# Patient Record
Sex: FEMALE | Race: WHITE | NOT HISPANIC OR LATINO | Employment: OTHER | URBAN - METROPOLITAN AREA
[De-identification: names, ages, dates, MRNs, and addresses within clinical notes are randomized per-mention and may not be internally consistent; named-entity substitution may affect disease eponyms.]

---

## 2017-02-22 ENCOUNTER — TELEPHONE (OUTPATIENT)
Dept: ONCOLOGY | Facility: CLINIC | Age: 52
End: 2017-02-22

## 2017-02-22 DIAGNOSIS — M85.80 OSTEOPENIA: Primary | ICD-10-CM

## 2017-02-22 DIAGNOSIS — C50.919 BREAST CANCER (H): ICD-10-CM

## 2017-02-22 NOTE — TELEPHONE ENCOUNTER
Name of caller:   Mayra Ramirez  Relationship to pt:  Self    Reason for call:   Patient calling to report that she broke her collar bone 14 months ago and it isn't healing. She is concerned that it's due to poor bone density since having chemo. Please advise    Best phone number to reach pt at is:   [phone]  Ok to leave a message with medical info?   Yes

## 2017-02-23 NOTE — TELEPHONE ENCOUNTER
Spoke with Dr. Wu regarding patient's concerns about her bone density.  Since patient is on Boniva, Dr. Wu's recommendation is for patient to see endocrinology for further discussion about her osteopenia concerns and recommendations.  Called patient back and relayed this information to her.  She would like our recommendation for an endocrinologist.  Placed referral for endocrinology and our schedulers will call her later today to set up appointment.  Pt voiced understanding instructions.  No further questions at this time.

## 2017-02-24 ENCOUNTER — OFFICE VISIT - HEALTHEAST (OUTPATIENT)
Dept: INTERNAL MEDICINE | Facility: CLINIC | Age: 52
End: 2017-02-24

## 2017-02-24 DIAGNOSIS — M25.561 ACUTE PAIN OF RIGHT KNEE: ICD-10-CM

## 2017-02-24 DIAGNOSIS — M79.672 FOOT PAIN, LEFT: ICD-10-CM

## 2017-02-24 ASSESSMENT — MIFFLIN-ST. JEOR: SCORE: 1432.29

## 2017-03-03 ENCOUNTER — TELEPHONE (OUTPATIENT)
Dept: ONCOLOGY | Facility: CLINIC | Age: 52
End: 2017-03-03

## 2017-03-03 NOTE — TELEPHONE ENCOUNTER
Patient is calling and would like a referral to a endocrinologist. Patient broke her collar bone in October 2016 and it still has not healed.  Dr. Wu mentioned she would like patient to see a endocrinologist.  Patient is calling for a referral and name.

## 2017-03-06 NOTE — TELEPHONE ENCOUNTER
Spoke with our schedulers.  They will contact patient today to get endocrinology apt set up.  Pt has Osteopenia and is on Boniva.  She has had trouble healing from her broken collar bone from Oct 2016.  Dr. Wu would like her to see Endocrinology for further recommendations.

## 2017-04-07 ENCOUNTER — OFFICE VISIT (OUTPATIENT)
Dept: ENDOCRINOLOGY | Facility: CLINIC | Age: 52
End: 2017-04-07

## 2017-04-07 VITALS
BODY MASS INDEX: 25.46 KG/M2 | SYSTOLIC BLOOD PRESSURE: 102 MMHG | WEIGHT: 168 LBS | DIASTOLIC BLOOD PRESSURE: 67 MMHG | HEIGHT: 68 IN | HEART RATE: 61 BPM

## 2017-04-07 DIAGNOSIS — M81.0 OSTEOPOROSIS: Primary | ICD-10-CM

## 2017-04-07 DIAGNOSIS — M81.0 OSTEOPOROSIS: ICD-10-CM

## 2017-04-07 LAB
ALBUMIN SERPL-MCNC: 3.9 G/DL (ref 3.4–5)
CALCIUM SERPL-MCNC: 9.2 MG/DL (ref 8.5–10.1)
PTH-INTACT SERPL-MCNC: 30 PG/ML (ref 12–72)

## 2017-04-07 ASSESSMENT — PAIN SCALES - GENERAL: PAINLEVEL: NO PAIN (0)
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
## 2017-04-07 NOTE — NURSING NOTE
Chief Complaint   Patient presents with     Consult     NEW PATIENT- OSTEOPENIA     Tenisha Garcia, [facility]  Endocrinology & Diabetes 3G

## 2017-04-07 NOTE — MR AVS SNAPSHOT
After Visit Summary   4/7/2017    Mayra Ramirez    MRN: [id number]           Patient Information     Date Of Birth          1965        Visit Information        Provider Department      4/7/2017 2:50 PM Ramona Sandhu MD [facility] Endocrinology        Today's Diagnoses     Osteoporosis    -  1       Follow-ups after your visit        Your next 10 appointments already scheduled     Apr 13, 2017  3:30 PM CDT   Return Visit with David Wu MD   [facility] ([facility])    [facility]  [address]  [facility] [address]   [phone]              Future tests that were ordered for you today     Open Future Orders        Priority Expected Expires Ordered    Osteocalcin Routine  4/8/2018 4/7/2017    Vitamin D Deficiency (D3 Only) Routine  4/7/2018 4/7/2017    Albumin level Routine  4/7/2018 4/7/2017    Calcium Routine  4/7/2018 4/7/2017    Bone specific alk phosphatase Routine  4/7/2018 4/7/2017    Parathyroid Hormone Intact Routine  4/7/2018 4/7/2017    : Laboratory Miscellaneous Order Routine  4/7/2018 4/7/2017            Who to contact     Please call your clinic at [phone] to:    Ask questions about your health    Make or cancel appointments    Discuss your medicines    Learn about your test results    Speak to your doctor   If you have compliments or concerns about an experience at your clinic, or if you wish to file a complaint, please contact [facility] Patient Relations at [phone] or email us at [email or web address]         Additional Information About Your Visit        MyChart Information     [employer] gives you secure access to your electronic health record. If you see a primary care provider, you can also send messages to your care team and make appointments. If you have questions, please call your primary care clinic.  If you do not have a  "primary care provider, please call [phone] and they will assist you.      [employer] is an electronic gateway that provides easy, online access to your medical records. With [employer], you can request a clinic appointment, read your test results, renew a prescription or communicate with your care team.     To access your existing account, please contact your [facility] or call [phone] for assistance.        Care EveryWhere ID     This is your Care EveryWhere ID. This could be used by other organizations to access your [place] medical records  [id number]        Your Vitals Were     Pulse Height BMI (Body Mass Index)             61 1.727 m (5' 8\") 25.54 kg/m2          Blood Pressure from Last 3 Encounters:   04/07/17 102/67   10/12/16 106/71   09/12/16 98/64    Weight from Last 3 Encounters:   04/07/17 76.2 kg (168 lb)   10/12/16 74.9 kg (165 lb 3.2 oz)   09/12/16 75.7 kg (166 lb 14.4 oz)                 Today's Medication Changes          These changes are accurate as of: 4/7/17  3:41 PM.  If you have any questions, ask your nurse or doctor.               These medicines have changed or have updated prescriptions.        Dose/Directions    letrozole 2.5 MG tablet   Commonly known as:  FEMARA   This may have changed:  Another medication with the same name was removed. Continue taking this medication, and follow the directions you see here.   Used for:  Malignant neoplasm of female breast, unspecified laterality, unspecified site of breast (H)   Changed by:  David Wu MD        Dose:  2.5 mg   Take 1 tablet (2.5 mg) by mouth daily   Quantity:  90 tablet   Refills:  3         Stop taking these medicines if you haven't already. Please contact your care team if you have questions.     ACIDOPHILUS PO   Stopped by:  Ramona Sandhu MD           FISH OIL PO   Stopped by:  Ramona Sandhu MD           ZANTAC PO   Stopped by:  Ramona Sandhu MD     "                Primary Care Provider Office Phone # Fax #    Bryson Enriquez [phone]       [facility] [address]  [address]  [facility] [address]        Thank you!     Thank you for choosing [facility] ENDOCRINOLOGY  for your care. Our goal is always to provide you with excellent care. Hearing back from our patients is one way we can continue to improve our services. Please take a few minutes to complete the written survey that you may receive in the mail after your visit with us. Thank you!             Your Updated Medication List - Protect others around you: Learn how to safely use, store and throw away your medicines at www.disposemymeds.org.          This list is accurate as of: 4/7/17  3:41 PM.  Always use your most recent med list.                   Brand Name Dispense Instructions for use    BIOTIN PO      Take 1 tablet by mouth daily.       CALCIUM + D PO      None Entered       COQ10 PO      Take 1 tablet by mouth daily.       estradiol 0.1 MG/GM cream    ESTRACE    45 g    Use 0.5 gm intravaginally at bedtime on Monday and Thurs PM for 4 wks as directed       IBANdronate 150 MG tablet    BONIVA    3 tablet    Take 1 tablet (150 mg) by mouth every 30 days       letrozole 2.5 MG tablet    FEMARA    90 tablet    Take 1 tablet (2.5 mg) by mouth daily       MAGNESIUM GLUCONATE PO          * order for DME     1 Device    Equipment being ordered: Please provide patient with mastectomy bras and forms/prosthesis as indicated       * order for DME     1 Device    Equipment being ordered: Mastectomy Bras and prosthesis as indicated.       PROBIOTIC ADVANCED PO          vitamin D 1000 UNITS capsule      Take 2 capsules by mouth daily.       * Notice:  This list has 2 medication(s) that are the same as other medications prescribed for you. Read the directions carefully, and ask your doctor or other care provider to review them with you.

## 2017-04-08 PROBLEM — M81.0 OSTEOPOROSIS: Status: ACTIVE | Noted: 2017-04-08

## 2017-04-08 LAB — ALP BONE SERPL-MCNC: 12.9 UG/L

## 2017-04-09 LAB — DEPRECATED CALCIDIOL+CALCIFEROL SERPL-MC: 47 UG/L (ref 20–75)

## 2017-04-11 LAB
COLLAGEN NTX SER-SCNC: 19.8 UMOL/L
OSTEOCALCIN SERPL-MCNC: 18 NG/ML

## 2017-04-12 ENCOUNTER — TELEPHONE (OUTPATIENT)
Dept: ENDOCRINOLOGY | Facility: CLINIC | Age: 52
End: 2017-04-12

## 2017-04-12 NOTE — TELEPHONE ENCOUNTER
Reviewed lab    Results for MONIQUE GRIFFIN (MRN [id number]) as of 4/12/2017 11:38   Ref. Range 4/7/2017 16:00   Calcium Latest Ref Range: 8.5 - 10.1 mg/dL 9.2   Albumin Latest Ref Range: 3.4 - 5.0 g/dL 3.9   Bone Spec Alk Phosphatase Unknown 12.9   N-Telopeptide X-Link Unknown 19.8   Osteocalcin Latest Units: ng/mL 18   Vitamin D Deficiency screening Latest Ref Range: 20 - 75 ug/L 47   Parathyroid Hormone Intact Latest Ref Range: 12 - 72 pg/mL 30     NTX which is bone resorption marker is in upper limit of normal indicates of high bone resorption. Question is raised whether oral Boniva is working or is absorbed or not.    Plan: consider IV zoledronic acid vs. denosumab    Left message for patient to call back.    Ramona Sandhu MD    Division of Diabetes and Endocrinology  Department of Medicine  [phone]

## 2017-04-13 ENCOUNTER — ONCOLOGY VISIT (OUTPATIENT)
Dept: ONCOLOGY | Facility: CLINIC | Age: 52
End: 2017-04-13
Attending: INTERNAL MEDICINE
Payer: COMMERCIAL

## 2017-04-13 ENCOUNTER — HOSPITAL ENCOUNTER (OUTPATIENT)
Facility: CLINIC | Age: 52
Setting detail: SPECIMEN
Discharge: HOME | End: 2017-04-13
Attending: INTERNAL MEDICINE | Admitting: INTERNAL MEDICINE
Payer: COMMERCIAL

## 2017-04-13 VITALS
HEART RATE: 63 BPM | WEIGHT: 169 LBS | HEIGHT: 68 IN | OXYGEN SATURATION: 99 % | RESPIRATION RATE: 16 BRPM | SYSTOLIC BLOOD PRESSURE: 111 MMHG | BODY MASS INDEX: 25.61 KG/M2 | DIASTOLIC BLOOD PRESSURE: 72 MMHG | TEMPERATURE: 97.5 F

## 2017-04-13 DIAGNOSIS — C50.919 MALIGNANT NEOPLASM OF FEMALE BREAST, UNSPECIFIED LATERALITY, UNSPECIFIED SITE OF BREAST: Primary | ICD-10-CM

## 2017-04-13 LAB
ALBUMIN SERPL-MCNC: 3.9 G/DL (ref 3.4–5)
ALP SERPL-CCNC: 73 U/L (ref 40–150)
ALT SERPL W P-5'-P-CCNC: 25 U/L (ref 0–50)
ANION GAP SERPL CALCULATED.3IONS-SCNC: 6 MMOL/L (ref 3–14)
AST SERPL W P-5'-P-CCNC: 14 U/L (ref 0–45)
BILIRUB SERPL-MCNC: 0.3 MG/DL (ref 0.2–1.3)
BUN SERPL-MCNC: 14 MG/DL (ref 7–30)
CALCIUM SERPL-MCNC: 8.8 MG/DL (ref 8.5–10.1)
CANCER AG27-29 SERPL-ACNC: 31 U/ML (ref 0–39)
CHLORIDE SERPL-SCNC: 105 MMOL/L (ref 94–109)
CO2 SERPL-SCNC: 30 MMOL/L (ref 20–32)
CREAT SERPL-MCNC: 0.82 MG/DL (ref 0.52–1.04)
ERYTHROCYTE [DISTWIDTH] IN BLOOD BY AUTOMATED COUNT: 13 % (ref 10–15)
GFR SERPL CREATININE-BSD FRML MDRD: 73 ML/MIN/1.7M2
GLUCOSE SERPL-MCNC: 97 MG/DL (ref 70–99)
HCT VFR BLD AUTO: 39.7 % (ref 35–47)
HGB BLD-MCNC: 13.2 G/DL (ref 11.7–15.7)
MCH RBC QN AUTO: 30.1 PG (ref 26.5–33)
MCHC RBC AUTO-ENTMCNC: 33.2 G/DL (ref 31.5–36.5)
MCV RBC AUTO: 91 FL (ref 78–100)
PLATELET # BLD AUTO: 196 10E9/L (ref 150–450)
POTASSIUM SERPL-SCNC: 4 MMOL/L (ref 3.4–5.3)
PROT SERPL-MCNC: 7.2 G/DL (ref 6.8–8.8)
RBC # BLD AUTO: 4.38 10E12/L (ref 3.8–5.2)
SODIUM SERPL-SCNC: 141 MMOL/L (ref 133–144)
WBC # BLD AUTO: 6.3 10E9/L (ref 4–11)

## 2017-04-13 PROCEDURE — 99211 OFF/OP EST MAY X REQ PHY/QHP: CPT

## 2017-04-13 PROCEDURE — 99214 OFFICE O/P EST MOD 30 MIN: CPT | Performed by: INTERNAL MEDICINE

## 2017-04-13 PROCEDURE — 80053 COMPREHEN METABOLIC PANEL: CPT | Performed by: INTERNAL MEDICINE

## 2017-04-13 PROCEDURE — 85027 COMPLETE CBC AUTOMATED: CPT | Performed by: INTERNAL MEDICINE

## 2017-04-13 PROCEDURE — 86300 IMMUNOASSAY TUMOR CA 15-3: CPT | Performed by: INTERNAL MEDICINE

## 2017-04-13 PROCEDURE — 36415 COLL VENOUS BLD VENIPUNCTURE: CPT

## 2017-04-13 ASSESSMENT — PAIN SCALES - GENERAL: PAINLEVEL: NO PAIN (0)

## 2017-04-13 NOTE — NURSING NOTE
"Mayra Ramirez is a 51 year old female who presents for:  Chief Complaint   Patient presents with     Oncology Clinic Visit     Follow-up        Initial Vitals:  /72 (BP Location: Right arm, Patient Position: Chair, Cuff Size: Adult Regular)  Pulse 63  Temp 97.5  F (36.4  C) (Tympanic)  Resp 16  Ht 1.727 m (5' 8\")  Wt 76.7 kg (169 lb)  SpO2 99%  BMI 25.7 kg/m2 Estimated body mass index is 25.7 kg/(m^2) as calculated from the following:    Height as of this encounter: 1.727 m (5' 8\").    Weight as of this encounter: 76.7 kg (169 lb).. Body surface area is 1.92 meters squared. BP completed using cuff size: regular  No Pain (0) No LMP recorded. Patient is not currently having periods (Reason: UNKNOWN). Allergies and medications reviewed.     Medications: Medication refills not needed today.  Pharmacy name entered into [employer]: [id number] [place] [address]    Comments: Follow-up    8 minutes for nursing intake (face to face time)   Roshni Lopez  DISCHARGE PLAN:  Next appointments: See patient instruction section  Departure Mode: Ambulatory  Accompanied by: self  0 minutes for nursing discharge (face to face time)   Roshni Lopez          "

## 2017-04-13 NOTE — PATIENT INSTRUCTIONS
RTC MD 6 months -Scheduled for 10/18/2017 @ 2:30. Selina RIVER.    Labs today   And on [id number]. Selina CHAVEZ printed and given to Pt. Selina RIVER.

## 2017-04-13 NOTE — MR AVS SNAPSHOT
After Visit Summary   4/13/2017    Mayra Ramirez    MRN: [id number]           Patient Information     Date Of Birth          1965        Visit Information        Provider Department      4/13/2017 3:30 PM David Wu MD [facility] On[facility]      Today's Diagnoses     Malignant neoplasm of female breast, unspecified laterality, unspecified site of breast (H)    -  1      Care Instructions    RTC MD 6 months -Scheduled for 10/18/2017 @ 2:30. Selina LEBLANC    Labs today   And on hxoxek-Cwjm-Sooektsrq. Selina RIVER.  AVS printed and given to Pt. Selina RIVER.             Follow-ups after your visit        Your next 10 appointments already scheduled     Oct 18, 2017  2:30 PM CDT   Return Visit with David Wu MD   [facility] ([facility])    [facility]  [address]  [address]  [facility] [id number] [phone]              Who to contact     If you have questions or need follow up information about today's clinic visit or your schedule please contact [facility] directly at [phone].  Normal or non-critical lab and imaging results will be communicated to you by MyChart, letter or phone within 4 business days after the clinic has received the results. If you do not hear from us within 7 days, please contact the clinic through Ogorodhart or phone. If you have a critical or abnormal lab result, we will notify you by phone as soon as possible.  Submit refill requests through Tenebril or call your pharmacy and they will forward the refill request to us. Please allow 3 business days for your refill to be completed.          Additional Information About Your Visit        MyChart Information     Tenebril gives you secure access to your electronic health record. If you see a primary care provider, you can also send messages to your care team and make appointments. If you have  "questions, please call your primary care clinic.  If you do not have a primary care provider, please call [phone] and they will assist you.        Care EveryWhere ID     This is your Care EveryWhere ID. This could be used by other organizations to access your [facility] medical records  [id number]        Your Vitals Were     Pulse Temperature Respirations Height Pulse Oximetry BMI (Body Mass Index)    63 97.5  F (36.4  C) (Tympanic) 16 1.727 m (5' 8\") 99% 25.7 kg/m2       Blood Pressure from Last 3 Encounters:   04/13/17 111/72   04/07/17 102/67   10/12/16 106/71    Weight from Last 3 Encounters:   04/13/17 76.7 kg (169 lb)   04/07/17 76.2 kg (168 lb)   10/12/16 74.9 kg (165 lb 3.2 oz)              We Performed the Following     Ca27.29  breast tumor marker     CBC with platelets     Comprehensive metabolic panel (BMP + Alb, Alk Phos, ALT, AST, Total. Bili, TP)          Today's Medication Changes          These changes are accurate as of: 4/13/17  4:39 PM.  If you have any questions, ask your nurse or doctor.               Stop taking these medicines if you haven't already. Please contact your care team if you have questions.     IBANdronate 150 MG tablet   Commonly known as:  BONIVA   Stopped by:  David Wu MD                    Primary Care Provider Office Phone # Fax #    Bryson Enriquez [phone]       [facility] [address]  [address]  [place] [id number]        Thank you!     Thank you for choosing [facility]  for your care. Our goal is always to provide you with excellent care. Hearing back from our patients is one way we can continue to improve our services. Please take a few minutes to complete the written survey that you may receive in the mail after your visit with us. Thank you!             Your Updated Medication List - Protect others around you: Learn how to safely use, store and throw away your medicines at " www.disposemymeds.org.          This list is accurate as of: 4/13/17  4:39 PM.  Always use your most recent med list.                   Brand Name Dispense Instructions for use    BIOTIN PO      Take 1 tablet by mouth daily.       CALCIUM + D PO      None Entered       COQ10 PO      Take 1 tablet by mouth daily.       estradiol 0.1 MG/GM cream    ESTRACE    45 g    Use 0.5 gm intravaginally at bedtime on Monday and Thurs PM for 4 wks as directed       letrozole 2.5 MG tablet    FEMARA    90 tablet    Take 1 tablet (2.5 mg) by mouth daily       MAGNESIUM GLUCONATE PO          * order for DME     1 Device    Equipment being ordered: Please provide patient with mastectomy bras and forms/prosthesis as indicated       * order for DME     1 Device    Equipment being ordered: Mastectomy Bras and prosthesis as indicated.       PROBIOTIC ADVANCED PO          vitamin D 1000 UNITS capsule      Take 2 capsules by mouth daily.       * Notice:  This list has 2 medication(s) that are the same as other medications prescribed for you. Read the directions carefully, and ask your doctor or other care provider to review them with you.

## 2017-04-14 NOTE — PROGRESS NOTES
HISTORY OF PRESENT ILLNESS:  Mayra Ramirez is a 51-year-old patient who comes in today for interim followup.  She has a history of metastatic breast cancer, is here for followup of that.  Mayra was initially diagnosed with left-sided breast cancer and then developed brain metastasis.  She has been in remission for approximately 11 years on Femara, and she also does Boniva for her bones.  She has had some problems recently with memory and also she has had some issues with osteopenia and osteoporosis.  She had an MRI scan done which showed that she had some shrinkage on her brain, probably from her previous surgery and radiation therapy.  She also had neuropsych testing.  She decided to quit her job in order to decrease stress levels and increase her sleep to see if it would help with her brain functioning.  She is currently out of work.  With regard to the bones, she could potentially stop the Boniva, and also I have discussed with her whether or not she wants to stop the Femara because the Femara is actually hurting her bones.  She is going to start with stopping the Boniva and see if it makes any difference.  At this point she wants to continue on the Femara.      REVIEW OF SYSTEMS:  A 14-point comprehensive review of systems apart from what is outlined above is otherwise unremarkable.      MEDICATIONS:  As charted.      ALLERGIES:  As charted.      PHYSICAL EXAMINATION:   VITAL SIGNS:  Stable.   HEENT:  Oropharynx clear, mucous membranes moist.   NECK:  Has no masses or goiter.   LYMPH:  There is no cervical, supraclavicular or infraclavicular adenopathy.   CHEST:  Clear to auscultation and percussion bilaterally.   HEART:  S1, S2 normal.  No added sounds or murmurs.   BREASTS:  The patient is status post bilateral mastectomies.  The scars look good.  Right and left axillae are negative.   ABDOMEN:  Soft and nontender, no hepatosplenomegaly.   EXTREMITIES:  Legs are without tenderness or edema.   LYMPHATIC:  No  evidence of lymphedema.   NEUROLOGIC:  The patient is alert and oriented x3.  Peripheral neurologic exam grossly intact.   LEFT CLAVICLE:  The patient had an injury to her left clavicle area so that bone looks abnormal and has not healed properly.      DATA REVIEW:  Labs are pending at time of dictation.      IMPRESSION:  A 51-year-old patient with a history of metastatic breast cancer.  She has had metastatic breast cancer to brain.  She has been on Femara for a number of years.  She has also been on Boniva.  We are going to give her a holiday from the [place].  She will see me back again in 6 months.  At some point she could also stop her Femara and see if she still does well without it.         AMARILIS SAHNI MD             D: 2017 17:17   T: 2017 04:10   MT: [email or web address]      Name:     MONIQUE GRIFFIN   MRN:      [id number]        Account:      [id number]   :      1965           Visit Date:   2017      Document: [id number]

## 2017-04-18 DIAGNOSIS — C50.919 BREAST CANCER (H): Primary | ICD-10-CM

## 2017-04-20 ENCOUNTER — TELEPHONE (OUTPATIENT)
Dept: ENDOCRINOLOGY | Facility: CLINIC | Age: 52
End: 2017-04-20

## 2017-04-20 NOTE — TELEPHONE ENCOUNTER
Pt called back Dr. Greene to discuss labs r/t delayed clavicle healing. Please advise at [phone]. DVM fine. Sent to endo [facility].

## 2017-04-21 NOTE — TELEPHONE ENCOUNTER
Results for MONIQUE GRIFFIN (MRN [id number]) as of 4/21/2017 12:33   Ref. Range 4/7/2017 16:00   Calcium Latest Ref Range: 8.5 - 10.1 mg/dL 9.2   Albumin Latest Ref Range: 3.4 - 5.0 g/dL 3.9   Bone Spec Alk Phosphatase Unknown 12.9   N-Telopeptide X-Link Unknown 19.8   Osteocalcin Latest Units: ng/mL 18   Vitamin D Deficiency screening Latest Ref Range: 20 - 75 ug/L 47   Parathyroid Hormone Intact Latest Ref Range: 12 - 72 pg/mL 30     Called and discussed with the patient. Recommended Reclast. Side effects discussed.  She will discuss with her  and notify me.    Ramona Sandhu MD    Division of Diabetes and Endocrinology  Department of Medicine  [phone]

## 2017-04-27 ENCOUNTER — TELEPHONE (OUTPATIENT)
Dept: ONCOLOGY | Facility: CLINIC | Age: 52
End: 2017-04-27

## 2017-04-27 NOTE — TELEPHONE ENCOUNTER
Returned 's many calls ~ patient's short term memory loss and what should be done. Spoke with MD and she wants results of neuropsych testing and suggested stopping Femara.  Patient does not want to stop Femara, but has stopped working, and has a plan to exercise, get plenty of sleep and eat well.  Writer enc patient to stick to plan and give herself time to see if changing lifestyle and reducing stress is enough to improve memory issues.  Patient is in agreement with the plan.  Will cont to follow periodically and patient will call with additional questions or concerns.  Patient has also stop[ped Boniva for the forseeable future de to needing a break per MD.  Patient is concerned about DX of osteopenia and osteopetrosis, but is aware MD is monitoring.  No other concerns at this time.  Joe Baer, RN, BSN, OCN  [facility]  Patient Care Coordinator

## 2017-04-27 NOTE — TELEPHONE ENCOUNTER
Pt  Jesse Ramirez called this morning, he would like Joe to give him a call, he has some questions regarding his wife losing her short term memory, he can be reached at [phone]. Ok to leave a message. Nida Yip

## 2017-04-28 ENCOUNTER — TELEPHONE (OUTPATIENT)
Dept: ONCOLOGY | Facility: CLINIC | Age: 52
End: 2017-04-28

## 2017-04-28 NOTE — TELEPHONE ENCOUNTER
Patient calling wanting to know what bone strengthener she needs to go on.    Per Dr Wu's note states she wants the patient to have a Boniva holiday and she will re-evaluate at her next follow-up in 6 months.      Patient wants to know what new bone strengthener Dr Wu will put her on.    Explained to patient that she has not recommended anything because she wants her to have a holiday since she has been on boniva for a long time.    Patient states understanding.    Leda Patel RN BSN OCN

## 2017-04-28 NOTE — TELEPHONE ENCOUNTER
Remington, patient's spouse called ~ patient's short term memory loss.  Reports that patient has had 2 MRI's completed in May of 2015 and 2016.  Would like to have an additional MRI completed in May 2017 to have as a comparison.  2015 and 2016 MRI's showed brain shrinkage which may explain patient's STM loss.  Will discuss with MD and will also obtain info from [facility] ~ her neuro psych testing which was completed in Dec 2016 and reviewed in January 2017.  Patient has had her MRI's completed at [facility]. Patient's spouse would also like whatever paperwork is needed to be signed so he can receive information or sign on her behalf if she continues to have problems with her STM.  Writer did explain that his wife would need to sign off on all paperwork and he was aware of this fact.  Patient's email: [email or web address]

## 2017-05-03 ENCOUNTER — TELEPHONE (OUTPATIENT)
Dept: ONCOLOGY | Facility: CLINIC | Age: 52
End: 2017-05-03

## 2017-05-03 NOTE — TELEPHONE ENCOUNTER
Patient called and states she is schedule to do an MRI through her neurologist. She was told that they will do the MRI without contrast and was wondering if Dr. Wu, wanted her to do an MRI also since she thinks Dr. Wu, orders it with contrast.

## 2017-05-03 NOTE — TELEPHONE ENCOUNTER
Per review of chart, previous brain MRIs have been ordered without and with contrast.  Called pt.  Pt states that she is driving now and will call back later.

## 2017-05-04 NOTE — TELEPHONE ENCOUNTER
Talked with pt.  Pt states that her neurologist would like her to have another brain MRI done.  MRI will be ordered by neurologist.  Is wondering if her previous MRIs of the brain have used contrast?  Pt states that she thinks she has had contrast in the past.  Per review of chart, MRI of the brain from 5/4/2016 did use contrast.  Also MRI that was scanned into chart from [facility] in 2015 also used contrast.  Pt states that she will let her neurologist know that previous MRIs have used contrast.  Pt will also arrange to have MRI results sent to Dr Wu.  Pt verbalized understanding.  No further action needed at this time.    Jody Dutton RN BSN

## 2017-05-12 ENCOUNTER — TRANSFERRED RECORDS (OUTPATIENT)
Dept: HEALTH INFORMATION MANAGEMENT | Facility: CLINIC | Age: 52
End: 2017-05-12

## 2017-05-30 ENCOUNTER — TRANSFERRED RECORDS (OUTPATIENT)
Dept: HEALTH INFORMATION MANAGEMENT | Facility: CLINIC | Age: 52
End: 2017-05-30

## 2017-07-11 ENCOUNTER — COMMUNICATION - HEALTHEAST (OUTPATIENT)
Dept: FAMILY MEDICINE | Facility: CLINIC | Age: 52
End: 2017-07-11

## 2017-07-12 ENCOUNTER — AMBULATORY - HEALTHEAST (OUTPATIENT)
Dept: LAB | Facility: CLINIC | Age: 52
End: 2017-07-12

## 2017-07-12 DIAGNOSIS — R53.82 CHRONIC FATIGUE: ICD-10-CM

## 2017-07-13 ENCOUNTER — AMBULATORY - HEALTHEAST (OUTPATIENT)
Dept: NEUROLOGY | Facility: CLINIC | Age: 52
End: 2017-07-13

## 2017-07-13 DIAGNOSIS — G31.84 MCI (MILD COGNITIVE IMPAIRMENT): ICD-10-CM

## 2017-07-19 ENCOUNTER — HOSPITAL ENCOUNTER (OUTPATIENT)
Dept: NEUROLOGY | Facility: CLINIC | Age: 52
Setting detail: THERAPIES SERIES
Discharge: STILL ADMITTED OR ADMINISTRATIVE | End: 2017-07-19
Attending: PSYCHOLOGIST

## 2017-07-19 DIAGNOSIS — G31.84 MCI (MILD COGNITIVE IMPAIRMENT): ICD-10-CM

## 2017-07-19 DIAGNOSIS — F43.22 ADJUSTMENT DISORDER WITH ANXIETY: ICD-10-CM

## 2017-08-28 ENCOUNTER — OFFICE VISIT - HEALTHEAST (OUTPATIENT)
Dept: VASCULAR SURGERY | Facility: CLINIC | Age: 52
End: 2017-08-28

## 2017-08-28 ENCOUNTER — TRANSFERRED RECORDS (OUTPATIENT)
Dept: HEALTH INFORMATION MANAGEMENT | Facility: CLINIC | Age: 52
End: 2017-08-28

## 2017-08-28 DIAGNOSIS — L90.5 SCAR CONDITION AND FIBROSIS OF SKIN: ICD-10-CM

## 2017-08-28 DIAGNOSIS — R07.89 LEFT-SIDED CHEST WALL PAIN: ICD-10-CM

## 2017-08-28 DIAGNOSIS — I97.2 POST-MASTECTOMY LYMPHEDEMA SYNDROME: ICD-10-CM

## 2017-08-28 DIAGNOSIS — S42.022A: ICD-10-CM

## 2017-08-28 DIAGNOSIS — C50.919 BREAST CANCER METASTASIZED TO BRAIN, UNSPECIFIED LATERALITY (H): ICD-10-CM

## 2017-08-28 DIAGNOSIS — M24.512 CONTRACTURE, LEFT SHOULDER: ICD-10-CM

## 2017-08-28 DIAGNOSIS — C79.31 BREAST CANCER METASTASIZED TO BRAIN, UNSPECIFIED LATERALITY (H): ICD-10-CM

## 2017-08-28 ASSESSMENT — MIFFLIN-ST. JEOR: SCORE: 1386.94

## 2017-09-11 ENCOUNTER — TELEPHONE (OUTPATIENT)
Dept: ONCOLOGY | Facility: CLINIC | Age: 52
End: 2017-09-11

## 2017-09-11 ENCOUNTER — OFFICE VISIT - HEALTHEAST (OUTPATIENT)
Dept: PHYSICAL THERAPY | Facility: REHABILITATION | Age: 52
End: 2017-09-11

## 2017-09-11 DIAGNOSIS — C50.919 BREAST CANCER (H): Primary | ICD-10-CM

## 2017-09-11 DIAGNOSIS — R60.0 LOCALIZED EDEMA: ICD-10-CM

## 2017-09-11 DIAGNOSIS — R07.89 LEFT-SIDED CHEST WALL PAIN: ICD-10-CM

## 2017-09-11 DIAGNOSIS — M25.612 DECREASED ROM OF LEFT SHOULDER: ICD-10-CM

## 2017-09-12 ENCOUNTER — MEDICAL CORRESPONDENCE (OUTPATIENT)
Dept: HEALTH INFORMATION MANAGEMENT | Facility: CLINIC | Age: 52
End: 2017-09-12

## 2017-09-12 NOTE — TELEPHONE ENCOUNTER
Return call to pt who states she had a mastectomy in the past and will need new prescription for mastectomy bras and prosthesis, 2 each.  Requests order is sent to [facility] at [phone].  Prescriptions completed but will need signature from Dr Wu tomorrow.    Pt also states she is having problems with her balance that started within the last 6 months since pt has seen Dr Wu in April, 2017.  Pt states she has had Whole Brain RT in past for brain mets and has had memory issues and did have therapy at [facility] in [place] in the past.  Pt states she would like referral for [facility] again. Along with referral, pt states she is having more trouble with balance in the last 1-2 months and notes issues when she is not walking on a flat surface, walking at the fair, walking down stairs. Pt denies headaches, n/v, visual changes. Informed pt we will need to discuss with Dr Wu tomorrow when in clinic. Pt then states she cannot recall dates but has also seen neurology in past for memory issues but saw neuro at [facility] this summer and had Pet and other scans as well but informed her issues are related to past RT. Pt will call [facility] and have records faxed to our clinic.     Belkis Hooper, RN, BSN

## 2017-09-13 ENCOUNTER — MEDICAL CORRESPONDENCE (OUTPATIENT)
Dept: HEALTH INFORMATION MANAGEMENT | Facility: CLINIC | Age: 52
End: 2017-09-13

## 2017-09-13 NOTE — TELEPHONE ENCOUNTER
Called patient.  We have her referral to the [facility] that Dr. Wu signed.  We also have a prescription for 2 mastectomy bras and prosthesis that we will fax.  We just need to know where patient would like to have her [facility] referral sent to (which office location).  No answer.  Left voicemail for patient to call us back.

## 2017-09-13 NOTE — TELEPHONE ENCOUNTER
Was able to speak with patient this morning.  She'd like her [facility] Referral sent to the [place] office.  Told patient we will fax it to that location.  Also, told patient we faxed her prescription for the 2 mastectomy bras to [facility] as requested.  Forms faxed and sent to scanning.  Pt also updated us that she called [facility] and they plan to push her PET scan results to us.  She spoke with [facility] and they will send us her MRI results via mail.  No further questions or concerns at this time.

## 2017-09-14 ENCOUNTER — OFFICE VISIT - HEALTHEAST (OUTPATIENT)
Dept: PHYSICAL THERAPY | Facility: REHABILITATION | Age: 52
End: 2017-09-14

## 2017-09-14 DIAGNOSIS — M25.612 DECREASED ROM OF LEFT SHOULDER: ICD-10-CM

## 2017-09-14 DIAGNOSIS — R07.89 LEFT-SIDED CHEST WALL PAIN: ICD-10-CM

## 2017-09-14 DIAGNOSIS — R60.0 LOCALIZED EDEMA: ICD-10-CM

## 2017-09-18 ENCOUNTER — OFFICE VISIT - HEALTHEAST (OUTPATIENT)
Dept: PHYSICAL THERAPY | Facility: REHABILITATION | Age: 52
End: 2017-09-18

## 2017-09-18 DIAGNOSIS — R60.0 LOCALIZED EDEMA: ICD-10-CM

## 2017-09-18 DIAGNOSIS — R07.89 LEFT-SIDED CHEST WALL PAIN: ICD-10-CM

## 2017-09-18 DIAGNOSIS — M25.612 DECREASED ROM OF LEFT SHOULDER: ICD-10-CM

## 2017-09-21 ENCOUNTER — OFFICE VISIT - HEALTHEAST (OUTPATIENT)
Dept: PHYSICAL THERAPY | Facility: REHABILITATION | Age: 52
End: 2017-09-21

## 2017-09-21 DIAGNOSIS — M25.612 DECREASED ROM OF LEFT SHOULDER: ICD-10-CM

## 2017-09-21 DIAGNOSIS — L90.5 SCAR CONDITION AND FIBROSIS OF SKIN: ICD-10-CM

## 2017-09-21 DIAGNOSIS — R07.89 LEFT-SIDED CHEST WALL PAIN: ICD-10-CM

## 2017-09-21 DIAGNOSIS — M24.512 CONTRACTURE, LEFT SHOULDER: ICD-10-CM

## 2017-09-21 DIAGNOSIS — I97.2 POST-MASTECTOMY LYMPHEDEMA SYNDROME: ICD-10-CM

## 2017-09-21 DIAGNOSIS — R60.0 LOCALIZED EDEMA: ICD-10-CM

## 2017-09-23 DIAGNOSIS — N95.2 ATROPHIC VAGINITIS: ICD-10-CM

## 2017-09-24 NOTE — TELEPHONE ENCOUNTER
estradiol (ESTRACE) 0.1 MG/GM vaginal cream      Last Written Prescription Date: 08/27/15  Last Fill Quantity: 45g, # refills: 1  Last Office Visit with JEFF, HARLEY or [facility] prescribing provider: 09/12/16  Next 5 appointments (look out 90 days)     Oct 25, 2017 10:30 AM CDT   (Arrive by 6:30 AM)   Return Visit with David Wu MD   [facility] ([facility])    [facility]  [address]  [address]  [facility] [address]   [phone]                   BP Readings from Last 3 Encounters:   04/13/17 111/72   04/07/17 102/67   10/12/16 106/71     Date of last Breast Exam: not charted

## 2017-09-25 ENCOUNTER — OFFICE VISIT - HEALTHEAST (OUTPATIENT)
Dept: PHYSICAL THERAPY | Facility: REHABILITATION | Age: 52
End: 2017-09-25

## 2017-09-25 DIAGNOSIS — R60.0 LOCALIZED EDEMA: ICD-10-CM

## 2017-09-25 DIAGNOSIS — L90.5 SCAR CONDITION AND FIBROSIS OF SKIN: ICD-10-CM

## 2017-09-25 DIAGNOSIS — R07.89 LEFT-SIDED CHEST WALL PAIN: ICD-10-CM

## 2017-09-25 DIAGNOSIS — M24.512 CONTRACTURE, LEFT SHOULDER: ICD-10-CM

## 2017-09-25 DIAGNOSIS — I97.2 POST-MASTECTOMY LYMPHEDEMA SYNDROME: ICD-10-CM

## 2017-09-25 DIAGNOSIS — M25.612 DECREASED ROM OF LEFT SHOULDER: ICD-10-CM

## 2017-09-25 DIAGNOSIS — Z85.3 HISTORY OF BREAST CANCER: ICD-10-CM

## 2017-09-26 RX ORDER — ESTRADIOL 0.1 MG/G
CREAM VAGINAL
Qty: 42.5 G | Refills: 1 | Status: SHIPPED | OUTPATIENT
Start: 2017-09-26 | End: 2018-04-19

## 2017-09-26 NOTE — TELEPHONE ENCOUNTER
Dr. Enriquez please ok this medication with past hx of Cancer wanted to clarify ok to use .Rosa Elena Cody RN

## 2017-09-27 ENCOUNTER — TRANSFERRED RECORDS (OUTPATIENT)
Dept: HEALTH INFORMATION MANAGEMENT | Facility: CLINIC | Age: 52
End: 2017-09-27

## 2017-09-28 ENCOUNTER — OFFICE VISIT - HEALTHEAST (OUTPATIENT)
Dept: PHYSICAL THERAPY | Facility: REHABILITATION | Age: 52
End: 2017-09-28

## 2017-09-28 DIAGNOSIS — I97.2 POST-MASTECTOMY LYMPHEDEMA SYNDROME: ICD-10-CM

## 2017-09-28 DIAGNOSIS — L90.5 SCAR CONDITION AND FIBROSIS OF SKIN: ICD-10-CM

## 2017-09-28 DIAGNOSIS — R07.89 LEFT-SIDED CHEST WALL PAIN: ICD-10-CM

## 2017-09-28 DIAGNOSIS — M24.512 CONTRACTURE, LEFT SHOULDER: ICD-10-CM

## 2017-10-03 ENCOUNTER — OFFICE VISIT - HEALTHEAST (OUTPATIENT)
Dept: PHYSICAL THERAPY | Facility: REHABILITATION | Age: 52
End: 2017-10-03

## 2017-10-03 DIAGNOSIS — L90.5 SCAR CONDITION AND FIBROSIS OF SKIN: ICD-10-CM

## 2017-10-03 DIAGNOSIS — R07.89 LEFT-SIDED CHEST WALL PAIN: ICD-10-CM

## 2017-10-03 DIAGNOSIS — M24.512 CONTRACTURE, LEFT SHOULDER: ICD-10-CM

## 2017-10-03 DIAGNOSIS — M25.612 DECREASED ROM OF LEFT SHOULDER: ICD-10-CM

## 2017-10-03 DIAGNOSIS — I97.2 POST-MASTECTOMY LYMPHEDEMA SYNDROME: ICD-10-CM

## 2017-10-05 ENCOUNTER — OFFICE VISIT - HEALTHEAST (OUTPATIENT)
Dept: PHYSICAL THERAPY | Facility: REHABILITATION | Age: 52
End: 2017-10-05

## 2017-10-05 DIAGNOSIS — R60.0 LOCALIZED EDEMA: ICD-10-CM

## 2017-10-05 DIAGNOSIS — R07.89 LEFT-SIDED CHEST WALL PAIN: ICD-10-CM

## 2017-10-05 DIAGNOSIS — M24.512 CONTRACTURE, LEFT SHOULDER: ICD-10-CM

## 2017-10-05 DIAGNOSIS — L90.5 SCAR CONDITION AND FIBROSIS OF SKIN: ICD-10-CM

## 2017-10-05 DIAGNOSIS — M25.612 DECREASED ROM OF LEFT SHOULDER: ICD-10-CM

## 2017-10-05 DIAGNOSIS — I97.2 POST-MASTECTOMY LYMPHEDEMA SYNDROME: ICD-10-CM

## 2017-10-09 ENCOUNTER — OFFICE VISIT - HEALTHEAST (OUTPATIENT)
Dept: PHYSICAL THERAPY | Facility: REHABILITATION | Age: 52
End: 2017-10-09

## 2017-10-09 DIAGNOSIS — R07.89 LEFT-SIDED CHEST WALL PAIN: ICD-10-CM

## 2017-10-09 DIAGNOSIS — L90.5 SCAR CONDITION AND FIBROSIS OF SKIN: ICD-10-CM

## 2017-10-09 DIAGNOSIS — M24.512 CONTRACTURE, LEFT SHOULDER: ICD-10-CM

## 2017-10-09 DIAGNOSIS — I97.2 POST-MASTECTOMY LYMPHEDEMA SYNDROME: ICD-10-CM

## 2017-10-25 ENCOUNTER — ONCOLOGY VISIT (OUTPATIENT)
Dept: ONCOLOGY | Facility: CLINIC | Age: 52
End: 2017-10-25
Attending: INTERNAL MEDICINE
Payer: COMMERCIAL

## 2017-10-25 ENCOUNTER — HOSPITAL ENCOUNTER (OUTPATIENT)
Facility: CLINIC | Age: 52
Setting detail: SPECIMEN
Discharge: HOME | End: 2017-10-25
Attending: INTERNAL MEDICINE | Admitting: INTERNAL MEDICINE
Payer: COMMERCIAL

## 2017-10-25 VITALS
SYSTOLIC BLOOD PRESSURE: 104 MMHG | RESPIRATION RATE: 16 BRPM | HEIGHT: 68 IN | OXYGEN SATURATION: 100 % | TEMPERATURE: 96 F | DIASTOLIC BLOOD PRESSURE: 69 MMHG | WEIGHT: 170 LBS | BODY MASS INDEX: 25.76 KG/M2

## 2017-10-25 DIAGNOSIS — Z17.0 MALIGNANT NEOPLASM OF LEFT BREAST IN FEMALE, ESTROGEN RECEPTOR POSITIVE, UNSPECIFIED SITE OF BREAST (H): Primary | ICD-10-CM

## 2017-10-25 DIAGNOSIS — C50.912 MALIGNANT NEOPLASM OF LEFT BREAST IN FEMALE, ESTROGEN RECEPTOR POSITIVE, UNSPECIFIED SITE OF BREAST (H): Primary | ICD-10-CM

## 2017-10-25 LAB
ALBUMIN SERPL-MCNC: 4 G/DL (ref 3.4–5)
ALP SERPL-CCNC: 76 U/L (ref 40–150)
ALT SERPL W P-5'-P-CCNC: 26 U/L (ref 0–50)
ANION GAP SERPL CALCULATED.3IONS-SCNC: 5 MMOL/L (ref 3–14)
AST SERPL W P-5'-P-CCNC: 13 U/L (ref 0–45)
BILIRUB SERPL-MCNC: 0.7 MG/DL (ref 0.2–1.3)
BUN SERPL-MCNC: 15 MG/DL (ref 7–30)
CALCIUM SERPL-MCNC: 9 MG/DL (ref 8.5–10.1)
CANCER AG27-29 SERPL-ACNC: 31 U/ML (ref 0–39)
CHLORIDE SERPL-SCNC: 104 MMOL/L (ref 94–109)
CO2 SERPL-SCNC: 29 MMOL/L (ref 20–32)
CREAT SERPL-MCNC: 0.75 MG/DL (ref 0.52–1.04)
ERYTHROCYTE [DISTWIDTH] IN BLOOD BY AUTOMATED COUNT: 13.2 % (ref 10–15)
GFR SERPL CREATININE-BSD FRML MDRD: 81 ML/MIN/1.7M2
GLUCOSE SERPL-MCNC: 92 MG/DL (ref 70–99)
HCT VFR BLD AUTO: 42.7 % (ref 35–47)
HGB BLD-MCNC: 14.2 G/DL (ref 11.7–15.7)
MCH RBC QN AUTO: 29.9 PG (ref 26.5–33)
MCHC RBC AUTO-ENTMCNC: 33.3 G/DL (ref 31.5–36.5)
MCV RBC AUTO: 90 FL (ref 78–100)
PLATELET # BLD AUTO: 214 10E9/L (ref 150–450)
POTASSIUM SERPL-SCNC: 3.9 MMOL/L (ref 3.4–5.3)
PROT SERPL-MCNC: 7.7 G/DL (ref 6.8–8.8)
RBC # BLD AUTO: 4.75 10E12/L (ref 3.8–5.2)
SODIUM SERPL-SCNC: 138 MMOL/L (ref 133–144)
WBC # BLD AUTO: 5 10E9/L (ref 4–11)

## 2017-10-25 PROCEDURE — 99214 OFFICE O/P EST MOD 30 MIN: CPT | Performed by: INTERNAL MEDICINE

## 2017-10-25 PROCEDURE — 80053 COMPREHEN METABOLIC PANEL: CPT | Performed by: INTERNAL MEDICINE

## 2017-10-25 PROCEDURE — 36415 COLL VENOUS BLD VENIPUNCTURE: CPT

## 2017-10-25 PROCEDURE — 85027 COMPLETE CBC AUTOMATED: CPT | Performed by: INTERNAL MEDICINE

## 2017-10-25 PROCEDURE — 86300 IMMUNOASSAY TUMOR CA 15-3: CPT | Performed by: INTERNAL MEDICINE

## 2017-10-25 PROCEDURE — 99211 OFF/OP EST MAY X REQ PHY/QHP: CPT

## 2017-10-25 RX ORDER — LETROZOLE 2.5 MG/1
2.5 TABLET, FILM COATED ORAL DAILY
Qty: 90 TABLET | Refills: 3 | Status: CANCELLED | OUTPATIENT
Start: 2017-10-25

## 2017-10-25 ASSESSMENT — PAIN SCALES - GENERAL: PAINLEVEL: NO PAIN (0)

## 2017-10-25 NOTE — PATIENT INSTRUCTIONS
RTC MD 12 months  - scheduled/Karly  Bone density next available   - Faxed order to [facility].  Pt will now call to schedule at her convenience.  Karly    Labs today -Roshni CHAVEZ printed and given.  Karly

## 2017-10-25 NOTE — NURSING NOTE
"Oncology Rooming Note    October 25, 2017 10:37 AM   HeidiAngela Ramirez is a 52 year old female who presents for:    Chief Complaint   Patient presents with     Oncology Clinic Visit     Follow-up     Initial Vitals: Resp 16  Ht 1.727 m (5' 8\")  Wt 77.1 kg (170 lb)  BMI 25.85 kg/m2 Estimated body mass index is 25.85 kg/(m^2) as calculated from the following:    Height as of this encounter: 1.727 m (5' 8\").    Weight as of this encounter: 77.1 kg (170 lb). Body surface area is 1.92 meters squared.  No Pain (0) Comment: Data Unavailable   No LMP recorded. Patient is not currently having periods (Reason: UNKNOWN).  Allergies reviewed: Yes  Medications reviewed: Yes    Medications: MEDICATION REFILLS NEEDED TODAY. Provider was notified.  Pharmacy name entered into Ph[facility]: [id number] [place] [address]    Clinical concerns: Follow-up, patient would like a refill on her Famara     8 minutes for nursing intake (face to face time)   DISCHARGE PLAN:  Next appointments: See patient instruction section  Departure Mode: Ambulatory  Accompanied by: self  0 minutes for nursing discharge (face to face time)   Roshni Lopez                  "
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
## 2017-10-25 NOTE — MR AVS SNAPSHOT
After Visit Summary   10/25/2017    Mayra Ramirez    MRN: [id number]           Patient Information     Date Of Birth          1965        Visit Information        Provider Department      10/25/2017 10:30 AM David Wu MD [facility]        Today's Diagnoses     Malignant neoplasm of left breast in female, estrogen receptor positive, unspecified site of breast (H)    -  1      Care Instructions    RTC MD 12 months      Bone density next available         Labs today -[facility]          Follow-ups after your visit        Your next 10 appointments already scheduled     Oct 25, 2018 12:30 PM CDT   Return Visit with David Wu MD   [facility] ([facility])    [facility]  [address]  [facility] [address]  [facility] [id number] [phone]              Future tests that were ordered for you today     Open Future Orders        Priority Expected Expires Ordered    DX Hip/Pelvis/Spine Routine  10/25/2018 10/25/2017            Who to contact     If you have questions or need follow up information about today's clinic visit or your schedule please contact [facility] directly at [phone].  Normal or non-critical lab and imaging results will be communicated to you by MyChart, letter or phone within 4 business days after the clinic has received the results. If you do not hear from us within 7 days, please contact the clinic through [employer] or phone. If you have a critical or abnormal lab result, we will notify you by phone as soon as possible.  Submit refill requests through [employer] or call your pharmacy and they will forward the refill request to us. Please allow 3 business days for your refill to be completed.          Additional Information About Your Visit        MyChart Information     [employer] gives you secure access to your electronic health record. If you see a  "primary care provider, you can also send messages to your care team and make appointments. If you have questions, please call your primary care clinic.  If you do not have a primary care provider, please call [phone] and they will assist you.        Care EveryWhere ID     This is your Care EveryWhere ID. This could be used by other organizations to access your [facility] medical records  [id number]        Your Vitals Were     Temperature Respirations Height Pulse Oximetry BMI (Body Mass Index)       96  F (35.6  C) (Tympanic) 16 1.727 m (5' 8\") 100% 25.85 kg/m2        Blood Pressure from Last 3 Encounters:   10/25/17 104/69   04/13/17 111/72   04/07/17 102/67    Weight from Last 3 Encounters:   10/25/17 77.1 kg (170 lb)   04/13/17 76.7 kg (169 lb)   04/07/17 76.2 kg (168 lb)              We Performed the Following     Ca27.29  breast tumor marker     CBC with platelets     Comprehensive metabolic panel (BMP + Alb, Alk Phos, ALT, AST, Total. Bili, TP)        Primary Care Provider Office Phone # Fax #    Bryson Enriquez [phone]       [address]  [address]  [facility] [address]        Equal Access to Services     NIMISHA MEJIA : Hadii aad ku hadasho Soomaali, waaxda luqadaha, qaybta kaalmada adeegyada, waxay idiin haykenyan odessa turpin . So [facility] [phone].    ATENCIÓN: Si habla español, tiene a acuna disposición servicios gratuitos de asistencia lingüística. Llame al [phone].    We comply with applicable federal civil rights laws and [place] laws. We do not discriminate on the basis of race, color, national origin, age, disability, sex, sexual orientation, or gender identity.            Thank you!     Thank you for choosing [facility]  for your care. Our goal is always to provide you with excellent care. Hearing back from our patients is one way we can continue to improve our services. Please take a few minutes to complete the written survey that " you may receive in the mail after your visit with us. Thank you!             Your Updated Medication List - Protect others around you: Learn how to safely use, store and throw away your medicines at www.disposemymeds.org.          This list is accurate as of: 10/25/17 11:59 AM.  Always use your most recent med list.                   Brand Name Dispense Instructions for use Diagnosis    BONE DENSITY BUILDER PO           CALCIUM + D PO      None Entered    Routine gynecological examination       COQ10 PO      Take 1 tablet by mouth daily.        ESTRACE VAGINAL 0.1 MG/GM cream   Generic drug:  estradiol     42.5 g    USE 0.5GM INTRAVAGINALLY AT BEDTIME ON MONDAY AND THURSDAY FOR 4 WEEKS AS DIRECTED    Atrophic vaginitis       letrozole 2.5 MG tablet    FEMARA    90 tablet    Take 1 tablet (2.5 mg) by mouth daily    Malignant neoplasm of female breast, unspecified laterality, unspecified site of breast       MAGNESIUM GLUCONATE PO           * order for DME     1 Device    Equipment being ordered: Please provide patient with mastectomy bras and forms/prosthesis as indicated    Breast cancer (H)       * order for DME     1 Device    Equipment being ordered: Mastectomy Bras and prosthesis as indicated.    Breast cancer (H)       * order for DME     2 Units    2 Mastectomy Bras and 2 Prosthesis    Breast cancer (H)       PROBIOTIC ADVANCED PO       Osteoporosis       UNABLE TO FIND      MEDICATION NAME: Brain Restore        vitamin D 1000 UNITS capsule      Take 2 capsules by mouth daily.        * Notice:  This list has 3 medication(s) that are the same as other medications prescribed for you. Read the directions carefully, and ask your doctor or other care provider to review them with you.
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
## 2017-10-27 ENCOUNTER — OFFICE VISIT - HEALTHEAST (OUTPATIENT)
Dept: PHYSICAL THERAPY | Facility: REHABILITATION | Age: 52
End: 2017-10-27

## 2017-10-27 DIAGNOSIS — Z85.3 HISTORY OF BREAST CANCER: ICD-10-CM

## 2017-10-27 DIAGNOSIS — R60.0 LOCALIZED EDEMA: ICD-10-CM

## 2017-10-27 DIAGNOSIS — R07.89 LEFT-SIDED CHEST WALL PAIN: ICD-10-CM

## 2017-10-27 DIAGNOSIS — I97.2 POST-MASTECTOMY LYMPHEDEMA SYNDROME: ICD-10-CM

## 2017-10-27 DIAGNOSIS — M24.512 CONTRACTURE, LEFT SHOULDER: ICD-10-CM

## 2017-10-27 DIAGNOSIS — M25.612 DECREASED ROM OF LEFT SHOULDER: ICD-10-CM

## 2017-10-27 DIAGNOSIS — L90.5 SCAR CONDITION AND FIBROSIS OF SKIN: ICD-10-CM

## 2017-10-30 ENCOUNTER — OFFICE VISIT - HEALTHEAST (OUTPATIENT)
Dept: PHYSICAL THERAPY | Facility: REHABILITATION | Age: 52
End: 2017-10-30

## 2017-10-30 DIAGNOSIS — R60.0 LOCALIZED EDEMA: ICD-10-CM

## 2017-10-30 DIAGNOSIS — M25.612 DECREASED ROM OF LEFT SHOULDER: ICD-10-CM

## 2017-10-30 DIAGNOSIS — M24.512 CONTRACTURE, LEFT SHOULDER: ICD-10-CM

## 2017-10-30 DIAGNOSIS — L90.5 SCAR CONDITION AND FIBROSIS OF SKIN: ICD-10-CM

## 2017-10-30 DIAGNOSIS — I97.2 POST-MASTECTOMY LYMPHEDEMA SYNDROME: ICD-10-CM

## 2017-10-30 DIAGNOSIS — R07.89 LEFT-SIDED CHEST WALL PAIN: ICD-10-CM

## 2017-11-06 ENCOUNTER — OFFICE VISIT - HEALTHEAST (OUTPATIENT)
Dept: PHYSICAL THERAPY | Facility: REHABILITATION | Age: 52
End: 2017-11-06

## 2017-11-06 DIAGNOSIS — R07.89 LEFT-SIDED CHEST WALL PAIN: ICD-10-CM

## 2017-11-06 DIAGNOSIS — M24.512 CONTRACTURE, LEFT SHOULDER: ICD-10-CM

## 2017-11-06 DIAGNOSIS — L90.5 SCAR CONDITION AND FIBROSIS OF SKIN: ICD-10-CM

## 2017-11-06 DIAGNOSIS — I97.2 POST-MASTECTOMY LYMPHEDEMA SYNDROME: ICD-10-CM

## 2017-11-08 ENCOUNTER — OFFICE VISIT - HEALTHEAST (OUTPATIENT)
Dept: PHYSICAL THERAPY | Facility: REHABILITATION | Age: 52
End: 2017-11-08

## 2017-11-08 DIAGNOSIS — L90.5 SCAR CONDITION AND FIBROSIS OF SKIN: ICD-10-CM

## 2017-11-08 DIAGNOSIS — R07.89 LEFT-SIDED CHEST WALL PAIN: ICD-10-CM

## 2017-11-08 DIAGNOSIS — I97.2 POST-MASTECTOMY LYMPHEDEMA SYNDROME: ICD-10-CM

## 2017-11-08 DIAGNOSIS — M24.512 CONTRACTURE, LEFT SHOULDER: ICD-10-CM

## 2017-11-10 ENCOUNTER — TELEPHONE (OUTPATIENT)
Dept: ONCOLOGY | Facility: CLINIC | Age: 52
End: 2017-11-10

## 2017-11-10 DIAGNOSIS — R42 DIZZINESS: ICD-10-CM

## 2017-11-10 DIAGNOSIS — C79.31 BRAIN METASTASIS: ICD-10-CM

## 2017-11-10 DIAGNOSIS — C50.919 BREAST CANCER (H): Primary | ICD-10-CM

## 2017-11-10 NOTE — TELEPHONE ENCOUNTER
"Pt called today requesting a referral from Dr. Wu for an \"online\" neurologist? Please call pt for more details.  "

## 2017-11-12 ENCOUNTER — HEALTH MAINTENANCE LETTER (OUTPATIENT)
Age: 52
End: 2017-11-12

## 2017-11-13 ENCOUNTER — OFFICE VISIT - HEALTHEAST (OUTPATIENT)
Dept: PHYSICAL THERAPY | Facility: REHABILITATION | Age: 52
End: 2017-11-13

## 2017-11-13 DIAGNOSIS — R07.89 LEFT-SIDED CHEST WALL PAIN: ICD-10-CM

## 2017-11-13 DIAGNOSIS — L90.5 SCAR CONDITION AND FIBROSIS OF SKIN: ICD-10-CM

## 2017-11-13 DIAGNOSIS — M24.512 CONTRACTURE, LEFT SHOULDER: ICD-10-CM

## 2017-11-13 DIAGNOSIS — I97.2 POST-MASTECTOMY LYMPHEDEMA SYNDROME: ICD-10-CM

## 2017-11-13 NOTE — TELEPHONE ENCOUNTER
"Pt calling back.  States that she has had ongoing problems with balance, memory, and focusing issues for the past 4-5 months.  Has already been seen by neurology and at the [facility].  Pt states that her nephew and his wife go to a specialist in [place] for \"brain mapping\" to help with these types of symptoms.  Pt states that this specialist in [place] recommended that pt could see a specialist in [place] at [facility] who also does this type of work. Pt states that the office website is [email or web address], and the phone number is [phone]. Pt states that this office would do an EEG first to map the brain and show any deficiencies. Then would work on brain training to repair sites that are damaged. Pt is requesting a referral from Dr Wu to this office. Advised pt that will discuss with Dr Wu when she is back in the clinic on 11/15/2017, and then call pt back. OK to leave a message if unable to reach pt.  Jody Dutton RN BSN    "

## 2017-11-15 NOTE — TELEPHONE ENCOUNTER
Per Dr Wu, it would be OK to enter a neurology referral, and then to specify that pt will be seeing [facility] in [place].   Referral has been entered and printed. Copy of referral mailed to pt's home address per pt request.  Pt states that she has already talked to her insurance company about coverage for this type of appointment. Patient verbalized understanding and agreement with plan.  Pt was instructed to call the clinic with any questions, concerns, or worsening symptoms.   Jody Dutton RN BSN

## 2017-11-16 ENCOUNTER — TRANSFERRED RECORDS (OUTPATIENT)
Dept: HEALTH INFORMATION MANAGEMENT | Facility: CLINIC | Age: 52
End: 2017-11-16

## 2017-11-27 ENCOUNTER — COMMUNICATION - HEALTHEAST (OUTPATIENT)
Dept: VASCULAR SURGERY | Facility: CLINIC | Age: 52
End: 2017-11-27

## 2017-11-27 ENCOUNTER — TRANSFERRED RECORDS (OUTPATIENT)
Dept: HEALTH INFORMATION MANAGEMENT | Facility: CLINIC | Age: 52
End: 2017-11-27

## 2017-11-27 ENCOUNTER — OFFICE VISIT - HEALTHEAST (OUTPATIENT)
Dept: VASCULAR SURGERY | Facility: CLINIC | Age: 52
End: 2017-11-27

## 2017-11-27 DIAGNOSIS — R07.89 LEFT-SIDED CHEST WALL PAIN: ICD-10-CM

## 2017-11-27 DIAGNOSIS — C79.31 BREAST CANCER METASTASIZED TO BRAIN, UNSPECIFIED LATERALITY (H): ICD-10-CM

## 2017-11-27 DIAGNOSIS — C50.919 BREAST CANCER METASTASIZED TO BRAIN, UNSPECIFIED LATERALITY (H): ICD-10-CM

## 2017-11-27 DIAGNOSIS — I97.2 POST-MASTECTOMY LYMPHEDEMA SYNDROME: ICD-10-CM

## 2017-11-27 DIAGNOSIS — L90.5 SCAR CONDITION AND FIBROSIS OF SKIN: ICD-10-CM

## 2017-11-27 ASSESSMENT — MIFFLIN-ST. JEOR: SCORE: 1386.94

## 2017-11-29 ENCOUNTER — TELEPHONE (OUTPATIENT)
Dept: ONCOLOGY | Facility: CLINIC | Age: 52
End: 2017-11-29

## 2017-11-29 NOTE — TELEPHONE ENCOUNTER
Patient is requesting a call to be notified of her bone density results from 11/16.     ALINA Chong

## 2017-11-29 NOTE — TELEPHONE ENCOUNTER
Dr Wu has reviewed pt's bone density scan that was done at [facility] on 11/16/2017.  Dr Wu states that overall there has been no change in the results since her previous scan.  Reviewed results with pt.  Advised pt to continue with calcium and vitamin D supplements that she is taking.  Also weight-bearing exercise can be helpful. Patient verbalized understanding and agreement with plan.  Pt was instructed to call the clinic with any questions, concerns, or worsening symptoms.   Jody Dutton RN BSN

## 2017-11-29 NOTE — TELEPHONE ENCOUNTER
Mayra called this afternoon wanting the results of her Dexa scan, she can be reached at [phone]. Ok to leave a message. Nida Yip

## 2017-12-07 ENCOUNTER — TELEPHONE (OUTPATIENT)
Dept: ONCOLOGY | Facility: CLINIC | Age: 52
End: 2017-12-07

## 2017-12-07 NOTE — TELEPHONE ENCOUNTER
Called pt back.  Advised pt that Dr Wu states that the specific clinic is called [facility] in [place].  Also reviewed recent bone density results with pt again, including the osteoporosis in the lumbar spine and osteopenia in the hips. Patient verbalized understanding and agreement with plan.  Pt was instructed to call the clinic with any questions, concerns, or worsening symptoms.   Jody Dutton RN BSN

## 2017-12-07 NOTE — TELEPHONE ENCOUNTER
Mayra called this morning and would like a nurse to give her a call, she would like to know the specifics of her Bone density test, she said that she received the results but forgot what they were. She would also like to know if the name of the sports center where Dr. Wu's son goes that has a [employer]EM[employer] machine is called the [facility], she has also forgotten what Dr. Wu has told her, she has too many clinics to keep track of.  She can be reached at [phone]. Ok to leave a message. Nida Yip

## 2017-12-22 ENCOUNTER — OFFICE VISIT (OUTPATIENT)
Dept: OBGYN | Facility: CLINIC | Age: 52
End: 2017-12-22
Payer: COMMERCIAL

## 2017-12-22 VITALS — DIASTOLIC BLOOD PRESSURE: 62 MMHG | WEIGHT: 169 LBS | SYSTOLIC BLOOD PRESSURE: 110 MMHG | BODY MASS INDEX: 25.7 KG/M2

## 2017-12-22 DIAGNOSIS — Z01.411 ENCOUNTER FOR GYNECOLOGICAL EXAMINATION WITH ABNORMAL FINDING: ICD-10-CM

## 2017-12-22 DIAGNOSIS — C50.912 MALIGNANT NEOPLASM OF LEFT FEMALE BREAST, UNSPECIFIED ESTROGEN RECEPTOR STATUS, UNSPECIFIED SITE OF BREAST (H): ICD-10-CM

## 2017-12-22 DIAGNOSIS — M81.8 OTHER OSTEOPOROSIS WITHOUT CURRENT PATHOLOGICAL FRACTURE: ICD-10-CM

## 2017-12-22 DIAGNOSIS — K21.9 GASTROESOPHAGEAL REFLUX DISEASE WITHOUT ESOPHAGITIS: Primary | ICD-10-CM

## 2017-12-22 DIAGNOSIS — Z13.6 CARDIOVASCULAR SCREENING; LDL GOAL LESS THAN 160: ICD-10-CM

## 2017-12-22 DIAGNOSIS — N95.2 ATROPHIC VAGINITIS: ICD-10-CM

## 2017-12-22 PROCEDURE — 99396 PREV VISIT EST AGE 40-64: CPT | Performed by: OBSTETRICS & GYNECOLOGY

## 2017-12-22 RX ORDER — ANTIARTHRITIC COMBINATION NO.2 900 MG
25 TABLET ORAL DAILY
COMMUNITY
End: 2021-01-25

## 2017-12-22 NOTE — PATIENT INSTRUCTIONS
You can reach your [facility] Care Team any time of the day by calling [phone]. This number will put you in touch with the 24 hour nurse line if the clinic is closed.    To contact your OB/GYN Surgery Scheduler please call [phone]. This is a direct number for your care team between 8 a.m. and 4 p.m. Monday through Friday.    [facility] is open for your convenience: [phone]  Monday through Friday 8 a.m. to 8:30 p.m.  Saturday 9 a.m. to 6 p.m.  Sunday Noon to 6 p.m.    They are closed on all major holidays.

## 2017-12-22 NOTE — MR AVS SNAPSHOT
After Visit Summary   12/22/2017    Mayra Ramirez    MRN: [id number]           Patient Information     Date Of Birth          1965        Visit Information        Provider Department      12/22/2017 11:30 AM Bryson Enriquez MD [facility]        Today's Diagnoses     Gastroesophageal reflux disease without esophagitis    -  1    CARDIOVASCULAR SCREENING; LDL GOAL LESS THAN 160        Encounter for gynecological examination with abnormal finding        Atrophic vaginitis        Other osteoporosis without current pathological fracture        Malignant neoplasm of left female breast, unspecified estrogen receptor status, unspecified site of breast (H)          Care Instructions    You can reach your [facility] Care Team any time of the day by calling [phone]. This number will put you in touch with the 24 hour nurse line if the clinic is closed.    To contact your OB/GYN Surgery Scheduler please call [phone]. This is a direct number for your care team between 8 a.m. and 4 p.m. Monday through Friday.    [facility] is open for your convenience: [phone]  Monday through Friday 8 a.m. to 8:30 p.m.  Saturday 9 a.m. to 6 p.m.  Sunday Noon to 6 p.m.    They are closed on all major holidays.            Follow-ups after your visit        Additional Services     GASTROENTEROLOGY ADULT REF PROCEDURE ONLY       Last Lab Result: Creatinine (mg/dL)       Date                     Value                 10/25/2017               0.75             ----------  Body mass index is 25.7 kg/(m^2).     Needed:  No  Language:  English    Patient will be contacted to schedule procedure.     Please be aware that coverage of these services is subject to the terms and limitations of your health insurance plan.  Call member services at your health plan with any benefit or coverage questions.  Any procedures must be performed at a [facility] facility OR coordinated by your  clinic's referral office.    Please bring the following with you to your appointment:    (1) Any X-Rays, CTs or MRIs which have been performed.  Contact the facility where they were done to arrange for  prior to your scheduled appointment.    (2) List of current medications   (3) This referral request   (4) Any documents/labs given to you for this referral                  Your next 10 appointments already scheduled     Oct 25, 2018 12:30 PM CDT   Return Visit with David Wu MD   [facility] ([facility])    [facility]  [address]  [address]  [facility] [id number] [phone]              Future tests that were ordered for you today     Open Future Orders        Priority Expected Expires Ordered    Lipid panel reflex to direct LDL Fasting Routine 12/22/2017 12/22/2018 12/22/2017            Who to contact     If you have questions or need follow up information about today's clinic visit or your schedule please contact [facility] directly at [phone].  Normal or non-critical lab and imaging results will be communicated to you by MyChart, letter or phone within 4 business days after the clinic has received the results. If you do not hear from us within 7 days, please contact the clinic through [employer] or phone. If you have a critical or abnormal lab result, we will notify you by phone as soon as possible.  Submit refill requests through [employer] or call your pharmacy and they will forward the refill request to us. Please allow 3 business days for your refill to be completed.          Additional Information About Your Visit        [employer] Information     [employer] gives you secure access to your electronic health record. If you see a primary care provider, you can also send messages to your care team and make appointments. If you have questions, please call your primary care clinic.  If you do not have a  primary care provider, please call [phone] and they will assist you.        Care EveryWhere ID     This is your Care EveryWhere ID. This could be used by other organizations to access your [facility] medical records  [id number]        Your Vitals Were     BMI (Body Mass Index)                   25.7 kg/m2            Blood Pressure from Last 3 Encounters:   12/22/17 110/62   10/25/17 104/69   04/13/17 111/72    Weight from Last 3 Encounters:   12/22/17 169 lb (76.7 kg)   10/25/17 170 lb (77.1 kg)   04/13/17 169 lb (76.7 kg)              We Performed the Following     GASTROENTEROLOGY ADULT REF PROCEDURE ONLY        Primary Care Provider Office Phone # Fax #    Bryson Enriquez [phone]       [address]  [address]  [facility] [address]        Equal Access to Services     [facility]: Hadii aad ku hadasho Soomaali, waaxda luqadaha, qaybta kaalmada adeegyada, waxay idiin haymelody turpin . So [facility] [phone].    ATENCIÓN: Si habla español, tiene a acuna disposición servicios gratuitos de asistencia lingüística. Llame al [phone].    We comply with applicable federal civil rights laws and [place] laws. We do not discriminate on the basis of race, color, national origin, age, disability, sex, sexual orientation, or gender identity.            Thank you!     Thank you for choosing [facility]  for your care. Our goal is always to provide you with excellent care. Hearing back from our patients is one way we can continue to improve our services. Please take a few minutes to complete the written survey that you may receive in the mail after your visit with us. Thank you!             Your Updated Medication List - Protect others around you: Learn how to safely use, store and throw away your medicines at www.disposemymeds.org.          This list is accurate as of: 12/22/17 11:59 PM.  Always use your most recent med list.                   Brand Name  Dispense Instructions for use Diagnosis    ALOE VERA JUICE PO           BIOTIN PO           BONE DENSITY BUILDER PO           CALCIUM + D PO      None Entered    Routine gynecological examination       COQ10 PO      Take 1 tablet by mouth daily.        dhea 25 MG Tabs      Take 25 mg by mouth daily        ESTRACE VAGINAL 0.1 MG/GM cream   Generic drug:  estradiol     42.5 g    USE 0.5GM INTRAVAGINALLY AT BEDTIME ON MONDAY AND THURSDAY FOR 4 WEEKS AS DIRECTED    Atrophic vaginitis       MAGNESIUM GLUCONATE PO           MELATONIN PO           order for DME     2 Units    2 Mastectomy Bras and 2 Prosthesis    Breast cancer (H)       PROBIOTIC ADVANCED PO       Osteoporosis       SUPER GREENS PO           * UNABLE TO FIND      MEDICATION NAME: Brain Restore        * UNABLE TO FIND      MEDICATION NAME: [place] clear-Heavy metal detox        * UNABLE TO FIND      MEDICATION NAME: Adrenogen        * UNABLE TO FIND      MEDICATION NAME: Omega 10        * UNABLE TO FIND      perimine        * UNABLE TO FIND      MEDICATION NAME: thyrosol        * UNABLE TO FIND      45 mg MEDICATION NAME: Vitamin d with k 2        VITAMIN C PO           vitamin D 1000 UNITS capsule      Take 2 capsules by mouth daily.        * Notice:  This list has 7 medication(s) that are the same as other medications prescribed for you. Read the directions carefully, and ask your doctor or other care provider to review them with you.

## 2017-12-22 NOTE — NURSING NOTE
"Chief Complaint   Patient presents with     Physical       Initial /62  Wt 169 lb (76.7 kg)  BMI 25.7 kg/m2 Estimated body mass index is 25.7 kg/(m^2) as calculated from the following:    Height as of 10/25/17: 5' 8\" (1.727 m).    Weight as of this encounter: 169 lb (76.7 kg).  BP completed using cuff size: regular        The following HM Due: NONE      The following patient reported/Care Every where data was sent to:  P ABSTRACT QUALITY INITIATIVES [[id number]]        Vanessa Willingham, [facility]                 "
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
## 2018-02-12 ENCOUNTER — OFFICE VISIT - HEALTHEAST (OUTPATIENT)
Dept: FAMILY MEDICINE | Facility: CLINIC | Age: 53
End: 2018-02-12

## 2018-02-12 DIAGNOSIS — M94.9 DISORDER OF BONE AND CARTILAGE: ICD-10-CM

## 2018-02-12 DIAGNOSIS — Z85.3 HISTORY OF BREAST CANCER: ICD-10-CM

## 2018-02-12 DIAGNOSIS — E78.5 HYPERLIPIDEMIA, UNSPECIFIED HYPERLIPIDEMIA TYPE: ICD-10-CM

## 2018-02-12 DIAGNOSIS — Z00.00 ROUTINE GENERAL MEDICAL EXAMINATION AT A HEALTH CARE FACILITY: ICD-10-CM

## 2018-02-12 DIAGNOSIS — Z23 NEED FOR IMMUNIZATION AGAINST INFLUENZA: ICD-10-CM

## 2018-02-12 DIAGNOSIS — Z86.39 HISTORY OF HASHIMOTO THYROIDITIS: ICD-10-CM

## 2018-02-12 DIAGNOSIS — M89.9 DISORDER OF BONE AND CARTILAGE: ICD-10-CM

## 2018-02-12 ASSESSMENT — MIFFLIN-ST. JEOR: SCORE: 1411.89

## 2018-02-16 ENCOUNTER — AMBULATORY - HEALTHEAST (OUTPATIENT)
Dept: LAB | Facility: CLINIC | Age: 53
End: 2018-02-16

## 2018-02-16 ENCOUNTER — COMMUNICATION - HEALTHEAST (OUTPATIENT)
Dept: FAMILY MEDICINE | Facility: CLINIC | Age: 53
End: 2018-02-16

## 2018-02-16 ENCOUNTER — COMMUNICATION - HEALTHEAST (OUTPATIENT)
Dept: ADMINISTRATIVE | Facility: CLINIC | Age: 53
End: 2018-02-16

## 2018-02-16 DIAGNOSIS — E78.5 HYPERLIPIDEMIA, UNSPECIFIED HYPERLIPIDEMIA TYPE: ICD-10-CM

## 2018-02-16 DIAGNOSIS — Z86.39 HISTORY OF HASHIMOTO THYROIDITIS: ICD-10-CM

## 2018-02-16 DIAGNOSIS — Z00.00 ROUTINE GENERAL MEDICAL EXAMINATION AT A HEALTH CARE FACILITY: ICD-10-CM

## 2018-02-16 LAB
CHOLEST SERPL-MCNC: 221 MG/DL
FASTING STATUS PATIENT QL REPORTED: YES
FASTING STATUS PATIENT QL REPORTED: YES
GLUCOSE BLD-MCNC: 91 MG/DL (ref 70–99)
HDLC SERPL-MCNC: 51 MG/DL
LDLC SERPL CALC-MCNC: 152 MG/DL
T3 SERPL-MCNC: 80 NG/DL (ref 45–175)
T4 FREE SERPL-MCNC: 0.9 NG/DL (ref 0.7–1.8)
TRIGL SERPL-MCNC: 92 MG/DL
TSH SERPL DL<=0.005 MIU/L-ACNC: 3.09 UIU/ML (ref 0.3–5)

## 2018-03-20 ENCOUNTER — TELEPHONE (OUTPATIENT)
Dept: ONCOLOGY | Facility: CLINIC | Age: 53
End: 2018-03-20
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
## 2018-03-26 NOTE — TELEPHONE ENCOUNTER
Patient called to see if Dr. Wu was back in clinic. Patient was informed that Dr. Wu will be back next week.

## 2018-04-13 NOTE — TELEPHONE ENCOUNTER
Per Dr Wu, unfortunately, there is not any infusion/medication that she could order that would help with this concern.  This is an orthopedic concern and needs to be addressed by orthopedics. Pt has been notified. Patient verbalized understanding and agreement with plan.  Pt was instructed to call the clinic with any questions, concerns, or worsening symptoms.   Jody Dutton RN BSN

## 2018-04-19 DIAGNOSIS — N95.2 ATROPHIC VAGINITIS: ICD-10-CM

## 2018-04-19 RX ORDER — ESTRADIOL 0.1 MG/G
CREAM VAGINAL
Qty: 42.5 G | Refills: 1 | Status: SHIPPED | OUTPATIENT
Start: 2018-04-19 | End: 2019-07-12

## 2018-04-19 NOTE — PROGRESS NOTES
Pt calling for a refill on her Estrace cream. She was seen in December. Rx approved.    Ruby Moore RN

## 2018-04-20 ENCOUNTER — TRANSFERRED RECORDS (OUTPATIENT)
Dept: HEALTH INFORMATION MANAGEMENT | Facility: CLINIC | Age: 53
End: 2018-04-20

## 2018-05-29 ENCOUNTER — COMMUNICATION - HEALTHEAST (OUTPATIENT)
Dept: FAMILY MEDICINE | Facility: CLINIC | Age: 53
End: 2018-05-29

## 2018-05-31 ENCOUNTER — TELEPHONE (OUTPATIENT)
Dept: ONCOLOGY | Facility: CLINIC | Age: 53
End: 2018-05-31

## 2018-05-31 NOTE — TELEPHONE ENCOUNTER
Talked with pt.  Pt states that she was seen by orthopedics and endocrinology at [facility] for her non-healing fracture of her clavicle.  Pt reports that they are recommending that pt should restart the boniva before her surgery.  Dr Wu states that she would like to see the records from [facility] before starting any boniva.  The notes that are currently in Care Everywhere are not clear regarding the recommendations for Boniva.  Pt states that she will contact [facility] tomorrow for clarification.  She will have them fax their recommendations to our clinic.  Pt states that she will call back in the next few days with update. Patient verbalized understanding and agreement with plan.  Pt was instructed to call the clinic with any questions, concerns, or worsening symptoms.   Jody Dutton RN BSN

## 2018-06-05 NOTE — TELEPHONE ENCOUNTER
Pt called back.  Notified pt that she does not need to receive boniva before her surgery or for at least 3 months after surgery.  Pt verbalized understanding and agreement with plan.  Jody Dutton RN BSN

## 2018-06-05 NOTE — TELEPHONE ENCOUNTER
Tenisha from Dr Ojeda's office called back.  States that pt should NOT receive Boniva before her surgery. In fact, pt should not receive Boniva for at least 3 months after surgery also.  Dr Ojeda's office will let us know if boniva is needed in the future.  Left a message for pt to call back.  Jody Dutton RN BSN

## 2018-06-05 NOTE — TELEPHONE ENCOUNTER
Left a message for pt to call back to see if she was able to learn anything from [facility].  Jody Dutton RN BSN

## 2018-06-05 NOTE — TELEPHONE ENCOUNTER
Talked with pt.  Pt states that she talked with [facility] and they were supposed to be faxing some information to our clinic last week. No information from [facility] has been received.    Called [facility] directly.  Left a message with Dr Lance Ojeda's office (endocrinology) for clarification.  Does pt need to have boniva prior to her surgery scheduled on 6/15?  If boniva is needed, please fax orders to our clinic.  Will await return call.  Jody Dutton RN BSN

## 2018-08-15 ENCOUNTER — COMMUNICATION - HEALTHEAST (OUTPATIENT)
Dept: VASCULAR SURGERY | Facility: CLINIC | Age: 53
End: 2018-08-15

## 2018-08-15 ENCOUNTER — OFFICE VISIT - HEALTHEAST (OUTPATIENT)
Dept: VASCULAR SURGERY | Facility: CLINIC | Age: 53
End: 2018-08-15

## 2018-08-15 DIAGNOSIS — I97.2 POST-MASTECTOMY LYMPHEDEMA SYNDROME: ICD-10-CM

## 2018-08-15 DIAGNOSIS — C50.919 BREAST CANCER METASTASIZED TO BRAIN, UNSPECIFIED LATERALITY (H): ICD-10-CM

## 2018-08-15 DIAGNOSIS — L90.5 SCAR CONDITION AND FIBROSIS OF SKIN: ICD-10-CM

## 2018-08-15 DIAGNOSIS — S42.009K: ICD-10-CM

## 2018-08-15 DIAGNOSIS — C79.31 BREAST CANCER METASTASIZED TO BRAIN, UNSPECIFIED LATERALITY (H): ICD-10-CM

## 2018-08-15 DIAGNOSIS — M24.512 CONTRACTURE, LEFT SHOULDER: ICD-10-CM

## 2018-08-15 ASSESSMENT — MIFFLIN-ST. JEOR: SCORE: 1364.26

## 2018-09-24 ENCOUNTER — OFFICE VISIT - HEALTHEAST (OUTPATIENT)
Dept: FAMILY MEDICINE | Facility: CLINIC | Age: 53
End: 2018-09-24

## 2018-09-24 DIAGNOSIS — R41.3 POOR SHORT-TERM MEMORY: ICD-10-CM

## 2018-09-24 DIAGNOSIS — C50.919 BREAST CANCER METASTASIZED TO BRAIN, UNSPECIFIED LATERALITY (H): ICD-10-CM

## 2018-09-24 DIAGNOSIS — C79.31 BREAST CANCER METASTASIZED TO BRAIN, UNSPECIFIED LATERALITY (H): ICD-10-CM

## 2018-09-24 DIAGNOSIS — Z85.3 HISTORY OF BREAST CANCER: ICD-10-CM

## 2018-09-24 DIAGNOSIS — S42.009K: ICD-10-CM

## 2018-09-24 DIAGNOSIS — Z00.00 ROUTINE GENERAL MEDICAL EXAMINATION AT A HEALTH CARE FACILITY: ICD-10-CM

## 2018-10-16 ENCOUNTER — COMMUNICATION - HEALTHEAST (OUTPATIENT)
Dept: FAMILY MEDICINE | Facility: CLINIC | Age: 53
End: 2018-10-16

## 2018-11-05 ENCOUNTER — COMMUNICATION - HEALTHEAST (OUTPATIENT)
Dept: FAMILY MEDICINE | Facility: CLINIC | Age: 53
End: 2018-11-05

## 2018-11-05 ENCOUNTER — COMMUNICATION - HEALTHEAST (OUTPATIENT)
Dept: PEDIATRICS | Facility: CLINIC | Age: 53
End: 2018-11-05

## 2018-11-06 ENCOUNTER — AMBULATORY - HEALTHEAST (OUTPATIENT)
Dept: NURSING | Facility: CLINIC | Age: 53
End: 2018-11-06

## 2018-11-15 ENCOUNTER — COMMUNICATION - HEALTHEAST (OUTPATIENT)
Dept: VASCULAR SURGERY | Facility: CLINIC | Age: 53
End: 2018-11-15

## 2018-11-15 ENCOUNTER — COMMUNICATION - HEALTHEAST (OUTPATIENT)
Dept: ADMINISTRATIVE | Facility: CLINIC | Age: 53
End: 2018-11-15

## 2018-11-15 ENCOUNTER — OFFICE VISIT - HEALTHEAST (OUTPATIENT)
Dept: VASCULAR SURGERY | Facility: CLINIC | Age: 53
End: 2018-11-15

## 2018-11-15 ENCOUNTER — AMBULATORY - HEALTHEAST (OUTPATIENT)
Dept: VASCULAR SURGERY | Facility: CLINIC | Age: 53
End: 2018-11-15

## 2018-11-15 DIAGNOSIS — I97.2 POST-MASTECTOMY LYMPHEDEMA SYNDROME: ICD-10-CM

## 2018-11-15 DIAGNOSIS — C79.31 BREAST CANCER METASTASIZED TO BRAIN, UNSPECIFIED LATERALITY (H): ICD-10-CM

## 2018-11-15 DIAGNOSIS — M24.512 CONTRACTURE, LEFT SHOULDER: ICD-10-CM

## 2018-11-15 DIAGNOSIS — L90.5 SCAR CONDITION AND FIBROSIS OF SKIN: ICD-10-CM

## 2018-11-15 DIAGNOSIS — C50.919 BREAST CANCER METASTASIZED TO BRAIN, UNSPECIFIED LATERALITY (H): ICD-10-CM

## 2018-11-15 ASSESSMENT — MIFFLIN-ST. JEOR: SCORE: 1375.6

## 2018-11-19 ENCOUNTER — COMMUNICATION - HEALTHEAST (OUTPATIENT)
Dept: OTHER | Facility: CLINIC | Age: 53
End: 2018-11-19

## 2018-11-21 ENCOUNTER — COMMUNICATION - HEALTHEAST (OUTPATIENT)
Dept: OTHER | Facility: CLINIC | Age: 53
End: 2018-11-21

## 2018-11-26 ENCOUNTER — AMBULATORY - HEALTHEAST (OUTPATIENT)
Dept: OTHER | Facility: CLINIC | Age: 53
End: 2018-11-26

## 2018-11-26 ENCOUNTER — COMMUNICATION - HEALTHEAST (OUTPATIENT)
Dept: OTHER | Facility: CLINIC | Age: 53
End: 2018-11-26

## 2018-11-27 ENCOUNTER — AMBULATORY - HEALTHEAST (OUTPATIENT)
Dept: OTHER | Facility: CLINIC | Age: 53
End: 2018-11-27

## 2018-12-03 ENCOUNTER — COMMUNICATION - HEALTHEAST (OUTPATIENT)
Dept: VASCULAR SURGERY | Facility: CLINIC | Age: 53
End: 2018-12-03

## 2018-12-03 ENCOUNTER — COMMUNICATION - HEALTHEAST (OUTPATIENT)
Dept: OTHER | Facility: CLINIC | Age: 53
End: 2018-12-03

## 2018-12-03 ENCOUNTER — AMBULATORY - HEALTHEAST (OUTPATIENT)
Dept: OTHER | Facility: CLINIC | Age: 53
End: 2018-12-03

## 2018-12-06 ENCOUNTER — ONCOLOGY VISIT (OUTPATIENT)
Dept: ONCOLOGY | Facility: CLINIC | Age: 53
End: 2018-12-06
Attending: INTERNAL MEDICINE
Payer: COMMERCIAL

## 2018-12-06 ENCOUNTER — HOSPITAL ENCOUNTER (OUTPATIENT)
Facility: CLINIC | Age: 53
Setting detail: SPECIMEN
Discharge: HOME | End: 2018-12-06
Attending: INTERNAL MEDICINE | Admitting: INTERNAL MEDICINE
Payer: COMMERCIAL

## 2018-12-06 VITALS
WEIGHT: 167.4 LBS | SYSTOLIC BLOOD PRESSURE: 95 MMHG | DIASTOLIC BLOOD PRESSURE: 64 MMHG | BODY MASS INDEX: 25.37 KG/M2 | TEMPERATURE: 97.4 F | OXYGEN SATURATION: 99 % | RESPIRATION RATE: 18 BRPM | HEIGHT: 68 IN | HEART RATE: 54 BPM

## 2018-12-06 DIAGNOSIS — C50.912 MALIGNANT NEOPLASM OF LEFT BREAST IN FEMALE, ESTROGEN RECEPTOR POSITIVE, UNSPECIFIED SITE OF BREAST (H): Primary | ICD-10-CM

## 2018-12-06 DIAGNOSIS — Z17.0 MALIGNANT NEOPLASM OF LEFT BREAST IN FEMALE, ESTROGEN RECEPTOR POSITIVE, UNSPECIFIED SITE OF BREAST (H): Primary | ICD-10-CM

## 2018-12-06 LAB
ALBUMIN SERPL-MCNC: 3.7 G/DL (ref 3.4–5)
ALP SERPL-CCNC: 80 U/L (ref 40–150)
ALT SERPL W P-5'-P-CCNC: 24 U/L (ref 0–50)
ANION GAP SERPL CALCULATED.3IONS-SCNC: 3 MMOL/L (ref 3–14)
AST SERPL W P-5'-P-CCNC: 15 U/L (ref 0–45)
BILIRUB SERPL-MCNC: 0.4 MG/DL (ref 0.2–1.3)
BUN SERPL-MCNC: 11 MG/DL (ref 7–30)
CALCIUM SERPL-MCNC: 8.6 MG/DL (ref 8.5–10.1)
CANCER AG27-29 SERPL-ACNC: 29 U/ML (ref 0–39)
CHLORIDE SERPL-SCNC: 107 MMOL/L (ref 94–109)
CO2 SERPL-SCNC: 31 MMOL/L (ref 20–32)
CREAT SERPL-MCNC: 0.77 MG/DL (ref 0.52–1.04)
ERYTHROCYTE [DISTWIDTH] IN BLOOD BY AUTOMATED COUNT: 13.4 % (ref 10–15)
GFR SERPL CREATININE-BSD FRML MDRD: 78 ML/MIN/1.7M2
GLUCOSE SERPL-MCNC: 97 MG/DL (ref 70–99)
HCT VFR BLD AUTO: 41.7 % (ref 35–47)
HGB BLD-MCNC: 13.5 G/DL (ref 11.7–15.7)
MCH RBC QN AUTO: 29.4 PG (ref 26.5–33)
MCHC RBC AUTO-ENTMCNC: 32.4 G/DL (ref 31.5–36.5)
MCV RBC AUTO: 91 FL (ref 78–100)
PLATELET # BLD AUTO: 222 10E9/L (ref 150–450)
POTASSIUM SERPL-SCNC: 3.8 MMOL/L (ref 3.4–5.3)
PROT SERPL-MCNC: 7.3 G/DL (ref 6.8–8.8)
RBC # BLD AUTO: 4.59 10E12/L (ref 3.8–5.2)
SODIUM SERPL-SCNC: 141 MMOL/L (ref 133–144)
WBC # BLD AUTO: 6 10E9/L (ref 4–11)

## 2018-12-06 PROCEDURE — 85027 COMPLETE CBC AUTOMATED: CPT | Performed by: INTERNAL MEDICINE

## 2018-12-06 PROCEDURE — 80053 COMPREHEN METABOLIC PANEL: CPT | Performed by: INTERNAL MEDICINE

## 2018-12-06 PROCEDURE — 99214 OFFICE O/P EST MOD 30 MIN: CPT | Performed by: INTERNAL MEDICINE

## 2018-12-06 PROCEDURE — 36415 COLL VENOUS BLD VENIPUNCTURE: CPT

## 2018-12-06 PROCEDURE — 86300 IMMUNOASSAY TUMOR CA 15-3: CPT | Performed by: INTERNAL MEDICINE

## 2018-12-06 PROCEDURE — G0463 HOSPITAL OUTPT CLINIC VISIT: HCPCS

## 2018-12-06 ASSESSMENT — PAIN SCALES - GENERAL: PAINLEVEL: NO PAIN (0)

## 2018-12-06 NOTE — MR AVS SNAPSHOT
After Visit Summary   12/6/2018    Mayra Ramirez    MRN: [id number]           Patient Information     Date Of Birth          1965        Visit Information        Provider Department      12/6/2018 1:00 PM David Wu MD [facility]      Today's Diagnoses     Malignant neoplasm of left breast in female, estrogen receptor positive, unspecified site of breast (H)    -  1      Care Instructions    RTC MD 1 year        Labs today and on return-Drawn ES       Labs and 1 year F/U on 12/05/19  JV    AVS given to patient 12/6/18  JV          Follow-ups after your visit        Your next 10 appointments already scheduled     Jan 07, 2019  2:00 PM CST   [facility]    [address]  [address]  [facility] [address]   [phone]            Dec 05, 2019 10:30 AM CST   Return Visit with David Wu MD   [facility] ([facility])    [facility]  [address]  [address]  [facility] [id number] [phone]              Who to contact     If you have questions or need follow up information about today's clinic visit or your schedule please contact [facility] directly at [phone].  Normal or non-critical lab and imaging results will be communicated to you by MyChart, letter or phone within 4 business days after the clinic has received the results. If you do not hear from us within 7 days, please contact the clinic through MyChart or phone. If you have a critical or abnormal lab result, we will notify you by phone as soon as possible.  Submit refill requests through [employer] or call your pharmacy and they will forward the refill request to us. Please allow 3 business days for your refill to be completed.          Additional Information About Your Visit       "  MyChart Information     [employer] gives you secure access to your electronic health record. If you see a primary care provider, you can also send messages to your care team and make appointments. If you have questions, please call your primary care clinic.  If you do not have a primary care provider, please call [phone] and they will assist you.        Care EveryWhere ID     This is your Care EveryWhere ID. This could be used by other organizations to access your [place] medical records  [id number]        Your Vitals Were     Pulse Temperature Respirations Height Pulse Oximetry BMI (Body Mass Index)    54 97.4  F (36.3  C) (Oral) 18 1.727 m (5' 8\") 99% 25.45 kg/m2       Blood Pressure from Last 3 Encounters:   12/06/18 95/64   12/22/17 110/62   10/25/17 104/69    Weight from Last 3 Encounters:   12/06/18 75.9 kg (167 lb 6.4 oz)   12/22/17 76.7 kg (169 lb)   10/25/17 77.1 kg (170 lb)              We Performed the Following     Ca27.29  breast tumor marker     CBC with platelets     Comprehensive metabolic panel (BMP + Alb, Alk Phos, ALT, AST, Total. Bili, TP)        Primary Care Provider Office Phone # Fax #    Bryson Enriquez [phone]       [address]  [address]  [facility] [address]        Equal Access to Services     [facility] : Hadii aad ku hadasho Soomaali, waaxda luqadaha, qaybta kaalmada adeegyada, jeanne hillman haymelody turpin . So [place] [phone].    ATENCIÓN: Si habla español, tiene a acuna disposición servicios gratuitos de asistencia lingüística. Llame al [phone].    We comply with applicable federal civil rights laws and [place] laws. We do not discriminate on the basis of race, color, national origin, age, disability, sex, sexual orientation, or gender identity.            Thank you!     Thank you for choosing [facility]  for your care. Our goal is always to provide you with excellent care. Hearing back from our patients " is one way we can continue to improve our services. Please take a few minutes to complete the written survey that you may receive in the mail after your visit with us. Thank you!             Your Updated Medication List - Protect others around you: Learn how to safely use, store and throw away your medicines at www.disposemymeds.org.          This list is accurate as of 12/6/18  3:01 PM.  Always use your most recent med list.                   Brand Name Dispense Instructions for use Diagnosis    ALOE VERA JUICE PO           BIOTIN PO           BONE DENSITY BUILDER PO           CALCIUM + D PO      None Entered    Routine gynecological examination       COQ10 PO      Take 1 tablet by mouth daily.        dhea 25 MG Tabs      Take 25 mg by mouth daily        estradiol 0.1 MG/GM vaginal cream    ESTRACE VAGINAL    42.5 g    USE 0.5GM INTRAVAGINALLY AT BEDTIME ON MONDAY AND THURSDAY FOR 4 WEEKS AS DIRECTED    Atrophic vaginitis       MAGNESIUM GLUCONATE PO           MELATONIN PO           order for DME     2 Units    2 Mastectomy Bras and 2 Prosthesis    Breast cancer (H)       PROBIOTIC ADVANCED PO       Osteoporosis       SUPER GREENS PO           * UNABLE TO FIND      MEDICATION NAME: Brain Restore        * UNABLE TO FIND      MEDICATION NAME: [place] clear-Heavy metal detox        * UNABLE TO FIND      MEDICATION NAME: Adrenogen        * UNABLE TO FIND      MEDICATION NAME: Omega 10        * UNABLE TO FIND      perimine        * UNABLE TO FIND      MEDICATION NAME: thyrosol        * UNABLE TO FIND      45 mg MEDICATION NAME: Vitamin d with k 2        VITAMIN C PO           vitamin D 1000 units capsule      Take 2 capsules by mouth daily.        ZANTAC PO           * Notice:  This list has 7 medication(s) that are the same as other medications prescribed for you. Read the directions carefully, and ask your doctor or other care provider to review them with you.

## 2018-12-06 NOTE — PATIENT INSTRUCTIONS
RTC MD 1 year        Labs today and on return-Drawn ES       Labs and 1 year F/U on 12/05/19  JGABE    AVS given to patient 12/6/18  JV

## 2018-12-06 NOTE — LETTER
12/6/2018         RE: Mayra Ramirez  [address] [place] [address]        Dear Colleague,    Thank you for referring your patient, Mayra Ramirez, to the [facility]. Please see a copy of my visit note below.    Visit Date:   12/06/2018      HISTORY OF PRESENT ILLNESS:  Mayra Ramirez is 53 years old, comes in today for interim followup.  She has a history of metastatic breast cancer.  She initially was diagnosed with left-sided breast cancer and then developed brain metastasis.  She had a resection of the brain metastasis, and then had Femara for approximately about 10-11 years after that.  She was on Boniva for her bones.  She is off all treatment at this point.  She has had no evidence of recurrence of her cancer.  She did have a problem with a fracture of her left clavicle, and had some issues with bone healing, probably due to the fact that she was on Boniva.  She more recently has had orthopedic surgery done at the [facility] with a bone graft to her left clavicle, and that seems to be healing quite nicely.  She also has continued problems with some balance issues, and also memory loss which is felt to be chronic from her previous brain metastasis.  It has not gotten any worse recently.      REVIEW OF SYSTEMS:  A 14-point comprehensive review of systems apart from what is outlined above is otherwise unremarkable.  She does get lymphedema in the left arm.      MEDICATIONS:  As charted.      ALLERGIES:  AS CHARTED.      PHYSICAL EXAMINATION:   GENERAL:  She is well-appearing lady in no acute distress.   VITAL SIGNS:  Stable.   HEENT:  Oropharynx clear, mucous membranes moist.   NECK:  Has no masses or goiter.   LYMPH:  There is no cervical, supraclavicular or infraclavicular adenopathy.   CHEST:  Clear to auscultation and percussion bilaterally.   HEART:  S1, S2 normal.  No added sounds or murmurs.   BREASTS:  The patient is status post bilateral mastectomies,  scars look good.  Right and left axillae are negative.   GI EXAM:  Abdomen soft and nontender, no hepatosplenomegaly.   EXTREMITIES:  Legs are without tenderness or edema.  The patient's left clavicle looks distinctly abnormal since she has had her surgery done.      DATA REVIEW:  Labs are pending at the time of dictation.      IMPRESSION:  A 53-year-old patient with a history of metastatic breast cancer.  She is now off all treatment.  She had about 10-11 years of Femara after she had brain metastasis, and has not had any evidence of metastasis since her situation has been quite complex.  She does have some chronic memory and balance issues which she attributes to the brain metastasis.  She has no other worrisome symptomatology.      Total consultation time today has been 30 minutes.  The majority of time was spent on counseling and direction of patient care.         AMARILIS WU MD             D: 2018   T: 2018   MT: CORNELL      Name:     MONIQUE GRIFFIN   MRN:      [id number]        Account:      [id number]   :      1965           Visit Date:   2018      Document: [id number]       Again, thank you for allowing me to participate in the care of your patient.        Sincerely,        Amarilis Wu MD

## 2018-12-06 NOTE — NURSING NOTE
"Oncology Rooming Note    December 6, 2018 2:33 PM   Mayra Ramirez is a 53 year old female who presents for:    Chief Complaint   Patient presents with     Oncology Clinic Visit     Breast cancer      Initial Vitals: BP 95/64  Pulse 54  Temp 97.4  F (36.3  C) (Oral)  Resp 18  Ht 1.727 m (5' 8\")  Wt 75.9 kg (167 lb 6.4 oz)  SpO2 99%  BMI 25.45 kg/m2 Estimated body mass index is 25.45 kg/(m^2) as calculated from the following:    Height as of this encounter: 1.727 m (5' 8\").    Weight as of this encounter: 75.9 kg (167 lb 6.4 oz). Body surface area is 1.91 meters squared.  No Pain (0) Comment: Data Unavailable   No LMP recorded. Patient is not currently having periods (Reason: UNKNOWN).  Allergies reviewed: Yes  Medications reviewed: Yes    Medications: Medication refills not needed today.  Pharmacy name entered into [facility]: [id number] [place] [address]    Clinical concerns: f/u     8 minutes for nursing intake (face to face time)     Rena Rodriguez CMA          DISCHARGE PLAN:  Next appointments: See patient instruction section  Departure Mode: Ambulatory  Accompanied by: self  0 minutes for nursing discharge (face to face time)   Rena Rodriguez CMA      "

## 2018-12-07 ENCOUNTER — COMMUNICATION - HEALTHEAST (OUTPATIENT)
Dept: OTHER | Facility: CLINIC | Age: 53
End: 2018-12-07

## 2018-12-07 ENCOUNTER — AMBULATORY - HEALTHEAST (OUTPATIENT)
Dept: INTERNAL MEDICINE | Facility: CLINIC | Age: 53
End: 2018-12-07

## 2018-12-07 ENCOUNTER — OFFICE VISIT - HEALTHEAST (OUTPATIENT)
Dept: FAMILY MEDICINE | Facility: CLINIC | Age: 53
End: 2018-12-07

## 2018-12-07 DIAGNOSIS — R39.15 URINARY URGENCY: ICD-10-CM

## 2018-12-07 DIAGNOSIS — N39.46 MIXED INCONTINENCE: ICD-10-CM

## 2018-12-08 ENCOUNTER — COMMUNICATION - HEALTHEAST (OUTPATIENT)
Dept: SCHEDULING | Facility: CLINIC | Age: 53
End: 2018-12-08

## 2018-12-08 NOTE — PROGRESS NOTES
Visit Date:   12/06/2018      HISTORY OF PRESENT ILLNESS:  Mayra Ramirez is 53 years old, comes in today for interim followup.  She has a history of metastatic breast cancer.  She initially was diagnosed with left-sided breast cancer and then developed brain metastasis.  She had a resection of the brain metastasis, and then had Femara for approximately about 10-11 years after that.  She was on Boniva for her bones.  She is off all treatment at this point.  She has had no evidence of recurrence of her cancer.  She did have a problem with a fracture of her left clavicle, and had some issues with bone healing, probably due to the fact that she was on Boniva.  She more recently has had orthopedic surgery done at the [facility] with a bone graft to her left clavicle, and that seems to be healing quite nicely.  She also has continued problems with some balance issues, and also memory loss which is felt to be chronic from her previous brain metastasis.  It has not gotten any worse recently.      REVIEW OF SYSTEMS:  A 14-point comprehensive review of systems apart from what is outlined above is otherwise unremarkable.  She does get lymphedema in the left arm.      MEDICATIONS:  As charted.      ALLERGIES:  AS CHARTED.      PHYSICAL EXAMINATION:   GENERAL:  She is well-appearing lady in no acute distress.   VITAL SIGNS:  Stable.   HEENT:  Oropharynx clear, mucous membranes moist.   NECK:  Has no masses or goiter.   LYMPH:  There is no cervical, supraclavicular or infraclavicular adenopathy.   CHEST:  Clear to auscultation and percussion bilaterally.   HEART:  S1, S2 normal.  No added sounds or murmurs.   BREASTS:  The patient is status post bilateral mastectomies, scars look good.  Right and left axillae are negative.   GI EXAM:  Abdomen soft and nontender, no hepatosplenomegaly.   EXTREMITIES:  Legs are without tenderness or edema.  The patient's left clavicle looks distinctly abnormal since she has had her surgery done.       DATA REVIEW:  Labs are pending at the time of dictation.      IMPRESSION:  A 53-year-old patient with a history of metastatic breast cancer.  She is now off all treatment.  She had about 10-11 years of Femara after she had brain metastasis, and has not had any evidence of metastasis since her situation has been quite complex.  She does have some chronic memory and balance issues which she attributes to the brain metastasis.  She has no other worrisome symptomatology.      Total consultation time today has been 30 minutes.  The majority of time was spent on counseling and direction of patient care.         AMARILIS SAHNI MD             D: 2018   T: 2018   MT: CORNELL      Name:     MONIQUE GRIFFIN   MRN:      [id number]        Account:      [id number]   :      1965           Visit Date:   2018      Document: [id number]

## 2018-12-12 ENCOUNTER — AMBULATORY - HEALTHEAST (OUTPATIENT)
Dept: OTHER | Facility: CLINIC | Age: 53
End: 2018-12-12

## 2018-12-13 ENCOUNTER — COMMUNICATION - HEALTHEAST (OUTPATIENT)
Dept: FAMILY MEDICINE | Facility: CLINIC | Age: 53
End: 2018-12-13

## 2018-12-17 ENCOUNTER — COMMUNICATION - HEALTHEAST (OUTPATIENT)
Dept: OTHER | Facility: CLINIC | Age: 53
End: 2018-12-17

## 2019-01-07 ENCOUNTER — OFFICE VISIT (OUTPATIENT)
Dept: OBGYN | Facility: CLINIC | Age: 54
End: 2019-01-07
Payer: COMMERCIAL

## 2019-01-07 VITALS — BODY MASS INDEX: 25.24 KG/M2 | WEIGHT: 166 LBS | SYSTOLIC BLOOD PRESSURE: 102 MMHG | DIASTOLIC BLOOD PRESSURE: 78 MMHG

## 2019-01-07 DIAGNOSIS — E78.00 ELEVATED CHOLESTEROL: Primary | ICD-10-CM

## 2019-01-07 DIAGNOSIS — N76.0 VAGINITIS AND VULVOVAGINITIS: ICD-10-CM

## 2019-01-07 DIAGNOSIS — Z01.411 ENCOUNTER FOR GYNECOLOGICAL EXAMINATION WITH ABNORMAL FINDING: ICD-10-CM

## 2019-01-07 DIAGNOSIS — C50.012 MALIGNANT NEOPLASM INVOLVING BOTH NIPPLE AND AREOLA OF LEFT BREAST IN FEMALE, UNSPECIFIED ESTROGEN RECEPTOR STATUS (H): ICD-10-CM

## 2019-01-07 PROCEDURE — 99213 OFFICE O/P EST LOW 20 MIN: CPT | Mod: 25 | Performed by: OBSTETRICS & GYNECOLOGY

## 2019-01-07 PROCEDURE — 99396 PREV VISIT EST AGE 40-64: CPT | Performed by: OBSTETRICS & GYNECOLOGY

## 2019-01-07 RX ORDER — METRONIDAZOLE 7.5 MG/G
1 GEL VAGINAL DAILY
Qty: 35 G | Refills: 0 | Status: SHIPPED | OUTPATIENT
Start: 2019-01-07 | End: 2019-06-24
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
## 2019-01-08 NOTE — PATIENT INSTRUCTIONS
You can reach your [facility] Care Team any time of the day by calling [phone]. This number will put you in touch with the 24 hour nurse line if the clinic is closed.    To contact your OB/GYN Station Coordinator/Surgery Scheduler please call [phone]. This is a direct number for your care team between 8 a.m. and 4 p.m. Monday through Friday.    [facility] is open for your convenience:  Monday through Friday 8 a.m. to 6 p.m.  Closed weekends and all major holidays.

## 2019-01-09 ENCOUNTER — TELEPHONE (OUTPATIENT)
Dept: OBGYN | Facility: CLINIC | Age: 54
End: 2019-01-09

## 2019-01-09 DIAGNOSIS — E78.00 ELEVATED CHOLESTEROL: Primary | ICD-10-CM

## 2019-01-09 DIAGNOSIS — E78.00 ELEVATED CHOLESTEROL: ICD-10-CM

## 2019-01-09 LAB
CHOLEST SERPL-MCNC: 232 MG/DL
HDLC SERPL-MCNC: 47 MG/DL
LDLC SERPL CALC-MCNC: 157 MG/DL
NONHDLC SERPL-MCNC: 185 MG/DL
TRIGL SERPL-MCNC: 140 MG/DL

## 2019-01-09 PROCEDURE — 80061 LIPID PANEL: CPT | Performed by: OBSTETRICS & GYNECOLOGY

## 2019-01-09 PROCEDURE — 36415 COLL VENOUS BLD VENIPUNCTURE: CPT | Performed by: OBSTETRICS & GYNECOLOGY

## 2019-01-09 RX ORDER — SIMVASTATIN 20 MG
20 TABLET ORAL AT BEDTIME
Qty: 90 TABLET | Refills: 3 | Status: SHIPPED | OUTPATIENT
Start: 2019-01-09 | End: 2021-01-25

## 2019-01-10 NOTE — TELEPHONE ENCOUNTER
I spoke with the patient regarding the results of her fasting lipid panel and discussed the risk benefits and alternative forms of therapy.  The patient eats a healthy diet and after this lengthy discussion the patient is consented to begin simvastatin 20 mg at at bedtime.  In 1 month I will recheck a fasting lipid panel and liver enzyme panel with appropriate therapy to follow.  I have answered all the questions and obtained informed consent I think the patient is a good understanding

## 2019-01-14 ENCOUNTER — TELEPHONE (OUTPATIENT)
Dept: OBGYN | Facility: CLINIC | Age: 54
End: 2019-01-14

## 2019-01-14 NOTE — TELEPHONE ENCOUNTER
Patient calls to Rn VM asking for HDL and Lipid counts as she is going to a [facility] doctor today and they told her they can't pull up the records.   Called and gave her the results. Patient verbalized understanding.    Sylvia Schwartz RN

## 2019-02-04 ENCOUNTER — COMMUNICATION - HEALTHEAST (OUTPATIENT)
Dept: VASCULAR SURGERY | Facility: CLINIC | Age: 54
End: 2019-02-04

## 2019-02-06 ENCOUNTER — COMMUNICATION - HEALTHEAST (OUTPATIENT)
Dept: VASCULAR SURGERY | Facility: CLINIC | Age: 54
End: 2019-02-06

## 2019-02-07 ENCOUNTER — OFFICE VISIT - HEALTHEAST (OUTPATIENT)
Dept: VASCULAR SURGERY | Facility: CLINIC | Age: 54
End: 2019-02-07

## 2019-02-07 DIAGNOSIS — L90.5 SCAR CONDITION AND FIBROSIS OF SKIN: ICD-10-CM

## 2019-02-07 DIAGNOSIS — M24.512 CONTRACTURE, LEFT SHOULDER: ICD-10-CM

## 2019-02-07 DIAGNOSIS — C79.31 BREAST CANCER METASTASIZED TO BRAIN, UNSPECIFIED LATERALITY (H): ICD-10-CM

## 2019-02-07 DIAGNOSIS — C50.919 BREAST CANCER METASTASIZED TO BRAIN, UNSPECIFIED LATERALITY (H): ICD-10-CM

## 2019-02-07 ASSESSMENT — MIFFLIN-ST. JEOR: SCORE: 1396.01

## 2019-02-12 ENCOUNTER — OFFICE VISIT - HEALTHEAST (OUTPATIENT)
Dept: PHYSICAL THERAPY | Facility: REHABILITATION | Age: 54
End: 2019-02-12

## 2019-02-12 DIAGNOSIS — I97.2 POST-MASTECTOMY LYMPHEDEMA SYNDROME: ICD-10-CM

## 2019-02-12 DIAGNOSIS — L90.5 SCAR CONDITION AND FIBROSIS OF SKIN: ICD-10-CM

## 2019-02-12 DIAGNOSIS — M24.512 CONTRACTURE, LEFT SHOULDER: ICD-10-CM

## 2019-02-15 ENCOUNTER — OFFICE VISIT - HEALTHEAST (OUTPATIENT)
Dept: PHYSICAL THERAPY | Facility: REHABILITATION | Age: 54
End: 2019-02-15

## 2019-02-15 DIAGNOSIS — M25.612 DECREASED ROM OF LEFT SHOULDER: ICD-10-CM

## 2019-02-15 DIAGNOSIS — R07.89 LEFT-SIDED CHEST WALL PAIN: ICD-10-CM

## 2019-02-15 DIAGNOSIS — M24.512 CONTRACTURE, LEFT SHOULDER: ICD-10-CM

## 2019-02-15 DIAGNOSIS — Z85.3 HISTORY OF BREAST CANCER: ICD-10-CM

## 2019-02-15 DIAGNOSIS — I97.2 POST-MASTECTOMY LYMPHEDEMA SYNDROME: ICD-10-CM

## 2019-02-15 DIAGNOSIS — L90.5 SCAR CONDITION AND FIBROSIS OF SKIN: ICD-10-CM

## 2019-02-15 DIAGNOSIS — R60.0 LOCALIZED EDEMA: ICD-10-CM

## 2019-02-19 ENCOUNTER — OFFICE VISIT - HEALTHEAST (OUTPATIENT)
Dept: PHYSICAL THERAPY | Facility: REHABILITATION | Age: 54
End: 2019-02-19

## 2019-02-19 DIAGNOSIS — I97.2 POST-MASTECTOMY LYMPHEDEMA SYNDROME: ICD-10-CM

## 2019-02-19 DIAGNOSIS — M24.512 CONTRACTURE, LEFT SHOULDER: ICD-10-CM

## 2019-02-19 DIAGNOSIS — L90.5 SCAR CONDITION AND FIBROSIS OF SKIN: ICD-10-CM

## 2019-02-22 ENCOUNTER — COMMUNICATION - HEALTHEAST (OUTPATIENT)
Dept: FAMILY MEDICINE | Facility: CLINIC | Age: 54
End: 2019-02-22

## 2019-02-22 ENCOUNTER — OFFICE VISIT - HEALTHEAST (OUTPATIENT)
Dept: PHYSICAL THERAPY | Facility: REHABILITATION | Age: 54
End: 2019-02-22

## 2019-02-22 DIAGNOSIS — I97.2 POST-MASTECTOMY LYMPHEDEMA SYNDROME: ICD-10-CM

## 2019-02-22 DIAGNOSIS — R60.0 LOCALIZED EDEMA: ICD-10-CM

## 2019-02-22 DIAGNOSIS — M25.612 DECREASED ROM OF LEFT SHOULDER: ICD-10-CM

## 2019-02-22 DIAGNOSIS — M24.512 CONTRACTURE, LEFT SHOULDER: ICD-10-CM

## 2019-02-22 DIAGNOSIS — L90.5 SCAR CONDITION AND FIBROSIS OF SKIN: ICD-10-CM

## 2019-02-22 DIAGNOSIS — Z85.3 HISTORY OF BREAST CANCER: ICD-10-CM

## 2019-02-22 DIAGNOSIS — R07.89 LEFT-SIDED CHEST WALL PAIN: ICD-10-CM

## 2019-02-26 ENCOUNTER — OFFICE VISIT - HEALTHEAST (OUTPATIENT)
Dept: PHYSICAL THERAPY | Facility: REHABILITATION | Age: 54
End: 2019-02-26

## 2019-02-26 DIAGNOSIS — M24.512 CONTRACTURE, LEFT SHOULDER: ICD-10-CM

## 2019-02-26 DIAGNOSIS — L90.5 SCAR CONDITION AND FIBROSIS OF SKIN: ICD-10-CM

## 2019-02-26 DIAGNOSIS — I97.2 POST-MASTECTOMY LYMPHEDEMA SYNDROME: ICD-10-CM

## 2019-02-28 ENCOUNTER — OFFICE VISIT - HEALTHEAST (OUTPATIENT)
Dept: PHYSICAL THERAPY | Facility: REHABILITATION | Age: 54
End: 2019-02-28

## 2019-02-28 DIAGNOSIS — M24.512 CONTRACTURE, LEFT SHOULDER: ICD-10-CM

## 2019-02-28 DIAGNOSIS — L90.5 SCAR CONDITION AND FIBROSIS OF SKIN: ICD-10-CM

## 2019-02-28 DIAGNOSIS — I97.2 POST-MASTECTOMY LYMPHEDEMA SYNDROME: ICD-10-CM

## 2019-03-05 ENCOUNTER — OFFICE VISIT - HEALTHEAST (OUTPATIENT)
Dept: PHYSICAL THERAPY | Facility: REHABILITATION | Age: 54
End: 2019-03-05

## 2019-03-05 DIAGNOSIS — M24.512 CONTRACTURE, LEFT SHOULDER: ICD-10-CM

## 2019-03-05 DIAGNOSIS — R60.0 LOCALIZED EDEMA: ICD-10-CM

## 2019-03-05 DIAGNOSIS — R07.89 LEFT-SIDED CHEST WALL PAIN: ICD-10-CM

## 2019-03-05 DIAGNOSIS — M25.612 DECREASED ROM OF LEFT SHOULDER: ICD-10-CM

## 2019-03-05 DIAGNOSIS — I97.2 POST-MASTECTOMY LYMPHEDEMA SYNDROME: ICD-10-CM

## 2019-03-05 DIAGNOSIS — Z85.3 HISTORY OF BREAST CANCER: ICD-10-CM

## 2019-03-05 DIAGNOSIS — L90.5 SCAR CONDITION AND FIBROSIS OF SKIN: ICD-10-CM

## 2019-03-08 ENCOUNTER — COMMUNICATION - HEALTHEAST (OUTPATIENT)
Dept: PHYSICAL THERAPY | Facility: REHABILITATION | Age: 54
End: 2019-03-08

## 2019-03-08 ENCOUNTER — OFFICE VISIT - HEALTHEAST (OUTPATIENT)
Dept: PHYSICAL THERAPY | Facility: REHABILITATION | Age: 54
End: 2019-03-08

## 2019-03-08 DIAGNOSIS — M24.512 CONTRACTURE, LEFT SHOULDER: ICD-10-CM

## 2019-03-08 DIAGNOSIS — M25.612 DECREASED ROM OF LEFT SHOULDER: ICD-10-CM

## 2019-03-08 DIAGNOSIS — R60.0 LOCALIZED EDEMA: ICD-10-CM

## 2019-03-08 DIAGNOSIS — R07.89 LEFT-SIDED CHEST WALL PAIN: ICD-10-CM

## 2019-03-08 DIAGNOSIS — Z85.3 HISTORY OF BREAST CANCER: ICD-10-CM

## 2019-03-08 DIAGNOSIS — L90.5 SCAR CONDITION AND FIBROSIS OF SKIN: ICD-10-CM

## 2019-03-08 DIAGNOSIS — I97.2 POST-MASTECTOMY LYMPHEDEMA SYNDROME: ICD-10-CM

## 2019-03-12 ENCOUNTER — OFFICE VISIT - HEALTHEAST (OUTPATIENT)
Dept: PHYSICAL THERAPY | Facility: REHABILITATION | Age: 54
End: 2019-03-12

## 2019-03-12 DIAGNOSIS — L90.5 SCAR CONDITION AND FIBROSIS OF SKIN: ICD-10-CM

## 2019-03-12 DIAGNOSIS — I97.2 POST-MASTECTOMY LYMPHEDEMA SYNDROME: ICD-10-CM

## 2019-03-12 DIAGNOSIS — M24.512 CONTRACTURE, LEFT SHOULDER: ICD-10-CM

## 2019-03-28 ENCOUNTER — OFFICE VISIT - HEALTHEAST (OUTPATIENT)
Dept: PHYSICAL THERAPY | Facility: REHABILITATION | Age: 54
End: 2019-03-28

## 2019-03-28 DIAGNOSIS — M24.512 CONTRACTURE, LEFT SHOULDER: ICD-10-CM

## 2019-03-28 DIAGNOSIS — I97.2 POST-MASTECTOMY LYMPHEDEMA SYNDROME: ICD-10-CM

## 2019-03-28 DIAGNOSIS — L90.5 SCAR CONDITION AND FIBROSIS OF SKIN: ICD-10-CM

## 2019-04-02 ENCOUNTER — OFFICE VISIT - HEALTHEAST (OUTPATIENT)
Dept: PHYSICAL THERAPY | Facility: REHABILITATION | Age: 54
End: 2019-04-02

## 2019-04-02 DIAGNOSIS — I97.2 POST-MASTECTOMY LYMPHEDEMA SYNDROME: ICD-10-CM

## 2019-04-02 DIAGNOSIS — L90.5 SCAR CONDITION AND FIBROSIS OF SKIN: ICD-10-CM

## 2019-04-02 DIAGNOSIS — M24.512 CONTRACTURE, LEFT SHOULDER: ICD-10-CM

## 2019-04-04 ENCOUNTER — OFFICE VISIT - HEALTHEAST (OUTPATIENT)
Dept: PHYSICAL THERAPY | Facility: REHABILITATION | Age: 54
End: 2019-04-04

## 2019-04-04 DIAGNOSIS — L90.5 SCAR CONDITION AND FIBROSIS OF SKIN: ICD-10-CM

## 2019-04-04 DIAGNOSIS — M24.512 CONTRACTURE, LEFT SHOULDER: ICD-10-CM

## 2019-04-04 DIAGNOSIS — I97.2 POST-MASTECTOMY LYMPHEDEMA SYNDROME: ICD-10-CM

## 2019-04-23 ENCOUNTER — OFFICE VISIT - HEALTHEAST (OUTPATIENT)
Dept: PHYSICAL THERAPY | Facility: REHABILITATION | Age: 54
End: 2019-04-23

## 2019-04-23 DIAGNOSIS — I97.2 POST-MASTECTOMY LYMPHEDEMA SYNDROME: ICD-10-CM

## 2019-04-23 DIAGNOSIS — L90.5 SCAR CONDITION AND FIBROSIS OF SKIN: ICD-10-CM

## 2019-04-23 DIAGNOSIS — M24.512 CONTRACTURE, LEFT SHOULDER: ICD-10-CM

## 2019-04-25 ENCOUNTER — OFFICE VISIT - HEALTHEAST (OUTPATIENT)
Dept: PHYSICAL THERAPY | Facility: REHABILITATION | Age: 54
End: 2019-04-25

## 2019-04-25 DIAGNOSIS — M24.512 CONTRACTURE, LEFT SHOULDER: ICD-10-CM

## 2019-04-25 DIAGNOSIS — L90.5 SCAR CONDITION AND FIBROSIS OF SKIN: ICD-10-CM

## 2019-04-25 DIAGNOSIS — I97.2 POST-MASTECTOMY LYMPHEDEMA SYNDROME: ICD-10-CM

## 2019-04-30 ENCOUNTER — OFFICE VISIT - HEALTHEAST (OUTPATIENT)
Dept: PHYSICAL THERAPY | Facility: REHABILITATION | Age: 54
End: 2019-04-30

## 2019-04-30 DIAGNOSIS — I97.2 POST-MASTECTOMY LYMPHEDEMA SYNDROME: ICD-10-CM

## 2019-04-30 DIAGNOSIS — L90.5 SCAR CONDITION AND FIBROSIS OF SKIN: ICD-10-CM

## 2019-04-30 DIAGNOSIS — M24.512 CONTRACTURE, LEFT SHOULDER: ICD-10-CM

## 2019-06-13 ENCOUNTER — COMMUNICATION - HEALTHEAST (OUTPATIENT)
Dept: FAMILY MEDICINE | Facility: CLINIC | Age: 54
End: 2019-06-13

## 2019-06-13 DIAGNOSIS — N39.46 MIXED INCONTINENCE: ICD-10-CM

## 2019-06-13 DIAGNOSIS — R39.15 URINARY URGENCY: ICD-10-CM

## 2019-06-24 DIAGNOSIS — N95.2 ATROPHIC VAGINITIS: ICD-10-CM

## 2019-06-24 DIAGNOSIS — N76.0 VAGINITIS AND VULVOVAGINITIS: ICD-10-CM

## 2019-06-25 RX ORDER — METRONIDAZOLE 7.5 MG/G
GEL VAGINAL
Qty: 70 G | Refills: 0 | Status: SHIPPED | OUTPATIENT
Start: 2019-06-25 | End: 2019-12-05

## 2019-06-25 NOTE — TELEPHONE ENCOUNTER
Left message to call back on voicemail on cell phone.  Please verify with the pt that she is requesting this refill. If so, what symptoms is she having?   Then route to md. BENJI Patrick RN

## 2019-07-12 RX ORDER — ESTRADIOL 0.1 MG/G
CREAM VAGINAL
Qty: 42.5 G | Refills: 1 | Status: SHIPPED | OUTPATIENT
Start: 2019-07-12 | End: 2021-01-25

## 2019-07-12 NOTE — TELEPHONE ENCOUNTER
Pt actually needs estradiol refill.    She does hav evaginal odor though and would like to use both metrogel and estradiol if she can.  Is that possible?    Abbie VILLANUEVA R.N.  [facility]

## 2019-07-15 ENCOUNTER — OFFICE VISIT - HEALTHEAST (OUTPATIENT)
Dept: VASCULAR SURGERY | Facility: CLINIC | Age: 54
End: 2019-07-15

## 2019-07-15 DIAGNOSIS — I97.2 POST-MASTECTOMY LYMPHEDEMA SYNDROME: ICD-10-CM

## 2019-07-15 DIAGNOSIS — C79.31 BREAST CANCER METASTASIZED TO BRAIN, UNSPECIFIED LATERALITY (H): ICD-10-CM

## 2019-07-15 DIAGNOSIS — C50.919 BREAST CANCER METASTASIZED TO BRAIN, UNSPECIFIED LATERALITY (H): ICD-10-CM

## 2019-07-15 DIAGNOSIS — L90.5 SCAR CONDITION AND FIBROSIS OF SKIN: ICD-10-CM

## 2019-07-15 DIAGNOSIS — M24.512 CONTRACTURE, LEFT SHOULDER: ICD-10-CM

## 2019-07-15 DIAGNOSIS — R26.89 BALANCE PROBLEM: ICD-10-CM

## 2019-07-15 ASSESSMENT — MIFFLIN-ST. JEOR: SCORE: 1387.84

## 2019-08-02 ENCOUNTER — OFFICE VISIT - HEALTHEAST (OUTPATIENT)
Dept: FAMILY MEDICINE | Facility: CLINIC | Age: 54
End: 2019-08-02

## 2019-08-02 DIAGNOSIS — R35.0 URINARY FREQUENCY: ICD-10-CM

## 2019-08-02 DIAGNOSIS — N39.0 URINARY TRACT INFECTION WITHOUT HEMATURIA, SITE UNSPECIFIED: ICD-10-CM

## 2019-08-02 LAB
ALBUMIN UR-MCNC: ABNORMAL MG/DL
APPEARANCE UR: ABNORMAL
BACTERIA #/AREA URNS HPF: ABNORMAL HPF
BILIRUB UR QL STRIP: NEGATIVE
COLOR UR AUTO: YELLOW
GLUCOSE UR STRIP-MCNC: NEGATIVE MG/DL
HGB UR QL STRIP: ABNORMAL
KETONES UR STRIP-MCNC: NEGATIVE MG/DL
LEUKOCYTE ESTERASE UR QL STRIP: ABNORMAL
NITRATE UR QL: NEGATIVE
PH UR STRIP: 7.5 [PH] (ref 5–8)
RBC #/AREA URNS AUTO: ABNORMAL HPF
SP GR UR STRIP: 1.01 (ref 1–1.03)
SQUAMOUS #/AREA URNS AUTO: ABNORMAL LPF
UROBILINOGEN UR STRIP-ACNC: ABNORMAL
WBC #/AREA URNS AUTO: >100 HPF

## 2019-08-03 LAB — BACTERIA SPEC CULT: ABNORMAL

## 2019-08-15 ENCOUNTER — COMMUNICATION - HEALTHEAST (OUTPATIENT)
Dept: FAMILY MEDICINE | Facility: CLINIC | Age: 54
End: 2019-08-15

## 2019-09-20 ENCOUNTER — COMMUNICATION - HEALTHEAST (OUTPATIENT)
Dept: FAMILY MEDICINE | Facility: CLINIC | Age: 54
End: 2019-09-20

## 2019-09-20 ENCOUNTER — OFFICE VISIT - HEALTHEAST (OUTPATIENT)
Dept: FAMILY MEDICINE | Facility: CLINIC | Age: 54
End: 2019-09-20

## 2019-09-20 DIAGNOSIS — R30.0 DYSURIA: ICD-10-CM

## 2019-09-20 LAB
ALBUMIN UR-MCNC: NEGATIVE MG/DL
APPEARANCE UR: CLEAR
BACTERIA #/AREA URNS HPF: ABNORMAL HPF
BILIRUB UR QL STRIP: NEGATIVE
COLOR UR AUTO: YELLOW
GLUCOSE UR STRIP-MCNC: NEGATIVE MG/DL
HGB UR QL STRIP: ABNORMAL
KETONES UR STRIP-MCNC: NEGATIVE MG/DL
LEUKOCYTE ESTERASE UR QL STRIP: ABNORMAL
NITRATE UR QL: NEGATIVE
PH UR STRIP: 7 [PH] (ref 5–8)
RBC #/AREA URNS AUTO: ABNORMAL HPF
SP GR UR STRIP: 1.01 (ref 1–1.03)
SQUAMOUS #/AREA URNS AUTO: ABNORMAL LPF
UROBILINOGEN UR STRIP-ACNC: ABNORMAL
WBC #/AREA URNS AUTO: ABNORMAL HPF

## 2019-09-21 LAB — BACTERIA SPEC CULT: NO GROWTH

## 2019-09-23 ENCOUNTER — COMMUNICATION - HEALTHEAST (OUTPATIENT)
Dept: FAMILY MEDICINE | Facility: CLINIC | Age: 54
End: 2019-09-23

## 2019-10-21 ENCOUNTER — TELEPHONE (OUTPATIENT)
Dept: ONCOLOGY | Facility: CLINIC | Age: 54
End: 2019-10-21

## 2019-10-21 ENCOUNTER — PATIENT OUTREACH (OUTPATIENT)
Dept: ONCOLOGY | Facility: CLINIC | Age: 54
End: 2019-10-21

## 2019-10-21 NOTE — TELEPHONE ENCOUNTER
Patient calling states she is seeing a new Neurologist and he wants her to get a MRI of her brain tomorrow. She says Dr. Wu has done this same type of MRI in the past and she doesn't feel she needs to have another one. Patient would like to know if she should go ahead with the MRI or if she should wait until she she's Dr. Wu in December. Call back number given: [phone]. Thanks, KB

## 2019-10-21 NOTE — PROGRESS NOTES
Pt left a voicemail message at 12:10 pm today. States that her neurologist is recommending a brain MRI be done. Pt is wondering if she has a recent brain MRI done that was ordered by Dr Wu?    Pt is also requesting a copy of her recent Dexa scan.  Left a message for pt to call back to discuss.  Jody Dutton RN, BSN, OCN

## 2019-10-21 NOTE — TELEPHONE ENCOUNTER
Talked with pt. Pt states that she saw a neurologist for increasing problems with balance over the past 6 months. The neurologist recommended brain MRI which is scheduled for tomorrow 10/22 at 1:15. Pt is wondering when her last brain MRI was done? Advised pt that her last brain MRI was done in May 2017.   Pt states that she is hesitant to get the brain MRI done without checking with Dr Wu first. Pt is requesting that we check with Dr Wu right away tomorrow morning to see if she agrees with plan for brain MRI. Call pt with reply as soon as possible. OK to leave a message if unable to reach pt.   Pt is also wondering when her last bone density test was done? Advised pt that the last bone density test was done 11/16/2017 at [facility]. Pt states that she will contact [facility] now to see if she has a more recent bone density test, and to obtain the reports. Patient verbalized understanding and agreement with plan.  Pt was instructed to call the clinic with any questions, concerns, or worsening symptoms.   Jody Dutton, RN, BSN, OCN

## 2019-10-22 NOTE — TELEPHONE ENCOUNTER
Talked with Dr Wu today. Dr Wu agrees with plan for brain MRI today. Per pt request, a detailed message was left for pt with this information. Pt should proceed with brain MRI today.  Contact the clinic with any further questions.  Jody Dutton, RN, BSN, OCN

## 2019-10-28 ENCOUNTER — PATIENT OUTREACH (OUTPATIENT)
Dept: ONCOLOGY | Facility: CLINIC | Age: 54
End: 2019-10-28

## 2019-10-28 NOTE — PROGRESS NOTES
Dr Rajesh Jeff from [facility] neurology clinic is calling. States that he ordered brain MRI for pt that was done on 10/24/2019. MRI showed a small punctate lesion in the right temporoparietal lobe. Follow-up brain MRI in 2 months was recommended. Dr Jeff has notified pt with results and he will order brain MRI to be done in 2 months. He just wanted Dr Wu to be aware.  Will plan to update Dr Wu when she is back in the clinic tomorrow. No need to call Dr Jeff back unless Dr Wu has further questions.  Jody Dutton, RN, BSN, OCN

## 2019-11-08 ENCOUNTER — HEALTH MAINTENANCE LETTER (OUTPATIENT)
Age: 54
End: 2019-11-08

## 2019-11-22 ENCOUNTER — COMMUNICATION - HEALTHEAST (OUTPATIENT)
Dept: FAMILY MEDICINE | Facility: CLINIC | Age: 54
End: 2019-11-22

## 2019-11-25 ENCOUNTER — COMMUNICATION - HEALTHEAST (OUTPATIENT)
Dept: FAMILY MEDICINE | Facility: CLINIC | Age: 54
End: 2019-11-25

## 2019-11-25 ENCOUNTER — OFFICE VISIT - HEALTHEAST (OUTPATIENT)
Dept: FAMILY MEDICINE | Facility: CLINIC | Age: 54
End: 2019-11-25

## 2019-11-25 DIAGNOSIS — Z01.818 PRE-OP EXAM: ICD-10-CM

## 2019-11-25 DIAGNOSIS — H25.89 OTHER AGE-RELATED CATARACT OF BOTH EYES: ICD-10-CM

## 2019-11-25 LAB
ANION GAP SERPL CALCULATED.3IONS-SCNC: 8 MMOL/L (ref 5–18)
BASOPHILS # BLD AUTO: 0 THOU/UL (ref 0–0.2)
BASOPHILS NFR BLD AUTO: 1 % (ref 0–2)
BUN SERPL-MCNC: 11 MG/DL (ref 8–22)
CALCIUM SERPL-MCNC: 9.5 MG/DL (ref 8.5–10.5)
CHLORIDE BLD-SCNC: 107 MMOL/L (ref 98–107)
CO2 SERPL-SCNC: 27 MMOL/L (ref 22–31)
CREAT SERPL-MCNC: 0.78 MG/DL (ref 0.6–1.1)
EOSINOPHIL # BLD AUTO: 0.1 THOU/UL (ref 0–0.4)
EOSINOPHIL NFR BLD AUTO: 2 % (ref 0–6)
ERYTHROCYTE [DISTWIDTH] IN BLOOD BY AUTOMATED COUNT: 11.1 % (ref 11–14.5)
GFR SERPL CREATININE-BSD FRML MDRD: >60 ML/MIN/1.73M2
GLUCOSE BLD-MCNC: 92 MG/DL (ref 70–125)
HCT VFR BLD AUTO: 37.6 % (ref 35–47)
HGB BLD-MCNC: 13 G/DL (ref 12–16)
LYMPHOCYTES # BLD AUTO: 1.3 THOU/UL (ref 0.8–4.4)
LYMPHOCYTES NFR BLD AUTO: 25 % (ref 20–40)
MCH RBC QN AUTO: 30.5 PG (ref 27–34)
MCHC RBC AUTO-ENTMCNC: 34.5 G/DL (ref 32–36)
MCV RBC AUTO: 89 FL (ref 80–100)
MONOCYTES # BLD AUTO: 0.6 THOU/UL (ref 0–0.9)
MONOCYTES NFR BLD AUTO: 12 % (ref 2–10)
NEUTROPHILS # BLD AUTO: 3.1 THOU/UL (ref 2–7.7)
NEUTROPHILS NFR BLD AUTO: 60 % (ref 50–70)
PLATELET # BLD AUTO: 197 THOU/UL (ref 140–440)
PMV BLD AUTO: 8.6 FL (ref 7–10)
POTASSIUM BLD-SCNC: 4.3 MMOL/L (ref 3.5–5)
RBC # BLD AUTO: 4.25 MILL/UL (ref 3.8–5.4)
SODIUM SERPL-SCNC: 142 MMOL/L (ref 136–145)
WBC: 5.1 THOU/UL (ref 4–11)

## 2019-11-25 ASSESSMENT — MIFFLIN-ST. JEOR: SCORE: 1400.54

## 2019-11-26 ENCOUNTER — COMMUNICATION - HEALTHEAST (OUTPATIENT)
Dept: FAMILY MEDICINE | Facility: CLINIC | Age: 54
End: 2019-11-26

## 2019-12-05 ENCOUNTER — HOSPITAL ENCOUNTER (OUTPATIENT)
Facility: CLINIC | Age: 54
Setting detail: SPECIMEN
Discharge: HOME | End: 2019-12-05
Attending: INTERNAL MEDICINE | Admitting: INTERNAL MEDICINE
Payer: COMMERCIAL

## 2019-12-05 ENCOUNTER — ONCOLOGY VISIT (OUTPATIENT)
Dept: ONCOLOGY | Facility: CLINIC | Age: 54
End: 2019-12-05
Attending: INTERNAL MEDICINE
Payer: COMMERCIAL

## 2019-12-05 ENCOUNTER — RECORDS - HEALTHEAST (OUTPATIENT)
Dept: ADMINISTRATIVE | Facility: OTHER | Age: 54
End: 2019-12-05

## 2019-12-05 VITALS
DIASTOLIC BLOOD PRESSURE: 58 MMHG | SYSTOLIC BLOOD PRESSURE: 103 MMHG | OXYGEN SATURATION: 99 % | HEART RATE: 67 BPM | TEMPERATURE: 97.7 F | RESPIRATION RATE: 16 BRPM | WEIGHT: 166.2 LBS | BODY MASS INDEX: 25.27 KG/M2

## 2019-12-05 DIAGNOSIS — Z17.0 MALIGNANT NEOPLASM OF LEFT BREAST IN FEMALE, ESTROGEN RECEPTOR POSITIVE, UNSPECIFIED SITE OF BREAST (H): ICD-10-CM

## 2019-12-05 DIAGNOSIS — C50.912 MALIGNANT NEOPLASM OF LEFT BREAST IN FEMALE, ESTROGEN RECEPTOR POSITIVE, UNSPECIFIED SITE OF BREAST (H): ICD-10-CM

## 2019-12-05 DIAGNOSIS — C79.31 BRAIN METASTASIS: Primary | ICD-10-CM

## 2019-12-05 LAB
ALBUMIN SERPL-MCNC: 4 G/DL (ref 3.4–5)
ALP SERPL-CCNC: 76 U/L (ref 40–150)
ALT SERPL W P-5'-P-CCNC: 55 U/L (ref 0–50)
ANION GAP SERPL CALCULATED.3IONS-SCNC: 5 MMOL/L (ref 3–14)
AST SERPL W P-5'-P-CCNC: 40 U/L (ref 0–45)
BILIRUB SERPL-MCNC: 0.8 MG/DL (ref 0.2–1.3)
BUN SERPL-MCNC: 12 MG/DL (ref 7–30)
CALCIUM SERPL-MCNC: 9.1 MG/DL (ref 8.5–10.1)
CANCER AG27-29 SERPL-ACNC: 33 U/ML (ref 0–39)
CHLORIDE SERPL-SCNC: 106 MMOL/L (ref 94–109)
CO2 SERPL-SCNC: 29 MMOL/L (ref 20–32)
CREAT SERPL-MCNC: 0.79 MG/DL (ref 0.52–1.04)
ERYTHROCYTE [DISTWIDTH] IN BLOOD BY AUTOMATED COUNT: 13.1 % (ref 10–15)
GFR SERPL CREATININE-BSD FRML MDRD: 84 ML/MIN/{1.73_M2}
GLUCOSE SERPL-MCNC: 91 MG/DL (ref 70–99)
HCT VFR BLD AUTO: 43.1 % (ref 35–47)
HGB BLD-MCNC: 13.8 G/DL (ref 11.7–15.7)
MCH RBC QN AUTO: 29.6 PG (ref 26.5–33)
MCHC RBC AUTO-ENTMCNC: 32 G/DL (ref 31.5–36.5)
MCV RBC AUTO: 93 FL (ref 78–100)
PLATELET # BLD AUTO: 206 10E9/L (ref 150–450)
POTASSIUM SERPL-SCNC: 3.7 MMOL/L (ref 3.4–5.3)
PROT SERPL-MCNC: 7.6 G/DL (ref 6.8–8.8)
RBC # BLD AUTO: 4.66 10E12/L (ref 3.8–5.2)
SODIUM SERPL-SCNC: 140 MMOL/L (ref 133–144)
WBC # BLD AUTO: 5.4 10E9/L (ref 4–11)

## 2019-12-05 PROCEDURE — 86300 IMMUNOASSAY TUMOR CA 15-3: CPT | Performed by: INTERNAL MEDICINE

## 2019-12-05 PROCEDURE — G0463 HOSPITAL OUTPT CLINIC VISIT: HCPCS

## 2019-12-05 PROCEDURE — 80053 COMPREHEN METABOLIC PANEL: CPT | Performed by: INTERNAL MEDICINE

## 2019-12-05 PROCEDURE — 36415 COLL VENOUS BLD VENIPUNCTURE: CPT

## 2019-12-05 PROCEDURE — 99214 OFFICE O/P EST MOD 30 MIN: CPT | Performed by: INTERNAL MEDICINE

## 2019-12-05 PROCEDURE — 85027 COMPLETE CBC AUTOMATED: CPT | Performed by: INTERNAL MEDICINE

## 2019-12-05 ASSESSMENT — PAIN SCALES - GENERAL: PAINLEVEL: NO PAIN (0)

## 2019-12-05 NOTE — PROGRESS NOTES
Visit Date:   12/05/2019      HISTORY OF PRESENT ILLNESS:  Mayra Ramirez is a 54-year-old patient who comes in today for interim followup.  She has a history of metastatic breast cancer diagnosed in 2005.  She was initially diagnosed with primary left-sided breast cancer in 2004 and then developed brain metastasis in 2005.  She had resection of the brain metastasis as well as radiation therapy and then did adjuvant Femara for about 10 or 11 years after that.  She did develop some issues with her bones and was on Boniva for her bones but now she is off all treatment.  She had a problem with a fracture of her left clavicle and had a lot of complications with bone healing.  She had to have a steel aye put in her left clavicle as well as a bone graft.  She is hoping to have the steel aye removed at some point in the future.  She has also over the years had continued problems with balance issues and some memory loss, felt to be chronic from her previous brain metastasis and radiation therapy as well as a brain surgery.  She has noticed some balance issues have gotten worse in the last 6 months and she did see her neurologist and had an MRI scan done on 10/24.  She had stable postop changes.  There was an interval development of a small punctate focus of enhancement in the temporoparietal white matter which measured less than 3 mm.  This was indeterminate.  There were no other abnormally enhancing lesions seen.  She had changes related to a previous treatment, which were unchanged.  I have discussed the MRI scan with her from oncology standpoint, I would probably just repeat it again in 4 months to make sure that that new lesion looks stable.  It may just be also related to scar tissue.  She is in agreement with that plan.  I have written her for the MRI scan to be done in 2/2020 and she will see me afterwards with the results.      REVIEW OF SYSTEMS:  A 14-point comprehensive review of systems is remarkable for some  fatigue, some memory loss, some balance issues which are chronic.  She also gets intermittent lymphedema in the left arm.      MEDICATIONS AND ALLERGIES:  Outlined in the nursing records        PAIN ASSESSMENT:  She is in no pain today.      FAMILY HISTORY:  The patient has no family history of breast cancers.      SOCIAL HISTORY:  The patient is a nonsmoker.  She has got a supportive family network.      PHYSICAL EXAMINATION:   GENERAL:  She is well-appearing lady in no acute distress.   VITAL SIGNS:  Stable.   HEENT:  Oropharynx clear, mucous membranes moist.   NECK:  Has no masses or goiter.   NECK:  There is no cervical, supraclavicular or infraclavicular adenopathy.   CHEST:  Clear to auscultation and percussion bilaterally.   HEART:  Sounds 1, 2 normal.  No added sounds or murmurs.   BREASTS:  The patient is status post bilateral mastectomies, scars look good.  Right and left axilla are negative.   GASTROINTESTINAL:  Abdomen is soft and nontender, no hepatosplenomegaly.   EXTREMITIES:  Legs are without tenderness or edema.   SKIN:  Has an abnormal looking left clavicle.     PERIPHERAL NEUROLOGICAL EXAM:  She has some balance issues which are mild.  Otherwise, that is normal.   NEUROLOGIC:  The patient is alert and oriented x 3.      DATA REVIEW:  I have reviewed the recent MRI scan, which was done in 10/2019.      IMPRESSION:  A 54-year-old patient with metastatic breast cancer off all treatment at this point.  She had a recent MRI scan because of some ongoing balance issues and also some chronic memory issues.  There was a small less than 3 mm focus seen.  We are going to repeat imaging in about 4 months to make sure that that is stable.  I have reassured her today that this far out, it would be unusual to have a recurrence and she is not having any other worrisome symptomatology.  She will see me back with a repeat MRI scan in 02/2020.         AMARILIS SAHNI MD             D: 12/05/2019   T:  2019   MT: KENDAL      Name:     MONIQUE GRIFFIN   MRN:      [id number]        Account:      [id number]   :      1965           Visit Date:   2019      Document: [id number]

## 2019-12-05 NOTE — NURSING NOTE
"Oncology Rooming Note    December 5, 2019 10:49 AM   HeidiAngela Ramirez is a 54 year old female who presents for:    Chief Complaint   Patient presents with     Oncology Clinic Visit     Breast cancer      Initial Vitals: /58   Pulse 67   Temp 97.7  F (36.5  C) (Oral)   Resp 16   Wt 75.4 kg (166 lb 3.2 oz)   SpO2 99%   BMI 25.27 kg/m   Estimated body mass index is 25.27 kg/m  as calculated from the following:    Height as of 12/6/18: 1.727 m (5' 8\").    Weight as of this encounter: 75.4 kg (166 lb 3.2 oz). Body surface area is 1.9 meters squared.  No Pain (0) Comment: Data Unavailable   No LMP recorded. (Menstrual status: UNKNOWN).  Allergies reviewed: Yes  Medications reviewed: Yes    Medications: Medication refills not needed today.  Pharmacy name entered into [id number]: [facility] [id number] [place] [address] [place]    Clinical concerns: f/u       Rena Rodriguez CMA              "

## 2019-12-05 NOTE — LETTER
12/5/2019         RE: Mayra Ramirez  [address] [place] [address]        Dear Colleague,    Thank you for referring your patient, Mayra Ramirez, to the [facility]. Please see a copy of my visit note below.    Visit Date:   12/05/2019      HISTORY OF PRESENT ILLNESS:  Mayra Ramirez is a 54-year-old patient who comes in today for interim followup.  She has a history of metastatic breast cancer diagnosed in 2005.  She was initially diagnosed with primary left-sided breast cancer in 2004 and then developed brain metastasis in 2005.  She had resection of the brain metastasis as well as radiation therapy and then did adjuvant Femara for about 10 or 11 years after that.  She did develop some issues with her bones and was on Boniva for her bones but now she is off all treatment.  She had a problem with a fracture of her left clavicle and had a lot of complications with bone healing.  She had to have a steel aye put in her left clavicle as well as a bone graft.  She is hoping to have the steel aye removed at some point in the future.  She has also over the years had continued problems with balance issues and some memory loss, felt to be chronic from her previous brain metastasis and radiation therapy as well as a brain surgery.  She has noticed some balance issues have gotten worse in the last 6 months and she did see her neurologist and had an MRI scan done on 10/24.  She had stable postop changes.  There was an interval development of a small punctate focus of enhancement in the temporoparietal white matter which measured less than 3 mm.  This was indeterminate.  There were no other abnormally enhancing lesions seen.  She had changes related to a previous treatment, which were unchanged.  I have discussed the MRI scan with her from oncology standpoint, I would probably just repeat it again in 4 months to make sure that that new lesion looks stable.  It may just be also related to scar  tissue.  She is in agreement with that plan.  I have written her for the MRI scan to be done in 2/2020 and she will see me afterwards with the results.      REVIEW OF SYSTEMS:  A 14-point comprehensive review of systems is remarkable for some fatigue, some memory loss, some balance issues which are chronic.  She also gets intermittent lymphedema in the left arm.      MEDICATIONS AND ALLERGIES:  Outlined in the nursing records        PAIN ASSESSMENT:  She is in no pain today.      FAMILY HISTORY:  The patient has no family history of breast cancers.      SOCIAL HISTORY:  The patient is a nonsmoker.  She has got a supportive family network.      PHYSICAL EXAMINATION:   GENERAL:  She is well-appearing lady in no acute distress.   VITAL SIGNS:  Stable.   HEENT:  Oropharynx clear, mucous membranes moist.   NECK:  Has no masses or goiter.   NECK:  There is no cervical, supraclavicular or infraclavicular adenopathy.   CHEST:  Clear to auscultation and percussion bilaterally.   HEART:  Sounds 1, 2 normal.  No added sounds or murmurs.   BREASTS:  The patient is status post bilateral mastectomies, scars look good.  Right and left axilla are negative.   GASTROINTESTINAL:  Abdomen is soft and nontender, no hepatosplenomegaly.   EXTREMITIES:  Legs are without tenderness or edema.   SKIN:  Has an abnormal looking left clavicle.     PERIPHERAL NEUROLOGICAL EXAM:  She has some balance issues which are mild.  Otherwise, that is normal.   NEUROLOGIC:  The patient is alert and oriented x 3.      DATA REVIEW:  I have reviewed the recent MRI scan, which was done in 10/2019.      IMPRESSION:  A 54-year-old patient with metastatic breast cancer off all treatment at this point.  She had a recent MRI scan because of some ongoing balance issues and also some chronic memory issues.  There was a small less than 3 mm focus seen.  We are going to repeat imaging in about 4 months to make sure that that is stable.  I have reassured her today that  this far out, it would be unusual to have a recurrence and she is not having any other worrisome symptomatology.  She will see me back with a repeat MRI scan in 2020.         AMARILIS WU MD             D: 2019   T: 2019   MT: KENDAL      Name:     MONIQUE GRIFFIN   MRN:      [id number]        Account:      [id number]   :      1965           Visit Date:   2019      Document: [id number]       Again, thank you for allowing me to participate in the care of your patient.        Sincerely,        Amarilis Wu MD

## 2019-12-13 ENCOUNTER — TELEPHONE (OUTPATIENT)
Dept: ONCOLOGY | Facility: CLINIC | Age: 54
End: 2019-12-13

## 2019-12-13 DIAGNOSIS — Z17.0 MALIGNANT NEOPLASM OF LEFT BREAST IN FEMALE, ESTROGEN RECEPTOR POSITIVE, UNSPECIFIED SITE OF BREAST (H): Primary | ICD-10-CM

## 2019-12-13 DIAGNOSIS — C50.912 MALIGNANT NEOPLASM OF LEFT BREAST IN FEMALE, ESTROGEN RECEPTOR POSITIVE, UNSPECIFIED SITE OF BREAST (H): Primary | ICD-10-CM

## 2019-12-13 NOTE — TELEPHONE ENCOUNTER
Message left for patient to report that her bra script has been faxed to Romy Alfaro RN on 12/13/2019 at 11:04 AM

## 2019-12-13 NOTE — TELEPHONE ENCOUNTER
Mayra called in this morning and would like a script sent to [facility] for 2 bras and 1 set of breast forms, their fax number is [phone].  her insurance will cover this before the end of the year. If any questions Mayra can be reached at [phone].   Nida Yip, [facility]

## 2020-01-13 ENCOUNTER — OFFICE VISIT (OUTPATIENT)
Dept: OBGYN | Facility: CLINIC | Age: 55
End: 2020-01-13
Payer: COMMERCIAL

## 2020-01-13 ENCOUNTER — AMBULATORY - HEALTHEAST (OUTPATIENT)
Dept: MULTI SPECIALTY CLINIC | Facility: CLINIC | Age: 55
End: 2020-01-13

## 2020-01-13 VITALS — BODY MASS INDEX: 25.54 KG/M2 | DIASTOLIC BLOOD PRESSURE: 66 MMHG | WEIGHT: 168 LBS | SYSTOLIC BLOOD PRESSURE: 104 MMHG

## 2020-01-13 DIAGNOSIS — Z01.411 ENCOUNTER FOR GYNECOLOGICAL EXAMINATION WITH ABNORMAL FINDING: ICD-10-CM

## 2020-01-13 DIAGNOSIS — E78.00 ELEVATED CHOLESTEROL: Primary | ICD-10-CM

## 2020-01-13 DIAGNOSIS — N89.8 VAGINAL IRRITATION: ICD-10-CM

## 2020-01-13 DIAGNOSIS — N76.0 VAGINITIS AND VULVOVAGINITIS: ICD-10-CM

## 2020-01-13 DIAGNOSIS — Z12.4 SCREENING FOR MALIGNANT NEOPLASM OF CERVIX: ICD-10-CM

## 2020-01-13 LAB
HPV_EXT - HISTORICAL: NORMAL
PAP SMEAR - HIM PATIENT REPORTED: NORMAL
SPECIMEN SOURCE: NORMAL
WET PREP SPEC: NORMAL

## 2020-01-13 PROCEDURE — 99396 PREV VISIT EST AGE 40-64: CPT | Performed by: OBSTETRICS & GYNECOLOGY

## 2020-01-13 PROCEDURE — 87210 SMEAR WET MOUNT SALINE/INK: CPT | Performed by: OBSTETRICS & GYNECOLOGY

## 2020-01-13 PROCEDURE — 99213 OFFICE O/P EST LOW 20 MIN: CPT | Mod: 25 | Performed by: OBSTETRICS & GYNECOLOGY

## 2020-01-13 PROCEDURE — G0145 SCR C/V CYTO,THINLAYER,RESCR: HCPCS | Performed by: OBSTETRICS & GYNECOLOGY

## 2020-01-13 PROCEDURE — G0476 HPV COMBO ASSAY CA SCREEN: HCPCS | Performed by: OBSTETRICS & GYNECOLOGY

## 2020-01-13 PROCEDURE — 87624 HPV HI-RISK TYP POOLED RSLT: CPT | Performed by: OBSTETRICS & GYNECOLOGY

## 2020-01-13 RX ORDER — METRONIDAZOLE 7.5 MG/G
1 GEL VAGINAL DAILY
Qty: 35 G | Refills: 0 | Status: SHIPPED | OUTPATIENT
Start: 2020-01-13 | End: 2021-01-25

## 2020-01-13 NOTE — LETTER
January 21, 2020    Mayra Ramirez  [address] [place] [address]    Dear ,  This letter is regarding your recent Pap smear (cervical cancer screening) and Human Papillomavirus (HPV) test.  We are happy to inform you that your Pap smear result is normal. Cervical cancer is closely linked with certain types of HPV. Your results showed no evidence of high-risk HPV.  We recommend you have your next PAP smear and HPV test in 5 years.  You will still need to return to the clinic every year for an annual exam and other preventive tests.  If you have additional questions regarding this result, please call our registered nurse, Ruby at [phone].  Sincerely,    Bryson Enriquez MD/maria teresa

## 2020-01-13 NOTE — RESULT ENCOUNTER NOTE
Please notify pt of nl result because of the fact she is having malodor.  It did respond to MetroGel in the past I did send a prescription for MetroGel and applicator at bedtime for 7 nights to the listed pharmacy.  Please advise that she should avoid alcohol during the time she is taking the medication and to let me know if there is no resolution of her symptoms  Bryson Enriquez M.D.

## 2020-01-13 NOTE — NURSING NOTE
"Chief Complaint   Patient presents with     Physical       Initial /66   Wt 76.2 kg (168 lb)   BMI 25.54 kg/m   Estimated body mass index is 25.54 kg/m  as calculated from the following:    Height as of 18: 1.727 m (5' 8\").    Weight as of this encounter: 76.2 kg (168 lb).  BP completed using cuff size: regular    Questioned patient about current smoking habits.  Pt. has never smoked.          The following HM Due: NONE      The following patient reported/Care Every where data was sent to:  P ABSTRACT QUALITY INITIATIVES [[id number]]        Vanessa Willingham, [facility]                 "

## 2020-01-15 ENCOUNTER — COMMUNICATION - HEALTHEAST (OUTPATIENT)
Dept: FAMILY MEDICINE | Facility: CLINIC | Age: 55
End: 2020-01-15

## 2020-01-15 ENCOUNTER — COMMUNICATION - HEALTHEAST (OUTPATIENT)
Dept: LAB | Facility: CLINIC | Age: 55
End: 2020-01-15

## 2020-01-15 ENCOUNTER — AMBULATORY - HEALTHEAST (OUTPATIENT)
Dept: LAB | Facility: CLINIC | Age: 55
End: 2020-01-15

## 2020-01-15 ENCOUNTER — TRANSFERRED RECORDS (OUTPATIENT)
Dept: HEALTH INFORMATION MANAGEMENT | Facility: CLINIC | Age: 55
End: 2020-01-15

## 2020-01-15 DIAGNOSIS — E78.00 ELEVATED CHOLESTEROL: ICD-10-CM

## 2020-01-15 LAB
CHOLEST SERPL-MCNC: 188 MG/DL
HDLC SERPL-MCNC: 50 MG/DL
LDLC SERPL CALC-MCNC: 117 MG/DL
TRIGL SERPL-MCNC: 105 MG/DL

## 2020-01-16 LAB
CHOLEST SERPL-MCNC: 188 MG/DL
COPATH REPORT: NORMAL
FASTING STATUS PATIENT QL REPORTED: YES
HDLC SERPL-MCNC: 50 MG/DL
LDLC SERPL CALC-MCNC: 117 MG/DL
PAP: NORMAL
TRIGL SERPL-MCNC: 105 MG/DL

## 2020-01-16 NOTE — PATIENT INSTRUCTIONS
You can reach your [facility] Care Team any time of the day by calling [phone]. This number will put you in touch with the 24 hour nurse line if the clinic is closed.    To contact your OB/GYN Station Coordinator/Surgery Scheduler please call [phone]. This is a direct number for your care team between 8 a.m. and 4 p.m. Monday through Friday.    [facility] is open for your convenience:  Monday through Friday 8 a.m. to 6 p.m.  Closed weekends and all major holidays.
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
## 2020-01-20 ENCOUNTER — COMMUNICATION - HEALTHEAST (OUTPATIENT)
Dept: FAMILY MEDICINE | Facility: CLINIC | Age: 55
End: 2020-01-20

## 2020-01-21 LAB
FINAL DIAGNOSIS: NORMAL
HPV HR 12 DNA CVX QL NAA+PROBE: NEGATIVE
HPV16 DNA SPEC QL NAA+PROBE: NEGATIVE
HPV18 DNA SPEC QL NAA+PROBE: NEGATIVE
SPECIMEN DESCRIPTION: NORMAL
SPECIMEN SOURCE CVX/VAG CYTO: NORMAL

## 2020-02-03 ENCOUNTER — TELEPHONE (OUTPATIENT)
Dept: OBGYN | Facility: CLINIC | Age: 55
End: 2020-02-03

## 2020-02-05 ENCOUNTER — HOSPITAL ENCOUNTER (OUTPATIENT)
Dept: MRI IMAGING | Facility: CLINIC | Age: 55
Discharge: HOME | End: 2020-02-05
Attending: INTERNAL MEDICINE | Admitting: INTERNAL MEDICINE
Payer: COMMERCIAL

## 2020-02-05 DIAGNOSIS — C79.31 BRAIN METASTASIS: ICD-10-CM

## 2020-02-05 PROCEDURE — A9585 GADOBUTROL INJECTION: HCPCS | Performed by: INTERNAL MEDICINE

## 2020-02-05 PROCEDURE — 70553 MRI BRAIN STEM W/O & W/DYE: CPT

## 2020-02-05 PROCEDURE — 25500064 ZZH RX 255 OP 636: Performed by: INTERNAL MEDICINE

## 2020-02-05 RX ORDER — GADOBUTROL 604.72 MG/ML
7.5 INJECTION INTRAVENOUS ONCE
Status: COMPLETED | OUTPATIENT
Start: 2020-02-05 | End: 2020-02-05

## 2020-02-05 RX ADMIN — GADOBUTROL 7 ML: 604.72 INJECTION INTRAVENOUS at 12:17

## 2020-02-06 NOTE — TELEPHONE ENCOUNTER
I spoke with the patient today regarding her most recent lipid panel that she had done.  Her total cholesterol was 188 her triglycerides 105 her HDL 50 and her .  I reviewed the previous lipid panel from January 2019.  The patient elected not to go on medication but instead to do diet and exercise.  I congratulated her on a wonderful job.  She will call for any concerns in the interval.  We will proceed with routine follow-up per recommendations and protocol

## 2020-02-12 ENCOUNTER — ONCOLOGY VISIT (OUTPATIENT)
Dept: ONCOLOGY | Facility: CLINIC | Age: 55
End: 2020-02-12
Attending: INTERNAL MEDICINE
Payer: COMMERCIAL

## 2020-02-12 ENCOUNTER — RECORDS - HEALTHEAST (OUTPATIENT)
Dept: ADMINISTRATIVE | Facility: OTHER | Age: 55
End: 2020-02-12

## 2020-02-12 VITALS
RESPIRATION RATE: 18 BRPM | DIASTOLIC BLOOD PRESSURE: 72 MMHG | HEART RATE: 65 BPM | BODY MASS INDEX: 25.44 KG/M2 | OXYGEN SATURATION: 100 % | WEIGHT: 167.3 LBS | TEMPERATURE: 97.3 F | SYSTOLIC BLOOD PRESSURE: 110 MMHG

## 2020-02-12 DIAGNOSIS — Z17.0 MALIGNANT NEOPLASM OF LEFT BREAST IN FEMALE, ESTROGEN RECEPTOR POSITIVE, UNSPECIFIED SITE OF BREAST (H): Primary | ICD-10-CM

## 2020-02-12 DIAGNOSIS — C50.912 MALIGNANT NEOPLASM OF LEFT BREAST IN FEMALE, ESTROGEN RECEPTOR POSITIVE, UNSPECIFIED SITE OF BREAST (H): Primary | ICD-10-CM

## 2020-02-12 PROCEDURE — 99214 OFFICE O/P EST MOD 30 MIN: CPT | Performed by: INTERNAL MEDICINE

## 2020-02-12 PROCEDURE — G0463 HOSPITAL OUTPT CLINIC VISIT: HCPCS

## 2020-02-12 ASSESSMENT — PAIN SCALES - GENERAL: PAINLEVEL: NO PAIN (0)

## 2020-02-12 NOTE — LETTER
2/12/2020         RE: Mayra Ramirez  [address] [place] [address]        Dear Colleague,    Thank you for referring your patient, Mayra Ramirez, to the [facility]. Please see a copy of my visit note below.    Visit Date:   02/12/2020      HISTORY OF PRESENT ILLNESS:  Mayra Ramirez is a 54-year-old patient who comes in today for interim followup.  She has a history of metastatic breast cancer diagnosed in 2005.  Initially, her primary breast cancer was on the left side in 2004 and then she developed brain metastasis in 2005.  She had resection of that as well as radiation therapy and then did adjuvant Femara for approximately 10 years.  She has had some problems with her bones.  When I saw her on 12/05/2019, she had a recent MRI scan done because of some ongoing balance issues and memory issues and she was due to have a repeat one done.  There was a small focus less than 3 mm seen.  We were following up on that.  She comes back today with the repeat that was done on 02/05/2020.  There was no abnormal intracranial enhancement to suggest any intracranial metastatic disease seen.  She had stable sequelae from her right cerebellar resection and she has marked white matter changes which are thought to be due to either chronic ischemic disease versus treatment effects, and these are similar in appearance to 2017.  I have discussed the MRI scan of her head with her and she is happy with the result.  She can now continue to see me on a yearly basis.      MEDICATIONS AND ALLERGIES:  Outlined in the nursing records.      SOCIAL HISTORY:  The patient has a supportive family network.  She is a nonsmoker.      PHYSICAL EXAMINATION:   GENERAL:  Well-appearing lady in no acute distress.   VITAL SIGNS:  Stable.   NEUROLOGIC:  The patient is alert and oriented x3.   NECK:  Has no masses or goiter.  No cervical, supraclavicular or infraclavicular adenopathy.   CHEST:  Clear to auscultation and  percussion bilaterally.   HEART:  Sounds 1 and 2 normal.  No added sounds or murmurs.   BREASTS:  The patient is status post bilateral mastectomies.  The scars look good.  Right and left axilla are negative.   GASTROINTESTINAL:  Abdomen is soft and nontender.  No hepatosplenomegaly.   EXTREMITIES:  Legs without tenderness or edema.  The patient has an abnormal looking left clavicle where she has had a aye put in her left clavicle.      DATA REVIEW:  I have reviewed the MRI scan with her today, which was done in 2020.      IMPRESSION:  A 54-year-old patient with metastatic breast cancer, off all treatment at this point.  She is without any evidence of disease.  I have reviewed the MRI scan with her today, which does not show anything sinister.  I have discussed it with her and she is happy with it.  I will see her back in my clinic in a year or sooner if needed.      CONSULTATION TIME:  25 minutes, the majority of time was spent on counseling and direction of patient care.         AMARILIS WU MD             D: 2020   T: 2020   MT: ROBIN      Name:     MONIQUE GRIFFIN   MRN:      [id number]        Account:      [id number]   :      1965           Visit Date:   2020      Document: [id number]       Again, thank you for allowing me to participate in the care of your patient.        Sincerely,        Amarilis Wu MD

## 2020-02-12 NOTE — NURSING NOTE
"Oncology Rooming Note    February 12, 2020 1:17 PM   Mayra Ramirez is a 54 year old female who presents for:    Chief Complaint   Patient presents with     Oncology Clinic Visit     Brain metastasis      Initial Vitals: /72   Pulse 65   Temp 97.3  F (36.3  C) (Oral)   Resp 18   Wt 75.9 kg (167 lb 4.8 oz)   SpO2 100%   BMI 25.44 kg/m   Estimated body mass index is 25.44 kg/m  as calculated from the following:    Height as of 12/6/18: 1.727 m (5' 8\").    Weight as of this encounter: 75.9 kg (167 lb 4.8 oz). Body surface area is 1.91 meters squared.  No Pain (0) Comment: Data Unavailable   No LMP recorded. (Menstrual status: UNKNOWN).  Allergies reviewed: Yes  Medications reviewed: Yes    Medications: Medication refills not needed today.  Pharmacy name entered into [employer]: [id number] [place] [address]    Clinical concerns:  Doctor was notified.      Lila Hanna CMA              "

## 2020-02-13 NOTE — PROGRESS NOTES
Visit Date:   02/12/2020      HISTORY OF PRESENT ILLNESS:  Mayra Ramirez is a 54-year-old patient who comes in today for interim followup.  She has a history of metastatic breast cancer diagnosed in 2005.  Initially, her primary breast cancer was on the left side in 2004 and then she developed brain metastasis in 2005.  She had resection of that as well as radiation therapy and then did adjuvant Femara for approximately 10 years.  She has had some problems with her bones.  When I saw her on 12/05/2019, she had a recent MRI scan done because of some ongoing balance issues and memory issues and she was due to have a repeat one done.  There was a small focus less than 3 mm seen.  We were following up on that.  She comes back today with the repeat that was done on 02/05/2020.  There was no abnormal intracranial enhancement to suggest any intracranial metastatic disease seen.  She had stable sequelae from her right cerebellar resection and she has marked white matter changes which are thought to be due to either chronic ischemic disease versus treatment effects, and these are similar in appearance to 2017.  I have discussed the MRI scan of her head with her and she is happy with the result.  She can now continue to see me on a yearly basis.      MEDICATIONS AND ALLERGIES:  Outlined in the nursing records.      SOCIAL HISTORY:  The patient has a supportive family network.  She is a nonsmoker.      PHYSICAL EXAMINATION:   GENERAL:  Well-appearing lady in no acute distress.   VITAL SIGNS:  Stable.   NEUROLOGIC:  The patient is alert and oriented x3.   NECK:  Has no masses or goiter.  No cervical, supraclavicular or infraclavicular adenopathy.   CHEST:  Clear to auscultation and percussion bilaterally.   HEART:  Sounds 1 and 2 normal.  No added sounds or murmurs.   BREASTS:  The patient is status post bilateral mastectomies.  The scars look good.  Right and left axilla are negative.   GASTROINTESTINAL:  Abdomen is soft and  nontender.  No hepatosplenomegaly.   EXTREMITIES:  Legs without tenderness or edema.  The patient has an abnormal looking left clavicle where she has had a aye put in her left clavicle.      DATA REVIEW:  I have reviewed the MRI scan with her today, which was done in 2020.      IMPRESSION:  A 54-year-old patient with metastatic breast cancer, off all treatment at this point.  She is without any evidence of disease.  I have reviewed the MRI scan with her today, which does not show anything sinister.  I have discussed it with her and she is happy with it.  I will see her back in my clinic in a year or sooner if needed.      CONSULTATION TIME:  25 minutes, the majority of time was spent on counseling and direction of patient care.         AMARILIS SAHNI MD             D: 2020   T: 2020   MT: ROBIN      Name:     MONIQUE GRIFFIN   MRN:      [id number]        Account:      [id number]   :      1965           Visit Date:   2020      Document: [id number]

## 2020-02-19 ENCOUNTER — PATIENT OUTREACH (OUTPATIENT)
Dept: ONCOLOGY | Facility: CLINIC | Age: 55
End: 2020-02-19

## 2020-02-19 NOTE — PROGRESS NOTES
Late entry for 2/18/2020: Pt called the clinic and talked with the . States that she needs to have the images from her brain MRI done on 2/5/2020 sent to [facility] for ongoing care.   This nurse called and talked with the film room. They have pushed the images to [facility], but they state that [facility] will need to accept the images.  Called and talked with pt. Pt was notified with this information.  Pt states that she will contact [facility] to let them know that they will need to accept the images that have been sent to them. Patient verbalized understanding and agreement with plan.  Pt was instructed to call the clinic with any questions, concerns, or worsening symptoms.   Jody Dutton, RN, BSN, OCN, CBCN

## 2020-05-18 ENCOUNTER — TELEPHONE (OUTPATIENT)
Dept: OBGYN | Facility: CLINIC | Age: 55
End: 2020-05-18

## 2020-05-18 NOTE — TELEPHONE ENCOUNTER
Patient called asking if she could get a referral from Dr. Enriquez for a Gastroenchronologist? Pleaser return call.

## 2020-05-18 NOTE — TELEPHONE ENCOUNTER
Pt requests referral for GI.    She states that you referred her to a good GI doc a long time ago (Dr Ellis) but she has retired.    She asks for another good GI doc that you would recommend for acidic stomach and reflux and scopes.    Abbie VILLANUEVA R.N.

## 2020-05-18 NOTE — TELEPHONE ENCOUNTER
I spoke with the patient and recommended the physicians at [facility].  At this time she is doing well but has a history of reflux.  If her symptoms exacerbate and are unrelieved with conservative measures she will see them for appropriate ongoing therapy

## 2020-06-08 ENCOUNTER — RECORDS - HEALTHEAST (OUTPATIENT)
Dept: ADMINISTRATIVE | Facility: OTHER | Age: 55
End: 2020-06-08

## 2020-06-09 ENCOUNTER — COMMUNICATION - HEALTHEAST (OUTPATIENT)
Dept: SCHEDULING | Facility: CLINIC | Age: 55
End: 2020-06-09

## 2020-06-09 ENCOUNTER — OFFICE VISIT - HEALTHEAST (OUTPATIENT)
Dept: FAMILY MEDICINE | Facility: CLINIC | Age: 55
End: 2020-06-09

## 2020-06-09 DIAGNOSIS — S50.311A ABRASION OF RIGHT ELBOW, INITIAL ENCOUNTER: ICD-10-CM

## 2020-06-09 ASSESSMENT — PATIENT HEALTH QUESTIONNAIRE - PHQ9: SUM OF ALL RESPONSES TO PHQ QUESTIONS 1-9: 1

## 2020-06-15 ENCOUNTER — RECORDS - HEALTHEAST (OUTPATIENT)
Dept: VASCULAR SURGERY | Facility: CLINIC | Age: 55
End: 2020-06-15

## 2020-06-15 ENCOUNTER — OFFICE VISIT - HEALTHEAST (OUTPATIENT)
Dept: FAMILY MEDICINE | Facility: CLINIC | Age: 55
End: 2020-06-15

## 2020-06-15 DIAGNOSIS — S50.311D ABRASION OF RIGHT ELBOW, SUBSEQUENT ENCOUNTER: ICD-10-CM

## 2020-06-30 ENCOUNTER — RECORDS - HEALTHEAST (OUTPATIENT)
Dept: ADMINISTRATIVE | Facility: OTHER | Age: 55
End: 2020-06-30

## 2020-07-22 ENCOUNTER — OFFICE VISIT - HEALTHEAST (OUTPATIENT)
Dept: FAMILY MEDICINE | Facility: CLINIC | Age: 55
End: 2020-07-22

## 2020-07-22 ENCOUNTER — AMBULATORY - HEALTHEAST (OUTPATIENT)
Dept: LAB | Facility: CLINIC | Age: 55
End: 2020-07-22

## 2020-07-22 DIAGNOSIS — R30.0 DYSURIA: ICD-10-CM

## 2020-07-22 LAB
ALBUMIN UR-MCNC: NEGATIVE MG/DL
APPEARANCE UR: CLEAR
BACTERIA #/AREA URNS HPF: ABNORMAL HPF
BILIRUB UR QL STRIP: NEGATIVE
COLOR UR AUTO: YELLOW
GLUCOSE UR STRIP-MCNC: NEGATIVE MG/DL
HGB UR QL STRIP: NEGATIVE
KETONES UR STRIP-MCNC: NEGATIVE MG/DL
LEUKOCYTE ESTERASE UR QL STRIP: ABNORMAL
NITRATE UR QL: NEGATIVE
PH UR STRIP: 7 [PH] (ref 5–8)
RBC #/AREA URNS AUTO: ABNORMAL HPF
SP GR UR STRIP: 1.02 (ref 1–1.03)
SQUAMOUS #/AREA URNS AUTO: ABNORMAL LPF
UROBILINOGEN UR STRIP-ACNC: ABNORMAL
WBC #/AREA URNS AUTO: ABNORMAL HPF
WBC CLUMPS #/AREA URNS HPF: PRESENT /[HPF]

## 2020-07-23 LAB — BACTERIA SPEC CULT: NO GROWTH

## 2020-07-27 ENCOUNTER — COMMUNICATION - HEALTHEAST (OUTPATIENT)
Dept: FAMILY MEDICINE | Facility: CLINIC | Age: 55
End: 2020-07-27

## 2020-08-03 ENCOUNTER — RECORDS - HEALTHEAST (OUTPATIENT)
Dept: ADMINISTRATIVE | Facility: OTHER | Age: 55
End: 2020-08-03

## 2020-08-10 ENCOUNTER — OFFICE VISIT - HEALTHEAST (OUTPATIENT)
Dept: VASCULAR SURGERY | Facility: CLINIC | Age: 55
End: 2020-08-10

## 2020-08-10 DIAGNOSIS — C79.31 BREAST CANCER METASTASIZED TO BRAIN, UNSPECIFIED LATERALITY (H): ICD-10-CM

## 2020-08-10 DIAGNOSIS — C50.919 BREAST CANCER METASTASIZED TO BRAIN, UNSPECIFIED LATERALITY (H): ICD-10-CM

## 2020-08-10 DIAGNOSIS — I97.2 POST-MASTECTOMY LYMPHEDEMA SYNDROME: ICD-10-CM

## 2020-08-10 DIAGNOSIS — M24.512 CONTRACTURE, LEFT SHOULDER: ICD-10-CM

## 2020-08-10 DIAGNOSIS — L90.5 SCAR CONDITION AND FIBROSIS OF SKIN: ICD-10-CM

## 2020-08-24 ENCOUNTER — OFFICE VISIT - HEALTHEAST (OUTPATIENT)
Dept: PHYSICAL THERAPY | Facility: REHABILITATION | Age: 55
End: 2020-08-24

## 2020-08-24 DIAGNOSIS — M24.512 CONTRACTURE, LEFT SHOULDER: ICD-10-CM

## 2020-08-24 DIAGNOSIS — I97.2 POST-MASTECTOMY LYMPHEDEMA SYNDROME: ICD-10-CM

## 2020-08-24 DIAGNOSIS — L90.5 SCAR CONDITION AND FIBROSIS OF SKIN: ICD-10-CM

## 2020-08-25 ENCOUNTER — RECORDS - HEALTHEAST (OUTPATIENT)
Dept: ADMINISTRATIVE | Facility: OTHER | Age: 55
End: 2020-08-25

## 2020-08-26 ENCOUNTER — COMMUNICATION - HEALTHEAST (OUTPATIENT)
Dept: PHYSICAL THERAPY | Facility: REHABILITATION | Age: 55
End: 2020-08-26

## 2020-08-27 ENCOUNTER — OFFICE VISIT - HEALTHEAST (OUTPATIENT)
Dept: PHYSICAL THERAPY | Facility: REHABILITATION | Age: 55
End: 2020-08-27

## 2020-08-27 DIAGNOSIS — M25.612 DECREASED ROM OF LEFT SHOULDER: ICD-10-CM

## 2020-08-27 DIAGNOSIS — I97.2 POST-MASTECTOMY LYMPHEDEMA SYNDROME: ICD-10-CM

## 2020-08-27 DIAGNOSIS — M24.512 CONTRACTURE, LEFT SHOULDER: ICD-10-CM

## 2020-08-27 DIAGNOSIS — R60.0 LOCALIZED EDEMA: ICD-10-CM

## 2020-08-27 DIAGNOSIS — Z85.3 HISTORY OF BREAST CANCER: ICD-10-CM

## 2020-08-27 DIAGNOSIS — R07.89 LEFT-SIDED CHEST WALL PAIN: ICD-10-CM

## 2020-08-27 DIAGNOSIS — L90.5 SCAR CONDITION AND FIBROSIS OF SKIN: ICD-10-CM

## 2020-08-31 ENCOUNTER — OFFICE VISIT - HEALTHEAST (OUTPATIENT)
Dept: PHYSICAL THERAPY | Facility: REHABILITATION | Age: 55
End: 2020-08-31

## 2020-08-31 DIAGNOSIS — L90.5 SCAR CONDITION AND FIBROSIS OF SKIN: ICD-10-CM

## 2020-08-31 DIAGNOSIS — I97.2 POST-MASTECTOMY LYMPHEDEMA SYNDROME: ICD-10-CM

## 2020-08-31 DIAGNOSIS — M24.512 CONTRACTURE, LEFT SHOULDER: ICD-10-CM

## 2020-09-02 ENCOUNTER — OFFICE VISIT - HEALTHEAST (OUTPATIENT)
Dept: PHYSICAL THERAPY | Facility: REHABILITATION | Age: 55
End: 2020-09-02

## 2020-09-02 DIAGNOSIS — M24.512 CONTRACTURE, LEFT SHOULDER: ICD-10-CM

## 2020-09-02 DIAGNOSIS — I97.2 POST-MASTECTOMY LYMPHEDEMA SYNDROME: ICD-10-CM

## 2020-09-02 DIAGNOSIS — L90.5 SCAR CONDITION AND FIBROSIS OF SKIN: ICD-10-CM

## 2020-09-04 ENCOUNTER — OFFICE VISIT - HEALTHEAST (OUTPATIENT)
Dept: PHYSICAL THERAPY | Facility: REHABILITATION | Age: 55
End: 2020-09-04

## 2020-09-04 DIAGNOSIS — Z85.3 HISTORY OF BREAST CANCER: ICD-10-CM

## 2020-09-04 DIAGNOSIS — I97.2 POST-MASTECTOMY LYMPHEDEMA SYNDROME: ICD-10-CM

## 2020-09-04 DIAGNOSIS — M25.612 DECREASED ROM OF LEFT SHOULDER: ICD-10-CM

## 2020-09-04 DIAGNOSIS — R60.0 LOCALIZED EDEMA: ICD-10-CM

## 2020-09-04 DIAGNOSIS — R07.89 LEFT-SIDED CHEST WALL PAIN: ICD-10-CM

## 2020-09-04 DIAGNOSIS — L90.5 SCAR CONDITION AND FIBROSIS OF SKIN: ICD-10-CM

## 2020-09-04 DIAGNOSIS — M24.512 CONTRACTURE, LEFT SHOULDER: ICD-10-CM

## 2020-09-08 ENCOUNTER — OFFICE VISIT - HEALTHEAST (OUTPATIENT)
Dept: PHYSICAL THERAPY | Facility: REHABILITATION | Age: 55
End: 2020-09-08

## 2020-09-08 DIAGNOSIS — Z85.3 HISTORY OF BREAST CANCER: ICD-10-CM

## 2020-09-08 DIAGNOSIS — R60.0 LOCALIZED EDEMA: ICD-10-CM

## 2020-09-08 DIAGNOSIS — L90.5 SCAR CONDITION AND FIBROSIS OF SKIN: ICD-10-CM

## 2020-09-08 DIAGNOSIS — M25.612 DECREASED ROM OF LEFT SHOULDER: ICD-10-CM

## 2020-09-08 DIAGNOSIS — R07.89 LEFT-SIDED CHEST WALL PAIN: ICD-10-CM

## 2020-09-08 DIAGNOSIS — M24.512 CONTRACTURE, LEFT SHOULDER: ICD-10-CM

## 2020-09-08 DIAGNOSIS — I97.2 POST-MASTECTOMY LYMPHEDEMA SYNDROME: ICD-10-CM

## 2020-09-10 ENCOUNTER — OFFICE VISIT - HEALTHEAST (OUTPATIENT)
Dept: PHYSICAL THERAPY | Facility: REHABILITATION | Age: 55
End: 2020-09-10

## 2020-09-10 DIAGNOSIS — M25.612 DECREASED ROM OF LEFT SHOULDER: ICD-10-CM

## 2020-09-10 DIAGNOSIS — R07.89 LEFT-SIDED CHEST WALL PAIN: ICD-10-CM

## 2020-09-10 DIAGNOSIS — I97.2 POST-MASTECTOMY LYMPHEDEMA SYNDROME: ICD-10-CM

## 2020-09-10 DIAGNOSIS — M24.512 CONTRACTURE, LEFT SHOULDER: ICD-10-CM

## 2020-09-10 DIAGNOSIS — R60.0 LOCALIZED EDEMA: ICD-10-CM

## 2020-09-11 ENCOUNTER — OFFICE VISIT - HEALTHEAST (OUTPATIENT)
Dept: PHYSICAL THERAPY | Facility: REHABILITATION | Age: 55
End: 2020-09-11

## 2020-09-11 DIAGNOSIS — Z85.3 HISTORY OF BREAST CANCER: ICD-10-CM

## 2020-09-11 DIAGNOSIS — M24.512 CONTRACTURE, LEFT SHOULDER: ICD-10-CM

## 2020-09-11 DIAGNOSIS — R07.89 LEFT-SIDED CHEST WALL PAIN: ICD-10-CM

## 2020-09-11 DIAGNOSIS — I97.2 POST-MASTECTOMY LYMPHEDEMA SYNDROME: ICD-10-CM

## 2020-09-11 DIAGNOSIS — R60.0 LOCALIZED EDEMA: ICD-10-CM

## 2020-09-11 DIAGNOSIS — L90.5 SCAR CONDITION AND FIBROSIS OF SKIN: ICD-10-CM

## 2020-09-14 ENCOUNTER — OFFICE VISIT - HEALTHEAST (OUTPATIENT)
Dept: PHYSICAL THERAPY | Facility: REHABILITATION | Age: 55
End: 2020-09-14

## 2020-09-14 DIAGNOSIS — R60.0 LOCALIZED EDEMA: ICD-10-CM

## 2020-09-14 DIAGNOSIS — I97.2 POST-MASTECTOMY LYMPHEDEMA SYNDROME: ICD-10-CM

## 2020-09-14 DIAGNOSIS — M24.512 CONTRACTURE, LEFT SHOULDER: ICD-10-CM

## 2020-09-18 ENCOUNTER — OFFICE VISIT - HEALTHEAST (OUTPATIENT)
Dept: PHYSICAL THERAPY | Facility: REHABILITATION | Age: 55
End: 2020-09-18

## 2020-09-18 DIAGNOSIS — M25.612 DECREASED ROM OF LEFT SHOULDER: ICD-10-CM

## 2020-09-18 DIAGNOSIS — R07.89 LEFT-SIDED CHEST WALL PAIN: ICD-10-CM

## 2020-09-18 DIAGNOSIS — L90.5 SCAR CONDITION AND FIBROSIS OF SKIN: ICD-10-CM

## 2020-09-18 DIAGNOSIS — Z85.3 HISTORY OF BREAST CANCER: ICD-10-CM

## 2020-09-18 DIAGNOSIS — R60.0 LOCALIZED EDEMA: ICD-10-CM

## 2020-09-18 DIAGNOSIS — M24.512 CONTRACTURE, LEFT SHOULDER: ICD-10-CM

## 2020-09-18 DIAGNOSIS — I97.2 POST-MASTECTOMY LYMPHEDEMA SYNDROME: ICD-10-CM

## 2020-09-24 ENCOUNTER — OFFICE VISIT - HEALTHEAST (OUTPATIENT)
Dept: PHYSICAL THERAPY | Facility: REHABILITATION | Age: 55
End: 2020-09-24

## 2020-09-24 DIAGNOSIS — M24.512 CONTRACTURE, LEFT SHOULDER: ICD-10-CM

## 2020-09-24 DIAGNOSIS — I97.2 POST-MASTECTOMY LYMPHEDEMA SYNDROME: ICD-10-CM

## 2020-09-24 DIAGNOSIS — R07.89 LEFT-SIDED CHEST WALL PAIN: ICD-10-CM

## 2020-09-24 DIAGNOSIS — R60.0 LOCALIZED EDEMA: ICD-10-CM

## 2020-09-28 ENCOUNTER — OFFICE VISIT - HEALTHEAST (OUTPATIENT)
Dept: PHYSICAL THERAPY | Facility: REHABILITATION | Age: 55
End: 2020-09-28

## 2020-09-28 DIAGNOSIS — M24.512 CONTRACTURE, LEFT SHOULDER: ICD-10-CM

## 2020-09-28 DIAGNOSIS — R60.0 LOCALIZED EDEMA: ICD-10-CM

## 2020-09-28 DIAGNOSIS — I97.2 POST-MASTECTOMY LYMPHEDEMA SYNDROME: ICD-10-CM

## 2020-09-28 DIAGNOSIS — L90.5 SCAR CONDITION AND FIBROSIS OF SKIN: ICD-10-CM

## 2020-09-29 ENCOUNTER — TELEPHONE (OUTPATIENT)
Dept: ONCOLOGY | Facility: CLINIC | Age: 55
End: 2020-09-29

## 2020-09-29 DIAGNOSIS — C50.012 MALIGNANT NEOPLASM INVOLVING BOTH NIPPLE AND AREOLA OF LEFT BREAST IN FEMALE, UNSPECIFIED ESTROGEN RECEPTOR STATUS (H): Primary | ICD-10-CM

## 2020-09-29 NOTE — TELEPHONE ENCOUNTER
Pt called today, the place she got her mastectomy bras closed this section, she would like a new rx sent to [facility], their fax number is [phone]. She can get 2 bras and 1 set prothesis.  Mayra also has questions regarding  Past dexa and weak bones. She would like a call, she can be reached at [phone].   Nida Yip, [facility]

## 2020-09-29 NOTE — TELEPHONE ENCOUNTER
Orders for post mastectomy bras and breast prosthesis were faxed to [facility] at fax number below.  Attempted to reach pt, but she did not answer. Left a message for pt to call back to discuss.  Jody Dutton RN, BSN, OCN, CBCN

## 2020-09-30 ENCOUNTER — OFFICE VISIT - HEALTHEAST (OUTPATIENT)
Dept: PHYSICAL THERAPY | Facility: REHABILITATION | Age: 55
End: 2020-09-30

## 2020-09-30 DIAGNOSIS — L90.5 SCAR CONDITION AND FIBROSIS OF SKIN: ICD-10-CM

## 2020-09-30 DIAGNOSIS — M24.512 CONTRACTURE, LEFT SHOULDER: ICD-10-CM

## 2020-09-30 DIAGNOSIS — R60.0 LOCALIZED EDEMA: ICD-10-CM

## 2020-09-30 DIAGNOSIS — I97.2 POST-MASTECTOMY LYMPHEDEMA SYNDROME: ICD-10-CM

## 2020-09-30 NOTE — TELEPHONE ENCOUNTER
Talked with pt today. 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 Pt is concerned that she is not on any treatment for this. She is wondering if Dr. Wu would recommend treatment? Or perhaps pt should have another bone density test done since it has been almost 3 years since her last bone density test?  Will check with Dr. Wu for advice. Call pt on her mobile number with reply. OK to leave a message if unable to reach pt.   Jody Dutton, RN, BSN, OCN, CBCN

## 2020-10-01 DIAGNOSIS — M85.88 OSTEOPENIA OF LUMBAR SPINE: Primary | ICD-10-CM

## 2020-10-01 NOTE — TELEPHONE ENCOUNTER
David Wu MD Nielsen, Karla, RN             Should get another bone density first      Per pt request, a general message was left for pt to let her know that Dr. Wu would like pt to have a bone density test done.  Will send a message to  to contact pt to schedule bone density test.  Will follow-up to make sure that bone density test is scheduled.  Jody Dutton, RN, BSN, OCN, CBCN

## 2020-10-02 ENCOUNTER — OFFICE VISIT - HEALTHEAST (OUTPATIENT)
Dept: PHYSICAL THERAPY | Facility: REHABILITATION | Age: 55
End: 2020-10-02

## 2020-10-02 DIAGNOSIS — I97.2 POST-MASTECTOMY LYMPHEDEMA SYNDROME: ICD-10-CM

## 2020-10-02 DIAGNOSIS — L90.5 SCAR CONDITION AND FIBROSIS OF SKIN: ICD-10-CM

## 2020-10-02 DIAGNOSIS — R60.0 LOCALIZED EDEMA: ICD-10-CM

## 2020-10-02 DIAGNOSIS — Z85.3 HISTORY OF BREAST CANCER: ICD-10-CM

## 2020-10-02 DIAGNOSIS — R07.89 LEFT-SIDED CHEST WALL PAIN: ICD-10-CM

## 2020-10-02 DIAGNOSIS — M24.512 CONTRACTURE, LEFT SHOULDER: ICD-10-CM

## 2020-10-02 DIAGNOSIS — M25.612 DECREASED ROM OF LEFT SHOULDER: ICD-10-CM

## 2020-10-02 NOTE — TELEPHONE ENCOUNTER
Mayra called again today to ask what she should do about her osteopenia/osteoporosis from her bone density report from Nov 2017.  Per Dr. Wu, she wants patient to repeat her bone density test since it was last done in 2017.  Our 's reached out to patient to schedule bone density test and left voicemail for her.  Also gave patient the inexio phone number to schedule if she chooses to go through [facility] for her test as she did last time.  The phone number for [facility] scheduling was [phone].  Explained to patient that Dr. Wu would evaluate how to best treat patient after we get the new results from her bone density test.  Faxed referral to [facility] at [phone].  Pt voiced understanding.  No further questions or concerns at this time.

## 2020-10-05 ENCOUNTER — RECORDS - HEALTHEAST (OUTPATIENT)
Dept: ADMINISTRATIVE | Facility: OTHER | Age: 55
End: 2020-10-05

## 2020-10-06 ENCOUNTER — COMMUNICATION - HEALTHEAST (OUTPATIENT)
Dept: PHYSICAL THERAPY | Facility: REHABILITATION | Age: 55
End: 2020-10-06

## 2020-10-07 ENCOUNTER — COMMUNICATION - HEALTHEAST (OUTPATIENT)
Dept: OTHER | Facility: CLINIC | Age: 55
End: 2020-10-07

## 2020-10-08 ENCOUNTER — HOSPITAL ENCOUNTER (OUTPATIENT)
Dept: NUCLEAR MEDICINE | Facility: HOSPITAL | Age: 55
Discharge: HOME | End: 2020-10-08
Attending: INTERNAL MEDICINE

## 2020-10-08 DIAGNOSIS — K21.00 GASTRO-ESOPHAGEAL REFLUX DISEASE WITH ESOPHAGITIS: ICD-10-CM

## 2020-10-11 ENCOUNTER — COMMUNICATION - HEALTHEAST (OUTPATIENT)
Dept: FAMILY MEDICINE | Facility: CLINIC | Age: 55
End: 2020-10-11

## 2020-10-11 DIAGNOSIS — R39.15 URINARY URGENCY: ICD-10-CM

## 2020-10-11 DIAGNOSIS — N39.46 MIXED INCONTINENCE: ICD-10-CM

## 2020-10-13 ENCOUNTER — COMMUNICATION - HEALTHEAST (OUTPATIENT)
Dept: VASCULAR SURGERY | Facility: CLINIC | Age: 55
End: 2020-10-13

## 2020-10-29 ENCOUNTER — PATIENT OUTREACH (OUTPATIENT)
Dept: ONCOLOGY | Facility: CLINIC | Age: 55
End: 2020-10-29

## 2020-10-29 DIAGNOSIS — M81.8 OTHER OSTEOPOROSIS WITHOUT CURRENT PATHOLOGICAL FRACTURE: Primary | ICD-10-CM

## 2020-10-29 NOTE — PROGRESS NOTES
Pt called the clinic. Is requesting her bone density results that were done at [facility] on 10/26/2020.  Results are available in Care Everywhere.  Will send a message to Dr. Wu for advice.  Jody Dutton RN, BSN, OCN, CBCN

## 2020-10-29 NOTE — PROGRESS NOTES
Bryce, MD Marija Novoa Karla, RN             She has osteoporosis of lumbar spine and osteopenia of hips   She should continue calcium and vit D      Per Dr. Wu, she does not believe that pt is eligible for any bone strengthening agents due to her history of impaired healing after the left clavicle fracture.     Dr. Wu states that it would be best for pt to see endocrinology for follow-up and recommendations.    Will call pt back tomorrow with results and recommendations.  Jody Dutton, RN, BSN, OCN, CBCN

## 2020-10-30 ENCOUNTER — OFFICE VISIT - HEALTHEAST (OUTPATIENT)
Dept: FAMILY MEDICINE | Facility: CLINIC | Age: 55
End: 2020-10-30
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
## 2020-10-30 DIAGNOSIS — R42 VERTIGO: ICD-10-CM

## 2020-10-30 NOTE — PROGRESS NOTES
Talked with pt today. Pt was given results and recommendations per Dr. Wu. Pt states that she has been taking calcium/vitamin D supplement.   Referral has been entered for endocrinology. Advised pt that  staff will be reaching out to her to schedule appt with endocrinology.  Patient verbalized understanding and agreement with plan.  Pt was instructed to call the clinic with any questions, concerns, or worsening symptoms.  Will follow-up to make sure that endocrinology appt is scheduled.  Jody Dutton, RN, BSN, OCN, CBCN

## 2020-11-06 NOTE — PROGRESS NOTES
Talked with pt today. Pt states that she has an appt with endocrinology at [place] on 12/14/2020.  Pt states that she has already seen endocrinology at [place] in the past, and she decided to follow-up there.  No further action needed.  Patient verbalized understanding and agreement with plan.  Pt was instructed to call the clinic with any questions, concerns, or worsening symptoms.  Jody Dutton, RN, BSN, OCN, CBCN

## 2020-12-03 ENCOUNTER — OFFICE VISIT - HEALTHEAST (OUTPATIENT)
Dept: VASCULAR SURGERY | Facility: CLINIC | Age: 55
End: 2020-12-03

## 2020-12-03 DIAGNOSIS — M24.512 CONTRACTURE, LEFT SHOULDER: ICD-10-CM

## 2020-12-03 DIAGNOSIS — I97.2 POST-MASTECTOMY LYMPHEDEMA SYNDROME: ICD-10-CM

## 2020-12-03 DIAGNOSIS — M80.00XA PATHOLOGICAL FRACTURE DUE TO AGE-RELATED OSTEOPOROSIS, UNSPECIFIED FRACTURE SITE, INITIAL ENCOUNTER: ICD-10-CM

## 2020-12-03 DIAGNOSIS — C50.919 BREAST CANCER METASTASIZED TO BRAIN, UNSPECIFIED LATERALITY (H): ICD-10-CM

## 2020-12-03 DIAGNOSIS — L90.5 SCAR CONDITION AND FIBROSIS OF SKIN: ICD-10-CM

## 2020-12-03 DIAGNOSIS — C79.31 BREAST CANCER METASTASIZED TO BRAIN, UNSPECIFIED LATERALITY (H): ICD-10-CM

## 2020-12-03 DIAGNOSIS — M79.89 LEFT ARM SWELLING: ICD-10-CM

## 2020-12-03 ASSESSMENT — MIFFLIN-ST. JEOR: SCORE: 1414.15

## 2020-12-06 ENCOUNTER — HEALTH MAINTENANCE LETTER (OUTPATIENT)
Age: 55
End: 2020-12-06

## 2020-12-10 ENCOUNTER — RECORDS - HEALTHEAST (OUTPATIENT)
Dept: ADMINISTRATIVE | Facility: OTHER | Age: 55
End: 2020-12-10

## 2020-12-18 ENCOUNTER — RECORDS - HEALTHEAST (OUTPATIENT)
Dept: ADMINISTRATIVE | Facility: OTHER | Age: 55
End: 2020-12-18

## 2021-01-07 ENCOUNTER — COMMUNICATION - HEALTHEAST (OUTPATIENT)
Dept: SCHEDULING | Facility: CLINIC | Age: 56
End: 2021-01-07

## 2021-01-25 ENCOUNTER — OFFICE VISIT (OUTPATIENT)
Dept: OBGYN | Facility: CLINIC | Age: 56
End: 2021-01-25
Payer: COMMERCIAL

## 2021-01-25 VITALS — SYSTOLIC BLOOD PRESSURE: 120 MMHG | BODY MASS INDEX: 25.85 KG/M2 | DIASTOLIC BLOOD PRESSURE: 80 MMHG | WEIGHT: 170 LBS

## 2021-01-25 DIAGNOSIS — Z01.411 ENCOUNTER FOR GYNECOLOGICAL EXAMINATION WITH ABNORMAL FINDING: ICD-10-CM

## 2021-01-25 DIAGNOSIS — C79.31 BRAIN METASTASIS: ICD-10-CM

## 2021-01-25 DIAGNOSIS — E55.9 VITAMIN D DEFICIENCY: Primary | ICD-10-CM

## 2021-01-25 DIAGNOSIS — Z00.00 ENCOUNTER FOR MEDICARE ANNUAL WELLNESS EXAM: ICD-10-CM

## 2021-01-25 PROCEDURE — 87624 HPV HI-RISK TYP POOLED RSLT: CPT | Performed by: OBSTETRICS & GYNECOLOGY

## 2021-01-25 PROCEDURE — 99396 PREV VISIT EST AGE 40-64: CPT | Performed by: OBSTETRICS & GYNECOLOGY

## 2021-01-25 PROCEDURE — G0145 SCR C/V CYTO,THINLAYER,RESCR: HCPCS | Performed by: OBSTETRICS & GYNECOLOGY

## 2021-01-25 RX ORDER — ERGOCALCIFEROL 1.25 MG/1
50000 CAPSULE, LIQUID FILLED ORAL WEEKLY
Qty: 8 CAPSULE | Refills: 0 | Status: SHIPPED | OUTPATIENT
Start: 2021-01-25 | End: 2021-03-15

## 2021-01-25 SDOH — ECONOMIC STABILITY: FOOD INSECURITY: WITHIN THE PAST 12 MONTHS, THE FOOD YOU BOUGHT JUST DIDN'T LAST AND YOU DIDN'T HAVE MONEY TO GET MORE.: NOT ASKED

## 2021-01-25 SDOH — ECONOMIC STABILITY: TRANSPORTATION INSECURITY
IN THE PAST 12 MONTHS, HAS LACK OF TRANSPORTATION KEPT YOU FROM MEETINGS, WORK, OR FROM GETTING THINGS NEEDED FOR DAILY LIVING?: NOT ASKED

## 2021-01-25 SDOH — ECONOMIC STABILITY: FOOD INSECURITY: WITHIN THE PAST 12 MONTHS, YOU WORRIED THAT YOUR FOOD WOULD RUN OUT BEFORE YOU GOT MONEY TO BUY MORE.: NOT ASKED

## 2021-01-25 SDOH — ECONOMIC STABILITY: TRANSPORTATION INSECURITY
IN THE PAST 12 MONTHS, HAS THE LACK OF TRANSPORTATION KEPT YOU FROM MEDICAL APPOINTMENTS OR FROM GETTING MEDICATIONS?: NOT ASKED

## 2021-01-25 SDOH — ECONOMIC STABILITY: INCOME INSECURITY: HOW HARD IS IT FOR YOU TO PAY FOR THE VERY BASICS LIKE FOOD, HOUSING, MEDICAL CARE, AND HEATING?: NOT ASKED

## 2021-01-25 NOTE — PATIENT INSTRUCTIONS
You can reach your [facility] Care Team any time of the day by calling [phone]. This number will put you in touch with the 24 hour nurse line if the clinic is closed.    To contact your OB/GYN Station Coordinator/Surgery Scheduler please call [phone]. This is a direct number for your care team between 8 a.m. and 4 p.m. Monday through Friday.    [facility] is open for your convenience:  Monday through Friday 8 a.m. to 6 p.m.  Closed weekends and all major holidays.    Patient Education   Personalized Prevention Plan  You are due for the preventive services outlined below.  Your care team is available to assist you in scheduling these services.  If you have already completed any of these items, please share that information with your care team to update in your medical record.  Health Maintenance Due   Topic Date Due     Discuss Advance Care Planning  1965     Pneumococcal Vaccine (1 of 3 - PCV13) 10/10/1970     HIV Screening  10/10/1979     Hepatitis C Screening  10/10/1982     Flu Vaccine (1) 09/01/2020     PHQ-2  01/01/2021     Annual Wellness Visit  01/13/2021
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
## 2021-01-25 NOTE — NURSING NOTE
"Chief Complaint   Patient presents with     Physical       Initial /80   Wt 77.1 kg (170 lb)   BMI 25.85 kg/m   Estimated body mass index is 25.85 kg/m  as calculated from the following:    Height as of 18: 1.727 m (5' 8\").    Weight as of this encounter: 77.1 kg (170 lb).  BP completed using cuff size: regular    Questioned patient about current smoking habits.  Pt. has never smoked.          The following HM Due: NONE      The following patient reported/Care Every where data was sent to:  P ABSTRACT QUALITY INITIATIVES [[id number]]        Vanessa Willingham, [facility]                 "

## 2021-01-29 ENCOUNTER — TELEPHONE (OUTPATIENT)
Dept: OBGYN | Facility: CLINIC | Age: 56
End: 2021-01-29

## 2021-01-29 LAB
COPATH REPORT: NORMAL
FINAL DIAGNOSIS: NORMAL
HPV HR 12 DNA CVX QL NAA+PROBE: NEGATIVE
HPV16 DNA SPEC QL NAA+PROBE: NEGATIVE
HPV18 DNA SPEC QL NAA+PROBE: NEGATIVE
PAP: NORMAL
SPECIMEN DESCRIPTION: NORMAL
SPECIMEN SOURCE CVX/VAG CYTO: NORMAL

## 2021-01-29 NOTE — TELEPHONE ENCOUNTER
Pt calling. She had an appt with you on 1/25/21. She had recommended an OTC cream for her to use for vag itching and irritation. Pt does not remember what it was.    Please advise.    Jamia LUNA RN

## 2021-02-01 NOTE — TELEPHONE ENCOUNTER
I spoke with the patient regarding the vulvar irritation.  I recommended a 1% hydrocortisone cream that she could use on a 2-3 times a day basis for the next 7 to 14 days.  She will call me if there is no improvement.

## 2021-03-01 ENCOUNTER — COMMUNICATION - HEALTHEAST (OUTPATIENT)
Dept: FAMILY MEDICINE | Facility: CLINIC | Age: 56
End: 2021-03-01

## 2021-03-15 DIAGNOSIS — E55.9 VITAMIN D DEFICIENCY: ICD-10-CM

## 2021-03-15 RX ORDER — ERGOCALCIFEROL 1.25 MG/1
CAPSULE, LIQUID FILLED ORAL
Qty: 8 CAPSULE | Refills: 0 | Status: SHIPPED | OUTPATIENT
Start: 2021-03-15

## 2021-03-15 NOTE — TELEPHONE ENCOUNTER
Routing refill request to provider for review/approval because:  Drug not on the [facility] refill protocol     No mention of this in previous OV note, future order placed but not done.  Sending to provider to advise.    Maria Isabel Walker RN

## 2021-04-08 ENCOUNTER — RECORDS - HEALTHEAST (OUTPATIENT)
Dept: ADMINISTRATIVE | Facility: OTHER | Age: 56
End: 2021-04-08

## 2021-04-16 ENCOUNTER — OFFICE VISIT (OUTPATIENT)
Dept: OBGYN | Facility: CLINIC | Age: 56
End: 2021-04-16
Payer: COMMERCIAL

## 2021-04-16 ENCOUNTER — COMMUNICATION - HEALTHEAST (OUTPATIENT)
Dept: SCHEDULING | Facility: CLINIC | Age: 56
End: 2021-04-16

## 2021-04-16 ENCOUNTER — TELEPHONE (OUTPATIENT)
Dept: FAMILY MEDICINE | Facility: OTHER | Age: 56
End: 2021-04-16

## 2021-04-16 ENCOUNTER — NURSE TRIAGE (OUTPATIENT)
Dept: NURSING | Facility: CLINIC | Age: 56
End: 2021-04-16

## 2021-04-16 VITALS — BODY MASS INDEX: 26.3 KG/M2 | WEIGHT: 173 LBS | SYSTOLIC BLOOD PRESSURE: 110 MMHG | DIASTOLIC BLOOD PRESSURE: 62 MMHG

## 2021-04-16 DIAGNOSIS — N81.11 CYSTOCELE, MIDLINE: Primary | ICD-10-CM

## 2021-04-16 PROCEDURE — 99213 OFFICE O/P EST LOW 20 MIN: CPT | Performed by: OBSTETRICS & GYNECOLOGY

## 2021-04-16 RX ORDER — ROMOSOZUMAB-AQQG 105 MG/1.17ML
210 INJECTION, SOLUTION SUBCUTANEOUS
COMMUNITY
Start: 2021-02-01 | End: 2022-02-01

## 2021-04-16 NOTE — PATIENT INSTRUCTIONS
You can reach your [facility] Care Team any time of the day by calling [phone]. This number will put you in touch with the 24 hour nurse line if the clinic is closed.    To contact your OB/GYN Station Coordinator/Surgery Scheduler please call [phone]. This is a direct number for your care team between 8 a.m. and 4 p.m. Monday through Friday.    [facility] is open for your convenience:  Monday through Friday 8 a.m. to 6 p.m.  Closed weekends and all major holidays.

## 2021-04-16 NOTE — NURSING NOTE
"Chief Complaint   Patient presents with     Vaginal Problem       Initial /62   Wt 78.5 kg (173 lb)   BMI 26.30 kg/m   Estimated body mass index is 26.3 kg/m  as calculated from the following:    Height as of 18: 1.727 m (5' 8\").    Weight as of this encounter: 78.5 kg (173 lb).  BP completed using cuff size: regular    Questioned patient about current smoking habits.  Pt. has never smoked.          The following HM Due: NONE      The following patient reported/Care Every where data was sent to:  P ABSTRACT QUALITY INITIATIVES [[id number]]        Vanessa Willingham CMA                 "

## 2021-04-16 NOTE — TELEPHONE ENCOUNTER
Seen GYN (Dr. Enriquez) a couple weeks ago for yearly check     Had irritation and dryness in vaginal area  -told to use hydrocortizone cream  -just on outer area    Tonight it was very itchy and went to put some on, and there was something blocking vaginal opening    Still able to urinate  No pain   No fever    Triaged to a disposition of see HCP within 2 weeks. Patient sees Dr. Enriquez with [facility]. Transferred call to make an appointment with him.     COVID 19 Nurse Triage Plan/Patient Instructions    Please be aware that novel coronavirus (COVID-19) may be circulating in the community. If you develop symptoms such as fever, cough, or SOB or if you have concerns about the presence of another infection including coronavirus (COVID-19), please contact your health care provider or visit [email or web address].     Disposition/Instructions    In-Person Visit with provider recommended. Reference Visit Selection Guide.    Thank you for taking steps to prevent the spread of this virus.  o Limit your contact with others.  o Wear a simple mask to cover your cough.  o Wash your hands well and often.    Resources    [facility]: About COVID-19: [email or web address]    CDC: What to Do If You're Sick: www.cdc.gov/coronavirus/2019-ncov/about/steps-when-sick.html    CDC: Ending Home Isolation: www.cdc.gov/coronavirus/2019-ncov/hcp/disposition-in-home-patients.html     CDC: Caring for Someone: www.cdc.gov/coronavirus/2019-ncov/if-you-are-sick/care-for-someone.html     [facility]: Interim Guidance for Hospital Discharge to Home: www.health.[facility].[email or web address]/diseases/coronavirus/hcp/hospdischarge.pdf    [facility] clinical trials (COVID-19 research studies): clinicalaffairs.[email or web address]/um-clinical-trials     Below are the COVID-19 hotlines at the [facility] ([facility]). Interpreters are available.   o For health questions: Call 870-764-9729 or 1-366.357.9110 (7 a.m. to 7 p.m.)  o For  "questions about schools and childcare: Call 337-300-8222 or 1-532.524.1762 (7 a.m. to 7 p.m.)     Reason for Disposition    Feels like something inside is falling out of vagina (e.g., pressure, heaviness, fullness)    Additional Information    Negative: Sounds like a life-threatening emergency to the triager    Negative: Followed a genital area injury    Negative: Foreign body in vagina (e.g., tampon)    Negative: Vaginal bleeding is main symptom    Negative: Vaginal discharge is main symptom    Negative: Pain or burning with passing urine (urination) is main symptom    Negative: Menstrual cramps is main symptom    Negative: Abdomen pain is main symptom    Negative: Pubic lice suspected    Negative: Itching or rash of external female genital area (vulva)    Negative: Patient sounds very sick or weak to the triager    Negative: [1] SEVERE pain AND [2] not improved 2 hours after pain medicine    Negative: [1] Genital area looks infected (e.g., draining sore, spreading redness) AND [2] fever    Negative: [1] Something is hanging out of the vagina AND [2] can't easily be pushed back inside    Negative: MODERATE-SEVERE itching (i.e., interferes with school, work, or sleep)    Negative: Genital area looks infected (e.g., draining sore, spreading redness)    Negative: Rash with painful tiny water blisters    Negative: [1] Rash (e.g., redness, tiny bumps, sore) of genital area AND [2] present > 24 hours    Negative: Tender lump (swelling or \"ball\") at vaginal opening    Negative: [1] Symptoms of a yeast infection (i.e., itchy, white discharge, not bad smelling) AND    [2] not improved > 3 days following CARE ADVICE    Negative: [1] Vaginal itching AND [2] not improved > 3 days following CARE ADVICE    Negative: Patient is worried about sexually transmitted disease (STD)    Protocols used: VAGINAL SYMPTOMS-A-    Tenisha Benavides RN on 4/16/2021 at 1:53 AM      "

## 2021-04-19 ENCOUNTER — TELEPHONE (OUTPATIENT)
Dept: OBGYN | Facility: CLINIC | Age: 56
End: 2021-04-19

## 2021-04-19 PROBLEM — N81.11 CYSTOCELE, MIDLINE: Status: ACTIVE | Noted: 2021-04-19

## 2021-04-19 NOTE — TELEPHONE ENCOUNTER
Pt calling.   Had an appt with you on 4/16/21.    Pt states that she is noticing that she is having more issues with the cystocele.     States it occurs more often at night.     Pt is wondering what the next step is.     Jamia LUNA RN
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
## 2021-04-22 NOTE — TELEPHONE ENCOUNTER
I spoke with the patient regarding the cystocele that we diagnosed at an earlier office visit and we discussed the risk benefits and alternative forms of therapy.  In light of her past history I like to begin with a trial of a pessary.  The patient is interested in doing this.  Can you please contact her to arrange an appointment for a pessary fitting  Thank you  Bryson Enriquez MD FACOG

## 2021-04-26 ENCOUNTER — OFFICE VISIT (OUTPATIENT)
Dept: OBGYN | Facility: CLINIC | Age: 56
End: 2021-04-26
Payer: COMMERCIAL

## 2021-04-26 VITALS — DIASTOLIC BLOOD PRESSURE: 74 MMHG | SYSTOLIC BLOOD PRESSURE: 120 MMHG | WEIGHT: 173 LBS | BODY MASS INDEX: 26.3 KG/M2

## 2021-04-26 DIAGNOSIS — N81.11 CYSTOCELE, MIDLINE: Primary | ICD-10-CM

## 2021-04-26 PROCEDURE — 99213 OFFICE O/P EST LOW 20 MIN: CPT | Mod: 25 | Performed by: OBSTETRICS & GYNECOLOGY

## 2021-04-26 PROCEDURE — 57160 INSERT PESSARY/OTHER DEVICE: CPT | Performed by: OBSTETRICS & GYNECOLOGY

## 2021-04-26 PROCEDURE — A4562 PESSARY, NON RUBBER,ANY TYPE: HCPCS | Performed by: OBSTETRICS & GYNECOLOGY

## 2021-04-26 NOTE — NURSING NOTE
"Chief Complaint   Patient presents with     Pessary Check/Fit/Insert       Initial /74   Wt 78.5 kg (173 lb)   BMI 26.30 kg/m   Estimated body mass index is 26.3 kg/m  as calculated from the following:    Height as of 18: 1.727 m (5' 8\").    Weight as of this encounter: 78.5 kg (173 lb).  BP completed using cuff size: regular    Questioned patient about current smoking habits.  Pt. has never smoked.          The following HM Due: NONE      The following patient reported/Care Every where data was sent to:  P ABSTRACT QUALITY INITIATIVES [[id number]]        Vanessa Willingham [facility]                 "

## 2021-04-26 NOTE — PATIENT INSTRUCTIONS
You can reach your [facility] Care Team any time of the day by calling [phone]. This number will put you in touch with the 24 hour nurse line if the clinic is closed.    To contact your OB/GYN Station Coordinator/Surgery Scheduler please call [phone]. This is a direct number for your care team between 8 a.m. and 4 p.m. Monday through Friday.    [facility] is open for your convenience:  Monday through Friday 8 a.m. to 6 p.m.  Closed weekends and all major holidays.

## 2021-04-27 ENCOUNTER — TELEPHONE (OUTPATIENT)
Dept: OBGYN | Facility: CLINIC | Age: 56
End: 2021-04-27

## 2021-04-27 NOTE — PROGRESS NOTES
Mayra Ramirez is a 55 year old female  menopausal status post BSO for contraception, who presents for evaluation of pelvic floor pressure that she noticed recently.  The patient noticed the pressure after voiding.  She put her finger down by the vaginal introitus and could not feel the vaginal opening.  The patient had a sensation of something was falling out.  The patient says that she has some occasional urinary urgency with occasional leakage and some involuntary loss but is not a concern for her.  She is not wearing constant protection.  No vaginal bleeding.  See the previous office visit notes from 2021 for further details.  Time of that exam symptomatic grade 1-2 midline cystocele was noted.  We discussed conservative management.  The patient tried this and found unacceptable and she presents today for a pessary fitting.  Risks, benefits, and alternative modes of therapy discussed at length. Pathophysiology of the disease process reviewed, all of the patients questions answered and informed consent obtained.  The patient is not sexually active due to discomfort    Past Medical History:   Diagnosis Date     Abnormal Pap smear of cervix 05/15/1996    in Care Everywhere - ASCUS pap (no HPV result seen)     Brain metastases (H) 2006    Recurrent metastatic breast cancer     Lymphoedema 2012     Malignant neoplasm of breast (female), unspecified site 2004    Breast cancer     Current Outpatient Medications   Medication     ALOE VERA JUICE PO     BIOTIN PO     CALCIUM + D OR     Coenzyme Q10 (COQ10 PO)     MELATONIN PO     Mirabegron (MYRBETRIQ PO)     Multiple Minerals-Vitamins (BONE DENSITY BUILDER PO)     order for DME     Probiotic Product (PROBIOTIC ADVANCED PO)     romosozumab-aqqg (EVENITY) 105 MG/1.17ML SOSY injection     UNABLE TO FIND     UNABLE TO FIND     UNABLE TO FIND     UNABLE TO FIND     UNABLE TO FIND     UNABLE TO FIND     UNABLE TO FIND     UNABLE TO FIND     vitamin D2  (ERGOCALCIFEROL) 53820 units (1250 mcg) capsule     No current facility-administered medications for this visit.      /74   Wt 78.5 kg (173 lb)   BMI 26.30 kg/m    Constitutional: healthy, alert and no distress  Genitourinary: Normal external genitalia without lesions and speculum exam mildly atrophic vaginal epithelium noted no ulceration.  Multiparous cervix with grade 1 apical descent.  Grade 1-2 midline cystocele is noted.  The patient was fitted with a #3 diaphragm type pessary and demonstrated proficiency in insertion and removal.  The pessary was wrapped with Trimo-Zayas gel replaced and resulted in good pelvic floor support    (N81.11) Cystocele, midline  (primary encounter diagnosis)  Comment: Patient fitted with a #3 diaphragm pessary.  She will dress the pessary with Trimo-Zayas gel and inserted in the morning and remove it at night.  I will see her back in the office within the next several days to reassess.  If she has concerns in the interval she will call.  8 the patient has good understanding  Plan: FIT/INSERT INTRAVAG SUPPORT DEVICE/PESSARY,         PESSARY, NON RUBBER,ANY TYPE        Plan reviewed with the patient

## 2021-04-27 NOTE — TELEPHONE ENCOUNTER
Pt calling.   She had a pessary inserted yesterday, and today she is having a hard time inserting it. She has tried to insert it a few times and now noticed a little blood in her undies.  Pt does have a f/u appt on Thursday, 4/29.   I recommended that she leave the pessary our for now, and then be evaluated on Thursday.   I explained that sometimes you have to try different pessaries to find one that works best.     Pt wanted to make sure that the md was notified of all of this and see if he recommends her try something different.    Jamia LUNA, RN

## 2021-04-28 NOTE — TELEPHONE ENCOUNTER
Reviewed the phone triage nurse phone notes.  She has an appointment to see me tomorrow and I will review these findings with her.  Bryson Enriquez MD FACOG

## 2021-04-29 ENCOUNTER — OFFICE VISIT (OUTPATIENT)
Dept: OBGYN | Facility: CLINIC | Age: 56
End: 2021-04-29
Payer: COMMERCIAL

## 2021-04-29 VITALS — DIASTOLIC BLOOD PRESSURE: 64 MMHG | BODY MASS INDEX: 26.3 KG/M2 | SYSTOLIC BLOOD PRESSURE: 102 MMHG | WEIGHT: 173 LBS

## 2021-04-29 DIAGNOSIS — N81.11 CYSTOCELE, MIDLINE: Primary | ICD-10-CM

## 2021-04-29 PROCEDURE — 99213 OFFICE O/P EST LOW 20 MIN: CPT | Performed by: OBSTETRICS & GYNECOLOGY

## 2021-04-29 NOTE — PROGRESS NOTES
aMyra Ramirez is a 55 year old female  postmenopausal status post BSO not presently sexually active presents today for a follow-up of the visit of 2021.  At that time she had noticed a cystocele and after reviewing options with her decision was made to fit her with a pessary.  The patient was fitted with a #3 diaphragm type pessary and while provided good support the patient has difficulty with insertion.  She presents today for a follow-up    Past Medical History:   Diagnosis Date     Abnormal Pap smear of cervix 05/15/1996    in Care Everywhere - ASCUS pap (no HPV result seen)     Brain metastases (H)     Recurrent metastatic breast cancer     Lymphoedema 2012     Malignant neoplasm of breast (female), unspecified site     Breast cancer     Current Outpatient Medications   Medication     ALOE VERA JUICE PO     BIOTIN PO     CALCIUM + D OR     Coenzyme Q10 (COQ10 PO)     MELATONIN PO     Mirabegron (MYRBETRIQ PO)     Multiple Minerals-Vitamins (BONE DENSITY BUILDER PO)     order for DME     Probiotic Product (PROBIOTIC ADVANCED PO)     romosozumab-aqqg (EVENITY) 105 MG/1.17ML SOSY injection     UNABLE TO FIND     UNABLE TO FIND     UNABLE TO FIND     UNABLE TO FIND     UNABLE TO FIND     UNABLE TO FIND     UNABLE TO FIND     UNABLE TO FIND     vitamin D2 (ERGOCALCIFEROL) 29817 units (1250 mcg) capsule     No current facility-administered medications for this visit.      /64   Wt 78.5 kg (173 lb)   BMI 26.30 kg/m    Constitutional: healthy, alert and no distress  Genitourinary: Normal external genitalia without lesions and midline cystocele is noted unchanged from earlier exam.  The patient was fitted with a #2 diaphragm type pessary.  This provided good support.  The patient did not feel it once properly positioned.    (N81.11) Cystocele, midline  (primary encounter diagnosis)  Comment: Patient refitted with a #2 diaphragm pessary.  She will try this.  She will place it in the  morning and remove it at night.  She will use the Trimo-Zayas gel and call for any concerns in the interval.  If all is well I will see her back in a month for follow-up or sooner should concerns arise  Plan: Plan reviewed with the patient

## 2021-04-29 NOTE — PATIENT INSTRUCTIONS
You can reach your [facility] Care Team any time of the day by calling [phone]. This number will put you in touch with the 24 hour nurse line if the clinic is closed.    To contact your OB/GYN Station Coordinator/Surgery Scheduler please call [phone]. This is a direct number for your care team between 8 a.m. and 4 p.m. Monday through Friday.    [facility] is open for your convenience:  Monday through Friday 8 a.m. to 6 p.m.  Closed weekends and all major holidays.

## 2021-04-29 NOTE — NURSING NOTE
"Chief Complaint   Patient presents with     Pessary Check/Fit/Insert       Initial /64   Wt 78.5 kg (173 lb)   BMI 26.30 kg/m   Estimated body mass index is 26.3 kg/m  as calculated from the following:    Height as of 18: 1.727 m (5' 8\").    Weight as of this encounter: 78.5 kg (173 lb).  BP completed using cuff size: regular    Questioned patient about current smoking habits.  Pt. has never smoked.          The following HM Due: NONE      The following patient reported/Care Every where data was sent to:  P ABSTRACT QUALITY INITIATIVES [[id number]]        Vanessa Willingham, [facility]                 "

## 2021-05-05 ENCOUNTER — OFFICE VISIT - HEALTHEAST (OUTPATIENT)
Dept: VASCULAR SURGERY | Facility: CLINIC | Age: 56
End: 2021-05-05

## 2021-05-05 DIAGNOSIS — M24.512 CONTRACTURE, LEFT SHOULDER: ICD-10-CM

## 2021-05-05 DIAGNOSIS — M79.89 LEFT ARM SWELLING: ICD-10-CM

## 2021-05-05 DIAGNOSIS — L90.5 SCAR CONDITION AND FIBROSIS OF SKIN: ICD-10-CM

## 2021-05-05 DIAGNOSIS — C79.31 BREAST CANCER METASTASIZED TO BRAIN, UNSPECIFIED LATERALITY (H): ICD-10-CM

## 2021-05-05 DIAGNOSIS — C50.919 BREAST CANCER METASTASIZED TO BRAIN, UNSPECIFIED LATERALITY (H): ICD-10-CM

## 2021-05-05 DIAGNOSIS — I97.2 POST-MASTECTOMY LYMPHEDEMA SYNDROME: ICD-10-CM

## 2021-05-10 ENCOUNTER — TELEPHONE (OUTPATIENT)
Dept: OBGYN | Facility: CLINIC | Age: 56
End: 2021-05-10

## 2021-05-10 NOTE — TELEPHONE ENCOUNTER
Pt calls stating that when she removes her pessary at night the gel looks green.  She states its looks clear when she inserts it but then it looks green.    NO odor, no itching or irritation.    Any concern with this?  Next appt is May 17      Abbie VILLANUEVA R.N.

## 2021-05-10 NOTE — TELEPHONE ENCOUNTER
In the absence of any itching irritation or odor I would not change the therapy plan at this time.  The patient does have postmenopausal atrophic vaginitis and her symptoms are not terribly unusual

## 2021-05-17 ENCOUNTER — OFFICE VISIT (OUTPATIENT)
Dept: OBGYN | Facility: CLINIC | Age: 56
End: 2021-05-17
Payer: COMMERCIAL

## 2021-05-17 VITALS — BODY MASS INDEX: 26.76 KG/M2 | SYSTOLIC BLOOD PRESSURE: 110 MMHG | DIASTOLIC BLOOD PRESSURE: 76 MMHG | WEIGHT: 176 LBS

## 2021-05-17 DIAGNOSIS — N89.8 VAGINAL DISCHARGE: Primary | ICD-10-CM

## 2021-05-17 DIAGNOSIS — N81.11 CYSTOCELE, MIDLINE: ICD-10-CM

## 2021-05-17 LAB
SPECIMEN SOURCE: NORMAL
WET PREP SPEC: NORMAL

## 2021-05-17 PROCEDURE — 99213 OFFICE O/P EST LOW 20 MIN: CPT | Performed by: OBSTETRICS & GYNECOLOGY

## 2021-05-17 PROCEDURE — 87210 SMEAR WET MOUNT SALINE/INK: CPT | Performed by: OBSTETRICS & GYNECOLOGY

## 2021-05-17 NOTE — NURSING NOTE
"Chief Complaint   Patient presents with     Pessary Check/Fit/Insert       Initial /76   Wt 79.8 kg (176 lb)   BMI 26.76 kg/m   Estimated body mass index is 26.76 kg/m  as calculated from the following:    Height as of 18: 1.727 m (5' 8\").    Weight as of this encounter: 79.8 kg (176 lb).  BP completed using cuff size: regular    Questioned patient about current smoking habits.  Pt. has never smoked.          The following HM Due: NONE      The following patient reported/Care Every where data was sent to:  P ABSTRACT QUALITY INITIATIVES [[id number]]        Vanessa Willingham [facility]                 "

## 2021-05-17 NOTE — PROGRESS NOTES
Mayra Ramirez is a 55 year old female  postmenopausal status post BSO not presently sexually active presents today for a follow-up of the visit of 2021.  At that time she had noticed a cystocele and after reviewing options with her decision was made to fit her with a pessary.  Initially the patient was fitted with a #3 diaphragm pessary but found this uncomfortable.  The patient was then  Re- fitted with a #2 diaphragm type pessary.  The patient presents today for a follow-up visit.  While she states that initially she has some discomfort with placement with practice insertion and removal has gotten easier.  It does provide her with adequate support.  The patient does notice some intermittent green discharge with removal but denies burning itching or odor.  Bladder control is acceptable    Past Medical History:   Diagnosis Date     Abnormal Pap smear of cervix 05/15/1996    in Care Everywhere - ASCUS pap (no HPV result seen)     Brain metastases (H)     Recurrent metastatic breast cancer     Lymphoedema 2012     Malignant neoplasm of breast (female), unspecified site     Breast cancer     Current Outpatient Medications   Medication     ALOE VERA JUICE PO     BIOTIN PO     CALCIUM + D OR     Coenzyme Q10 (COQ10 PO)     MELATONIN PO     Mirabegron (MYRBETRIQ PO)     Multiple Minerals-Vitamins (BONE DENSITY BUILDER PO)     order for DME     Probiotic Product (PROBIOTIC ADVANCED PO)     romosozumab-aqqg (EVENITY) 105 MG/1.17ML SOSY injection     UNABLE TO FIND     UNABLE TO FIND     UNABLE TO FIND     UNABLE TO FIND     UNABLE TO FIND     UNABLE TO FIND     UNABLE TO FIND     UNABLE TO FIND     vitamin D2 (ERGOCALCIFEROL) 35021 units (1250 mcg) capsule     No current facility-administered medications for this visit.      /76   Wt 79.8 kg (176 lb)   BMI 26.76 kg/m    Constitutional: healthy, alert and no distress  Genitourinary: Normal external genitalia without lesions and the #2  diaphragm pessary was removed and cleansed with soap and water.  A wet prep was performed.  Bimanual exam the uterus is parous mobile adnexa without masses or enlargement, midline cystocele grade 2 is noted.  No evidence of any ulceration or irritation.  The pessary was redressed with Trimo-Zayas gel and replaced with good resulting pelvic floor support    (N89.8) Vaginal discharge  (primary encounter diagnosis)  Comment: Wet prep done  Plan: Wet prep        Results pending.  I will notify the patient when the results are available with appropriate therapy to follow.  I would anticipate that the wet prep would come back negative    (N81.11) Cystocele, midline  Comment: Pessary provides good support  Plan: I will see her back in a month or as needed concerns.  She will continue to insert it in the morning and remove it at night

## 2021-05-17 NOTE — RESULT ENCOUNTER NOTE
Please inform the patient of the normal wet prep results.  I did not see any signs of infection and believe the discharge that she experiences related to the Trimo-Zayas gel as we discussed  Thank you

## 2021-05-17 NOTE — PATIENT INSTRUCTIONS
You can reach your [facility] Care Team any time of the day by calling [phone]. This number will put you in touch with the 24 hour nurse line if the clinic is closed.    To contact your OB/GYN Station Coordinator/Surgery Scheduler please call [phone]. This is a direct number for your care team between 8 a.m. and 4 p.m. Monday through Friday.    [facility] is open for your convenience:  Monday through Friday 8 a.m. to 6 p.m.  Closed weekends and all major holidays.

## 2021-05-27 VITALS
SYSTOLIC BLOOD PRESSURE: 104 MMHG | BODY MASS INDEX: 26.27 KG/M2 | HEART RATE: 56 BPM | RESPIRATION RATE: 16 BRPM | DIASTOLIC BLOOD PRESSURE: 70 MMHG | TEMPERATURE: 97.5 F | WEIGHT: 172.8 LBS

## 2021-05-27 ASSESSMENT — PATIENT HEALTH QUESTIONNAIRE - PHQ9: SUM OF ALL RESPONSES TO PHQ QUESTIONS 1-9: 1

## 2021-05-27 NOTE — PROGRESS NOTES
[facility] Daily Progress     Patient Name: Mayra Vasquezb  Date: 4/2/2019  Visit #: 11/10-12   PTA visit #:  3  Referral Diagnosis: Swelling:  Lymphatic;  Left arm, Contracture(s):  Left  shoulder, Post mastectomy/lumpectomy/LND, Axillary LND and Fibrosis/Scarring.  Slight strain right upper trapezius.  Referring provider: Malia Escamilla, *  Visit Diagnosis:       ICD-10-CM     1. Contracture, left shoulder M24.512       right upper trap strain   2. 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 Scar condition and fibrosis of skin L90.5          Precaution: left clavicular metal implant.    History of Present Illness:    Mayra is a 53 y.o. female who presents to therapy today with complaints of left chest wall edema, decreased left shoulder mobility right neck pain. Date of onset/duration of symptoms is 2-7-19. Onset was gradual. Symptoms are intermittent and not improving. She denies history of similar symptoms. She describes their previous level of function as not limited     Assessment:     Patient arrived late due to road construction.  Left chest wall edema is reducing.  Skin is intact.    Patient is benefitting from skilled physical therapy and is making steady progress toward functional goals.  Patient is appropriate to continue with skilled physical therapy intervention, as indicated by initial plan of care.    Goal Status:  Patient will reach / maintain arm movement: overhead;for home chores;for dressing;with full ROM;in 4 weeks    Patient will have a decreased volume in : left;by 5%;to decrease risk of infection;for better fit of clothing;for improved body image;for ease of movement;in 4 weeks;Comment;Progressing toward  Comment: decreased left chest wall edema.  Patient will have decreased fibrosis, scar tissue for improved lymphatic mobilitiy : in 4 weeks;Progressing toward  Patient will perform, verbalize self-management of: skin  "care;self-monitoring;exercise;massage;self-compression;infection prevention;in 4 weeks      Plan / Patient Education:     Continue with initial plan of care.  Progress with home program as tolerated.  Plan for next visit: MFR, MLD, shoulder exercises, HEP. Review HEP upgrade as able due to metal implant.    Subjective:      Pain Ratin/10 today.    Patient reports she is feeling better.        Objective:     Caregiver present: no    Observation of swelling: left chest wall with mild edema, continues to improve    Volume measurements taken:  no    Skin condition is:  Intact, skin mobility increased.      Compression:Has compression sleeve, rarely uses      Medication for infection:  None.    Treatment Today     TREATMENT MINUTES COMMENTS   Evaluation     Self-care/ Home management     Manual therapy 45 MLD L torso in supine and R SL, R axilla to axilla anastomosis, MFR  With elongation to L anterior, lateral torso, L axilla and L upper arm. MFR and SCS R UT scapular region  In supine and seated.  Tissue lifting and space creation with \"S\", \"C\".   Neuromuscular Re-education     Therapeutic Activity     Therapeutic Exercises - Instructed on the side pull, pectoral stretch gentle, latissimus dorsi.   Gait training     Modality__________________                Total 45    Blank areas are intentional and mean the treatment did not include these items.       AXEL May  2019  " I have personally seen and examined this patient. I have fully participated in the care of this patient. I have reviewed all pertinent clinical information, including history physical exam, plan and the Resident's note and agree except as noted

## 2021-05-27 NOTE — PROGRESS NOTES
[facility] Daily Progress     Patient Name: Mayra Ramirez  Date: 4/4/2019  Visit #: 12/10-12 HP + 2 more  PTA visit #:  3  Referral Diagnosis: Swelling:  Lymphatic;  Left arm, Contracture(s):  Left  shoulder, Post mastectomy/lumpectomy/LND, Axillary LND and Fibrosis/Scarring.  Slight strain right upper trapezius.  Referring provider: Malia Escamilla, *  Visit Diagnosis:       ICD-10-CM     1. Contracture, left shoulder M24.512       right upper trap strain   2. 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 Scar condition and fibrosis of skin L90.5          Precaution: left clavicular metal implant.    History of Present Illness:    Mayra is a 53 y.o. female who presents to therapy today with complaints of left chest wall edema, decreased left shoulder mobility right neck pain. Date of onset/duration of symptoms is 2-7-19. Onset was gradual. Symptoms are intermittent and not improving. She denies history of similar symptoms. She describes their previous level of function as not limited     Assessment:     Left shoulder improved to WFL with minimal pectoral pulling at end range.  Left chest wall edema is decreasing.  Skin is intact.    Patient is benefitting from skilled physical therapy and is making steady progress toward functional goals.  Patient is appropriate to continue with skilled physical therapy intervention, as indicated by initial plan of care.    Goal Status:  Patient will reach / maintain arm movement: overhead;for home chores;for dressing;with full ROM;in 4 weeks    Patient will have a decreased volume in : left;by 5%;to decrease risk of infection;for better fit of clothing;for improved body image;for ease of movement;in 4 weeks;Comment;Progressing toward  Comment: decreased left chest wall edema.  Patient will have decreased fibrosis, scar tissue for improved lymphatic mobilitiy : in 4 weeks;Progressing toward  Patient will perform, verbalize self-management of: skin  "care;self-monitoring;exercise;massage;self-compression;infection prevention;in 4 weeks      Plan / Patient Education:     Continue with initial plan of care.  Progress with home program as tolerated.  Plan for next visit: MFR, MLD, shoulder exercises, HEP. Review HEP upgrade as able due to metal implant.will continue for 2 more visits to increase left chest wall skin mobility and edema redction.    Subjective:      Pain Ratin/10 today.    Patient reports she is doing her exercises..        Objective:     Caregiver present: no    Observation of swelling: left chest wall with mild edema, continues to improve    Volume measurements taken:  no    Skin condition is:  Intact, skin mobility increased.      Compression:Has compression sleeve, rarely uses      Medication for infection:  None.    Treatment Today     TREATMENT MINUTES COMMENTS   Evaluation     Self-care/ Home management     Manual therapy 45 MLD L torso in supine and R SL, R axilla to axilla anastomosis, MFR  With elongation to L anterior, lateral torso, L axilla and L upper arm with PNF motion D1D2. MFR and SCS R UT scapular region  In supine and seated.  Tissue lifting and space creation with \"S\", \"C\".   Neuromuscular Re-education     Therapeutic Activity     Therapeutic Exercises 10 Instructed and performed the side pull, pectoral stretch gentle, latissimus dorsi, shoulder shrugs.   Gait training     Modality__________________                Total 55    Blank areas are intentional and mean the treatment did not include these items.       AXEL May  2019  "

## 2021-05-27 NOTE — PROGRESS NOTES
[facility] Daily Progress     Patient Name: Mayra Vasquezb  Date: 3/28/2019  Visit #: 10/10-12   PTA visit #:  3  Referral Diagnosis: Swelling:  Lymphatic;  Left arm, Contracture(s):  Left  shoulder, Post mastectomy/lumpectomy/LND, Axillary LND and Fibrosis/Scarring.  Slight strain right upper trapezius.  Referring provider: Malia Escamilla, *  Visit Diagnosis:       ICD-10-CM     1. Contracture, left shoulder M24.512       right upper trap strain   2. 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 Scar condition and fibrosis of skin L90.5            History of Present Illness:    Mayra is a 53 y.o. female who presents to therapy today with complaints of left chest wall edema, decreased left shoulder mobility right neck pain. Date of onset/duration of symptoms is 2-7-19. Onset was gradual. Symptoms are intermittent and not improving. She denies history of similar symptoms. She describes their previous level of function as not limited     Assessment:     Left clavicular metal is showing more superficial.  Patient felt on the ice and called surgeon, they said it should be fine, no c/o clavicular pain.  Left upper quadrant edema reduces with MLD and skin increases mobility after therapy.    Patient is benefitting from skilled physical therapy and is making steady progress toward functional goals.  Patient is appropriate to continue with skilled physical therapy intervention, as indicated by initial plan of care.    Goal Status:  Patient will reach / maintain arm movement: overhead;for home chores;for dressing;with full ROM;in 4 weeks    Patient will have a decreased volume in : left;by 5%;to decrease risk of infection;for better fit of clothing;for improved body image;for ease of movement;in 4 weeks;Comment;Progressing toward  Comment: decreased left chest wall edema.  Patient will have decreased fibrosis, scar tissue for improved lymphatic mobilitiy : in 4 weeks;Progressing toward  Patient will  perform, verbalize self-management of: skin care;self-monitoring;exercise;massage;self-compression;infection prevention;in 4 weeks      Plan / Patient Education:     Continue with initial plan of care.  Progress with home program as tolerated.  Plan for next visit: MFR, MLD, shoulder exercises, HEP. Review HEP upgrade as able due to metal implant.    Subjective:      Pain Ratin/10 today.    Patient reports she is feeling better.        Objective:     Caregiver present: no    Observation of swelling: left chest wall with mild edema, continues to improve    Volume measurements taken:  no    Skin condition is:  Intact, skin mobility increased.      Compression:Has compression sleeve, rarely uses      Medication for infection:  None.    Treatment Today     TREATMENT MINUTES COMMENTS   Evaluation     Self-care/ Home management     Manual therapy 55 MLD L torso in supine and R SL, R axilla to axilla anastomosis, MFR  With elongation to L anterior, lateral torso, L axilla and L upper arm. MFR and SCS R UT scapular region  In supine and seated.   Neuromuscular Re-education     Therapeutic Activity     Therapeutic Exercises 5 Instructed on the side pull, pectoral stretch gentle, latissimus dorsi.   Gait training     Modality__________________                Total 60    Blank areas are intentional and mean the treatment did not include these items.       Sandra Osorio,NORY-SAI  3/28/2019

## 2021-05-28 NOTE — PROGRESS NOTES
[facility] Daily Progress     Patient Name: Mayra Ramirez  Date: 4/25/2019  Visit #: 14/10-12 HP + 2 more  PTA visit #:  3  Referral Diagnosis: Swelling:  Lymphatic;  Left arm, Contracture(s):  Left  shoulder, Post mastectomy/lumpectomy/LND, Axillary LND and Fibrosis/Scarring.  Slight strain right upper trapezius.  Referring provider: Malia Escamilla, *  Visit Diagnosis:       ICD-10-CM     1. Contracture, left shoulder M24.512       right upper trap strain   2. 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 Scar condition and fibrosis of skin L90.5          Precaution: left clavicular metal implant.    History of Present Illness:    Mayra is a 53 y.o. female who presents to therapy today with complaints of left chest wall edema, decreased left shoulder mobility right neck pain. Date of onset/duration of symptoms is 2-7-19. Onset was gradual. Symptoms are intermittent and not improving. She denies history of similar symptoms. She describes their previous level of function as not limited     Assessment:     Left shoulder ROM improved after manual therapy flexion 175o  Left chest wall with minimal edema.    Patient is benefitting from skilled physical therapy and is making steady progress toward functional goals.  Patient is appropriate to continue with skilled physical therapy intervention, as indicated by initial plan of care.    Goal Status:  Patient will reach / maintain arm movement: overhead;for home chores;for dressing;with full ROM;in 4 weeks;Progressing toward    Patient will have a decreased volume in : left;by 5%;to decrease risk of infection;for better fit of clothing;for improved body image;for ease of movement;in 4 weeks;Comment;Progressing toward  Comment: decreased left chest wall edema.  Patient will have decreased fibrosis, scar tissue for improved lymphatic mobilitiy : in 4 weeks;Progressing toward  Patient will perform, verbalize self-management of: skin  "care;self-monitoring;exercise;massage;self-compression;infection prevention;in 4 weeks;Progressing toward      Plan / Patient Education:     Continue with initial plan of care.  Progress with home program as tolerated.  Plan for next visit: MFR, MLD, shoulder exercises, HEP. Review HEP upgrade as able due to metal implant.will continue for 1 more visits to increase left chest wall skin mobility and edema reduction.    Subjective:      Pain Ratin/10 today.    Patient reports she fall down the stairs at home has a bruise on her right hip.        Objective:     Caregiver present: no    Observation of swelling: left chest wall with mild edema.    Volume measurements taken:  no    Skin condition is:  Intact, skin mobility increased.  Left clavicular metal implant in place.      Compression:Has compression sleeve, rarely uses      Medication for infection:  None.    Treatment Today     TREATMENT MINUTES COMMENTS   Evaluation     Self-care/ Home management     Manual therapy 45 MLD L torso in supine and R SL, R axilla to axilla anastomosis, MFR  With elongation to L anterior, lateral torso, L axilla and L upper arm with PNF motion D1D2. MFR and SCS R UT scapular region  In supine and seated.  Tissue lifting and space creation with \"S\", \"C\".   Neuromuscular Re-education     Therapeutic Activity     Therapeutic Exercises 10 Instructed and performed the side pull, pectoral stretch gentle, latissimus dorsi, shoulder shrugs.instructed to continue with daily exercises.   Gait training     Modality__________________                Total 55    Blank areas are intentional and mean the treatment did not include these items.       AXEL May  2019  "

## 2021-05-28 NOTE — PROGRESS NOTES
[facility] Daily Progress     Patient Name: Mayra Ramirez  Date: 4/23/2019  Visit #: 13/10-12 HP + 2 more  PTA visit #:  3  Referral Diagnosis: Swelling:  Lymphatic;  Left arm, Contracture(s):  Left  shoulder, Post mastectomy/lumpectomy/LND, Axillary LND and Fibrosis/Scarring.  Slight strain right upper trapezius.  Referring provider: Malia Escamilla, *  Visit Diagnosis:       ICD-10-CM     1. Contracture, left shoulder M24.512       right upper trap strain   2. 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 Scar condition and fibrosis of skin L90.5          Precaution: left clavicular metal implant.    History of Present Illness:    Mayra is a 53 y.o. female who presents to therapy today with complaints of left chest wall edema, decreased left shoulder mobility right neck pain. Date of onset/duration of symptoms is 2-7-19. Onset was gradual. Symptoms are intermittent and not improving. She denies history of similar symptoms. She describes their previous level of function as not limited     Assessment:     Left chest wall edema is decreased.    Patient is benefitting from skilled physical therapy and is making steady progress toward functional goals.  Patient is appropriate to continue with skilled physical therapy intervention, as indicated by initial plan of care.    Goal Status:  Patient will reach / maintain arm movement: overhead;for home chores;for dressing;with full ROM;in 4 weeks    Patient will have a decreased volume in : left;by 5%;to decrease risk of infection;for better fit of clothing;for improved body image;for ease of movement;in 4 weeks;Comment;Progressing toward  Comment: decreased left chest wall edema.  Patient will have decreased fibrosis, scar tissue for improved lymphatic mobilitiy : in 4 weeks;Progressing toward  Patient will perform, verbalize self-management of: skin care;self-monitoring;exercise;massage;self-compression;infection prevention;in 4 weeks      Plan / Patient  "Education:     Continue with initial plan of care.  Progress with home program as tolerated.  Plan for next visit: MFR, MLD, shoulder exercises, HEP. Review HEP upgrade as able due to metal implant.will continue for 1 more visits to increase left chest wall skin mobility and edema reduction.    Subjective:      Pain Ratin/10 today.    Patient reports she is doing her exercises..        Objective:     Caregiver present: no    Observation of swelling: left chest wall with mild edema.    Volume measurements taken:  no    Skin condition is:  Intact, skin mobility increased.      Compression:Has compression sleeve, rarely uses      Medication for infection:  None.    Treatment Today     TREATMENT MINUTES COMMENTS   Evaluation     Self-care/ Home management     Manual therapy 45 MLD L torso in supine and R SL, R axilla to axilla anastomosis, MFR  With elongation to L anterior, lateral torso, L axilla and L upper arm with PNF motion D1D2. MFR and SCS R UT scapular region  In supine and seated.  Tissue lifting and space creation with \"S\", \"C\".   Neuromuscular Re-education     Therapeutic Activity     Therapeutic Exercises 10 Instructed and performed the side pull, pectoral stretch gentle, latissimus dorsi, shoulder shrugs.   Gait training     Modality__________________                Total 55    Blank areas are intentional and mean the treatment did not include these items.       AXEL May  2019  "

## 2021-05-28 NOTE — PROGRESS NOTES
[facility] Daily Progress -discharge    Patient Name: Mayra Ramirez  Date: 2019  Visit #: 15/10-12 HP + 2 more  PTA visit #:  3  Referral Diagnosis: Swelling:  Lymphatic;  Left arm, Contracture(s):  Left  shoulder, Post mastectomy/lumpectomy/LND, Axillary LND and Fibrosis/Scarring.  Slight strain right upper trapezius.  Referring provider: Malia Escamilla, *  Visit Diagnosis:       ICD-10-CM     1. Contracture, left shoulder M24.512       right upper trap strain   2. 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 Scar condition and fibrosis of skin L90.5          Precaution: left clavicular metal implant.      Assessment:     Patient was seen for manual lymph drainage and myofascial release, exercises.  Left shoulder ROM is WFL. Decreased left chest wall edema, skin is intact including the left clavicular area with the metal implant.  Patient is compliant with home program.    Goal Status:  Patient will reach / maintain arm movement: overhead;for home chores;for dressing;with full ROM;in 4 weeks;Met    Patient will have a decreased volume in : left;by 5%;to decrease risk of infection;for better fit of clothing;for improved body image;for ease of movement;in 4 weeks;Comment;Met  Comment: decreased left chest wall edema.  Patient will have decreased fibrosis, scar tissue for improved lymphatic mobilitiy : in 4 weeks;Met  Patient will perform, verbalize self-management of: skin care;self-monitoring;exercise;massage;self-compression;infection prevention;in 4 weeks;Met      Plan / Patient Education:     Discharge from physical therapy all goals met.    Subjective:      Pain Ratin/10 today.    Doing her exercises.        Objective:     Caregiver present: no    Observation of swelling: left chest wall with decreased edema.    Volume measurements taken:  no    Skin condition is:  Intact, skin mobility increased.  Left clavicular metal implant in place.      Compression:Has compression sleeve,  "rarely uses      Medication for infection:  None.    Treatment Today     TREATMENT MINUTES COMMENTS   Evaluation     Self-care/ Home management     Manual therapy 45 MLD L torso in supine and R SL, R axilla to axilla anastomosis, MFR  With elongation to L anterior, lateral torso, L axilla and L upper arm with PNF motion D1D2. MFR and SCS R UT scapular region  In supine and seated.  Tissue lifting and space creation with \"S\", \"C\".   Neuromuscular Re-education     Therapeutic Activity     Therapeutic Exercises 10 Instructed and performed the side pull, pectoral stretch gentle, latissimus dorsi, shoulder shrugs.instructed to continue with daily exercises.   Gait training     Modality__________________                Total 55    Blank areas are intentional and mean the treatment did not include these items.       Sandra Osorio,NORY-SAI  4/30/2019  "

## 2021-05-29 NOTE — TELEPHONE ENCOUNTER
RN cannot approve Refill Request    RN can NOT refill this medication med is not covered by policy/route to provider.    Rajesh Hernandez, [facility] Triage/Med Refill 6/13/2019    Requested Prescriptions   Pending Prescriptions Disp Refills     MYRBETRIQ 25 mg Tb24 ER tablet [Pharmacy Med Name: MYRBETRIQ ER 25 MG TABLET] 90 tablet 1     Sig: TAKE 1 TABLET BY MOUTH EVERY DAY       There is no refill protocol information for this order

## 2021-05-30 VITALS — BODY MASS INDEX: 26.52 KG/M2 | HEIGHT: 68 IN | WEIGHT: 175 LBS

## 2021-05-30 NOTE — PROGRESS NOTES
Date Of Service: 07/15/19    Date last Seen:  02/07/19    PCP: Sariah Corbin MD    Impression:   1. Left chest wall swelling and left arm/shoulder tightness-under good control  2. Secondary post mastectomy lymphedema  3. Complex left clavicular fracture-s/p graft and plate removal with revision 6/18 [facility]-doing well  4. Decreased balance since treatment for brain met  5. Breast carcinoma (2004 left, left mod rad, right simple mastec, rad + chemo+solitary brain met)     Plan:        1.   Questions were answered.  1. She has a compression garment to be used when she needs it.    2. Continue exercises  3. Has antibiotics and been trained on how to use appropriately.    4. Continue to work on balance.  Recommendations given.  5. I will see her in follow up in 8 months or when needed.  Discussed [employer] recommendations and modifications.      Time spent with patient 25 minutes, with greater than 50% time in consultation, education and coordination of care.  _____________________________________________________________________    Chief Complaint:  left chest and left arm/shoulder tightness, complex left clavicular surgery     History of Present Illness:  Mayra Ramirez returns to the [facility] for her left chest fullness and left arm/shoulder tightness due to post mastectomy lymphedema with fibrosis and scarring with fractures after being diagnosed diagnosed with left breast cancer in 2004 and treated with left modified radical mastectomy, right simple mastectomy, and chemotherapy and and radiation.  She then developed a brain met requiring surgery then whole brain radiation followed by oophorectomy.  In Dec. 2015 she sustained a complex left clavicular fracture requiring plating with residual significant scarring requiring complex surgical revision at [facility]. She did very well.  There has been no recurrent disease and she sees Dr. Wu.    She is here for follow up.  She  reports the swelling is under excellent control.  She does her exercises regularly. She wears her compression as needed.  She continues to have balance issues since her brain met treatment.  She has had therapy for this.   There is no new numbness, tingling or weakness. There has been no new unexplained weight loss, loss of appetite, nausea and/or vomiting, shortness of breath, weight loss and chest pain.       Past Medical History:   Diagnosis Date     Breast cancer (H) left    2004 - HER2 pos, ER+, TX-     Metastasis to brain (H) 2005     Osteopenia     2/2 breast CA tx.       Past Surgical History:   Procedure Laterality Date     BRAIN SURGERY  2005    removal of cerebellar tumor     MASTECTOMY MODIFIED RADICAL Left      ORIF CLAVICLE FRACTURE  12/2016    plate and screws     TX REMOVAL OF OVARY(S)      Description: Oophorectomy;  Recorded: 10/29/2013;     SIMPLE MASTECTOMY Right      TONSILLECTOMY           Current Outpatient Medications:      ALOE VERA ORAL, Take 2 oz by mouth daily. juice, Disp: , Rfl:      biotin 5 mg cap, Take by mouth., Disp: , Rfl:      calcium carbonate-vitamin D3 (CALCIUM 600 + D,3,) 600 mg calcium- 200 unit cap, Take by mouth 2 times a day at 6:00 am and 4:00 pm., Disp: , Rfl:      clindamycin (CLEOCIN T) 1 % lotion, Apply topically 2 (two) times a day., Disp: , Rfl:      co-enzyme Q-10 30 mg capsule, Take 30 mg by mouth 3 (three) times a day., Disp: , Rfl:      coenzyme Q10 400 mg cap, Take 1 capsule by mouth daily., Disp: , Rfl:      estradiol (ESTRACE) 0.01 % (0.1 mg/gram) vaginal cream, , Disp: , Rfl:      fluticasone (FLONASE) 50 mcg/actuation nasal spray, , Disp: , Rfl:      LACTOBACILLUS ACIDOPHILUS (PROBIOTIC ORAL), Take by mouth daily., Disp: , Rfl:      magnesium 250 mg Tab, Take by mouth., Disp: , Rfl:      melatonin 3 mg Tab tablet, Take by mouth., Disp: , Rfl:      MULTIVITAMIN ORAL, Take by mouth., Disp: , Rfl:      MYRBETRIQ 25 mg Tb24 ER tablet, TAKE 1 TABLET BY MOUTH  EVERY DAY, Disp: 90 tablet, Rfl: 1     omega-3/dha/epa/fish oil (FISH OIL-OMEGA-3 FATTY ACIDS) 300-1,000 mg capsule, Take 2 g by mouth daily., Disp: , Rfl:      prasterone, DHEA, (DHEA ORAL), Take by mouth., Disp: , Rfl:      prasterone, DHEA, (DHEA ORAL), Take by mouth daily., Disp: , Rfl:      ranitidine (ZANTAC) 150 MG tablet, Take 150 mg by mouth 2 (two) times a day., Disp: , Rfl:      UNABLE TO FIND, Med Name: Trancor, Disp: , Rfl:      UNABLE TO FIND, Med Name: Endefin, Disp: , Rfl:      UNABLE TO FIND, Med Name: Perimine, Disp: , Rfl:      UNABLE TO FIND, Med Name: Thyrosol, Disp: , Rfl:      UNABLE TO FIND, Med Name: Bone Builder, Disp: , Rfl:      UNABLE TO FIND, Med Name: Adrenogen, Disp: , Rfl:      UNABLE TO FIND, Med Name: Metallo Clear, Disp: , Rfl:      vit C/E/zinc/lutein/zeaxanthin (OCUVITE EYE HEALTH ORAL), Take by mouth., Disp: , Rfl:      metroNIDAZOLE (METROGEL) 0.75 % vaginal gel, Insert 1 application into the vagina at bedtime., Disp: , Rfl:     Allergies   Allergen Reactions     Sulfa (Sulfonamide Antibiotics) Hives     Sulfamethoxazole-Trimethoprim      Sulfasalazine Hives       Social History     Socioeconomic History     Marital status:      Spouse name: Not on file     Number of children: Not on file     Years of education: Not on file     Highest education level: Not on file   Occupational History     Occupation:      Employer: [employer]     Comment: medical leave 4/2017-?   Social Needs     Financial resource strain: Not on file     Food insecurity:     Worry: Not on file     Inability: Not on file     Transportation needs:     Medical: Not on file     Non-medical: Not on file   Tobacco Use     Smoking status: Never Smoker     Smokeless tobacco: Never Used   Substance and Sexual Activity     Alcohol use: No     Drug use: No     Sexual activity: Yes     Partners: Male   Lifestyle     Physical activity:     Days per week: Not on file     Minutes per  session: Not on file     Stress: Not on file   Relationships     Social connections:     Talks on phone: Not on file     Gets together: Not on file     Attends Shinto service: Not on file     Active member of club or organization: Not on file     Attends meetings of clubs or organizations: Not on file     Relationship status: Not on file     Intimate partner violence:     Fear of current or ex partner: Not on file     Emotionally abused: Not on file     Physically abused: Not on file     Forced sexual activity: Not on file   Other Topics Concern     Not on file   Social History Narrative    .  4 kids.  Retired .         Family History   Problem Relation Age of Onset     Cancer Mother         thyroid      Hypertension Mother      Breast cancer Maternal Grandmother      Diabetes Maternal Grandmother      Diabetes Paternal Grandmother        Review of Systems:  Review of systems is otherwise negative, except as noted above.  Full 12 point review of systems was completed.    Imaging:    I personally reviewed the following imaging today and those on care everywhere, if indicated    DX Clavicle Left 2 Views6/17/2019  [facility]  Result Impression   No significant change since 12/10/2018. Plate and screw fixation  across a healed mid left clavicular fracture. Near anatomic alignment. Surgical  clips left axilla. No evidence of hardware failure.   Result Narrative   EXAM:  DX CLAVICLE LEFT 2 VIEWS   Other Result Information   Interface, [facility] Generic [id number] - 06/17/2019 11:18 AM CDT  EXAM:  DX CLAVICLE LEFT 2 VIEWS    IMPRESSION:  No significant change since 12/10/2018. Plate and screw fixation  across a healed mid left clavicular fracture. Near anatomic alignment. Surgical  clips left axilla. No evidence of hardware failure.       Labs:    I personally reviewed the following labs today and those on care everywhere, if indicated    Vitamin D, Total (25-Hydroxy)    Date Value Ref Range Status   07/12/2017 42.4 30.0 - 80.0 ng/mL Final       Lab Results   Component Value Date    TSH 3.09 02/16/2018       Physical Exam:  Vitals:    07/15/19 1012   BP: 94/64   Pulse: 60   Resp: 16   Temp: 98.6  F (37  C)      BMI 25.12 (stable) weight 165 pounds      Circumferential measures:    Vasc Edema 11/27/2017 8/15/2018 11/15/2018 2/7/2019 7/15/2019   Right-just above MCP 20.0 18.5 19.2 19.8 19.5   Right Wrist 16.0 15.4 16.5 16.3 16   Right Up 10cm 21.0 21.4 21.5 22.5 23.3   Right Up 10cm From Elbow 31.0 27.7 29.8 32 31.7   Left-just above MCP 19.0 18.5 19.1 19 19.5   Left Wrist 16.2 15.2 16.3 15.7 15.7   Left Up 10cm 20.6 21.4 22 23.5 22.2   Left Up 10cm From Elbow 30 29 30.8 32.5 31     Numbers are stable.    General:  53 y.o. female in no apparent distress.      Psych:  Alert and oriented x 3.  Cooperative. Affect normal.    HEENT:  Atraumaticand normocephalic.    Range of Motion:  Range of motion of shoulders, elbows, wrists is normal bilaterally without active joint synovitis, erythema, joint swelling, crepitus or joint laxity. The left clavicle is looking good.      Sensation: Intact to pinprick and light touch throughout the upper extremities bilaterally.       Strength:  Normal strength testing in shoulder abduction, elbow flexion, elbow extension, wrist extension, forearm supination, forearm pronation, hand intrinsics bilaterally.       Lymph nodes: No cervical, supraclavicular, infraclavicular, or axillary lymphadenopathy palpated.     Vascular: No unusual venous distention.  Radial arterial pulses strong and equal at the wrists bilaterally.      Skin: No unusual rubor, calor, masses or rashes along the skin in either arm.  No significant fibrosity or scarring.  No pain to palpation.  Left arm skin is soft.      Malia Escamilla MD, ABWMS, FACCWS, [facility]  Medical Director Wound Care and Lymphedema  [facility]  [phone]

## 2021-05-31 VITALS — HEIGHT: 68 IN | WEIGHT: 165 LBS | BODY MASS INDEX: 25.01 KG/M2

## 2021-05-31 VITALS — BODY MASS INDEX: 25.01 KG/M2 | WEIGHT: 165 LBS | HEIGHT: 68 IN

## 2021-05-31 NOTE — TELEPHONE ENCOUNTER
Medication Question or Clarification  Who is calling: Patient  What medication are you calling about? (include dose and sig) Myrbetriq 25 mg, one daily  Who prescribed the medication?: Sariah Corbin MD   What is your question/concern?: She [facility].  She stop Myrbetriq while taking the antibiotic.  She has been off the antibiotic for one day.  When should she restart this medication?  Pharmacy: n/a  Okay to leave a detailed message?: Yes  Site CMT - Please call the pharmacy to obtain any additional needed information.

## 2021-05-31 NOTE — TELEPHONE ENCOUNTER
Left message on patient's voicemail with provider's message, asked patient to call back with any other questions.

## 2021-06-01 VITALS — HEIGHT: 68 IN | WEIGHT: 171.2 LBS | BODY MASS INDEX: 25.94 KG/M2

## 2021-06-01 VITALS — WEIGHT: 160 LBS | HEIGHT: 68 IN | BODY MASS INDEX: 24.25 KG/M2

## 2021-06-01 NOTE — TELEPHONE ENCOUNTER
Test Results  Who is calling?:  The patient  Who ordered the test:  Blake Cruz MD  Type of test: Lab  Date of test:  9/20/2019  Where was the test performed:  [facility]  What are your questions/concerns?:  The patient would like the test results.   Okay to leave a detailed message?:  Yes

## 2021-06-01 NOTE — PROGRESS NOTES
Assessment/Plan:        1. Dysuria  Concern with urinary tract infection with symptoms similar to UTI experienced a month ago with positive urine culture    Plan:  - Urinalysis-UC if Indicated  - Culture, Urine   Negative UA and culture  Information sent to the patient  Keep hydrated        Subjective:    Patient ID:   Mayra Ramirez is a 53 y.o. female presenting with bladder symptoms as if having bladder infection, symptoms similar to what she had experienced a month ago for your tract infection and treated with cephalexin.  Looking back at the record she had a positive urine culture with group B strep.        Review of Systems  Allergy: reviewed  General : negative  A complete 5 point review of systems was obtained and is negative other than what is stated in the HPI.       The following patient's history were reviewed and updated as appropriate:   She  has a past medical history of Breast cancer (H) (left), Metastasis to brain (H) (2005), and Osteopenia..      Outpatient Encounter Medications as of 9/20/2019   Medication Sig Dispense Refill     ALOE VERA ORAL Take 2 oz by mouth daily. juice       biotin 5 mg cap Take by mouth.       calcium carbonate-vitamin D3 (CALCIUM 600 + D,3,) 600 mg calcium- 200 unit cap Take by mouth 2 times a day at 6:00 am and 4:00 pm.       clindamycin (CLEOCIN T) 1 % lotion Apply topically 2 (two) times a day.       coenzyme Q10 400 mg cap Take 1 capsule by mouth daily.       fluticasone (FLONASE) 50 mcg/actuation nasal spray        LACTOBACILLUS ACIDOPHILUS (PROBIOTIC ORAL) Take by mouth daily.       magnesium 250 mg Tab Take by mouth.       melatonin 3 mg Tab tablet Take by mouth.       MULTIVITAMIN ORAL Take by mouth.       MYRBETRIQ 25 mg Tb24 ER tablet TAKE 1 TABLET BY MOUTH EVERY DAY 90 tablet 1     omega-3/dha/epa/fish oil (FISH OIL-OMEGA-3 FATTY ACIDS) 300-1,000 mg capsule Take 2 g by mouth daily.       prasterone, DHEA, (DHEA ORAL) Take by mouth.       prasterone, DHEA, (DHEA  ORAL) Take by mouth daily.       ranitidine (ZANTAC) 150 MG tablet Take 150 mg by mouth 2 (two) times a day.       UNABLE TO FIND Med Name: Trancor       UNABLE TO FIND Med Name: Endefin       UNABLE TO FIND Med Name: Perimine       UNABLE TO FIND Med Name: Thyrosol       UNABLE TO FIND Med Name: Bone Builder       UNABLE TO FIND Med Name: Adrenogen       UNABLE TO FIND Med Name: Metallo Clear       vit C/E/zinc/lutein/zeaxanthin (OCUVITE EYE HEALTH ORAL) Take by mouth.       [DISCONTINUED] co-enzyme Q-10 30 mg capsule Take 30 mg by mouth 3 (three) times a day.       [DISCONTINUED] metroNIDAZOLE (METROGEL) 0.75 % vaginal gel Insert 1 application into the vagina at bedtime.       estradiol (ESTRACE) 0.01 % (0.1 mg/gram) vaginal cream        No facility-administered encounter medications on file as of 9/20/2019.          Objective:   /60 (Patient Site: Right Arm, Patient Position: Sitting, Cuff Size: Adult Regular)   Pulse (!) 50   Wt 166 lb (75.3 kg)   SpO2 100%   BMI 25.24 kg/m        Physical Exam  General exam: No apparent distress and well-hydrated  Abdomen: Soft and nontender  Skin: No rash

## 2021-06-02 VITALS — BODY MASS INDEX: 25.54 KG/M2 | WEIGHT: 168 LBS

## 2021-06-02 VITALS — WEIGHT: 164.2 LBS | BODY MASS INDEX: 24.97 KG/M2

## 2021-06-02 VITALS — BODY MASS INDEX: 24.63 KG/M2 | HEIGHT: 68 IN | WEIGHT: 162.5 LBS

## 2021-06-02 VITALS — WEIGHT: 167 LBS | BODY MASS INDEX: 25.31 KG/M2 | HEIGHT: 68 IN

## 2021-06-03 ENCOUNTER — RECORDS - HEALTHEAST (OUTPATIENT)
Dept: ADMINISTRATIVE | Facility: OTHER | Age: 56
End: 2021-06-03

## 2021-06-03 VITALS
SYSTOLIC BLOOD PRESSURE: 100 MMHG | HEART RATE: 50 BPM | BODY MASS INDEX: 25.24 KG/M2 | DIASTOLIC BLOOD PRESSURE: 60 MMHG | OXYGEN SATURATION: 100 % | WEIGHT: 166 LBS

## 2021-06-03 VITALS — BODY MASS INDEX: 24.48 KG/M2 | WEIGHT: 161 LBS

## 2021-06-03 VITALS — BODY MASS INDEX: 25.04 KG/M2 | WEIGHT: 165.2 LBS | HEIGHT: 68 IN

## 2021-06-03 NOTE — TELEPHONE ENCOUNTER
Question following Office Visit  When did you see your provider: today  What is your question: patient asking for a medication for heartburn she can take the morning of surgery on 12/2/19 Patient is very concerned about getting acid reflux. Please advise!  Okay to leave a detailed message: Yes

## 2021-06-03 NOTE — TELEPHONE ENCOUNTER
LM to patient about labs, asked to call clinic for a more in detail message, informed that I will be putting a letter through [employer] and will send through mail.

## 2021-06-03 NOTE — TELEPHONE ENCOUNTER
Please advise her that the anesthesiologist can give her a liquid medication on the morning of her surgery that will help with this concern. She can call her pre-op nurse and ask for this medication to be available but it is very standard and should be available on the day of surgery.

## 2021-06-03 NOTE — TELEPHONE ENCOUNTER
Called and relayed message to patient. She was understanding and will double check with pre op nurse

## 2021-06-03 NOTE — TELEPHONE ENCOUNTER
----- Message from Yessica Mike PA-C sent at 11/26/2019  7:35 AM CST -----  Labs are normal, please call results to the patient or send a letter if not reachable by phone.

## 2021-06-03 NOTE — PROGRESS NOTES
Preoperative Exam    Scheduled Procedure: Cataract Surgery Right Eye   Surgery Date:  12/2/19  Surgery Location: [facility]- Fax # [phone]    Surgeon:      Assessment/Plan:     1. Other age-related cataract of both eyes  Second cataract surgery planned for left eye 2 weeks after first.  - HM1(CBC and Differential); Future  - Basic Metabolic Panel; Future    2. Pre-op exam      Surgical Procedure Risk: Low (reported cardiac risk generally < 1%)  Have you had prior anesthesia?: Yes  Have you or any family members had a previous anesthesia reaction:  No  Do you or any family members have a history of a clotting or bleeding disorder?: No  Cardiac Risk Assessment: no increased risk for major cardiac complications    Pending review of diagnostic evaluation, APPROVAL GIVEN to proceed with proposed procedure, without further diagnostic evaluation.    Please Note:  none    Functional Status: Independent  Patient plans to recover at home with family.     Subjective:      Mayra Ramirez is a 54 y.o. female who presents for a preoperative consultation.  She has a history of breast cancer, was treated in 2006, had recurrence at the base of the cerebella in 2006 and had to have radiation therapy of the brain.  Since then she has some new dizziness and memory difficulties in the last 6 months.  She is following up with neurology and oncology for these concerns, has an MRI that is planned by neurology.  Surgical history is positive for clavicular fracture 4 years ago repaired with stabilizing hardware due to the severity of the fracture.  Prior to that surgery of the brain in 2006 as mentioned previously and double mastectomy in 2006.  ROS: Patient denies fever, chills, sweats, fainting, fatigue, weight change, dizziness, sleep problems, chest pain, palpitations, shortness of breath, wheezing, cough,  sore throat, changes in hearing, ear pain,tinnitus,  disphagia, sore throat, globus, changes in  vision, eye pain eye redness, acid reflux, nausea, vomiting, diarrhea, constipation, black or bloody stools,  Dysuria, frequency, urinary incontinence, nocturia, hematuria, back pain,joint pain, bone pain, muscle cramps,edema, weakness, numbness, tingling of extremities, rash, itching, skin changes, swollen lymph nodes, thirst, increased urination, breast lumps, breast pain, nipple discharge, memory difficulties, anxiety, mood swings, (female)vaginal discharge, dyspareunia, menorrhagia, pelvic pain, sexual dysfunction,   (male) testicular lumps, erectile dysfunction.      All other systems reviewed and are negative, other than those listed in the HPI.    Pertinent History  Do you have difficulty breathing or chest pain after walking up a flight of stairs: No  History of obstructive sleep apnea: No  Steroid use in the last 6 months: No  Frequent Aspirin/NSAID use: No  Prior Blood Transfusion: No  Prior Blood Transfusion Reaction: No  If for some reason prior to, during or after the procedure, if it is medically indicated, would you be willing to have a blood transfusion?:  There is no transfusion refusal.    Current Outpatient Medications   Medication Sig Dispense Refill     ALOE VERA ORAL Take 2 oz by mouth daily. juice       biotin 5 mg cap Take by mouth.       calcium carbonate-vitamin D3 (CALCIUM 600 + D,3,) 600 mg calcium- 200 unit cap Take by mouth 2 times a day at 6:00 am and 4:00 pm.       clindamycin (CLEOCIN T) 1 % lotion Apply topically 2 (two) times a day.       coenzyme Q10 400 mg cap Take 1 capsule by mouth daily.       estradiol (ESTRACE) 0.01 % (0.1 mg/gram) vaginal cream        fluticasone (FLONASE) 50 mcg/actuation nasal spray        LACTOBACILLUS ACIDOPHILUS (PROBIOTIC ORAL) Take by mouth daily.       magnesium 250 mg Tab Take by mouth.       melatonin 3 mg Tab tablet Take by mouth.       MULTIVITAMIN ORAL Take by mouth.       MYRBETRIQ 25 mg Tb24 ER tablet TAKE 1 TABLET BY MOUTH EVERY DAY 90  tablet 1     omega-3/dha/epa/fish oil (FISH OIL-OMEGA-3 FATTY ACIDS) 300-1,000 mg capsule Take 2 g by mouth daily.       prasterone, DHEA, (DHEA ORAL) Take by mouth.       prasterone, DHEA, (DHEA ORAL) Take by mouth daily.       ranitidine (ZANTAC) 150 MG tablet Take 150 mg by mouth 2 (two) times a day.       UNABLE TO FIND Med Name: Trancor       UNABLE TO FIND Med Name: Endefin       UNABLE TO FIND Med Name: Perimine       UNABLE TO FIND Med Name: Thyrosol       UNABLE TO FIND Med Name: Bone Builder       UNABLE TO FIND Med Name: Adrenogen       UNABLE TO FIND Med Name: Metallo Clear       vit C/E/zinc/lutein/zeaxanthin (OCUVITE EYE HEALTH ORAL) Take by mouth.       No current facility-administered medications for this visit.         Allergies   Allergen Reactions     Sulfa (Sulfonamide Antibiotics) Hives     Sulfamethoxazole-Trimethoprim      Sulfasalazine Hives       Patient Active Problem List   Diagnosis     Osteopenia     Dizziness     Breast cancer metastasized to brain, unspecified laterality (H)     Walk Is Wobbly Or Unsteady (Ataxia)     History of breast cancer     Hyperlipidemia     Left-sided chest wall pain     Scar condition and fibrosis of skin     Contracture, left shoulder     Post-mastectomy lymphedema syndrome     Atrophic vaginitis     Encounter for screening for cardiovascular disorders     Fracture of clavicle with nonunion     Gastroesophageal reflux disease     Hypothyroidism     Poor short-term memory     Balance problem       Past Medical History:   Diagnosis Date     Breast cancer (H) left    2004 - HER2 pos, ER+, ID-     Metastasis to brain (H) 2005     Osteopenia     2/2 breast CA tx.       Past Surgical History:   Procedure Laterality Date     BRAIN SURGERY  2005    removal of cerebellar tumor     MASTECTOMY MODIFIED RADICAL Left      ORIF CLAVICLE FRACTURE  12/2016    plate and screws     ID REMOVAL OF OVARY(S)      Description: Oophorectomy;  Recorded: 10/29/2013;     SIMPLE  "MASTECTOMY Right      TONSILLECTOMY         Social History     Socioeconomic History     Marital status:      Spouse name: Not on file     Number of children: Not on file     Years of education: Not on file     Highest education level: Not on file   Occupational History     Occupation:      Employer: [employer]     Comment: medical leave 4/2017-?   Social Needs     Financial resource strain: Not on file     Food insecurity:     Worry: Not on file     Inability: Not on file     Transportation needs:     Medical: Not on file     Non-medical: Not on file   Tobacco Use     Smoking status: Never Smoker     Smokeless tobacco: Never Used   Substance and Sexual Activity     Alcohol use: No     Drug use: No     Sexual activity: Yes     Partners: Male   Lifestyle     Physical activity:     Days per week: Not on file     Minutes per session: Not on file     Stress: Not on file   Relationships     Social connections:     Talks on phone: Not on file     Gets together: Not on file     Attends Yazdanism service: Not on file     Active member of club or organization: Not on file     Attends meetings of clubs or organizations: Not on file     Relationship status: Not on file     Intimate partner violence:     Fear of current or ex partner: Not on file     Emotionally abused: Not on file     Physically abused: Not on file     Forced sexual activity: Not on file   Other Topics Concern     Not on file   Social History Narrative    .  4 kids.  Retired .         Patient Care Team:  Sariah Corbin MD as PCP - General (Family Medicine)  Sariah Corbin MD as Assigned PCP          Objective:     Vitals:    11/25/19 1043   BP: (!) 88/58   Weight: 168 lb (76.2 kg)   Height: 5' 8\" (1.727 m)         Physical Exam:  Alert, cooperative, well-hydrated.  Appears well.  Eyes: Pupils equal, round, reactive to light.  HEENT: Sclera white, nares patent, MMM   Lungs: Clear to " auscultation. No retractions, no increased work of respiration, equal chest rise.   Heart: Regular rate and rhythm, no murmurs, clicks,    Gallops.  Abdomen: Soft, bowel sounds in 4 quadrants with no tenderness to palpation, no organomegaly or masses, no aortic or renal bruits.  Extremities: no tenderness to palpation of gastrocnemius, bilaterally.  Skin: no increased warmth, edema, or erythema of lower legs bilaterally.  Back:  No cervical, thoracic or lumbar tenderness to spinous processes or musculature.    Neuro: pupils equal and reactive to light bilaterally, CN II - XII  intact. No focal motor/sensory deficits.Rhomberg negative. M/S 5/5 all extremities. DTR 2/4 all extremities      There are no Patient Instructions on file for this visit.  Labs:  No results found for this or any previous visit (from the past 24 hour(s)).    Immunization History   Administered Date(s) Administered     Hep A, Adult IM (19yr & older) 09/13/2007     Influenza,seasonal quad, PF 10/29/2013     Influenza,seasonal quad, PF, =/> 6months 12/22/2015     Influenza,seasonal,quad inj =/> 6months 02/12/2018     Td, Adult, Absorbed 10/01/2010     Tdap 09/13/2007, 12/16/2013     Typhoid, Inj, Inactive 09/13/2007     ZOSTER, RECOMBINANT, IM 11/06/2018           Electronically signed by Yessica Mike PA-C 11/25/19 10:45 AM

## 2021-06-03 NOTE — TELEPHONE ENCOUNTER
New Appointment Needed  What is the reason for the visit:    Pre-Op Appt Request  When is the surgery? :  12/2  Where is the surgery?:   [facility]  Who is the surgeon? :  Dr. Joseph  What type of surgery is being done?: Catarct  Provider Preference: Any available  How soon do you need to be seen?: before 12/2  Waitlist offered?: No  Okay to leave a detailed message:  Yes

## 2021-06-04 ENCOUNTER — RECORDS - HEALTHEAST (OUTPATIENT)
Dept: ADMINISTRATIVE | Facility: OTHER | Age: 56
End: 2021-06-04

## 2021-06-04 VITALS
SYSTOLIC BLOOD PRESSURE: 102 MMHG | HEART RATE: 60 BPM | RESPIRATION RATE: 18 BRPM | DIASTOLIC BLOOD PRESSURE: 64 MMHG | BODY MASS INDEX: 24.94 KG/M2 | TEMPERATURE: 98.3 F | WEIGHT: 164 LBS

## 2021-06-04 VITALS — BODY MASS INDEX: 21.9 KG/M2 | WEIGHT: 144 LBS

## 2021-06-04 VITALS
BODY MASS INDEX: 25.46 KG/M2 | HEIGHT: 68 IN | SYSTOLIC BLOOD PRESSURE: 88 MMHG | WEIGHT: 168 LBS | DIASTOLIC BLOOD PRESSURE: 58 MMHG

## 2021-06-05 VITALS
SYSTOLIC BLOOD PRESSURE: 106 MMHG | TEMPERATURE: 98.1 F | OXYGEN SATURATION: 100 % | HEART RATE: 67 BPM | BODY MASS INDEX: 25.38 KG/M2 | RESPIRATION RATE: 12 BRPM | WEIGHT: 166.9 LBS | DIASTOLIC BLOOD PRESSURE: 69 MMHG

## 2021-06-05 VITALS
HEART RATE: 60 BPM | SYSTOLIC BLOOD PRESSURE: 100 MMHG | BODY MASS INDEX: 25.91 KG/M2 | TEMPERATURE: 97.1 F | WEIGHT: 171 LBS | DIASTOLIC BLOOD PRESSURE: 70 MMHG | RESPIRATION RATE: 20 BRPM | HEIGHT: 68 IN

## 2021-06-05 NOTE — TELEPHONE ENCOUNTER
New Appointment Needed  What is the reason for the visit:    Same Date/Next Day Appt Request  What is the reason for your visit?: Same day Lab, patient moved from 11:20am to 11:50am    Provider Preference: Lab  How soon do you need to be seen?: today  Waitlist offered?: No  Okay to leave a detailed message:  Yes, not needed.

## 2021-06-05 NOTE — TELEPHONE ENCOUNTER
Upcoming Appointment Question  When is the appointment: Today  What is your appointment for?: Lab-Lipid panel ordered by ob-gyn Dr. Bryson Enriquez   Who is your appointment scheduled with?: [facility]  What is your question/concern?: Patient will have her OB-GYN Dr. Bryson Enriquez office in [facility] fax the order to [place] Fax# [phone] Attn: Labs/Dr. Sariah Corbin  Okay to leave a detailed message?: No return call needed
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
## 2021-06-08 NOTE — PROGRESS NOTES
ASSESSMENT:  1. Abrasion of right elbow, subsequent encounter    As she continues to have pain and a bit of swelling there, I think it would be ho at this point to get an x-ray done for her, so we put in a future order for an x-ray that she can do at [place] at the [facility] there, and we can follow-up with her on results when they become available.  If it is negative I will just reassure her that she should continue with the current cares that she has been doing.  If there is something there we will address that according to the results.      - XR Elbow Right 2 VWS; Future  - XR Elbow Right 2 VWS          Preventive Health Care:      PLAN:  There are no Patient Instructions on file for this visit.    Orders Placed This Encounter   Procedures     XR Elbow Right 2 VWS     Standing Status:   Future     Number of Occurrences:   1     Standing Expiration Date:   6/16/2021     Order Specific Question:   Reason for Exam (Describe Symptoms):     Answer:   Right elbow pain after a fall     Order Specific Question:   Is the patient pregnant?     Answer:   No     Order Specific Question:   Can the procedure be changed per Radiologist protocol?     Answer:   Yes     No follow-ups on file.    CHIEF COMPLAINT:  Chief Complaint   Patient presents with     Follow-up     F/U with provider re: Injury from a fall Rt elbow, Part that is bleeding is healing, but part of bone that is extremely tender to the touch       HISTORY OF PRESENT ILLNESS:  Mayra is a 54 y.o. female calling in to the clinic today for a follow-up for her injury.  Please see last note from there where we had a video visit about a abrasion that she had on her elbow from a fall.  She fell forward and hit her right elbow and she opened up a small laceration which has already healed nicely.  However today, she is more concerned about some swelling and bruising that she has around the olecranon area.  She has a bit of pain when fully extending  "her right elbow and she is just concerned about it and wonders if she needs an x-ray.  The area that was open has healed up nicely without any concern.    REVIEW OF SYSTEMS:     All other systems are negative.    PFSH:      TOBACCO USE:  Social History     Tobacco Use   Smoking Status Never Smoker   Smokeless Tobacco Never Used           PHYSICAL EXAM:  (observations via Video)    The elbow is a bit bruised at that olecranon area.  The abrasion seems to be healed up nicely.  There is a bit of bruising there.  She does have just a bit of limitation to full extension but she does have full flexion it seems that supination and pronation are acceptable.          Video Start time:  1440  Video End time:  1455    The visit lasted a total of 15 minutes face to face    CA intake time  10 minutes      The patient has been notified of following:     \"This video visit will be conducted via a call between you and your physician/provider. We have found that certain health care needs can be provided without the need for an in-person physical exam.  This service lets us provide the care you need with a video conversation.  If a prescription is necessary we can send it directly to your pharmacy.  If lab work is needed we can place an order for that and you can then stop by our lab to have the test done at a later time.    Video visits are billed at different rates depending on your insurance coverage. Please reach out to your insurance provider with any questions.    If during the course of the call the physician/provider feels a video visit is not appropriate, you will not be charged for this service.\"    Patient has given verbal consent to a Video visit? Yes    Patient would like to receive their AVS by AVS Preference: Samantha.        Video-Visit Details    Type of service:  Video Visit    Originating Location (pt. Location): Home    Distant Location (provider location):  [facility]     Mode of Communication:  " Video Conference via [facility]    Quentin Medeiros MD

## 2021-06-08 NOTE — TELEPHONE ENCOUNTER
"Pt calling  Yesterday out walking &  Tripped over bike  Fell on elbow  Elbow is swollen, bruised, there is a \"cut\" that daughter states looks \"deep\"   Pt states she has full ROM  Has not taken any OTC for pain relief  Pt states it bled 'a lot\" yesterday,that has resolved  Has cleaned wound well, dressed with antibiotic ointment & Band-Aid  Per protocol pt to be evaluated today  Warm transfer to  for appointment.   pt agrees with plan  Reviewed care advise  Afua Rosenthal RN  [place] Nurse Advisor    Reason for Disposition    Patient wants to be seen    Additional Information    Negative: Major bleeding (actively dripping or spurting) that can't be stopped    Negative: Amputation or bone sticking through the skin    Negative: Serious injury with multiple fractures    Negative: Bullet, stabbed by knife or other serious penetrating wound    Negative: Sounds like a life-threatening emergency to the triager    Negative: Wound looks infected    Negative: Shoulder injury    Negative: Hand or wrist injury    Negative: Looks like a broken bone or dislocated joint (crooked or deformed)    Negative: Can't bend injured elbow at all    Negative: Skin is split open or gaping (length > 1/2 inch or 12 mm)    Negative: Bleeding won't stop after 10 minutes of direct pressure (using correct technique)    Negative: Dirt in the wound and not removed after 15 minutes of scrubbing    Negative: Numbness (loss of sensation) of finger(s), present now    Negative: Sounds like a serious injury to the triager    Negative: No prior tetanus shots (or is not fully vaccinated) and any wound (e.g., cut or scrape)    Negative: Large swelling or bruise and size > palm of person's hand    Negative: Can't move injured elbow normally (i.e., bend or straighten completely)    Negative: SEVERE pain (e.g., excruciating)    Protocols used: ELBOW INJURY-A-OH      "

## 2021-06-09 ENCOUNTER — COMMUNICATION - HEALTHEAST (OUTPATIENT)
Dept: OTHER | Facility: CLINIC | Age: 56
End: 2021-06-09

## 2021-06-09 NOTE — PROGRESS NOTES
"Mayra Ramirez is a 54 y.o. female who is being evaluated via a billable video visit.      The patient has been notified of following:     \"This video visit will be conducted via a call between you and your physician/provider. We have found that certain health care needs can be provided without the need for an in-person physical exam.  This service lets us provide the care you need with a video conversation.  If a prescription is necessary we can send it directly to your pharmacy.  If lab work is needed we can place an order for that and you can then stop by our lab to have the test done at a later time.    Video visits are billed at different rates depending on your insurance coverage. Please reach out to your insurance provider with any questions.    If during the course of the call the physician/provider feels a video visit is not appropriate, you will not be charged for this service.\"    Patient has given verbal consent to a Video visit? Yes  How would you like to obtain your AVS? AVS Preference: MyChart.  If dropped by the video visit, the video invitation should be sent to: Text to cell phone: [phone]  Will anyone else be joining your video visit? No        Video Start Time: 11:06 AM    Family Medicine Office Visit  [facility]  Patient Name: Mayra Ramirez  Patient Age: 54 y.o.  YOB: 1965  MRN: [id number]    Date of Visit: 2020  Reason for Office Visit:   Chief Complaint   Patient presents with     Urinary Tract Infection     strong ordor- adominal pain after voiding x2 weeks            Assessment / Plan / Medical Decision Makin. Dysuria  Called in antibiotic to take and call if any questions or concerns.  - Urinalysis-UC if Indicated; Future  - ciprofloxacin HCl (CIPRO) 500 MG tablet; Take 1 tablet (500 mg total) by mouth 2 (two) times a day for 10 days.  Dispense: 20 tablet; Refill: 0        Health Maintenance Review  Health Maintenance "   Topic Date Due     HEPATITIS C SCREENING  1965     HIV SCREENING  10/10/1979     ADVANCE CARE PLANNING  10/29/2018     ZOSTER VACCINES (2 of 2) 01/01/2019     MEDICARE ANNUAL WELLNESS VISIT  02/12/2019     PAP SMEAR  09/12/2019     INFLUENZA VACCINE RULE BASED (1) 08/01/2020     COLORECTAL CANCER SCREENING  04/07/2021     TD 18+ HE  12/16/2023     LIPID  01/15/2025     TDAP ADULT ONE TIME DOSE  Completed     HEPATITIS B VACCINES  Aged Out         I have discontinued Mayra Ramirez's biotin. I am also having her start on ciprofloxacin HCl. Additionally, I am having her maintain her calcium carbonate-vitamin D3, MULTIVITAMIN ORAL, magnesium, LACTOBACILLUS ACIDOPHILUS (PROBIOTIC ORAL), melatonin, fluticasone propionate, UNABLE TO FIND, UNABLE TO FIND, UNABLE TO FIND, UNABLE TO FIND, UNABLE TO FIND, UNABLE TO FIND, Myrbetriq, ALOE VERA ORAL, coenzyme Q10, NON FORMULARY, and omeprazole.      HPI:  Mayra Ramirez is a 54 y.o. year old who presents for a video visit today for symptoms of dysuria.  No significant pain with urination but pain around the time of urination with some abdominal pain in the suprapubic region.  Change in odor, foaming, and having to urinate more frequently - every hour.  No fevers, chills, no blood and no back pain       Review of Systems- pertinent positive in bold:  Constitutional: Fever, chills, night sweats, fainting, weight change, fatigue, seizures, dizziness, sleeping difficulties, loud snoring/pauses in breathing  Eyes: change in vision, blurred or double vision, redness/eye pain  Ears, nose, mouth, throat: change in hearing, ear pain, hoarseness, difficulty swallowing, sores in the mouth or throat  Respiratory: shortness of breath, cough, bloody sputum, wheezing  Cardiovascular: chest pain, palpitations   Gastrointestinal: abdominal pain, heartburn/indigestion, nausea/vomiting, change in appetite, change in bowel habits, constipation or diarrhea, rectal bleeding/dark stools,  difficulty swallowing  Urinary: painful urination, frequent urination, urinary urgency/incontinence, blood in urine/dark urine, nocturia  Musculoskeletal: backache/back pain (new or increasing), weakness, joint pain/stiffness (new or increasing), muscle cramps, swelling of hands, feet, ankles, leg pain/redness  Skin: change in moles/freckles, rash, nodules  Hematologic/lymphatic: swollen lymph glands, abnormal bruising/bleeding  Endocrine: excessive thirst/urination, cold or heat intolerance  Neurologic/emotional: worrisome memory change, numbness/tingling, anxiety, mood swings      Current Scheduled Meds:  Outpatient Encounter Medications as of 7/22/2020   Medication Sig Dispense Refill     ALOE VERA ORAL Take 2 oz by mouth daily. juice       calcium carbonate-vitamin D3 (CALCIUM 600 + D,3,) 600 mg calcium- 200 unit cap Take by mouth 2 times a day at 6:00 am and 4:00 pm.       coenzyme Q10 400 mg cap Take 1 capsule by mouth daily.       fluticasone (FLONASE) 50 mcg/actuation nasal spray        LACTOBACILLUS ACIDOPHILUS (PROBIOTIC ORAL) Take by mouth daily.       magnesium 250 mg Tab Take by mouth.       melatonin 3 mg Tab tablet Take by mouth.       MULTIVITAMIN ORAL Take by mouth.       MYRBETRIQ 25 mg Tb24 ER tablet TAKE 1 TABLET BY MOUTH EVERY DAY 90 tablet 1     NON FORMULARY Eye Pro - Daily eye supplement       omeprazole (PRILOSEC) 40 MG capsule TAKE 1 CAPSULE BY MOUTH EVERY DAY BEFORE A MEAL       UNABLE TO FIND Med Name: Trancor       UNABLE TO FIND Med Name: Endefin       UNABLE TO FIND Med Name: Perimine       UNABLE TO FIND Med Name: Thyrosol       UNABLE TO FIND Med Name: Bone Builder       UNABLE TO FIND Med Name: Adrenogen       ciprofloxacin HCl (CIPRO) 500 MG tablet Take 1 tablet (500 mg total) by mouth 2 (two) times a day for 10 days. 20 tablet 0     [DISCONTINUED] biotin 5 mg cap Take by mouth.       No facility-administered encounter medications on file as of 7/22/2020.      Past Medical History:    Diagnosis Date     Breast cancer (H) left    2004 - HER2 pos, ER+, IL-     Fracture of clavicle with nonunion 6/4/2018    Overview:  Added automatically from request for surgery [id number]     Metastasis to brain (H) 2005     Osteopenia     2/2 breast CA tx.     Past Surgical History:   Procedure Laterality Date     BRAIN SURGERY  2005    removal of cerebellar tumor     MASTECTOMY MODIFIED RADICAL Left      ORIF CLAVICLE FRACTURE  12/2016    plate and screws     [place] REMOVAL OF OVARY(S)      Description: Oophorectomy;  Recorded: 10/29/2013;     SIMPLE MASTECTOMY Right      TONSILLECTOMY       Social History     Tobacco Use     Smoking status: Never Smoker     Smokeless tobacco: Never Used   Substance Use Topics     Alcohol use: No     Drug use: No       Objective / Physical Examination:  There were no vitals filed for this visit.  Wt Readings from Last 3 Encounters:   06/09/20 144 lb (65.3 kg)   11/25/19 168 lb (76.2 kg)   09/20/19 166 lb (75.3 kg)     BP Readings from Last 3 Encounters:   11/25/19 (!) 88/58   09/20/19 100/60   08/02/19 104/69     There is no height or weight on file to calculate BMI.   Results for orders placed or performed in visit on 01/15/20   Lipid Cascade   Result Value Ref Range    Cholesterol 188 <=199 mg/dL    Triglycerides 105 <=149 mg/dL    HDL Cholesterol 50 >=50 mg/dL    LDL Calculated 117 <=129 mg/dL    Patient Fasting > 8hrs? Yes          GENERAL: Healthy, alert and no distress  EYES: Eyes grossly normal to inspection. No discharge or erythema, or obvious scleral/conjunctival abnormalities.  RESP: No audible wheeze, cough, or visible cyanosis.  No visible retractions or increased work of breathing.    NEURO: Cranial nerves grossly intact. Mentation and speech appropriate for age.  PSYCH: Mentation appears normal, affect normal/bright, judgement and insight intact, normal speech and appearance well-groomed        Orders Placed This Encounter   Procedures     Urinalysis-UC if Indicated    Followup: No follow-ups on file. earlier if needed.    Total time spent with patient was 15 min with >50% of time spent in face-to-face counseling regarding the above plan       Sariah Corbin MD        Video-Visit Details    Type of service:  Video Visit    Video End Time (time video stopped): 11:15  Originating Location (pt. Location): Home    Distant Location (provider location):  [facility]     Platform used for Video Visit: Basil Corbin MD

## 2021-06-09 NOTE — PROGRESS NOTES
Result is/are normal.  No growth but due to symptoms recommend continue antibiotic.  If no improvement, my chart or call the office.

## 2021-06-10 ENCOUNTER — AMBULATORY - HEALTHEAST (OUTPATIENT)
Dept: OTHER | Facility: CLINIC | Age: 56
End: 2021-06-10

## 2021-06-10 NOTE — TELEPHONE ENCOUNTER
Jerardo spoke with aMyra today regarding her NS. (NS #1) Pt forgot about the appt. Pt RS'd for tomorrow 8/26.  Lissette Swain PTA,CLT

## 2021-06-10 NOTE — PROGRESS NOTES
[facility] Certification Request    August 24, 2020      Patient: Mayra Ramirez  MR Number: [id number]  YOB: 1965  Date of Visit: 8/24/2020      Dear Malia Santos, *:    Thank you for this referral.   We are seeing Mayra Ramirez for Physical Therapy of left shoulder chest wall contracture.    Medicare and/or Medicaid requires physician review and approval of the treatment plan. Please review the plan of care and verify that you agree with the therapy plan of care by co-signing this note.      Plan of Care  Authorization / Certification Start Date: 08/24/20  Authorization / Certification End Date: 09/23/20  Authorization / Certification Number of Visits: 6-10  Communication with: Referral Source  Patient Related Instruction: Nature of Condition;Treatment plan and rationale;Self Care instruction;Basis of treatment;Body mechanics;Posture;Precautions;Next steps;Expected outcome  Times per Week: 2-3  Number of Weeks: 4  Number of Visits: 6-10  Discharge Planning: patient will be discharge to self care when goals met.  Precautions / Restrictions : left clavicle fracture and surgery   Therapeutic Exercise: ROM;Stretching;Strengthening;Lymphedema  Manual Therapy: soft tissue mobilization;myofascial release;lymphatic drainage massage      Goals:  Pt. will demonstrate/verbalize independence in self-management of condition in : 4 weeks  Pt. will be independent with home exercise program in : 4 weeks    Patient will have a decreased volume in : left;UE;by 5%;to decrease risk of infection;for better fit of clothing;for improved body image;for ease of movement  Patient will have decreased fibrosis, scar tissue for improved lymphatic mobilitiy : in 4 weeks  Patient will perform, verbalize self-management of: skin care;self-monitoring;exercise;massage;in 4 weeks        If you have any questions or concerns, please don't hesitate to call.    Sincerely,      Sandra Menchaca,  PT, CLT-SAI        Physician recommendation:     __X_ Follow therapist's recommendation        ___ Modify therapy      *Physician co-signature indicates they certify the need for these services furnished within this plan and while under their care.      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 Contracture, left shoulder  M24.512    2. Post-mastectomy lymphedema syndrome  I97.2    3. Scar condition and fibrosis of skin  L90.5        Assessment:     Mayra Ramirez is a 54 y.o. female who presents to therapy today with chief complaints of left arm swelling and left chest wall fullness. Onset date of sx was 2 months ago.  Pt reported h/o previous episodes of lymphedema. Previous treatments have included CDT.patient has history of breast cancer with bilateral mastectomies, Left LN removed, 2005 brain metastasis Swelling does not improves with elevation. Pain symptoms are 0-3/10.  Functional impairments include reaching.  Pt demo's signs and sx consistent with left upper extremity lymphedema, left chest wall scar fibrosis.   Lymphedema Assessment 2/12/2019 8/24/2020   Right Upper Extremity Total Estimated Volume (cm3) 2185.33 2110.95   Right UE change of volume from last visit (%) - -3.4   Left Upper Extremity Total Estimated Volume (cm3) 2199.05 2191.33   Left UE change of volume from last visit (%) - -0.35   Swelling Description Left>Right Left>Right   Upper Extremity Swelling Comparison (%) 0.62 3.67        Impairments in  pain,  "posture, joint mobility, integumentary integrity, swelling  Prognosis to achieve goals is  good      Pt. would be a good candidate for edema management and to develop a home management program.    Diagnoses and all orders for this visit:    Contracture, left shoulder    Post-mastectomy lymphedema syndrome    Scar condition and fibrosis of skin         Goals:   Pt. will demonstrate/verbalize independence in self-management of condition in : 4 weeks  Pt. will be independent with home exercise program in : 4 weeks    Patient will have a decreased volume in : left;UE;by 5%;to decrease risk of infection;for better fit of clothing;for improved body image;for ease of movement  Patient will have decreased fibrosis, scar tissue for improved lymphatic mobilitiy : in 4 weeks  Patient will perform, verbalize self-management of: skin care;self-monitoring;exercise;massage;in 4 weeks      Patient's goal:\"reduce lymphedema\".    Goals and plan of care were set in collaboration with the patient.    Patient's expectations/goals are realistic.    Barriers to Learning or Achieving Goals:  Chronicity of the problem.    Lymphedema Category:  V - Stage 2 with Low Functional Demand       Plan / Patient Instructions:      Plan of Care:   Authorization / Certification Start Date: 08/24/20  Authorization / Certification End Date: 09/23/20  Authorization / Certification Number of Visits: 6-10  Communication with: Referral Source  Patient Related Instruction: Nature of Condition;Treatment plan and rationale;Self Care instruction;Basis of treatment;Body mechanics;Posture;Precautions;Next steps;Expected outcome  Times per Week: 2-3  Number of Weeks: 4  Number of Visits: 6-10  Discharge Planning: patient will be discharge to self care when goals met.  Precautions / Restrictions : left clavicle fracture and surgery   Therapeutic Exercise: ROM;Stretching;Strengthening;Lymphedema  Manual Therapy: soft tissue mobilization;myofascial " release;lymphatic drainage massage    Treatment techniques, plan of care, and goals were discussed with the patient. The patient agrees to the plan as outlined. The plan of care is dynamic and will be modified on an ongoing basis.  Plan for next visit: MFR, MLCHARLENE, therapeutic exercises, upgrade HEP.     Subjective:         Social information:   Living Situation:single family home   Occupation:retired   Work Status:NA   Equipment Available: None and compression sleeve.    History of Present Illness:    Mayra is a 54 y.o. female who presents to therapy today with complaints of left arm swelling and chest wall edema. Date of onset/duration of symptoms is 2 months ago. Onset was gradual. Symptoms are intermittent and not improving. She reports  an episodic  history of similar symptoms. She describes their previous level of function as not limited    Pain Ratin  Pain rating at best: 0  Pain rating at worst: 3  Pain description: aching    Functional limitations are described as occurring with:   reaching overhead    Patient reports benefit from:  movement or exercise , physical therapy       Objective:      Patient Outcome Measures :    Lymphedema Life Impact Score (%): 7     Scores range from %, where a score of 20% represents the least impact on the individual's life (minimal physical, psychosocial and functional concerns).     Upper Extremity Lymphedema  2019   cm to wrist (cm) 8 8   cm to Tip of 3rd Finger 10 10   R Thumb (cm) 7.1 7.5   R Index (cm) 6.6 6.5   R Middle (cm) 6.2 6.3   R Ring (cm) 6.1 6.2   R Little (cm) 5.5 5.4   R --cm from 3rd fingertip 19.5 19   R --cm to smallest wrist 16.9 16.9   R 8cm Proximal to Wrist (cm) 18.6 18   R 16cm Proximal to Wrist (cm) 25.3 24   R 24cm Proximal to Wrist (cm) 26 26   R 32cm Proximal to Wrist (cm) 27.6 27.8   R 40cm Proximal to Wrist (cm) 33.1 32   Right Upper Extremity Total Estimated Volume (cm3) 2185.33 2110.95   Right UE change of volume from  last visit (%) - -3.4   L Thumb (cm) 6.1 6.1   L Index (cm) 6.2 6.2   L Middle (cm) 6.1 6.1   L Ring (cm) 5.8 6.2   L Little (cm) 5.2 5   L --cm from 3rd fingertip (cm) 19 19   L --cm to smallest wrist (cm) 16.5 16.4   L 8cm Proximal to Wrist (cm) 18.5 18.8   L 16cm Proximal to Wrist (cm) 24.2 24.5   L 24cm Proximal to Wrist (cm) 26 25.8   L 32cm Proximal to Wrist (cm) 29.3 29   L 40cm Proximal to Wrist (cm) 33.2 33   Left Upper Extremity Total Estimated Volume (cm3) 2199.05 2191.33   Left UE change of volume from last visit (%) - -0.35   Swelling Description Left>Right Left>Right   Upper Extremity Swelling Comparison (%) 0.62 3.67            Degree of swelling: Minimal < 10%    Areas of most significant swelling: left chest wall.        Examination  1. Contracture, left shoulder     2. Post-mastectomy lymphedema syndrome     3. Scar condition and fibrosis of skin       Precautions/Restrictions: None  Posture Observation:      General sitting posture is  fair.  General standing posture is fair.    Past Surgical History:   Procedure Laterality Date     BRAIN SURGERY  2005    removal of cerebellar tumor     MASTECTOMY MODIFIED RADICAL Left      ORIF CLAVICLE FRACTURE  12/2016    plate and screws     [place] REMOVAL OF OVARY(S)      Description: Oophorectomy;  Recorded: 10/29/2013;     SIMPLE MASTECTOMY Right      TONSILLECTOMY         Treatments: Chemotherapy  Precautions/Restrictions: left chest wall metal implant.  Involved area: Left Arm  Left Chest  Edema: 1+ and Minimal  Induration: No  Fibrosis: mild  Temperature: Normal  Sensation: Hyposensitive  Skin color: Normal  Skin texture: Dry  Scars: Location: bilateral chest wall.  Size: 8 cm each.  Wounds: Absent  Muscle tone: Normal  Gait: independent.        Treatment Today     TREATMENT MINUTES COMMENTS   Evaluation 30 low   Self-care/ Home management     Manual therapy 25 MFR /MLD left chest wall, elongation, tissue bending, scar release.   Neuromuscular Re-education      Therapeutic Activity     Therapeutic Exercises     Gait training 5 Exercises:  Exercise #1: supine pectoral stretch, x 5 , home program  Comment #1: bilateral shoulders rolls.x 5   home program       Modality__________________                Total 60    Blank areas are intentional and mean the treatment did not include these items.       PT Evaluation Code: (Please list factors)  Patient History/Co morbidities: breast cancer.  Examination: left chest wall and arm lymphedema.  Clinical Presentation: stable.  Clinical Decision Making: low    Patient History/  Co morbidities Examination  (body structures and functions, activity limitations, and/or participation restrictions) Clinical Presentation Clinical Decision Making (Complexity)   No documented Co morbidities or personal factors 1-2 Elements Stable and/or uncomplicated Low   1-2 documented co morbidities or personal factor 3 Elements Evolving clinical presentation with changing characteristics Moderate   3-4 documented co morbidities or personal factors 4 or more Unstable and unpredictable High            NORY May-SAI  8/24/2020  10:02 AM

## 2021-06-10 NOTE — PROGRESS NOTES
[facility] Daily Progress     Patient Name: Mayra Ramirez  Date: 2020  Visit #: 2  PTA visit #:  1  Referral Diagnosis: [unfilled]  Referring provider: Malia Escamilla, *  Visit Diagnosis:     ICD-10-CM    1. Contracture, left shoulder  M24.512    2. Post-mastectomy lymphedema syndrome  I97.2    3. Scar condition and fibrosis of skin  L90.5    4. Left-sided chest wall pain  R07.89    5. Decreased ROM of left shoulder  M25.612    6. Localized edema  R60.0    7. History of breast cancer  Z85.3          Assessment:   Pt was seen today for her first follow up appointment.  Pt demonstrates normal L shoulder ROM. She does c/o stiffness in the axilla area.   Mild cording noted L axilla area.  Patient is benefitting from skilled physical therapy and is making steady progress toward functional goals.  Patient is appropriate to continue with skilled physical therapy intervention, as indicated by initial plan of care.    Goal Status:  Pt. will demonstrate/verbalize independence in self-management of condition in : 4 weeks  Pt. will be independent with home exercise program in : 4 weeks    Patient will have a decreased volume in : left;UE;by 5%;to decrease risk of infection;for better fit of clothing;for improved body image;for ease of movement  Patient will have decreased fibrosis, scar tissue for improved lymphatic mobilitiy : in 4 weeks  Patient will perform, verbalize self-management of: skin care;self-monitoring;exercise;massage;in 4 weeks      Plan / Patient Education:     Continue with initial plan of care.  Progress with home program as tolerated.   Continue with MFR and MLD.     Subjective:   Pt reports her her L shoulder is sore and stiff. Pt reports stiffness under her arm. Sometimes it itches.  Pt has been compliant with her HEP. Pt states she walks 30' each evening.  Pt reports she does have a compression garment which she does not use.  Pain Ratin        Objective:   L shoulder  "ROM:  Flexion: 155 degrees  AB: WNL\"  Crunchy at the top.\"  ER: 75 degrees  IR: thumb to T5      Treatment Today     TREATMENT MINUTES COMMENTS   Evaluation     Self-care/ Home management     Manual therapy 53 MFR/MLD L chest wall, L UE, tissue bending and scar release   Neuromuscular Re-education     Therapeutic Activity     Therapeutic Exercises 5 Verbal review of HEP   Gait training     Modality__________________                Total 58    Blank areas are intentional and mean the treatment did not include these items.       Nithya Santos,CLT  8/27/2020    "

## 2021-06-11 NOTE — PROGRESS NOTES
[facility] re-Certification Request    September 18, 2020      Patient: Mayra Ramirez  MR Number: [id number]  YOB: 1965  Date of Visit: 9/18/2020      Dear Malia Santos, *:    Thank you for this referral.   We are seeing Mayra Ramirez for Physical Therapy of left upper extremity lymphedema.    Medicare and/or Medicaid requires physician review and approval of the treatment plan. Please review the plan of care and verify that you agree with the therapy plan of care by co-signing this note.      Plan of Care  Authorization / Certification Start Date: 09/14/20  Authorization / Certification End Date: 10/14/20  Authorization / Certification Number of Visits: continue with 6 more visits  Communication with: Referral Source  Patient Related Instruction: Nature of Condition;Treatment plan and rationale;Self Care instruction;Basis of treatment;Body mechanics;Posture;Precautions;Next steps;Expected outcome  Times per Week: 2  Number of Weeks: 6  Number of Visits: 6 more visits  Discharge Planning: patient will be discharge to self care when goals met.  Precautions / Restrictions : left clavicle fracture and surgery   Therapeutic Exercise: ROM;Stretching;Strengthening;Lymphedema  Manual Therapy: soft tissue mobilization;myofascial release;lymphatic drainage massage      Goals:  Pt. will demonstrate/verbalize independence in self-management of condition in : 4 weeks  Pt. will be independent with home exercise program in : 4 weeks    Patient will have a decreased volume in : in 4 weeks  Patient will have decreased fibrosis, scar tissue for improved lymphatic mobilitiy : in 4 weeks  Patient will perform, verbalize self-management of: in 4 weeks        If you have any questions or concerns, please don't hesitate to call.    Sincerely,      Nithya Swain PTA, [facility]        Physician recommendation:     _X__ Follow therapist's recommendation        ___ Modify  "therapy      *Physician co-signature indicates they certify the need for these services furnished within this plan and while under their care.    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      Assessment:     Patient was seen for complete decongestive physical therapy     Patient had a left axillary cord and it is resolving with soft manual therapy.    Patient is benefitting from skilled physical therapy and is making steady progress toward functional goals.  Patient is appropriate to continue with skilled physical therapy intervention, as indicated by initial plan of care.    Goal Status:  Pt. will demonstrate/verbalize independence in self-management of condition in : 4 weeks  Pt. will be independent with home exercise program in : 4 weeks    Patient will have a decreased volume in : in 4 weeks  Patient will have decreased fibrosis, scar tissue for improved lymphatic mobilitiy : in 4 weeks  Patient will perform, verbalize self-management of: in 4 weeks      Plan / Patient Education:     Continue with initial plan of care.  Progress with home program as tolerated.   Continue with MFR and MLD.     Subjective:     Pt reports tightness in L pec.      Pain Ratin        Objective:   L shoulder ROM:  Flexion: WNL  AB: WNL \"shoulder hurts\" pt reports some tingling in her fingers with AB  ER: 77 degrees  IR: thumb to T5    Left clavicular area with metal implant and reduced surgical scar " mobility.    Left deltoid with minimal edema.  Skin is intact, no open areas.      Treatment Today     TREATMENT MINUTES COMMENTS   Evaluation     Self-care/ Home management     Manual therapy 45 MFR/MLD L chest wall, L UE, tissue bending and scar release  Added PNF D1-2 with end range.  Left rib cage gentle mobilizations.   Neuromuscular Re-education     Therapeutic Activity     Therapeutic Exercises  reviewed pectoral stretch exercises, shoulder shrugs   Gait training     Modality__________________                Total 45 Pt 15 late for her appt   Blank areas are intentional and mean the treatment did not include these items.       Nithya Santos,CLT  9/18/2020     Physical Therapist observed treatment and reviewed patient's subjective and objective progress, progress towards goals and plan of care with the PTA.  It is appropriate to continue per Plan of Care.   Isela South, PT, DPT, CLT

## 2021-06-11 NOTE — PROGRESS NOTES
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 Post-mastectomy lymphedema syndrome  I97.2    2. Localized edema  R60.0    3. Contracture, left shoulder  M24.512    4. Scar condition and fibrosis of skin  L90.5        Diagnoses and all orders for this visit:    Post-mastectomy lymphedema syndrome    Localized edema    Contracture, left shoulder    Scar condition and fibrosis of skin       .OPTPTPRECAUTION : metal bar on right clavicule      Assessment:     Left shoulder with increased ROM by the end of the visit.  Left chest wall with increased fascia mobility.    Patient is compliant with home exercises.    Patient demonstrates understanding/independence with home program.  Response to Intervention manual therapy.  Patient is benefitting from skilled physical therapy and is making steady progress toward functional goals.  Patient is appropriate to continue with skilled physical therapy intervention, as indicated by initial plan of care.  Therapy interventions being performed are medically necessary, are being delivered according to accepted standards of medical practice, and require the skills of a therapist to perform the services.      Goal Status:  Pt. will demonstrate/verbalize independence in self-management of condition in : 4 weeks;Progressing toward  Pt. will be independent with home exercise program in : 4 weeks;Progressing toward    Patient will have a decreased volume in : in 4 weeks;Progressing  "toward  Patient will have decreased fibrosis, scar tissue for improved lymphatic mobilitiy : in 4 weeks;Progressing toward  Patient will perform, verbalize self-management of: in 4 weeks;Progressing toward      Plan / Patient Education:     Continue with initial plan of care.manual therapy, shoulder exrcises.    Subjective:     Pain Ratin  Patient reports :\" my left shoulder is getting better\".        Objective:     Caregiver present: No    Observation of swelling: left upper extremity is better.     Volume measurements taken:  No      Skin condition is:  better    Compression:  No compression. Last worn yesterday..    Medication for infection:  No    Treatment Today     TREATMENT MINUTES COMMENTS   Evaluation     Self-care/ Home management     Manual therapy 45 MFR to left upper quadrant and lateral trunk.  Manual Therapy: Manual lymph drainage for left upper extremity:  Neck effleurage, short neck, shoulder collectors, right axilla, left inguinal, anterior axilla to axilla anastomosis from left to right, anterior axilla to in left inguinal anastomosis, left upper extremity, anterior axilla to axilla, neck effleurage.  Follow up by PNF D1-2 arm motion     Neuromuscular Re-education     Therapeutic Activity     Therapeutic Exercises 2 Review home exercises.   Gait training     Modality__________________                Total 47    Blank areas are intentional and mean the treatment did not include these items.       Sandra Menchaca PT, CLT-[facility]  2020    "

## 2021-06-11 NOTE — PROGRESS NOTES
Initial Psychology Consultation (Standard)    Mayra Ramirez  [id number]  Consult Date: 7/19/2017    Reason for Referral:  The patient is a 51 y.o. year old, ,  individual who is being seen for an evaluation of cognitive, emotional, and behavioral functioning at the request of Rajesh Jeff MD. Collateral information was obtained from a review of the record.   Patient was informed of the role of psychology services, the foreseeable risks and benefits, the limits of confidentiality, and the responsibilities of a mandated .   The patient agreed to proceed.  Patient reports that her  is aware that she is meeting with this writer today.    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  The patient was treated for breast cancer with chemotherapy in 2004. 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  Secondary to short-term memory deficits observed by the patient in the last year she underwent a neuropsychological evaluation in 2016.  This was repeated in June, 2017 at [place].  The patient's results are reported to show mild to moderate impairments in attention, encoding, and verbal memory.  However, findings were stable from [id number] with the conclusion that the patient does not have a neurodegenerative illness.  The patient is described as being independent with driving, household management, and medications.  The patient's  has always managed the household finances.    Upon interview, the patient acknowledges that she is uncertain of the rationale for meeting with this writer today.  She reports that Dr. Jeff thought it might be beneficial given her complex health issues for the last 10+ years.  The patient indicates that her  and she  have learned that the best remedies are exercise, good nutrition, good sleep, and minimal stress.  Patient reports that she goes to a health club every other day.  On the off days she walks.  Patient indicates an awareness of the importance of mindful meditation.  She reports taking a delmy chi class and is pursuing learning more about mindfulness meditation.  This writer suggests that the patient consider calm.com as another resource for relaxation techniques.  Patient also indicates that she is aware of the importance of brain stimulation and utilizes apps such as luminosity to engage in these strategies.  Patient also indicates that she is on medical leave from work which substantially reduces her stress.  Patient reports no difficulty transitioning to possible assisted.  She reports having a full day and meaningful activity.    Patient denies depressed or anxious mood.  She denies feelings of hopelessness, helplessness, worthlessness.  She reports no suicidal ideation.  She reports sleeping well.  She reports no difficulty with irritability, concentration, or indecisiveness.    No standardized assessment tools were administered.    No non-personal contextual factors are reported.    Social History: The patient was raised by both parents.  She is the oldest of 4 children.  Her father is .  She describes her family as being close.  Patient achieved a bachelor's degree and a master's degree in school counseling.  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  The patient reports that she is on medical leave for 1 year and will re-evaluate although she expects to retire.  Patient reports having excellent support from family including her mother.  She reports that her spiritual beliefs are of comfort and participates in a [employer]ism community.   She reports no history of abuse.  She reports achieving developmental milestones in an expected fashion.  Patient does not report financial concerns.    Medical History: Patient feels that she has a good understanding of her complex medical issues.  The patient does not report cultural factors impacting pursuit of care.  The patient pursues standard medical care.  Patient's history is as indicated above with metastatic breast cancer.  Patient's family history is significant for her father with heart disease.  He  in .  Her mother's history is significant for hypertension and thyroid cancer.    Medications include:    Current Outpatient Prescriptions   Medication Sig     biotin 5 mg cap Take by mouth.     calcium carbonate-vitamin D3 (CALCIUM 600 + D,3,) 600 mg calcium- 200 unit cap Take by mouth 2 times a day at 6:00 am and 4:00 pm.     cholecalciferol, vitamin D3, (VITAMIN D3) 2,000 unit Tab Take 1 tablet by mouth daily.     co-enzyme Q-10 30 mg capsule Take 30 mg by mouth daily. Taking liquid form 2 tsp daily     DOCOSAHEXANOIC ACID/EPA (FISH OIL ORAL) Take by mouth.     ibandronate (BONIVA) 150 mg tablet Take 150 mg by mouth every 30 (thirty) days. Take in AM with glass of water prior to food, don't lie down for 30 minutes.     L. RHAMNOSUS GG/INULIN (CULTURELLE PROBIOTICS ORAL) Take by mouth.     letrozole (FEMARA) 2.5 mg tablet Take 2.5 mg by mouth daily.     MELATONIN ORAL Take 300 mg by mouth daily.     MULTIVITAMIN ORAL Take by mouth.     ranitidine (ZANTAC) 75 MG tablet Take 150 mg by mouth as needed for heartburn.       Psychiatric History: Patient reports no history significant for mental health or chemical health issues.  Patient's family history is significant for her father and paternal grandfather with depression in the context of complex medical issues.  She reports no family history significant for chemical abuse or dependency.  Patient does not currently have psychiatric medications.  A  CAGE Questionnaire was not administered secondary to absence of reported chemical use history    Mental Status Exam:   Grooming:  Well-groomed    Attire:  Appropriate    Age:  appears stated age    Attitude during interview: friendly    Participation: cooperative     Motor activity:  Mild gait disturbance    Eye contact:  appropriate    Mood:  Stable    Affect:  Pleasant; mildly anxious    Speech/language:  Within normal limits    Attention:  Within normal limits    Concentration:  within normal limits    Thought Process:  Within normal limits    Thought Content:  Within normal limits    Orientation:  Orientated to person time and place    Memory:  No evidence of impairment.    Judgement: No evidence of impairment    Estimated intelligence:  high    Demonstrated insight:  normal    Fund of Knowledge: Good      Clinical Summary: The patient is a 51-year-old, ,  individual who is referred for evaluation and treatment of adjustment issues associated with complex medical history.  The patient was diagnosed with breast cancer in 2004 with brain metastasis in 2005.  Patient has recently undergone neuropsychological evaluation in 2016 and again in 2017.  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  The patient does not have evidence of neurodegeneration.  She has recently taken medical leave from her employment as a  at the middle school level.    Patient reports coping well with her medical issues and welcomes being on medical leave.  She reports having excellent support from family.  Patient describes her awareness of the importance of good nutrition, exercise, good sleep, and minimizing stress for brain health.  She reports no difficulty engaging in the strategies.  She expresses an interest in learning more about relaxation techniques and is currently taking a class in delmy chi.  The patient describes a weekly routine that is  meaningful and full.    Patient denies depressed or anxious mood and reports hope for the future.    Patient's strengths include absence of premorbid history significant for mental health or chemical health issues and excellent support from family.    Diagnosis: Adjustment disorder with anxious mood, in remission      Plan: We mutually agreed that there would be no need for further services at this time.  The patient was advised on how to initiate services should the need arise in the future.    Thank you Dr. Jeff for requesting the participation of psychology service in the care of this patient.

## 2021-06-11 NOTE — PROGRESS NOTES
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 Post-mastectomy lymphedema syndrome  I97.2    2. Localized edema  R60.0    3. Contracture, left shoulder  M24.512    4. Left-sided chest wall pain  R07.89    5. History of breast cancer  Z85.3    6. Scar condition and fibrosis of skin  L90.5          Assessment:     Pt doing well with home program.  Pt responding well to treatment.    Patient is benefitting from skilled physical therapy and is making steady progress toward functional goals.  Patient is appropriate to continue with skilled physical therapy intervention, as indicated by initial plan of care.    Goal Status:  Pt. will demonstrate/verbalize independence in self-management of condition in : 4 weeks;Progressing toward  Pt. will be independent with home exercise program in : 4 weeks;Progressing toward    Patient will have a decreased volume in : left;UE;by 5%;to decrease risk of infection;for better fit of clothing;for improved body image;for ease of movement;Progressing toward  Patient will have decreased fibrosis, scar tissue for improved lymphatic mobilitiy : in 4 weeks;Progressing toward  Patient will perform, verbalize self-management of: skin care;self-monitoring;exercise;massage;in 4 weeks;infection prevention;compression care;Progressing toward      Plan / Patient Education:     Continue with initial plan of care.  Progress with home program as tolerated.   Continue with MFR and MLD.  "    Subjective:   Pt reports she is \"good.\"    Pt reports she felt good after yesterday's treatment.         Pain Ratin        Objective:   L shoulder ROM:  Flexion: WNL  AB: WNL \"shoulder hurts\" pt reports some tingling in her fingers with AB  ER: 75 degrees  IR: thumb to T5    Left clavicular area with metal implant and reduced surgical scar mobility.      Treatment Today     TREATMENT MINUTES COMMENTS   Evaluation     Self-care/ Home management     Manual therapy 45 MFR/MLD L chest wall, L UE, tissue bending and scar release  Added PNF D1-2 with end range.  Left rib cage gentle mobilizations.   Neuromuscular Re-education     Therapeutic Activity     Therapeutic Exercises  Verbal review of HEP   Gait training     Modality__________________                Total 45    Blank areas are intentional and mean the treatment did not include these items.       Nithya Santos,CLT  2020  "

## 2021-06-11 NOTE — PROGRESS NOTES
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 Post-mastectomy lymphedema syndrome  I97.2    2. Localized edema  R60.0    3. Contracture, left shoulder  M24.512    4. History of breast cancer  Z85.3    5. Scar condition and fibrosis of skin  L90.5    6. Left-sided chest wall pain  R07.89    7. Decreased ROM of left shoulder  M25.612          Assessment:     Noted left axillar cording, responds well to manual therapy and stretching.    Patient is benefitting from skilled physical therapy and is making steady progress toward functional goals.  Patient is appropriate to continue with skilled physical therapy intervention, as indicated by initial plan of care.    Goal Status:  Pt. will demonstrate/verbalize independence in self-management of condition in : 4 weeks;Progressing toward  Pt. will be independent with home exercise program in : 4 weeks;Progressing toward    Patient will have a decreased volume in : left;UE;by 5%;to decrease risk of infection;for better fit of clothing;for improved body image;for ease of movement;Progressing toward  Patient will have decreased fibrosis, scar tissue for improved lymphatic mobilitiy : in 4 weeks;Progressing toward  Patient will perform, verbalize self-management of: skin care;self-monitoring;exercise;massage;in 4 weeks;infection prevention;compression care;Progressing toward      Plan / Patient Education:     Continue with initial plan of care.  Progress with home program as  "tolerated.   Continue with MFR and MLD.     Subjective:   Pt reports her L anterior chest wall has been sore.   Pt feels that she has more stiffness.       Pain Ratin        Objective:   L shoulder ROM:  Flexion: 160 degrees with c/o arm pull  AB: WNL\"  Crunchy at the top.\"  ER: 75 degrees  IR: thumb to T5    Left clavicular area with metal implant and reduced surgical scar mobility.      Treatment Today     TREATMENT MINUTES COMMENTS   Evaluation     Self-care/ Home management     Manual therapy 53 MFR/MLD L chest wall, L UE, tissue bending and scar release  Added PNF D1-2 with end range.   Neuromuscular Re-education     Therapeutic Activity     Therapeutic Exercises 5 Verbal review of HEP   Gait training     Modality__________________                Total 58    Blank areas are intentional and mean the treatment did not include these items.       Nithya Santos,CLT  2020  "

## 2021-06-11 NOTE — PROGRESS NOTES
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 Post-mastectomy lymphedema syndrome  I97.2    2. Localized edema  R60.0    3. Contracture, left shoulder  M24.512    4. Scar condition and fibrosis of skin  L90.5        Diagnoses and all orders for this visit:    Post-mastectomy lymphedema syndrome    Localized edema    Contracture, left shoulder    Scar condition and fibrosis of skin       .OPTPTPRECAUTION : metal bar on right clavicule      Assessment:     Chest wall with increased fascia mobility.  Skin intact, left clavicular surgical scar with increased mobility.    Patient is compliant with home exercises.    Patient demonstrates understanding/independence with home program.  Response to Intervention manual therapy.  Patient is benefitting from skilled physical therapy and is making steady progress toward functional goals.  Patient is appropriate to continue with skilled physical therapy intervention, as indicated by initial plan of care.  Therapy interventions being performed are medically necessary, are being delivered according to accepted standards of medical practice, and require the skills of a therapist to perform the services.      Goal Status:  Pt. will demonstrate/verbalize independence in self-management of condition in : 4 weeks;Progressing toward  Pt. will be independent with home exercise program in : 4 weeks;Progressing toward    Patient will have a decreased volume in : in 4 weeks;Progressing  "toward  Patient will have decreased fibrosis, scar tissue for improved lymphatic mobilitiy : in 4 weeks;Progressing toward  Patient will perform, verbalize self-management of: in 4 weeks;Progressing toward      Plan / Patient Education:     Continue with initial plan of care.manual therapy, shoulder exrcises.    Subjective:     Pain Ratin  Patient reports :\" I did my exercises\".        Objective:     Caregiver present: No    Observation of swelling: left upper extremity is decreased.     Volume measurements taken:  No    Skin condition is: intact.    Compression:  No compression. Last worn yesterday..    Medication for infection:  No    Treatment Today     TREATMENT MINUTES COMMENTS   Evaluation     Self-care/ Home management     Manual therapy 54 MFR to left upper quadrant and lateral trunk, left rib cage elongation.  Manual Therapy: Manual lymph drainage for left upper extremity:  Neck effleurage, short neck, shoulder collectors, right axilla, left inguinal, anterior axilla to axilla anastomosis from left to right, anterior axilla to in left inguinal anastomosis, left upper extremity, anterior axilla to axilla, neck effleurage.  Follow up by PNF D1-2 arm motion     Neuromuscular Re-education     Therapeutic Activity     Therapeutic Exercises 2 Review home exercises.   Gait training     Modality__________________                Total 57    Blank areas are intentional and mean the treatment did not include these items.       Sandra Menchaca PT, NORY-SAI  2020  "

## 2021-06-11 NOTE — PROGRESS NOTES
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 Contracture, left shoulder  M24.512    2. Post-mastectomy lymphedema syndrome  I97.2    3. Scar condition and fibrosis of skin  L90.5          Assessment:     Patient with limited fascial mobility on the anterior chest wall and latissimus dorsi.  ROM of shoulder is WFL.    Patient is benefitting from skilled physical therapy and is making steady progress toward functional goals.  Patient is appropriate to continue with skilled physical therapy intervention, as indicated by initial plan of care.    Goal Status:  Pt. will demonstrate/verbalize independence in self-management of condition in : 4 weeks  Pt. will be independent with home exercise program in : 4 weeks    Patient will have a decreased volume in : left;UE;by 5%;to decrease risk of infection;for better fit of clothing;for improved body image;for ease of movement  Patient will have decreased fibrosis, scar tissue for improved lymphatic mobilitiy : in 4 weeks  Patient will perform, verbalize self-management of: skin care;self-monitoring;exercise;massage;in 4 weeks      Plan / Patient Education:     Continue with initial plan of care.  Progress with home program as tolerated.   Continue with MFR and MLD.     Subjective:     Patient reports\" she is doing her exercises, my left shoulder feel stiff\".  Pain Ratin        Objective:   L shoulder ROM:  Flexion: 155 degrees  AB: WNL\"  Crunchy at the top.\"  ER: 75 " degrees  IR: thumb to T5    Left clavicular area with metal implant and reduced surgical scar mobility.      Treatment Today     TREATMENT MINUTES COMMENTS   Evaluation     Self-care/ Home management     Manual therapy 53 MFR/MLD L chest wall, L UE, tissue bending and scar release  Added PNF D1-2 with end range.   Neuromuscular Re-education     Therapeutic Activity     Therapeutic Exercises 5 Verbal review of HEP   Gait training     Modality__________________                Total 58    Blank areas are intentional and mean the treatment did not include these items.       Sandra Osorio,NORY-SAI  8/31/2020

## 2021-06-11 NOTE — PROGRESS NOTES
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 Post-mastectomy lymphedema syndrome  I97.2    2. Localized edema  R60.0    3. Contracture, left shoulder  M24.512    4. Left-sided chest wall pain  R07.89        Diagnoses and all orders for this visit:    Post-mastectomy lymphedema syndrome    Localized edema    Contracture, left shoulder    Left-sided chest wall pain       .OPTPTPRECAUTION      Assessment:     Left chest wall increases myofascial mobility after MFR.    Patient is compliant with home exercises.    Patient demonstrates understanding/independence with home program.  Response to Intervention manual therapy.  Patient is benefitting from skilled physical therapy and is making steady progress toward functional goals.  Patient is appropriate to continue with skilled physical therapy intervention, as indicated by initial plan of care.  Therapy interventions being performed are medically necessary, are being delivered according to accepted standards of medical practice, and require the skills of a therapist to perform the services.      Goal Status:  Pt. will demonstrate/verbalize independence in self-management of condition in : 4 weeks;Progressing toward  Pt. will be independent with home exercise program in : 4 weeks;Progressing toward    Patient will have a decreased volume in : in 4 weeks;Progressing toward  Patient will have decreased fibrosis, scar tissue for improved lymphatic mobilitiy : in 4  "weeks;Progressing toward  Patient will perform, verbalize self-management of: in 4 weeks;Progressing toward      Plan / Patient Education:     Continue with initial plan of care.    Subjective:     Pain Ratin  Patient reports :\" I'm going for weekly chiropractor treatments for the metal implant\".        Objective:     Caregiver present: No    Observation of swelling: left upper extremity is better.     Volume measurements taken:  No      Skin condition is:  better    Compression:  No compression. Last worn yesterday..    Medication for infection:  No    Treatment Today     TREATMENT MINUTES COMMENTS   Evaluation     Self-care/ Home management     Manual therapy 54 MFR to left upper quadrant and lateral trunk.  Manual Therapy: Manual lymph drainage for left upper extremity:  Neck effleurage, short neck, shoulder collectors, right axilla, left inguinal, anterior axilla to axilla anastomosis from left to right, anterior axilla to in left inguinal anastomosis, left upper extremity, anterior axilla to axilla, neck effleurage.  Follow up by PNF D1-2 arm motion     Neuromuscular Re-education     Therapeutic Activity     Therapeutic Exercises 2 Review home exercises.   Gait training     Modality__________________                Total 57    Blank areas are intentional and mean the treatment did not include these items.       Sandra Menchaca PT, CLT-SAI  2020      "

## 2021-06-11 NOTE — PROGRESS NOTES
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 Post-mastectomy lymphedema syndrome  I97.2    2. Contracture, left shoulder  M24.512    3. Scar condition and fibrosis of skin  L90.5          Assessment:     Noted left axillar cording, responds well to manual therapy and stretching.    Patient is benefitting from skilled physical therapy and is making steady progress toward functional goals.  Patient is appropriate to continue with skilled physical therapy intervention, as indicated by initial plan of care.    Goal Status:  Pt. will demonstrate/verbalize independence in self-management of condition in : 4 weeks;Progressing toward  Pt. will be independent with home exercise program in : 4 weeks;Progressing toward    Patient will have a decreased volume in : left;UE;by 5%;to decrease risk of infection;for better fit of clothing;for improved body image;for ease of movement;Progressing toward  Patient will have decreased fibrosis, scar tissue for improved lymphatic mobilitiy : in 4 weeks;Progressing toward  Patient will perform, verbalize self-management of: skin care;self-monitoring;exercise;massage;in 4 weeks;infection prevention;compression care;Progressing toward      Plan / Patient Education:     Continue with initial plan of care.  Progress with home program as tolerated.   Continue with MFR and MLD.     Subjective:     Patient reports\" she is doing her exercises, my left shoulder feel stiff\".  Pain Rating: " "0        Objective:   L shoulder ROM:  Flexion: 160 degrees with c/o arm pull  AB: WNL\"  Crunchy at the top.\"  ER: 75 degrees  IR: thumb to T5    Left clavicular area with metal implant and reduced surgical scar mobility.      Treatment Today     TREATMENT MINUTES COMMENTS   Evaluation     Self-care/ Home management     Manual therapy 53 MFR/MLD L chest wall, L UE, tissue bending and scar release  Added PNF D1-2 with end range.   Neuromuscular Re-education     Therapeutic Activity     Therapeutic Exercises 5 Verbal review of HEP   Gait training     Modality__________________                Total 58    Blank areas are intentional and mean the treatment did not include these items.       Sandra Osorio,NORY-SAI  9/2/2020  "

## 2021-06-11 NOTE — PROGRESS NOTES
[facility] re-Certification Request    September 14, 2020      Patient: Mayra Ramirez  MR Number: [id number]  YOB: 1965  Date of Visit: 9/14/2020      Dear Malia Santos, *:    Thank you for this referral.   We are seeing Mayra Ramirez for Physical Therapy of left upper extremity lymphedema.    Medicare and/or Medicaid requires physician review and approval of the treatment plan. Please review the plan of care and verify that you agree with the therapy plan of care by co-signing this note.      Plan of Care  Authorization / Certification Start Date: 09/14/20  Authorization / Certification End Date: 10/14/20  Authorization / Certification Number of Visits: continue with 6 more visits  Communication with: Referral Source  Patient Related Instruction: Nature of Condition;Treatment plan and rationale;Self Care instruction;Basis of treatment;Body mechanics;Posture;Precautions;Next steps;Expected outcome  Times per Week: 2  Number of Weeks: 6  Number of Visits: 6 more visits  Discharge Planning: patient will be discharge to self care when goals met.  Precautions / Restrictions : left clavicle fracture and surgery   Therapeutic Exercise: ROM;Stretching;Strengthening;Lymphedema  Manual Therapy: soft tissue mobilization;myofascial release;lymphatic drainage massage      Goals:  Pt. will demonstrate/verbalize independence in self-management of condition in : 4 weeks  Pt. will be independent with home exercise program in : 4 weeks    Patient will have a decreased volume in : in 4 weeks  Patient will have decreased fibrosis, scar tissue for improved lymphatic mobilitiy : in 4 weeks  Patient will perform, verbalize self-management of: in 4 weeks        If you have any questions or concerns, please don't hesitate to call.    Sincerely,      Sandra Menchaca, PT, [facility]-SAI        Physician recommendation:     _X__ Follow therapist's recommendation        ___ Modify  therapy      *Physician co-signature indicates they certify the need for these services furnished within this plan and while under their care.    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 Post-mastectomy lymphedema syndrome  I97.2    2. Localized edema  R60.0    3. Contracture, left shoulder  M24.512          Assessment:     Patient was seen for complete decongestive physical therapy  As per  Left upper extremity protocol with manual lymph drainage, MFR patient education and exercises, patient was able to tolerate bandaging well, patient had good softening of thick fibrotic areas, skin is intact but very dry.   Patient had a left axillary cord and it is resolving with soft manual therapy.    Patient is benefitting from skilled physical therapy and is making steady progress toward functional goals.  Patient is appropriate to continue with skilled physical therapy intervention, as indicated by initial plan of care.    Goal Status:  Pt. will demonstrate/verbalize independence in self-management of condition in : 4 weeks  Pt. will be independent with home exercise program in : 4 weeks    Patient will have a decreased volume in : in 4 weeks  Patient will have decreased fibrosis, scar tissue for improved lymphatic mobilitiy : in 4 weeks  Patient will perform, verbalize self-management of: in 4 weeks      Plan / Patient Education:     Continue with initial plan of care.  Progress with home  "program as tolerated.   Continue with MFR and MLD.     Subjective:   Pt reports she is \" getting better, left arm is more mobile.\"    Pt reports she goes to [place] for chiropractor treatments for clavicle.         Pain Ratin        Objective:   L shoulder ROM:  Flexion: WNL  AB: WNL \"shoulder hurts\" pt reports some tingling in her fingers with AB  ER: 77 degrees  IR: thumb to T5    Left clavicular area with metal implant and reduced surgical scar mobility.    Left deltoid with minimal edema.  Skin is intact, no open areas.      Treatment Today     TREATMENT MINUTES COMMENTS   Evaluation     Self-care/ Home management     Manual therapy 45 MFR/MLD L chest wall, L UE, tissue bending and scar release  Added PNF D1-2 with end range.  Left rib cage gentle mobilizations.   Neuromuscular Re-education     Therapeutic Activity     Therapeutic Exercises 10 Instructed and performed pectoral stretch exercises, shoulder shrugs   Gait training     Modality__________________                Total 55    Blank areas are intentional and mean the treatment did not include these items.       NORY May-SAI  2020  "

## 2021-06-11 NOTE — PROGRESS NOTES
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 Post-mastectomy lymphedema syndrome  I97.2    2. Localized edema  R60.0    3. Contracture, left shoulder  M24.512    4. History of breast cancer  Z85.3    5. Scar condition and fibrosis of skin  L90.5    6. Left-sided chest wall pain  R07.89    7. Decreased ROM of left shoulder  M25.612          Assessment:     Left pectoral to clavicular area with myofascial restrictions.  Patient responds well to manual therapy and exercises.    Patient is benefitting from skilled physical therapy and is making steady progress toward functional goals.  Patient is appropriate to continue with skilled physical therapy intervention, as indicated by initial plan of care.    Goal Status:  Pt. will demonstrate/verbalize independence in self-management of condition in : 4 weeks;Progressing toward  Pt. will be independent with home exercise program in : 4 weeks;Progressing toward    Patient will have a decreased volume in : left;UE;by 5%;to decrease risk of infection;for better fit of clothing;for improved body image;for ease of movement;Progressing toward  Patient will have decreased fibrosis, scar tissue for improved lymphatic mobilitiy : in 4 weeks;Progressing toward  Patient will perform, verbalize self-management of: skin care;self-monitoring;exercise;massage;in 4 weeks;infection prevention;compression care;Progressing toward      Plan / Patient Education:     Continue with initial  "plan of care.  Progress with home program as tolerated.   Continue with MFR and MLD.     Subjective:   Pt reports her L anterior chest wall has been sore.   Pt feels that she has more stiffness.       Pain Ratin        Objective:   L shoulder ROM:  Flexion: 162 degrees with c/o arm pull  AB: WNL\"  Crunchy at the top.\"  ER: 75 degrees  IR: thumb to T5    Left clavicular area with metal implant and reduced surgical scar mobility.      Treatment Today     TREATMENT MINUTES COMMENTS   Evaluation     Self-care/ Home management     Manual therapy 53 MFR/MLD L chest wall, L UE, tissue bending and scar release  Added PNF D1-2 with end range.  Left rib cage gentle mobilizations.   Neuromuscular Re-education     Therapeutic Activity     Therapeutic Exercises 5 Verbal review of HEP   Gait training     Modality__________________                Total 58    Blank areas are intentional and mean the treatment did not include these items.       RANJIT Mancilla  2020  "

## 2021-06-11 NOTE — PROGRESS NOTES
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 Post-mastectomy lymphedema syndrome  I97.2    2. Localized edema  R60.0    3. Contracture, left shoulder  M24.512    4. Left-sided chest wall pain  R07.89    5. Decreased ROM of left shoulder  M25.612          Assessment:     Pt doing well with home program.  Pt responding well to treatment.    Patient is benefitting from skilled physical therapy and is making steady progress toward functional goals.  Patient is appropriate to continue with skilled physical therapy intervention, as indicated by initial plan of care.    Goal Status:  Pt. will demonstrate/verbalize independence in self-management of condition in : 4 weeks;Progressing toward  Pt. will be independent with home exercise program in : 4 weeks;Progressing toward    Patient will have a decreased volume in : left;UE;by 5%;to decrease risk of infection;for better fit of clothing;for improved body image;for ease of movement;Progressing toward  Patient will have decreased fibrosis, scar tissue for improved lymphatic mobilitiy : in 4 weeks;Progressing toward  Patient will perform, verbalize self-management of: skin care;self-monitoring;exercise;massage;in 4 weeks;infection prevention;compression care;Progressing toward      Plan / Patient Education:     Continue with initial plan of care.  Progress with home program as tolerated.   Continue with MFR and MLD.     Subjective:   Pt reports she is \"doing " "alright.\"  Pt states she has been doing her stretches.  Pt reports some tingling in her L UE with wall slides.        Pain Ratin        Objective:   L shoulder ROM:  Flexion: 162 degrees with c/o arm pull  AB: WNL\"  Crunchy at the top.\"  ER: 75 degrees  IR: thumb to T5    Left clavicular area with metal implant and reduced surgical scar mobility.      Treatment Today     TREATMENT MINUTES COMMENTS   Evaluation     Self-care/ Home management     Manual therapy 53 MFR/MLD L chest wall, L UE, tissue bending and scar release  Added PNF D1-2 with end range.  Left rib cage gentle mobilizations.   Neuromuscular Re-education     Therapeutic Activity     Therapeutic Exercises  Verbal review of HEP   Gait training     Modality__________________                Total 53    Blank areas are intentional and mean the treatment did not include these items.       RANJIT Gusman  9/10/2020  "

## 2021-06-12 NOTE — TELEPHONE ENCOUNTER
I called Mayra on 10/07/2020 and spoke with her about the electronic referral we received. She says she post-mastectomy bras and two breast forms. I gave her the scheduling line number to call back when she is at home near her calendar to get an appnt scheduled.

## 2021-06-12 NOTE — PROGRESS NOTES
S:  Very pleasant 55-year-old female with past history of breast cancer with metastasis, presents after falling last night.  She became vertiginous when getting out of bed and fell and struck her right thorax on a piece of furniture.  There was no loss of consciousness and she is able to ambulate afterwards.  She is noticed increased vertiginous episodes over the past 2 to 3 months and they are intermittent.  ROS: No headache.  No shortness of breath.  No difficulty breathing.    Medications: Calcium, Flonase, melatonin, Prilosec, metronidazole gel and magnesium.    O:   Blood pressure 106/69 pulse 87 respiration 12 temperature 98.1  In no acute distress  Lungs clear to auscultation  Heart regular rate and rhythm  Neurologically the patient moves all 4 extremities pupils are equal and she ambulates and gets a bit of a chair easily  Examination of the thorax shows pain to both AP and lateral compression of the right thorax around the T12 region.    X-ray-no evidence of rib fracture    A:   Rib contusion  Fall  Vertigo    P:   The patient has a rib contusion and most likely represents blood underneath the skin or a bruised rib.  Recommended over-the-counter analgesics, expectant therapy and told her that in the next 4 to 6 weeks it should heal.  In addition because of her new vertiginous symptoms and the fact that she had metastasis to the cerebellum in the past MRI of the brain was ordered and she agreed to follow-up with Dr. Corbin.

## 2021-06-12 NOTE — TELEPHONE ENCOUNTER
I left a voice message for Mayra on both her home and cell phone. I did tell her we do not have her scheduled for more visits. Pt was informed that was for 6 visits which were completed last Friday. Pt was instructed to call this clinic if she has further questions.   Lissette Swain PTA,CLT

## 2021-06-12 NOTE — TELEPHONE ENCOUNTER
RN cannot approve Refill Request    RN can NOT refill this medication med is not covered by policy/route to provider. Last office visit: 12/7/2018 Sariah Corbin MD Last Physical: Visit date not found Last MTM visit: Visit date not found Last visit same specialty: 11/25/2019 Yessica Mike PA-C.  Next visit within 3 mo: Visit date not found  Next physical within 3 mo: Visit date not found      Gayathri Osorio, [facility] Triage/Med Refill 10/11/2020    Requested Prescriptions   Pending Prescriptions Disp Refills     MYRBETRIQ 25 mg Tb24 ER tablet [Pharmacy Med Name: MYRBETRIQ ER 25 MG TABLET] 90 tablet 1     Sig: TAKE 1 TABLET BY MOUTH EVERY DAY       There is no refill protocol information for this order

## 2021-06-12 NOTE — PROGRESS NOTES
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 Post-mastectomy lymphedema syndrome  I97.2    2. Localized edema  R60.0    3. Contracture, left shoulder  M24.512    4. Scar condition and fibrosis of skin  L90.5    5. Left-sided chest wall pain  R07.89    6. History of breast cancer  Z85.3    7. Decreased ROM of left shoulder  M25.612        Diagnoses and all orders for this visit:    Post-mastectomy lymphedema syndrome    Localized edema    Contracture, left shoulder    Scar condition and fibrosis of skin    Left-sided chest wall pain    History of breast cancer    Decreased ROM of left shoulder       .OPTPTPRECAUTION : metal bar on right clavicule      Assessment:     Chest wall with increased fascia mobility.  Skin intact, left clavicular surgical scar with increased mobility.    Patient is compliant with home exercises.    Patient demonstrates understanding/independence with home program.  Response to Intervention manual therapy.  Patient is benefitting from skilled physical therapy and is making steady progress toward functional goals.  Patient is appropriate to continue with skilled physical therapy intervention, as indicated by initial plan of care.  Therapy interventions being performed are medically necessary, are being delivered according to accepted standards of medical practice, and require the skills of a therapist to perform the services.      Goal Status:  Pt. will demonstrate/verbalize independence  "in self-management of condition in : 4 weeks;Progressing toward  Pt. will be independent with home exercise program in : 4 weeks;Progressing toward    Patient will have a decreased volume in : in 4 weeks;Progressing toward  Patient will have decreased fibrosis, scar tissue for improved lymphatic mobilitiy : in 4 weeks;Progressing toward  Patient will perform, verbalize self-management of: in 4 weeks;Progressing toward      Plan / Patient Education:     Continue with initial plan of care.manual therapy, shoulder exrcises.  Pt will call later today. Pt thinking she had more appointments scheduled. We do not have anything scheduled. I will speak with her primary PT Sandra Menchaca on Monday to update the POC.    Subjective:   Pt reports her L chest wall and axilla feels \"strange.\"  Pt reports her HEP is going well..   Pt reports her shoulders get sore. Pt reports soreness after carrying grocery bags and also with lying on her sides.  Pain Ratin          Objective:       Caregiver present: No    Observation of swelling: left upper extremity is decreased.     Volume measurements taken:  yes    Skin condition is: intact.    Compression:  No compression. Last worn yesterday..    Medication for infection:  No    Treatment Today     TREATMENT MINUTES COMMENTS   Evaluation     Self-care/ Home management     Manual therapy 54 MFR to left upper quadrant and lateral trunk, left rib cage elongation.  Manual Therapy: Manual lymph drainage for left upper extremity:  Neck effleurage, short neck, shoulder collectors, right axilla, left inguinal, anterior axilla to axilla anastomosis from left to right, anterior axilla to in left inguinal anastomosis, left upper extremity, anterior axilla to axilla, neck effleurage.  Follow up by PNF D1-2 arm motion     Neuromuscular Re-education     Therapeutic Activity     Therapeutic Exercises  Review home exercises.   Gait training     Modality__________________                Total 55  "   Blank areas are intentional and mean the treatment did not include these items.       Nithya Swain PTA, CLT  10/2/2020

## 2021-06-12 NOTE — TELEPHONE ENCOUNTER
Spoke with patient she is questioning if needs more therapy. Fluid is improved but cording continues. I will forward this to Dr Escamilla and return to call to patient.  Patient denies fever or s/s of infection at this time.

## 2021-06-12 NOTE — TELEPHONE ENCOUNTER
Spoke with patient and let her know to continue stretching and exercise at home.    Patient is concerned because she can still feel the cord in her armpit and it is bothering her. She has completed therapy, she is wondering if Dr. Escamilla thinks she needs more therapy to work on the cord.
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
## 2021-06-12 NOTE — TELEPHONE ENCOUNTER
Patient is calling with concerns of the lymphedema in her chest and wondering if she should be seen sooner than the 4 months.  She did complete the therapy treatments

## 2021-06-13 NOTE — PROGRESS NOTES
[facility] Daily Progress     Patient Name: Mayra Ramirez  Date: 2017  Visit #: 3/12  PTA visit #:  0     Date of evaluation: 2017  Referral Diagnosis: Left-sided chest wall pain  Referring provider: Malia Escamilla, *   Precautions/Restrictions: Balance; left clavicle fracture and surgery 2015      Visit Diagnosis:     ICD-10-CM    1. Decreased ROM of left shoulder M25.612    2. Left-sided chest wall pain R07.89    3. Localized edema R60.0          Assessment:   Pt tolerated MT with stretching.  She has good PROM.  Intermittent swelling may be related to work out days.  Suggested stretching before and after weight lifting and see if it helps.  Patient demonstrates understanding/independence with home program.  Patient is appropriate to continue with skilled physical therapy intervention, as indicated by initial plan of care.    Goal Status:    Patient will have a decreased volume in : left;for ease of movement;in 4 weeks;Comment;Progressing toward  Comment: upper chest wall  Patient will have decreased fibrosis, scar tissue for improved lymphatic mobilitiy : in 4 weeks;Progressing toward  Patient will perform, verbalize self-management of: skin care;self-monitoring;exercise;massage;self-compression;compression wear;compression care;infection prevention;in 4 weeks;Progressing toward  Pt will: have full left shoulder ROM for ADL's in 4 weeks progressing.    Plan for next visits:     Continue with initial plan of care.  Progress with home program as tolerated.    Subjective:   No concerns  Pain Ratin       Objective:     Pt 20 minutes late. Full PROM in left shoulder in supine.    Treatment Today      TREATMENT MINUTES COMMENTS   Evaluation     Self-care/ Home management     Manual therapy 30 MLT trunk, neck, chest and back; PROM; Grade I-II distraction.   Neuromuscular Re-education     Therapeutic Activity     Therapeutic Exercises 10 Instructed in arm slide ; discussed  importance of stretching.   Gait training     Modality__________________                Total 40    Blank areas are intentional and mean the treatment did not include these items.     Caity Kunz PT, CLT  9/18/2017  1:20 PM

## 2021-06-13 NOTE — PROGRESS NOTES
"[facility] Daily Progress     Patient Name: Mayra Ramirez  Date: 10/9/2017  Visit #:   PTA visit #:  0     Date of evaluation: 2017  Referral Diagnosis: Left-sided chest wall pain  Referring provider: Malia Escamilla  Precautions/Restrictions: Balance; left clavicle fracture and surgery 2015      Visit Diagnosis:     ICD-10-CM    1. Left-sided chest wall pain R07.89    2. Scar condition and fibrosis of skin L90.5    3. Contracture, left shoulder M24.512    4. Post-mastectomy lymphedema syndrome I97.2          Assessment:     Left chest wall edema is decreased, patient has increased shoulder ROM.  Minimal c/o puffiness at left pectoral area.    Patient demonstrates understanding/independence with home program.  Patient is appropriate to continue with skilled physical therapy intervention, as indicated by initial plan of care.    Goal Status:  Patient will have a decreased volume in : left;for ease of movement;in 4 weeks;Comment;Progressing toward  Comment: upper chest wall  Patient will have decreased fibrosis, scar tissue for improved lymphatic mobilitiy : in 4 weeks;Progressing toward  Patient will perform, verbalize self-management of: skin care;self-monitoring;exercise;massage;self-compression;compression wear;compression care;infection prevention;in 4 weeks;Progressing toward  Pt will: have full left shoulder ROM for ADL's in 4 weeks progressing.    Plan for next visits:     Continue with initial plan of care.  Progress with home program as tolerated. MFR, MLD, exercises, HEP.    Subjective:     \" I feel my left chest is smaller \".   .  Pain Ratin       Objective:     Left pectoral insertion with minimal tenderness.  Left shoulder ROM is improved.      Treatment Today      TREATMENT MINUTES COMMENTS   Evaluation     Self-care/ Home management     Manual therapy 45 MLD as per left upper extremity protocol and MFR to lateral left trunk, left shoulder and pectoral release,left " shoulder mob, left arm pull.   Neuromuscular Re-education     Therapeutic Activity     Therapeutic Exercises 10 Instructed and performed  arm slide ;pectoral stretch, discussed importance of stretching.   Gait training     Modality__________________                Total 55    Blank areas are intentional and mean the treatment did not include these items.       Sandra Menchaca, PT,CLKIKI-SAI

## 2021-06-13 NOTE — PROGRESS NOTES
"[facility] Daily Progress     Patient Name: Mayra Ramirez  Date: 10/5/2017  Visit #:   PTA visit #:  0     Date of evaluation: 2017  Referral Diagnosis: Left-sided chest wall pain  Referring provider: Malia Escamilla, *   Precautions/Restrictions: Balance; left clavicle fracture and surgery 2015      Visit Diagnosis:     ICD-10-CM    1. Left-sided chest wall pain R07.89    2. Scar condition and fibrosis of skin L90.5    3. Contracture, left shoulder M24.512    4. 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 Decreased ROM of left shoulder M25.612    6. Localized edema R60.0          Assessment:     Left shoulder with increased ROM active.  Left chest wall is smaller.    Patient demonstrates understanding/independence with home program.  Patient is appropriate to continue with skilled physical therapy intervention, as indicated by initial plan of care.    Goal Status:    Patient will have a decreased volume in : left;for ease of movement;in 4 weeks;Comment;Progressing toward  Comment: upper chest wall  Patient will have decreased fibrosis, scar tissue for improved lymphatic mobilitiy : in 4 weeks;Progressing toward  Patient will perform, verbalize self-management of: skin care;self-monitoring;exercise;massage;self-compression;compression wear;compression care;infection prevention;in 4 weeks;Progressing toward  Pt will: have full left shoulder ROM for ADL's in 4 weeks progressing.    Plan for next visits:     Continue with initial plan of care.  Progress with home program as tolerated. MFR, MLD, exercises, HEP.    Subjective:     \" I'm going for treatments for my collar bone \".   .  Pain Ratin       Objective:     Left chest wall with decreased edema.  Shoulder posture improved.      Treatment Today      TREATMENT MINUTES COMMENTS   Evaluation     Self-care/ Home management     Manual therapy 44 MLD as per left upper extremity protocol and MFR to lateral left trunk, left " shoulder and pectoral release, left arm pull.   Neuromuscular Re-education     Therapeutic Activity     Therapeutic Exercises 9 Instructed in arm slide ;pectoral stretch, discussed importance of stretching.   Gait training     Modality__________________                Total 54    Blank areas are intentional and mean the treatment did not include these items.       Sandra Menchaca, PT,CLKIKI-SAI

## 2021-06-13 NOTE — PROGRESS NOTES
[facility] Daily Progress     Patient Name: Mayra Ramirez  Date: 2017  Visit #:   PTA visit #:  1     Date of evaluation: 2017  Referral Diagnosis: Left-sided chest wall pain  Referring provider: Malia Escamilla  Precautions/Restrictions: Balance; left clavicle fracture and surgery 2015      Visit Diagnosis:     ICD-10-CM    1. Left-sided chest wall pain R07.89    2. Scar condition and fibrosis of skin L90.5    3. Contracture, left shoulder M24.512    4. 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    Patient demonstrates understanding/independence with home program.  Patient is appropriate to continue with skilled physical therapy intervention, as indicated by initial plan of care.    Goal Status:  Patient will have a decreased volume in : left;for ease of movement;in 4 weeks;Comment;Progressing toward  Comment: upper chest wall  Patient will have decreased fibrosis, scar tissue for improved lymphatic mobilitiy : in 4 weeks;Progressing toward  Patient will perform, verbalize self-management of: skin care;self-monitoring;exercise;massage;self-compression;compression wear;compression care;infection prevention;in 4 weeks;Progressing toward  Pt will: have full left shoulder ROM for ADL's in 4 weeks progressing.    Plan for next visits:     Continue with initial plan of care.  Progress with home program as tolerated. MFR, MLD, exercises, HEP.    Subjective:      .  Pain Ratin  Patient reports she always feels better after therapy , left shoulder feels better.    Objective:     Skin is intact.    Left shoulder ROM  flexion 145o      Treatment Today      TREATMENT MINUTES COMMENTS   Evaluation     Self-care/ Home management     Manual therapy 45 MLD as per left upper extremity protocol and MFR to lateral left trunk,left scapular release, left shoulder and pectoral release,left shoulder mob, left arm pull.    Neuromuscular  Re-education     Therapeutic Activity     Therapeutic Exercises 10 Review home program, will continue with shoulder ROM exercises; shoulder rolls, pectoral stretch, median nerve glides.   Gait training     Modality__________________                Total 50    Blank areas are intentional and mean the treatment did not include these items.       Sandra Menchaca, PT,CLT-SAI

## 2021-06-13 NOTE — PROGRESS NOTES
"[facility] Daily Progress     Patient Name: Mayra Ramirez  Date: 2017  Visit #:   PTA visit #:  0     Date of evaluation: 2017  Referral Diagnosis: Left-sided chest wall pain  Referring provider: Malia Escamilla, *   Precautions/Restrictions: Balance; left clavicle fracture and surgery 2015      Visit Diagnosis:     ICD-10-CM    1. Left-sided chest wall pain R07.89    2. Scar condition and fibrosis of skin L90.5    3. Contracture, left shoulder M24.512    4. 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  Skin is intact, left chest wall with decreased edema.    Patient demonstrates understanding/independence with home program.  Patient is appropriate to continue with skilled physical therapy intervention, as indicated by initial plan of care.    Goal Status:    Patient will have a decreased volume in : left;for ease of movement;in 4 weeks;Comment;Progressing toward  Comment: upper chest wall  Patient will have decreased fibrosis, scar tissue for improved lymphatic mobilitiy : in 4 weeks;Progressing toward  Patient will perform, verbalize self-management of: skin care;self-monitoring;exercise;massage;self-compression;compression wear;compression care;infection prevention;in 4 weeks;Progressing toward  Pt will: have full left shoulder ROM for ADL's in 4 weeks progressing.    Plan for next visits:     Continue with initial plan of care.  Progress with home program as tolerated. MFR, MLD, exercises, HEP.    Subjective:     \" my left side is better\".   .  Pain Ratin       Objective:     Left chest wall with decreased edema.  Posture improves with verbal cues.      Treatment Today      TREATMENT MINUTES COMMENTS   Evaluation     Self-care/ Home management     Manual therapy 45 MLD as per left upper extremity protocol and MFR to lateral left trunk, left shoulder and pectoral release.   Neuromuscular Re-education     Therapeutic Activity   "   Therapeutic Exercises 8 Instructed in arm slide ; discussed importance of stretching.   Gait training     Modality__________________                Total 53    Blank areas are intentional and mean the treatment did not include these items.       Sandra Menchaca, PT,CLKIKI-SAI

## 2021-06-13 NOTE — PROGRESS NOTES
[facility] Daily Progress     Patient Name: Mayra Ramirez  Date: 2017  Visit #:   PTA visit #:  0     Date of evaluation: 2017  Referral Diagnosis: Left-sided chest wall pain  Referring provider: Malia Escamilla, *   Precautions/Restrictions: Balance; left clavicle fracture and surgery 2015      Visit Diagnosis:     ICD-10-CM    1. Decreased ROM of left shoulder M25.612    2. Left-sided chest wall pain R07.89    3. Localized edema R60.0    4. 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 Contracture, left shoulder M24.512    6. Scar condition and fibrosis of skin L90.5          Assessment:     Decreased left chest wall edema.  Posture is improving.    Patient demonstrates understanding/independence with home program.  Patient is appropriate to continue with skilled physical therapy intervention, as indicated by initial plan of care.    Goal Status:    Patient will have a decreased volume in : left;for ease of movement;in 4 weeks;Comment;Progressing toward  Comment: upper chest wall  Patient will have decreased fibrosis, scar tissue for improved lymphatic mobilitiy : in 4 weeks;Progressing toward  Patient will perform, verbalize self-management of: skin care;self-monitoring;exercise;massage;self-compression;compression wear;compression care;infection prevention;in 4 weeks;Progressing toward  Pt will: have full left shoulder ROM for ADL's in 4 weeks progressing.    Plan for next visits:     Continue with initial plan of care.  Progress with home program as tolerated. MFR, MLD, exercises, HEP.    Subjective:     I'm feeling my shoulder is better.   .  Pain Ratin       Objective:     Left chest wall with decreased edema.      Treatment Today      TREATMENT MINUTES COMMENTS   Evaluation     Self-care/ Home management     Manual therapy 52 MLD as per left upper extremity protocol and MFR to lateral left trunk, left shoulder and pectoral release.   Neuromuscular Re-education      Therapeutic Activity     Therapeutic Exercises 8 Instructed in arm slide ; discussed importance of stretching.   Gait training     Modality__________________                Total 60    Blank areas are intentional and mean the treatment did not include these items.       Sandra Menchaca, PT,CLT-SAI

## 2021-06-13 NOTE — PROGRESS NOTES
[facility] Daily Progress     Patient Name: Mayra Ramirez  Date: 10/30/2017  Visit #:   PTA visit #:  1     Date of evaluation: 2017  Referral Diagnosis: Left-sided chest wall pain  Referring provider: Malia Escamilla  Precautions/Restrictions: Balance; left clavicle fracture and surgery 2015      Visit Diagnosis:     ICD-10-CM    1. Left-sided chest wall pain R07.89    2. Scar condition and fibrosis of skin L90.5    3. Contracture, left shoulder M24.512    4. 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 Decreased ROM of left shoulder M25.612    6. Localized edema R60.0          Assessment:     Left shoulder ROM is improving.  Left chest wall with decreased edema.    Patient demonstrates understanding/independence with home program.  Patient is appropriate to continue with skilled physical therapy intervention, as indicated by initial plan of care.    Goal Status:  Patient will have a decreased volume in : left;for ease of movement;in 4 weeks;Comment;Progressing toward  Comment: upper chest wall  Patient will have decreased fibrosis, scar tissue for improved lymphatic mobilitiy : in 4 weeks;Progressing toward  Patient will perform, verbalize self-management of: skin care;self-monitoring;exercise;massage;self-compression;compression wear;compression care;infection prevention;in 4 weeks;Progressing toward  Pt will: have full left shoulder ROM for ADL's in 4 weeks progressing.    Plan for next visits:     Continue with initial plan of care.  Progress with home program as tolerated. MFR, MLD, exercises, HEP.  Consider adding scapular and core stabilization to HEP  Subjective:      .  Pain Ratin  Patient just came back from [place] where she goes for treatments for bone healing of her clavicula.    Objective:     Decreased left chest wall edema.  Left shoulder ROM is improving, shoulder flexion 145o      Treatment Today      TREATMENT MINUTES COMMENTS   Evaluation      Self-care/ Home management     Manual therapy 45 MLD as per left upper extremity protocol and MFR to lateral left trunk, left shoulder and pectoral release,left shoulder mob, left arm pull. Arm slide performed with therapist, x3 each side   Neuromuscular Re-education     Therapeutic Activity     Therapeutic Exercises 15 Corner-pectoral stretch, wand shoulder extension, shoulder shrugs.   Gait training     Modality__________________                Total 60    Blank areas are intentional and mean the treatment did not include these items.       Sandra Menchaca, PT,CLT-SAI

## 2021-06-13 NOTE — PROGRESS NOTES
"[facility] Daily Progress     Patient Name: Mayra Ramirez  Date: 10/3/2017  Visit #:   PTA visit #:  0     Date of evaluation: 2017  Referral Diagnosis: Left-sided chest wall pain  Referring provider: Malia Escamilla, *   Precautions/Restrictions: Balance; left clavicle fracture and surgery 2015      Visit Diagnosis:     ICD-10-CM    1. Left-sided chest wall pain R07.89    2. Scar condition and fibrosis of skin L90.5    3. Contracture, left shoulder M24.512    4. 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 Decreased ROM of left shoulder M25.612          Assessment:     Decreased left chest wall edema.    Patient demonstrates understanding/independence with home program.  Patient is appropriate to continue with skilled physical therapy intervention, as indicated by initial plan of care.    Goal Status:    Patient will have a decreased volume in : left;for ease of movement;in 4 weeks;Comment;Progressing toward  Comment: upper chest wall  Patient will have decreased fibrosis, scar tissue for improved lymphatic mobilitiy : in 4 weeks;Progressing toward  Patient will perform, verbalize self-management of: skin care;self-monitoring;exercise;massage;self-compression;compression wear;compression care;infection prevention;in 4 weeks;Progressing toward  Pt will: have full left shoulder ROM for ADL's in 4 weeks progressing.    Plan for next visits:     Continue with initial plan of care.  Progress with home program as tolerated. MFR, MLD, exercises, HEP.    Subjective:     \" I always feel better after therapy\".   .  Pain Ratin       Objective:     Increased left shoulder mobility.  Increased scar mobility.      Treatment Today      TREATMENT MINUTES COMMENTS   Evaluation     Self-care/ Home management     Manual therapy 45 MLD as per left upper extremity protocol and MFR to lateral left trunk, left shoulder and pectoral release, left arm pull.   Neuromuscular Re-education   "   Therapeutic Activity     Therapeutic Exercises 8 Instructed in arm slide ;pectoral stretch, discussed importance of stretching.   Gait training     Modality__________________                Total 53    Blank areas are intentional and mean the treatment did not include these items.       Sandra Menchaca, PT,CLKIKI-SAI

## 2021-06-13 NOTE — PROGRESS NOTES
"[facility] Daily Progress     Patient Name: Mayra Ramirez  Date: 2017  Visit #: 2  PTA visit #:  -    Date of evaluation: 2017  Referral Diagnosis: Left-sided chest wall pain  Referring provider: Malia Escamilla, *   Visit Diagnosis:     ICD-10-CM    1. Decreased ROM of left shoulder M25.612    2. Left-sided chest wall pain R07.89    3. Localized edema R60.0          Assessment:     Left chest wall with moderate edema, 3 surgical scar spots with decreased scar mobility.  Scar tissue softens well with manual therapy.  Patient is benefitting from skilled physical therapy and is making steady progress toward functional goals.  Patient is appropriate to continue with skilled physical therapy intervention, as indicated by initial plan of care.    Goal Status:  Patient will have a decreased volume in : left;for ease of movement;in 4 weeks;Comment;Progressing toward  Comment: upper chest wall  Patient will have decreased fibrosis, scar tissue for improved lymphatic mobilitiy : in 4 weeks;Progressing toward  Patient will perform, verbalize self-management of: skin care;self-monitoring;exercise;massage;self-compression;compression wear;compression care;infection prevention;in 4 weeks;Progressing toward  Pt will: have full left shoulder ROM for ADL's in 4 weeks progressing.    Plan / Patient Education:     Continue with initial plan of care. MFR, MLD, therapeutic exercises, kinesiotape as needed.    Subjective:     Pain Ratin  \"It's hard to move move left arm\".      Objective:     Caregiver present: No    Observation of swelling: left chest wall is unchanged.     Volume measurements taken:  No      Skin condition is:  unchanged    Compression: no compression, patient reports she has not wear her compression sleeve in several years not even when she travels.      Medication for infection:  No    Treatment Today     TREATMENT MINUTES COMMENTS   Evaluation     Self-care/ Home management  Review " traveling precautions.   Manual therapy 20 Manual Therapy: Manual lymph drainage for left upper extremity:  Neck effleurage, short neck, shoulder collectors, right axilla, left inguinal, anterior axilla to axilla anastomosis from left to right, anterior axilla to in left inguinal anastomosis, left upper extremity, anterior axilla to axilla, neck effleurage.MFR to pectoral and lateral trunk   Neuromuscular Re-education     Therapeutic Activity     Therapeutic Exercises 10 Shoulder rolls, pectoral stretch   Gait training     Modality__________________                Total 30    Blank areas are intentional and mean the treatment did not include these items.       Sandra Menchaca PT, NORY-SAI  9/14/2017

## 2021-06-13 NOTE — PATIENT INSTRUCTIONS - HE
Use compression garment as needed.    Continue exercises.    Follow up in 6 months or when needed.

## 2021-06-13 NOTE — PROGRESS NOTES
[facility] Daily Progress     Patient Name: Mayra Ramirez  Date: 2017  Visit #:   PTA visit #:  0     Date of evaluation: 2017  Referral Diagnosis: Left-sided chest wall pain  Referring provider: Malia Escamilla, *   Precautions/Restrictions: Balance; left clavicle fracture and surgery 2015      Visit Diagnosis:     ICD-10-CM    1. Decreased ROM of left shoulder M25.612    2. Left-sided chest wall pain R07.89    3. Localized edema R60.0          Assessment:     Left chest wall decreases edema after MLD.    Patient demonstrates understanding/independence with home program.  Patient is appropriate to continue with skilled physical therapy intervention, as indicated by initial plan of care.    Goal Status:    Patient will have a decreased volume in : left;for ease of movement;in 4 weeks;Comment;Progressing toward  Comment: upper chest wall  Patient will have decreased fibrosis, scar tissue for improved lymphatic mobilitiy : in 4 weeks;Progressing toward  Patient will perform, verbalize self-management of: skin care;self-monitoring;exercise;massage;self-compression;compression wear;compression care;infection prevention;in 4 weeks;Progressing toward  Pt will: have full left shoulder ROM for ADL's in 4 weeks progressing.    Plan for next visits:     Continue with initial plan of care.  Progress with home program as tolerated. MFR, MLD, exercises, HEP.    Subjective:     I had to pull out my pantone.  Pain Ratin       Objective:     Left chest wall edema softens after MLD.    Treatment Today      TREATMENT MINUTES COMMENTS   Evaluation     Self-care/ Home management     Manual therapy 52 MLD as per left upper extremity protocol and MFR to lateral left trunk, left shoulder and pectoral release.   Neuromuscular Re-education     Therapeutic Activity     Therapeutic Exercises 8 Instructed in arm slide ; discussed importance of stretching.   Gait training      Modality__________________                Total 60    Blank areas are intentional and mean the treatment did not include these items.       Sandra Menchaca PT,NORY-SAI

## 2021-06-14 NOTE — TELEPHONE ENCOUNTER
Medication Request  Medication name: myrbetriq 25mg in the morning    Requested Pharmacy: [facility] in [facility]    Reason for request: the patient is wondering if she can take this medication twice per day. Or if she can increase the medication.  At first the medication was working great for 24 hours. In the last month it is losing its effectiveness. And there are some days that are worse than others.     She is leaking.  She sneezes and she leaks. She doesn't last the whole day. It is random times of the day when she will leak.       Okay to leave a detailed message: yes on the cell phone at [phone].      Sheree Villasenor, RN   [facility] Medication Refill and Triage Nurse  12:51 PM  1/7/2021    Reason for Disposition    Caller has NON-URGENT medication question about med that PCP prescribed and triager unable to answer question    Protocols used: MEDICATION QUESTION CALL-A-[place]

## 2021-06-14 NOTE — PROGRESS NOTES
[facility] Daily Progress     Patient Name: Mayra Ramirez  Date: 2017  Visit #:  +2  PTA visit #:  1     Date of evaluation: 2017  Referral Diagnosis: Left-sided chest wall pain  Referring provider: Malia Escamilla  Precautions/Restrictions: Balance; left clavicle fracture and surgery 2015      Visit Diagnosis:     ICD-10-CM    1. 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 Contracture, left shoulder M24.512    3. Scar condition and fibrosis of skin L90.5    4. Left-sided chest wall pain R07.89          Assessment:     Decreased left chest wall edema.    Patient demonstrates understanding/independence with home program.  Patient is appropriate to continue with skilled physical therapy intervention, as indicated by initial plan of care.    Goal Status:  Patient will have a decreased volume in : left;for ease of movement;in 4 weeks;Comment;Progressing toward  Comment: upper chest wall  Patient will have decreased fibrosis, scar tissue for improved lymphatic mobilitiy : in 4 weeks;Progressing toward  Patient will perform, verbalize self-management of: skin care;self-monitoring;exercise;massage;self-compression;compression wear;compression care;infection prevention;in 4 weeks;Progressing toward  Pt will: have full left shoulder ROM for ADL's in 4 weeks progressing.    Plan for next visits:     Continue with initial plan of care.  Progress with home program as tolerated. MFR, MLD, exercises,lower extremity strengthening,  HEP.    Subjective:      .  Pain Ratin  Patient reports  She is doing her exercises.    Objective:     Skin is intact.    Left shoulder ROM  flexion 147o    Left chest wall edema is decreased.    Skin : intact.      Treatment Today      TREATMENT MINUTES COMMENTS   Evaluation     Self-care/ Home management     Manual therapy 45 MLD as per left upper extremity protocol and MFR to lateral left trunk,left scapular release, left shoulder and pectoral  release,left shoulder mob, left arm pull. Left axilla with tissue bending techniques.    Neuromuscular Re-education     Therapeutic Activity     Therapeutic Exercises 10 Review home program, will continue with shoulder ROM exercises; shoulder rolls, pectoral stretch, median nerve glides.   Gait training     Modality__________________                Total 55    Blank areas are intentional and mean the treatment did not include these items.       Sandra Menchaca, PT,CLT-SAI

## 2021-06-14 NOTE — PROGRESS NOTES
[facility] Discharge Summary  Patient Name: Mayra Ramirez  Date: 1/26/2021  Referral Diagnosis:   Contracture, left shoulder [M24.512]  - Primary        Scar condition and fibrosis of skin [L90.5]        Post-mastectomy lymphedema syndrome [I97.2]        Breast cancer metastasized to brain, unspecified laterality (H) [C50.919, C79.31]           Referring provider: Sariah Corbin MD  Visit Diagnosis:   1. Post-mastectomy lymphedema syndrome     2. Localized edema     3. Contracture, left shoulder     4. Scar condition and fibrosis of skin     5. Left-sided chest wall pain     6. History of breast cancer     7. Decreased ROM of left shoulder         Goals: met  No data recorded  No data recorded    Patient was seen for 6 visits from 8/24/2020 to 10/2/2020 with 01 missed appointments.  The patient met goals and has demonstrated understanding of and independence in the home program for self-care, and progression to next steps.  She will initiate contact if questions or concerns arise.  Patient received a home program shoulder and lymphedema exercises.    Therapy will be discontinued at this time.  The patient will need a new referral to resume.    Thank you for your referral.  Sandra Menchaca PT  1/26/2021  6:04 PM

## 2021-06-14 NOTE — PROGRESS NOTES
[facility] Daily Progress     Patient Name: Mayra Ramirez  Date: 2017  Visit #:  +1  PTA visit #:  1     Date of evaluation: 2017  Referral Diagnosis: Left-sided chest wall pain  Referring provider: Malia Escamilla  Precautions/Restrictions: Balance; left clavicle fracture and surgery 2015      Visit Diagnosis:     ICD-10-CM    1. Left-sided chest wall pain R07.89    2. Scar condition and fibrosis of skin L90.5    3. Contracture, left shoulder M24.512          Assessment:     Increased left shoulder mobility.    Patient demonstrates understanding/independence with home program.  Patient is appropriate to continue with skilled physical therapy intervention, as indicated by initial plan of care.    Goal Status:  Patient will have a decreased volume in : left;for ease of movement;in 4 weeks;Comment;Progressing toward  Comment: upper chest wall  Patient will have decreased fibrosis, scar tissue for improved lymphatic mobilitiy : in 4 weeks;Progressing toward  Patient will perform, verbalize self-management of: skin care;self-monitoring;exercise;massage;self-compression;compression wear;compression care;infection prevention;in 4 weeks;Progressing toward  Pt will: have full left shoulder ROM for ADL's in 4 weeks progressing.    Plan for next visits:     Continue with initial plan of care.  Progress with home program as tolerated. MFR, MLD, exercises,lower extremity strengthening,  HEP.    Subjective:      .  Pain Ratin  Patient reports  left shoulder feels better.    Objective:     Skin is intact.    Left shoulder ROM  flexion 145o    Left chest wall edema is decreased.      Treatment Today      TREATMENT MINUTES COMMENTS   Evaluation     Self-care/ Home management     Manual therapy 45 MLD as per left upper extremity protocol and MFR to lateral left trunk,left scapular release, left shoulder and pectoral release,left shoulder mob, left arm pull.    Neuromuscular Re-education      Therapeutic Activity     Therapeutic Exercises 10 Review home program, will continue with shoulder ROM exercises; shoulder rolls, pectoral stretch, median nerve glides.   Gait training     Modality__________________                Total 50    Blank areas are intentional and mean the treatment did not include these items.       Sandra Menchaca, PT,CLT-SAI

## 2021-06-14 NOTE — PROGRESS NOTES
[facility] Discharge Summary  Patient Name: Mayra Ramirez  Date: 12/14/2017  Referral Diagnosis: Left-sided chest wall pain  Referring provider: Malia Escamilla, *  Visit Diagnosis:   1. Post-mastectomy lymphedema syndrome     2. Contracture, left shoulder     3. Scar condition and fibrosis of skin     4. Left-sided chest wall pain         Goals:  Patient will have a decreased volume in : left;for ease of movement;in 4 weeks;Comment;Progressing toward  Comment: upper chest wall  Patient will have decreased fibrosis, scar tissue for improved lymphatic mobilitiy : in 4 weeks;Progressing toward  Patient will perform, verbalize self-management of: skin care;self-monitoring;exercise;massage;self-compression;compression wear;compression care;infection prevention;in 4 weeks;Progressing toward  Pt will: have full left shoulder ROM for ADL's in 4 weeks progressing.    Patient was seen for 14 visits from 9-11-17 to 11-13-17 with 0 missed appointments.  The patient met goals and has demonstrated understanding of and independence in the home program for self-care, and progression to next steps.  She will initiate contact if questions or concerns arise.  The patient was instructed to follow up with physician's clinic.    Therapy will be discontinued at this time.  The patient will need a new referral to resume.    Thank you for your referral.  Sandra Menchaca PT  12/14/2017  10:28 AM
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
## 2021-06-16 NOTE — TELEPHONE ENCOUNTER
Seen GYN (Dr. Enriquez) a couple weeks ago for yearly check     Had irritation and dryness in vaginal area  -told to use hydrocortizone cream  -just on outer area    Tonight it was very itchy and went to put some on, and there was something blocking vaginal opening    Still able to urinate  No pain   No fever    Triaged to a disposition of see HCP within 2 weeks. Patient sees Dr. Enriquez with [facility] for GYN. Transferred call to make an appointment with him.     *note also placed in [facility] instance of Epic    COVID 19 Nurse Triage Plan/Patient Instructions    Please be aware that novel coronavirus (COVID-19) may be circulating in the community. If you develop symptoms such as fever, cough, or SOB or if you have concerns about the presence of another infection including coronavirus (COVID-19), please contact your health care provider or visit  [email or web address].    Disposition/Instructions    In-Person Visit with provider recommended. Reference Visit Selection Guide.    Thank you for taking steps to prevent the spread of this virus.  o Limit your contact with others.  o Wear a simple mask to cover your cough.  o Wash your hands well and often.    Resources    [facility]: About COVID-19: [email or web address]    CDC: What to Do If You're Sick: www.cdc.gov/coronavirus/2019-ncov/about/steps-when-sick.html    CDC: Ending Home Isolation: www.cdc.gov/coronavirus/2019-ncov/hcp/disposition-in-home-patients.html     CDC: Caring for Someone: www.cdc.gov/coronavirus/2019-ncov/if-you-are-sick/care-for-someone.html     [facility]: Interim Guidance for Hospital Discharge to Home: www.[email or web address]/diseases/coronavirus/hcp/hospdischarge.pdf    [facility] clinical trials (COVID-19 research studies): [email or web address]     Below are the COVID-19 hotlines at the [place] Department of Health ([facility]). Interpreters are available.   o For health questions: Call 907-171-5553  "or 1-277.208.7874 (7 a.m. to 7 p.m.)  o For questions about schools and childcare: Call 027-875-3061 or 1-880.715.1687 (7 a.m. to 7 p.m.)     Reason for Disposition    Feels like something inside is falling out of vagina (e.g., pressure, heaviness, fullness)    Additional Information    Negative: Sounds like a life-threatening emergency to the triager    Negative: Followed a genital area injury    Negative: Foreign body in vagina (e.g., tampon)    Negative: Vaginal bleeding is main symptom    Negative: Vaginal discharge is main symptom    Negative: Pain or burning with passing urine (urination) is main symptom    Negative: Menstrual cramps is main symptom    Negative: Abdomen pain is main symptom    Negative: Pubic lice suspected    Negative: Itching or rash of external female genital area (vulva)    Negative: Patient sounds very sick or weak to the triager    Negative: Tender lump (swelling or \"ball\") at vaginal opening    Negative: [1] Symptoms of a yeast infection (i.e., itchy, white discharge, not bad smelling) AND    [2] not improved > 3 days following CARE ADVICE    Negative: [1] Vaginal itching AND [2] not improved > 3 days following CARE ADVICE    Negative: Patient is worried about sexually transmitted disease (STD)    Protocols used: VAGINAL SYMPTOMS-LEIGHTON-LOLITA Benavides RN Triage Nurse Advisor    "

## 2021-06-16 NOTE — PROGRESS NOTES
Assessment/Plan:     1. Routine general medical examination at a health care facility  Routine history and physical, updated in EMR.  Patient will return fasting for labs as below.  Will attempt to obtain record from [facility].  Mammogram no longer indicated, pap smear up to date, as is colonoscopy.  Immunizations up to date as well.  Plan repeat physical in 1 year.  - Thyroid Stimulating Hormone (TSH); Future  - T4, Free; Future  - T3, Total; Future  - Glucose; Future  - Lipid [place] FASTING; Future    2. Osteopenia  Patient stopped Boniva 10/2017.  Recommended repeat [facility] in October to evaluate for progression to decide about length of drug holiday.    3. Hyperlipidemia, unspecified hyperlipidemia type  Patient will return fasting for check of lipids.  - Lipid [place] FASTING; Future    4. History of Hashimoto thyroiditis  Patient was told in the past that she has Hashimoto thyroiditis.  She has never taken levothyroxine and states she would refuse this.  - Thyroid Stimulating Hormone (TSH); Future  - T4, Free; Future  - T3, Total; Future    5. Need for immunization against influenza  - Influenza, Seasonal,Quad Inj, 36+ MOS    6. History of breast cancer  Patient follows with her Oncologist at [facility].      Subjective:      Mayra Ramirez is a 52 y.o. female who presents for an annual exam. The patient is not sexually active. The patient participates in regular exercise: yes. The patient reports that there is not domestic violence in her life.  Patient talks about how she continues to deal with some fatigue and dizziness, improved by natural treatments like supplements and chiropractic care.    Healthy Habits:   Regular Exercise: Yes  Sunscreen Use: No  Healthy Diet: Yes  Dental Visits Regularly: Yes  Seat Belt: Yes  Sexually active: No  Self Breast Exam Monthly:Yes   Colonoscopy: 4/7/11 - repeating this spring  Lipid Profile: Yes  Glucose Screen: Yes  Prevention of Osteoporosis: Yes and taking Ca with D  Last  Dexa: Yes and 11/2017 - osteoporosis      Immunization History   Administered Date(s) Administered     Influenza,seasonal quad, PF 10/29/2013     Influenza,seasonal quad, PF, 36+MOS 12/22/2015     Tdap 12/16/2013     Immunization status: up to date and documented.    No exam data present    Gynecologic History  No LMP recorded. Patient is not currently having periods (Reason: Oopherectomy).  Contraception: surgical - oophorectomy  Last Pap: 9/12/16. Results were: normal and HPV neg  Last mammogram: 2004. Results were: abnormal      OB History   No data available       Current Outpatient Prescriptions   Medication Sig Dispense Refill     ascorbic acid, vitamin C, (VITAMIN C) 1000 MG tablet Take by mouth.       biotin 5 mg cap Take by mouth.       calcium carbonate-vitamin D3 (CALCIUM 600 + D,3,) 600 mg calcium- 200 unit cap Take by mouth 2 times a day at 6:00 am and 4:00 pm.       calcium-magnesium-zinc 333-133-5 mg Tab Take by mouth.       cholecalciferol, vitamin D3, (VITAMIN D3) 2,000 unit Tab Take 1 tablet by mouth daily.       LACTOBACILLUS ACIDOPHILUS (PROBIOTIC ORAL) Take by mouth daily.       magnesium 250 mg Tab Take by mouth.       melatonin 3 mg Tab tablet Take by mouth.       MULTIVITAMIN ORAL Take by mouth.       estradiol (ESTRACE) 0.01 % (0.1 mg/gram) vaginal cream USE 0.5GM INTRAVAGINALLY AT BEDTIME ON MONDAY AND THURSDAY FOR 4 WEEKS AS DIRECTED       letrozole (FEMARA) 2.5 mg tablet Take 2.5 mg by mouth daily.       omeprazole (PRILOSEC) 20 MG capsule TAKE 1 CAPSULE BY MOUTH TWICE A DAY AS DIRECTED  3     No current facility-administered medications for this visit.      Past Medical History:   Diagnosis Date     Breast cancer left    2004     Metastasis to brain 2005     Osteopenia     2/2 breast CA tx.     Past Surgical History:   Procedure Laterality Date     MASTECTOMY MODIFIED RADICAL Left      [place] REMOVAL OF OVARY(S)      Description: Oophorectomy;  Recorded: 10/29/2013;     SHOULDER SURGERY Left  "12/2015     SIMPLE MASTECTOMY Right      TONSILLECTOMY       Sulfamethoxazole-trimethoprim and Sulfasalazine  Family History   Problem Relation Age of Onset     Cancer Mother      thyroid      Hypertension Mother      Breast cancer Maternal Grandmother      Diabetes Maternal Grandmother      Diabetes Paternal Grandmother      Social History     Social History     Marital status:      Spouse name: N/A     Number of children: N/A     Years of education: N/A     Occupational History      [employer]     medical leave 4/2017-?     Social History Main Topics     Smoking status: Never Smoker     Smokeless tobacco: Never Used     Alcohol use No     Drug use: No     Sexual activity: Yes     Partners: Male     Other Topics Concern     Not on file     Social History Narrative    .  4 kids.  On medical leave .         Review of Systems  General:  Fatigue  Eyes: Denies problem  Ears/Nose/Throat: Denies problem  Cardiovascular: Denies problem  Respiratory:  Denies problem  Gastrointestinal:  Denies problem  Genitourinary: Denies problem  Musculoskeletal:  Left shoulder and clavicle pain after fracture  Skin: Denies problem  Neurologic: Ongoing dizziness  Psychiatric: Denies problem  Endocrine: Denies problem  Heme/Lymphatic: Denies problem   Allergic/Immunologic: Denies problem        Objective:         Vitals:    02/12/18 1404   BP: 108/70   Pulse: (!) 56   Resp: 16   Weight: 171 lb 3.2 oz (77.7 kg)   Height: 5' 7.8\" (1.722 m)     Body mass index is 26.18 kg/(m^2).    Physical Exam:  General Appearance: Alert, cooperative, no distress, appears stated age  Head: Normocephalic, without obvious abnormality, atraumatic  Eyes: PERRL, conjunctiva/corneas clear, EOM's intact  Ears: Normal TM's and external ear canals, both ears  Nose: Nares normal, septum midline, mucosa normal, no drainage  Throat: Lips, mucosa, and tongue normal; teeth and gums normal  Neck: " Supple, symmetrical, trachea midline, no adenopathy; thyroid: not enlarged, symmetric, no tenderness/mass/nodules  Back: Symmetric, no curvature, ROM normal, no CVA tenderness  Lungs: Clear to auscultation bilaterally, respirations unlabored  Breasts: Not examined (patient defers)  Heart: Regular rate and rhythm, S1 and S2 normal, no murmur, rub, or gallop  Abdomen: Soft, non-tender, bowel sounds active all four quadrants, no masses, no organomegaly  Pelvic:Not examined  Extremities: Extremities normal, atraumatic, no cyanosis or edema  Skin: Skin color, texture, turgor normal, no rashes or lesions  Lymph nodes: Cervical, supraclavicular, and axillary nodes normal  Neurologic: Normal

## 2021-06-17 NOTE — PATIENT INSTRUCTIONS - HE
Patient Instructions by Avel Vu PA-C at 8/2/2019 12:20 PM     Author: Avel Vu PA-C Service: -- Author Type: Physician Assistant    Filed: 8/2/2019  1:27 PM Encounter Date: 8/2/2019 Status: Signed    : Avel Vu PA-C (Physician Assistant)       Increased fluids and rest.  Discussed signs and symptoms of ascending urinary tract infection symptoms to include pyelonephritis. Instructed to turn to clinic if there are increased fever chills night sweats fatigue abdominal pain or flank pain  Antibiotic as written. Risks and benefits of medication discussed.  Indication for return to clinic.        Urinary Tract Infections in Women    Urinary tract infections (UTIs) are most often caused by bacteria (germs). These bacteria enter the urinary tract. The bacteria may come from outside the body. Or they may travel from the skin outside the rectum or vagina into the urethra. Female anatomy makes it easier for bacteria from the bowel to enter a womans urinary tract, which is the most common source of UTI. This means women develop UTIs more often than men. Pain in or around the urinary tract is a common UTI symptom. But the only way to know for sure if you have a UTI for the health care provider to test your urine. The two tests that may be done are the urinalysis and urine culture.  Types of UTIs    Cystitis: A bladder infection (cystitis) is the most common UTI in women. You may have urgent or frequent urination. You may also have pain, burning when you urinate, and bloody urine.    Urethritis: This is an inflamed urethra, which is the tube that carries urine from the bladder to outside the body. You may have lower stomach or back pain. You may also have urgent or frequent urination.    Pyelonephritis: This is a kidney infection. If not treated, it can be serious and damage your kidneys. In severe cases, you may be hospitalized. You may have a fever and lower back pain.  Medications to treat a UTI  Most  UTIs are treated with antibiotics. These kill the bacteria. The length of time you need to take them depends on the type of infection. It may be as short as 3 days. If you have repeated UTIs, a low-dose antibiotic may be needed for several months. Take antibiotics exactly as directed. Dont stop taking them until all of the medication is gone. If you stop taking the antibiotic too soon, the infection may not go away, and you may develop a resistance to the antibiotic. This can make it much harder to treat.  Lifestyle changes to treat and prevent UTIs  The lifestyle changes below will help get rid of your UTI. They may also help prevent future UTIs.    Drink plenty of fluids. This includes water, juice, or other caffeine-free drinks. Fluids help flush bacteria out of your body.    Empty your bladder. Always empty your bladder when you feel the urge to urinate. And always urinate before going to sleep. Urine that stays in your bladder can lead to infection. Try to urinate before and after sex as well.    Practice good personal hygiene. Wipe yourself from front to back after using the toilet. This helps keep bacteria from getting into the urethra.    Use condoms during sex. These help prevent UTIs caused by sexually transmitted bacteria. Also, avoid using spermicides during sex. These can increase the risk of UTIs. Choose other forms of birth control instead. For women who tend to get UTIs after sex, a low-dose of a preventive antibiotic may be used. Be sure to discuss this option with your health care provider.    Follow up with your health care provider as directed. He or she may test to make sure the infection has cleared. If necessary, additional treatment may be started.  Date Last Reviewed: 9/8/2014 2000-2016 [employer]. [address] [place] [address], [place] [address]. All rights reserved. This information is not intended as a substitute for professional medical care. Always follow your healthcare  professional's instructions.

## 2021-06-17 NOTE — PROGRESS NOTES
No new changes or concerns. Still has swelling on left upper chest near shoulder. No infection concerns. Has been having falls at home.

## 2021-06-17 NOTE — PATIENT INSTRUCTIONS - HE
For new compression garments:  Please call one of the [facility] locations below to schedule an appointment. If you received a prescription please bring it with you to your appointment. Some locations are limited to what they carry.    Office Locations    [facility]  [address] [place] [address]  Home Medical Equipment, [address]   Phone: [phone]   Orthotics and Prosthetics, [address]   Phone: [phone]    [facility]  Home Medical Equipment  [address], [address], [place] [address]   Phone: [phone]    Orthotics and Prosthetics ([facility])    [address], [place] [address]  Phone: [phone]    [facility]  [address]  [address]   [place] [address]   Phone: [phone]    [facility]  [address]  [place] [address]  Phone: [phone]    [facility] Bld[facility]   [address]  [place] [address]  Phone: [phone]    [facility]  [address]  [place] [address]  Phone: [phone]    [facility]  [address]  [address]  [place] [address]  Phone: [phone]    [place]   [address] [place] [address]   Phone: [phone]

## 2021-06-18 NOTE — PATIENT INSTRUCTIONS - HE
Patient Instructions by Nithya Swain PTA at 9/4/2020 11:00 AM     Author: Nithya Swain PTA Service: -- Author Type: Physical Therapist Assistant    Filed: 9/4/2020 11:52 AM Encounter Date: 9/4/2020 Status: Signed    : Nithya Swain PTA (Physical Therapist Assistant)        WALL WALK    Place your affected hand on the wall with the palm facing the wall. Next, walk your fingers up the wall towards overhead. Lastly, slide your hand back down the wall to the starting position.   .xshoul

## 2021-06-19 NOTE — LETTER
Letter by Blake Cruz MD at      Author: Blake Cruz MD Service: -- Author Type: --    Filed:  Encounter Date: 9/23/2019 Status: Signed         Mayra Ramirez  [address] [place] [address]             September 23, 2019         Dear Ms. Ramirez,    Below are the results from your recent visit:    Resulted Orders   Urinalysis-UC if Indicated   Result Value Ref Range    Color, UA Yellow Colorless, Yellow, Straw, Light Yellow    Clarity, UA Clear Clear    Glucose, UA Negative Negative    Bilirubin, UA Negative Negative    Ketones, UA Negative Negative    Specific Gravity, UA 1.015 1.005 - 1.030    Blood, UA Trace (!) Negative    pH, UA 7.0 5.0 - 8.0    Protein, UA Negative Negative mg/dL    Urobilinogen, UA 0.2 E.U./dL 0.2 E.U./dL, 1.0 E.U./dL    Nitrite, UA Negative Negative    Leukocytes, UA Trace (!) Negative    Bacteria, UA None Seen None Seen hpf    RBC, UA 0-2 None Seen, 0-2 hpf    WBC, UA 0-5 None Seen, 0-5 hpf    Squam Epithel, UA None Seen None Seen, 0-5 lpf    Narrative    Urine Culture ordered based on [facility] Medical Laboratory criteria   Culture, Urine   Result Value Ref Range    Culture No Growth                  Negative         Please call with questions or contact us using [employer].    Sincerely,        Electronically signed by Blake Cruz MD
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
## 2021-06-19 NOTE — LETTER
Letter by Yessica Mike PA-C at      Author: Yessica Mike PA-C Service: -- Author Type: --    Filed:  Encounter Date: 11/26/2019 Status: Signed         Mayra Ramirez  [address] [place] [address]             November 26, 2019         Dear Ms. Ramirez,    Below are the results from your recent visit:    Resulted Orders   Basic Metabolic Panel   Result Value Ref Range    Sodium 142 136 - 145 mmol/L    Potassium 4.3 3.5 - 5.0 mmol/L    Chloride 107 98 - 107 mmol/L    CO2 27 22 - 31 mmol/L    Anion Gap, Calculation 8 5 - 18 mmol/L    Glucose 92 70 - 125 mg/dL    Calcium 9.5 8.5 - 10.5 mg/dL    BUN 11 8 - 22 mg/dL    Creatinine 0.78 0.60 - 1.10 mg/dL    GFR MDRD Af Amer >60 >60 mL/min/1.73m2    GFR MDRD Non Af Amer >60 >60 mL/min/1.73m2    Narrative    Fasting Glucose reference range is 70-99 mg/dL per  American Diabetes Association (ADA) guidelines.   HM1 (CBC with Diff)   Result Value Ref Range    WBC 5.1 4.0 - 11.0 thou/uL    RBC 4.25 3.80 - 5.40 mill/uL    Hemoglobin 13.0 12.0 - 16.0 g/dL    Hematocrit 37.6 35.0 - 47.0 %    MCV 89 80 - 100 fL    MCH 30.5 27.0 - 34.0 pg    MCHC 34.5 32.0 - 36.0 g/dL    RDW 11.1 11.0 - 14.5 %    Platelets 197 140 - 440 thou/uL    MPV 8.6 7.0 - 10.0 fL    Neutrophils % 60 50 - 70 %    Lymphocytes % 25 20 - 40 %    Monocytes % 12 (H) 2 - 10 %    Eosinophils % 2 0 - 6 %    Basophils % 1 0 - 2 %    Neutrophils Absolute 3.1 2.0 - 7.7 thou/uL    Lymphocytes Absolute 1.3 0.8 - 4.4 thou/uL    Monocytes Absolute 0.6 0.0 - 0.9 thou/uL    Eosinophils Absolute 0.1 0.0 - 0.4 thou/uL    Basophils Absolute 0.0 0.0 - 0.2 thou/uL        Labs are normal.      Please call with questions or contact us using "[employer].    Sincerely,        Electronically signed by Yessica Mike PA-C

## 2021-06-20 NOTE — LETTER
Letter by Sariah Corbin MD at      Author: Sariah Corbin MD Service: -- Author Type: --    Filed:  Encounter Date: 7/27/2020 Status: (Other)         Mayra Ramirez  [address] [place] [address] [address] [place] [address]             July 27, 2020         Dear Ms. Ramirez,    Below are the results from your recent visit:    Resulted Orders   Urinalysis-UC if Indicated   Result Value Ref Range    Color, UA Yellow Colorless, Yellow, Straw, Light Yellow    Clarity, UA Clear Clear    Glucose, UA Negative Negative    Bilirubin, UA Negative Negative    Ketones, UA Negative Negative    Specific Gravity, UA 1.020 1.005 - 1.030    Blood, UA Negative Negative    pH, UA 7.0 5.0 - 8.0    Protein, UA Negative Negative mg/dL    Urobilinogen, UA 0.2 E.U./dL 0.2 E.U./dL, 1.0 E.U./dL    Nitrite, UA Negative Negative    Leukocytes, UA Trace (!) Negative    Bacteria, UA Few (!) None Seen hpf    RBC, UA None Seen None Seen, 0-2 hpf    WBC, UA 0-5 None Seen, 0-5 hpf    Squam Epithel, UA None Seen None Seen, 0-5 lpf    WBC Clumps Present (!) None Seen    Narrative    Urine Culture ordered based on [facility] criteria   Culture, Urine   Result Value Ref Range    Culture No Growth         Result is/are normal.  No growth but due to symptoms recommend continue antibiotic.  If no  improvement, my chart or call the office.     Please call with questions or contact us using [employer].    Sincerely,        Electronically signed by Sariah Corbin MD

## 2021-06-20 NOTE — LETTER
Letter by Sariah Corbin MD at      Author: Sariah Corbin MD Service: -- Author Type: --    Filed:  Encounter Date: 1/20/2020 Status: Signed         Mayra Ramirez  [address] [place] [address]             January 20, 2020         Dear Ms. Ramirez,    Below are the results from your recent visit:    Resulted Orders   Lipid Cascade   Result Value Ref Range    Cholesterol 188 <=199 mg/dL    Triglycerides 105 <=149 mg/dL    HDL Cholesterol 50 >=50 mg/dL    LDL Calculated 117 <=129 mg/dL    Patient Fasting > 8hrs? Yes            Normal          Please call with questions or contact us using [employer].    Sincerely,        Electronically signed by Sariah Corbin MD
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
## 2021-06-21 NOTE — PROGRESS NOTES
When I  went through my list of patients to call and schedule, I saw that Gisselle Mccoy had scheduled this patient on 11/21/18 to come in for an eval in [place] for 12/3/18.
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
## 2021-06-21 NOTE — PROGRESS NOTES
S: Patient seen in [place] to be measured for a Medi [place] arm sleeve with attached gauntlet 20-30mmHg.  She has an Rx from Dr. Escamilla to treat her lymphedema.  She reports her swelling okay.    O: Goal is to evaluate and measure patient for a compression garment that will help control her lymphedema and maintain reduction levels. The expected outcome with compliant use is to reduce swelling and control the patient's condition.      A: I took measurements for a Medi [place] arm sleeve with attached gauntlet 20-30mmHg (size 3) in sand with a silicone top band. This garment will help to maintain the reduced volume and shape of the limb. It will also help to inhibit further fluid accumulation. I helped Mayra don her compression sleeve pulled from the stock in [place]. It was a good fit and she stated that it was comfortable.    P: She is to call with any further needs.  Goal is to maintain a home program.

## 2021-06-22 ENCOUNTER — COMMUNICATION - HEALTHEAST (OUTPATIENT)
Dept: OTHER | Facility: CLINIC | Age: 56
End: 2021-06-22

## 2021-06-22 NOTE — PROGRESS NOTES
I called the patient on 12/12/18 and discussed that she would like to come in and exchange her arm sleeve w/attached gauntlet for the next size up (MEDI HARMONY ARMSLEEVE W/GAUNTLET 20-30MMHG (SIZE 4), IN SAND W/DELTA). She will come to the [facility] to do an exchange.

## 2021-06-22 NOTE — PROGRESS NOTES
Family Medicine Office Visit  [facility] [place]  Patient Name: Mayra Ramirez  Patient Age: 53 y.o.  YOB: 1965  MRN: [id number]    Date of Visit: 2018  Reason for Office Visit:   Chief Complaint   Patient presents with     Urine Leakage     bladder leakage - would like medications before vacation            Assessment / Plan / Medical Decision Makin. Urinary urgency  Trial of myrbetriq for urgency and see if any help with the incontinence    2. Mixed incontinence  Discussed kegel exercises and potential for pelvic floor exercises when back from vacation.  Call if you would like a referral to PT        Health Maintenance Review  Health Maintenance   Topic Date Due     INFLUENZA VACCINE RULE BASED (1) 2018     ADVANCE DIRECTIVES DISCUSSED WITH PATIENT  10/29/2018     COLONOSCOPY  2021     PAP SMEAR  2021     TD 18+ HE  2023     TDAP ADULT ONE TIME DOSE  Completed         I am having Mayra Ramirez start on mirabegron and azithromycin. I am also having her maintain her biotin, calcium carbonate-vitamin D3, MULTIVITAMIN ORAL, cholecalciferol (vitamin D3), magnesium, LACTOBACILLUS ACIDOPHILUS (PROBIOTIC ORAL), ascorbic acid (vitamin C), melatonin, fluticasone, ranitidine, co-enzyme Q-10, UNABLE TO FIND, UNABLE TO FIND, (prasterone, DHEA, (DHEA ORAL)), UNABLE TO FIND, UNABLE TO FIND, vit C/E/zinc/lutein/zeaxanthin (OCUVITE EYE HEALTH ORAL), fish oil-omega-3 fatty acids, UNABLE TO FIND, UNABLE TO FIND, UNABLE TO FIND, estradiol, and azithromycin.      HPI:  Mayra Ramirez is a 53 y.o. year old who presents to the office today for urinary incontinence.  Ongoing for many months - some incontinence when bending down and going to  things.  At times urgency and unable to make it to the bathroom.  Other times feels like has to go and nothing comes out.  No leakage with coughing, sneezing or laughing.  Nocturia 3-4x/week.  Stops drinking liquids  about 8pm.  No dysuria, no hematuria, no fever or chills, no abdominal pain.  Increased frequency of urination throughout the day.        Review of Systems- pertinent positive in bold:  Constitutional: Fever, chills, night sweats, fainting, weight change, fatigue, seizures, dizziness, sleeping difficulties, loud snoring/pauses in breathing  Eyes: change in vision, blurred or double vision, redness/eye pain  Ears, nose, mouth, throat: change in hearing, ear pain, hoarseness, difficulty swallowing, sores in the mouth or throat  Respiratory: shortness of breath, cough, bloody sputum, wheezing  Cardiovascular: chest pain, palpitations   Gastrointestinal: abdominal pain, heartburn/indigestion, nausea/vomiting, change in appetite, change in bowel habits, constipation or diarrhea, rectal bleeding/dark stools, difficulty swallowing  Urinary: painful urination, frequent urination, urinary urgency/incontinence, blood in urine/dark urine, nocturia  Musculoskeletal: backache/back pain (new or increasing), weakness, joint pain/stiffness (new or increasing), muscle cramps, swelling of hands, feet, ankles, leg pain/redness  Skin: change in moles/freckles, rash, nodules  Hematologic/lymphatic: swollen lymph glands, abnormal bruising/bleeding  Endocrine: excessive thirst/urination, cold or heat intolerance  Neurologic/emotional: worrisome memory change, numbness/tingling, anxiety, mood swings      Current Scheduled Meds:  Outpatient Encounter Medications as of 12/7/2018   Medication Sig Dispense Refill     ascorbic acid, vitamin C, (VITAMIN C) 1000 MG tablet Take by mouth.       biotin 5 mg cap Take by mouth.       calcium carbonate-vitamin D3 (CALCIUM 600 + D,3,) 600 mg calcium- 200 unit cap Take by mouth 2 times a day at 6:00 am and 4:00 pm.       cholecalciferol, vitamin D3, (VITAMIN D3) 2,000 unit Tab Take 1 tablet by mouth daily.       co-enzyme Q-10 30 mg capsule Take 30 mg by mouth 3 (three) times a day.       estradiol  (ESTRACE) 0.01 % (0.1 mg/gram) vaginal cream        fluticasone (FLONASE) 50 mcg/actuation nasal spray        LACTOBACILLUS ACIDOPHILUS (PROBIOTIC ORAL) Take by mouth daily.       magnesium 250 mg Tab Take by mouth.       melatonin 3 mg Tab tablet Take by mouth.       MULTIVITAMIN ORAL Take by mouth.       omega-3/dha/epa/fish oil (FISH OIL-OMEGA-3 FATTY ACIDS) 300-1,000 mg capsule Take 2 g by mouth daily.       prasterone, DHEA, (DHEA ORAL) Take by mouth.       ranitidine (ZANTAC) 150 MG tablet Take 150 mg by mouth 2 (two) times a day.       UNABLE TO FIND Med Name: Trancor       UNABLE TO FIND Med Name: Endefin       UNABLE TO FIND Med Name: Perimine       UNABLE TO FIND Med Name: Thyrosol       UNABLE TO FIND Med Name: Bone Builder       UNABLE TO FIND Med Name: Adrenogen       UNABLE TO FIND Med Name: Metallo Clear       vit C/E/zinc/lutein/zeaxanthin (OCUVITE EYE HEALTH ORAL) Take by mouth.       azithromycin (ZITHROMAX Z-RC) 250 MG tablet Take 2 tablets (500 mg) on  Day 1,  followed by 1 tablet (250 mg) once daily on Days 2 through 5.. 6 tablet 0     azithromycin (ZITHROMAX) 500 MG tablet        mirabegron (MYRBETRIQ) 25 mg Tb24 ER tablet Take 1 tablet (25 mg total) by mouth daily. 30 tablet 0     No facility-administered encounter medications on file as of 12/7/2018.      Past Medical History:   Diagnosis Date     Breast cancer (H) left    2004 - HER2 pos, ER+, [place]-     Metastasis to brain (H) 2005     Osteopenia     2/2 breast CA tx.     Past Surgical History:   Procedure Laterality Date     BRAIN SURGERY  2005    removal of cerebellar tumor     MASTECTOMY MODIFIED RADICAL Left      ORIF CLAVICLE FRACTURE  12/2016    plate and screws     ME REMOVAL OF OVARY(S)      Description: Oophorectomy;  Recorded: 10/29/2013;     SIMPLE MASTECTOMY Right      TONSILLECTOMY       Social History     Tobacco Use     Smoking status: Never Smoker     Smokeless tobacco: Never Used   Substance Use Topics     Alcohol use: No      Drug use: No       Objective / Physical Examination:  Vitals:    12/07/18 0715   BP: 100/76   Patient Site: Right Arm   Patient Position: Sitting   Cuff Size: Adult Regular   Pulse: 90   Weight: 168 lb (76.2 kg)     Wt Readings from Last 3 Encounters:   12/07/18 168 lb (76.2 kg)   11/15/18 162 lb 8 oz (73.7 kg)   09/24/18 164 lb 3.2 oz (74.5 kg)     BP Readings from Last 3 Encounters:   12/07/18 100/76   11/15/18 94/62   09/24/18 100/60     Body mass index is 25.54 kg/m .     General Appearance: Alert and oriented, cooperative, affect appropriate, speech clear, in no apparent distress  Head: Normocephalic, atraumatic  Ears: Tympanic membrane clear with landmarks well visualized bilaterally  Eyes: PERRL, fundi appear clear bilaterally. EOMI. Conjunctivae clear and sclerae non-icteric  Nose: Septum midline, nares patent, no visible polyps, mucosa moist and without drainage  Throat: Lips and mucosa moist. Teeth in good repair, pharynx without erythema or exudate  Neck: Supple, trachea midline. No cervical adenopathy  Back: Symmetrical and nontender  Lungs: Clear to auscultation bilaterally. Normal inspiratory and expiratory effort  Cardiovascular: Regular rate, normal S1, S2. No murmurs, rubs, or gallops  Abdomen: Bowel sounds active all four quadrants. Soft, non-tender. No hepatomegaly or splenomegaly. No bruits detected.   Extremities: Pulses 2+ and equal throughout. No edema. Strength equal throughout.  Integumentary: Warm and dry. Without suspicious looking lesions  Neuro: Alert and oriented, follows commands appropriately.     No orders of the defined types were placed in this encounter.  Followup: No Follow-up on file. earlier if needed.    Total time spent with patient was 15 min with >50% of time spent in face-to-face counseling regarding the above plan       Sariah Corbin MD

## 2021-06-22 NOTE — PROGRESS NOTES
Mayra and I spoke and she has concernes with the tightness of her arm sleeve. She says it kind of hurts when bending her thumb but she can't tell if it's supposed to be that tight. I told her to wash it a few times until I get the next size up in that garment later this week. I told her she can come in to trade it out if needed for the size up.    I will call her once that garment is in later this week to discuss a plan.

## 2021-06-23 NOTE — PATIENT INSTRUCTIONS - HE
A referral was sent to [facility]. You will receive a phone call in 1-2 business days to schedule you appointment. If for some reason you do not hear from them or wish to schedule sooner please call [phone] to schedule.  [facility] - [address], [address]  [place] [address]    SWELLING/LYMPHEDEMA THERAPY TREATMENT     - What to expect your first visit     Based on your initial evaluation, you will have an individualized program designed by a certified lymphedema therapist, with specialized training in swelling.     It will consist of discussing your prior activity level, goals and limitations.     The therapist will assess your edema, evaluate for swelling, ,measure your motion and strength.      - Treatment usually includes     Swelling education and prevention strategies.     Swelling/lymphedema massage- A special decompressive massage to help improve the lymphatic drainage.     Swelling/lymphatic stimulation exercises     Bandaging or compression garments- To provide increased tissue pressure in the swollen extremity.     Home exercise program instruction     Stretching exercises     Education in how to independently manage edema.        - Follow up care     ONCE FORMAL THERAPY HAS BEEN COMPLETED, THE PATIENT WILL BE EXPECTED TO WEAR THE APPROPRIATE COMPRESSION BANDAGES, BE CONSISTENT WITH THE SKIN CARE PROGRAM  AND PERFORM THE PRESCRIBED SELF -MASSAGE AND /OR EXERCISES AS PRESCRIBED.

## 2021-06-23 NOTE — TELEPHONE ENCOUNTER
Patient left voice message. She is having lymphedema in her chest. She has an acupuncture appointment coming up and she knows to avoid her left arm but she is not sure about what she can do and cannot do with her chest.     Message forwarded to Dr. Francine Beltran's .

## 2021-06-23 NOTE — TELEPHONE ENCOUNTER
Spoke with Mayra, informed her that Dr. Escamilla said the chest and arms should not be a site for acupuncture. She will discuss this more in depth tomorrow at her appointment with Dr. Escamilla.

## 2021-06-23 NOTE — TELEPHONE ENCOUNTER
Left message for patient to call back regarding her question about acupuncture. Note from Dr. Escamilla in August stated the patient could have acupuncture if they did not go near the arms or anterior chest.
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
## 2021-06-24 NOTE — PROGRESS NOTES
[facility] Daily Progress     Patient Name: Mayra Vasquezb  Date: 3/12/2019  Visit #: 9/10-12   PTA visit #:  3  Referral Diagnosis: Swelling:  Lymphatic;  Left arm, Contracture(s):  Left  shoulder, Post mastectomy/lumpectomy/LND, Axillary LND and Fibrosis/Scarring.  Slight strain right upper trapezius.  Referring provider: Malia Escamilla, *  Visit Diagnosis:       ICD-10-CM     1. Contracture, left shoulder M24.512       right upper trap strain   2. 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 Scar condition and fibrosis of skin L90.5            History of Present Illness:    Mayra is a 53 y.o. female who presents to therapy today with complaints of left chest wall edema, decreased left shoulder mobility right neck pain. Date of onset/duration of symptoms is 2-7-19. Onset was gradual. Symptoms are intermittent and not improving. She denies history of similar symptoms. She describes their previous level of function as not limited     Assessment:     Left shoulder ROM improved.  Left clavicular metal implant in place and surgical scar softens well with MFR, left chest wall upper quadrant with minimal fluid collection, softens well with MLD.    Patient is benefitting from skilled physical therapy and is making steady progress toward functional goals.  Patient is appropriate to continue with skilled physical therapy intervention, as indicated by initial plan of care.    Goal Status:  Patient will reach / maintain arm movement: overhead;for home chores;for dressing;with full ROM;in 4 weeks    Patient will have a decreased volume in : left;by 5%;to decrease risk of infection;for better fit of clothing;for improved body image;for ease of movement;in 4 weeks;Comment;Progressing toward  Comment: decreased left chest wall edema.  Patient will have decreased fibrosis, scar tissue for improved lymphatic mobilitiy : in 4 weeks;Progressing toward  Patient will perform, verbalize self-management of: skin  care;self-monitoring;exercise;massage;self-compression;infection prevention;in 4 weeks      Plan / Patient Education:     Continue with initial plan of care.  Progress with home program as tolerated.  Plan for next visit: MFR, MLD, shoulder exercises, HEP. Review HEP upgrade as able due to metal implant.    Subjective:      Pain Ratin/10 today.    Patient reports she is doing her exercises.        Objective:     Caregiver present: no    Observation of swelling: left chest wall with mild edema, continues to improve    Volume measurements taken:  no    Skin condition is:  Intact, skin mobility increased.      Compression:Has compression sleeve, rarely uses      Medication for infection:  None.    Treatment Today     TREATMENT MINUTES COMMENTS   Evaluation     Self-care/ Home management     Manual therapy 55 MLD L torso in supine and R SL, R axilla to axilla anastomosis, MFR  With elongation to L anterior, lateral torso, L axilla and L upper arm. MFR and SCS R UT scapular region  In supine and seated.   Neuromuscular Re-education     Therapeutic Activity     Therapeutic Exercises 5 Instructed on the side pull, pectoral stretch gentle, latissimus dorsi.   Gait training     Modality__________________                Total 60    Blank areas are intentional and mean the treatment did not include these items.       Sandra Osorio,NORY-SAI  3/12/2019

## 2021-06-24 NOTE — TELEPHONE ENCOUNTER
LVM for pt that it was not primary care that called as there are no notes or labs and pt has not been seen recently and no appointments scheduled.

## 2021-06-24 NOTE — PROGRESS NOTES
[facility] Daily Progress     Patient Name: Mayra Vasquezb  Date: 3/8/2019  Visit #: 8/10-12  PTA visit #:  3  Referral Diagnosis: Swelling:  Lymphatic;  Left arm, Contracture(s):  Left  shoulder, Post mastectomy/lumpectomy/LND, Axillary LND and Fibrosis/Scarring.  Slight strain right upper trapezius.  Referring provider: Malia Escamilla, *  Visit Diagnosis:       ICD-10-CM     1. Contracture, left shoulder M24.512       right upper trap strain   2. 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 Scar condition and fibrosis of skin L90.5            History of Present Illness:    Mayra is a 53 y.o. female who presents to therapy today with complaints of left chest wall edema, decreased left shoulder mobility right neck pain. Date of onset/duration of symptoms is 2-7-19. Onset was gradual. Symptoms are intermittent and not improving. She denies history of similar symptoms. She describes their previous level of function as not limited     Assessment:   Pt feels better at end of visit, L clavicular area  was also feeling OK  Left clavicular surgical scar with increased mobility.  Left chest wall with decreased edema.    Patient is benefitting from skilled physical therapy and is making steady progress toward functional goals.  Patient is appropriate to continue with skilled physical therapy intervention, as indicated by initial plan of care.    Goal Status:  Patient will reach / maintain arm movement: overhead;for home chores;for dressing;with full ROM;in 4 weeks    Patient will have a decreased volume in : left;by 5%;to decrease risk of infection;for better fit of clothing;for improved body image;for ease of movement;in 4 weeks;Comment;Progressing toward  Comment: decreased left chest wall edema.  Patient will have decreased fibrosis, scar tissue for improved lymphatic mobilitiy : in 4 weeks;Progressing toward  Patient will perform, verbalize self-management of: skin  care;self-monitoring;exercise;massage;self-compression;infection prevention;in 4 weeks      Plan / Patient Education:     Continue with initial plan of care.  Progress with home program as tolerated.  Plan for next visit: MFR, MLD, shoulder exercises, HEP. Consider KT    Subjective:      Pain Ratin/10 today.  Itching around L clavicle continues to irritate at times        Objective:     Caregiver present: no    Observation of swelling: left chest wall with mild edema, continues to improve    Volume measurements taken:  no    Skin condition is:  Intact, skin mobility increased.      Compression:Has compression sleeve, rarely uses      Medication for infection:  None.    Treatment Today     TREATMENT MINUTES COMMENTS   Evaluation     Self-care/ Home management     Manual therapy 48 MLD L torso in supine and R SL, R axilla to axilla anastomosis, MFR  With elongation to L anterior, lateral torso, L axilla and L upper arm. MFR and SCS R UT scapular region  In supine and seated.   Neuromuscular Re-education     Therapeutic Activity     Therapeutic Exercises     Gait training     Modality__________________                Total 48    Blank areas are intentional and mean the treatment did not include these items.       Afua Mann,NORY  3/8/2019

## 2021-06-24 NOTE — PROGRESS NOTES
[facility] Daily Progress     Patient Name: Mayra Vasquezb  Date: 2/15/2019  Visit #: 2/10-12  PTA visit #:  1  Referral Diagnosis: Swelling:  Lymphatic;  Left arm, Contracture(s):  Left  shoulder, Post mastectomy/lumpectomy/LND, Axillary LND and Fibrosis/Scarring.  Slight strain right upper trapezius.  Referring provider: Malia Escamilla, *  Visit Diagnosis:       ICD-10-CM     1. Contracture, left shoulder M24.512       right upper trap strain   2. 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 Scar condition and fibrosis of skin L90.5         with bone graft from shin      History of Present Illness:    Mayra is a 53 y.o. female who presents to therapy today with complaints of left chest wall edema, decreased left shoulder mobility right neck pain. Date of onset/duration of symptoms is 2-7-19. Onset was gradual. Symptoms are intermittent and not improving. She denies history of similar symptoms. She describes their previous level of function as not limited     Assessment:   Pt tolerating  MT well, denies pain and shortness of breath. She reports she is moving with less restriction at end of visit  CROM and LB shoulder AROM improved  with MT  Patient is benefitting from skilled physical therapy and is making steady progress toward functional goals.  Patient is appropriate to continue with skilled physical therapy intervention, as indicated by initial plan of care.    Goal Status:  Patient will reach / maintain arm movement: overhead;for home chores;for dressing;with full ROM;in 4 weeks    Patient will have a decreased volume in : left;by 5%;to decrease risk of infection;for better fit of clothing;for improved body image;for ease of movement;in 4 weeks;Comment  Comment: decreased left chest wall edema.  Patient will have decreased fibrosis, scar tissue for improved lymphatic mobilitiy : in 4 weeks  Patient will perform, verbalize self-management of: skin  care;self-monitoring;exercise;massage;self-compression;infection prevention;in 4 weeks      Plan / Patient Education:     Continue with initial plan of care.  Progress with home program as tolerated.  Plan for next visit: MFR, MLD, shoulder exercises, HEP. Consider KT  Subjective:     Pain Rating: Not rated today, but felt better after last visit and edema in L ant chest region remains slightly improved        Objective:     Caregiver present: no    Observation of swelling:     Volume measurements taken:  no    Skin condition is:  Intact, mildly dry, thickened scar along L clavicle with mild tenderness to touch  Mild fibrosis, adherence along incision    Compression:Has compression sleeve, rarely uses      Medication for infection:      Treatment Today     TREATMENT MINUTES COMMENTS   Evaluation     Self-care/ Home management     Manual therapy 58 MLD L torso in supine and R SL, MFR  L anterior, lateral torso, MFR and SCS R UT scapular region   Neuromuscular Re-education     Therapeutic Activity     Therapeutic Exercises     Gait training     Modality__________________                Total 58    Blank areas are intentional and mean the treatment did not include these items.       Afua Mann,NORY  2/15/2019

## 2021-06-24 NOTE — PROGRESS NOTES
[facility] Daily Progress     Patient Name: Mayra Vasquezb  Date: 2/22/2019  Visit #: 4/10-12  PTA visit #:  2  Referral Diagnosis: Swelling:  Lymphatic;  Left arm, Contracture(s):  Left  shoulder, Post mastectomy/lumpectomy/LND, Axillary LND and Fibrosis/Scarring.  Slight strain right upper trapezius.  Referring provider: Malia Escamilla, *  Visit Diagnosis:       ICD-10-CM     1. Contracture, left shoulder M24.512       right upper trap strain   2. 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 Scar condition and fibrosis of skin L90.5            History of Present Illness:    Mayra is a 53 y.o. female who presents to therapy today with complaints of left chest wall edema, decreased left shoulder mobility right neck pain. Date of onset/duration of symptoms is 2-7-19. Onset was gradual. Symptoms are intermittent and not improving. She denies history of similar symptoms. She describes their previous level of function as not limited     Assessment:   Pt is painfree at end of visit  Left shoulder is WFL.  Improved skin mobility of chest wall, mild left lateral trunk edema persists    Patient is benefitting from skilled physical therapy and is making steady progress toward functional goals.  Patient is appropriate to continue with skilled physical therapy intervention, as indicated by initial plan of care.    Goal Status:  Patient will reach / maintain arm movement: overhead;for home chores;for dressing;with full ROM;in 4 weeks    Patient will have a decreased volume in : left;by 5%;to decrease risk of infection;for better fit of clothing;for improved body image;for ease of movement;in 4 weeks;Comment;Progressing toward  Comment: decreased left chest wall edema.  Patient will have decreased fibrosis, scar tissue for improved lymphatic mobilitiy : in 4 weeks;Progressing toward  Patient will perform, verbalize self-management of: skin care;self-monitoring;exercise;massage;self-compression;infection  prevention;in 4 weeks      Plan / Patient Education:     Continue with initial plan of care.  Progress with home program as tolerated.  Plan for next visit: MFR, MLD, shoulder exercises, HEP. Consider KT  Subjective:     Pain Rating: A little pain noted in R UT  and continued tightness in L axilla/incisional area  Overall improved discomfort in axillary region, but still very sore at times. Pain remains variable          Objective:     Caregiver present: no    Observation of swelling: left lateral trunk with mild edema.    Volume measurements taken:  no    Skin condition is:  Intact, mildly dry, thickened scar along L clavicle with mild tenderness to touch  Mild fibrosis, adherence along incision    Compression:Has compression sleeve, rarely uses      Medication for infection:  None.    Treatment Today     TREATMENT MINUTES COMMENTS   Evaluation     Self-care/ Home management     Manual therapy 58 MLD L torso in supine and R axilla to axilla anastomosis, MFR  With elongation to L anterior, lateral torso, MFR and SCS R UT scapular region along  with PNF D1D2 patterns    Neuromuscular Re-education     Therapeutic Activity     Therapeutic Exercises     Gait training     Modality__________________                Total 58    Blank areas are intentional and mean the treatment did not include these items.       Afua Mann,RIZWANT  2/22/2019

## 2021-06-24 NOTE — PROGRESS NOTES
[facility] Daily Progress     Patient Name: Mayra Vasquezb  Date: 2/19/2019  Visit #: 3/10-12  PTA visit #:  1  Referral Diagnosis: Swelling:  Lymphatic;  Left arm, Contracture(s):  Left  shoulder, Post mastectomy/lumpectomy/LND, Axillary LND and Fibrosis/Scarring.  Slight strain right upper trapezius.  Referring provider: Malia Escamilla, *  Visit Diagnosis:       ICD-10-CM     1. Contracture, left shoulder M24.512       right upper trap strain   2. 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 Scar condition and fibrosis of skin L90.5            History of Present Illness:    Mayra is a 53 y.o. female who presents to therapy today with complaints of left chest wall edema, decreased left shoulder mobility right neck pain. Date of onset/duration of symptoms is 2-7-19. Onset was gradual. Symptoms are intermittent and not improving. She denies history of similar symptoms. She describes their previous level of function as not limited     Assessment:     Left shoulder is WFL.  Improved skin mobility of chest wall, mild left lateral trunk edema.    Patient is benefitting from skilled physical therapy and is making steady progress toward functional goals.  Patient is appropriate to continue with skilled physical therapy intervention, as indicated by initial plan of care.    Goal Status:  Patient will reach / maintain arm movement: overhead;for home chores;for dressing;with full ROM;in 4 weeks    Patient will have a decreased volume in : left;by 5%;to decrease risk of infection;for better fit of clothing;for improved body image;for ease of movement;in 4 weeks;Comment  Comment: decreased left chest wall edema.  Patient will have decreased fibrosis, scar tissue for improved lymphatic mobilitiy : in 4 weeks  Patient will perform, verbalize self-management of: skin care;self-monitoring;exercise;massage;self-compression;infection prevention;in 4 weeks      Plan / Patient Education:     Continue with initial  "plan of care.  Progress with home program as tolerated.  Plan for next visit: MFR, MLD, shoulder exercises, HEP. Consider KT  Subjective:     Pain Ratin/10  \"I went to acupuncture\".        Objective:     Caregiver present: no    Observation of swelling: left lateral trunk with mild edema.    Volume measurements taken:  no    Skin condition is:  Intact, mildly dry, thickened scar along L clavicle with mild tenderness to touch  Mild fibrosis, adherence along incision    Compression:Has compression sleeve, rarely uses      Medication for infection:  None.    Treatment Today     TREATMENT MINUTES COMMENTS   Evaluation     Self-care/ Home management     Manual therapy 57 MLD L torso in supine and R axilla to axilla anastomosis, MFR  With elongation to L anterior, lateral torso, MFR and SCS R UT scapular region and with PNF D1D2 patterns exercises.   Neuromuscular Re-education     Therapeutic Activity     Therapeutic Exercises     Gait training     Modality__________________                Total 57    Blank areas are intentional and mean the treatment did not include these items.       Sandra Osorio,NORY-SAI  2019    "

## 2021-06-24 NOTE — PROGRESS NOTES
[facility] Daily Progress     Patient Name: Mayra Ramirez  Date: 2/28/2019  Visit #: 6/10-12  PTA visit #:  2  Referral Diagnosis: Swelling:  Lymphatic;  Left arm, Contracture(s):  Left  shoulder, Post mastectomy/lumpectomy/LND, Axillary LND and Fibrosis/Scarring.  Slight strain right upper trapezius.  Referring provider: Malia Escmailla, *  Visit Diagnosis:       ICD-10-CM     1. Contracture, left shoulder M24.512       right upper trap strain   2. 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 Scar condition and fibrosis of skin L90.5            History of Present Illness:    Mayra is a 53 y.o. female who presents to therapy today with complaints of left chest wall edema, decreased left shoulder mobility right neck pain. Date of onset/duration of symptoms is 2-7-19. Onset was gradual. Symptoms are intermittent and not improving. She denies history of similar symptoms. She describes their previous level of function as not limited     Assessment:     Left chest wall with fluid collection.left shoulder ROM is WFL.    Patient is benefitting from skilled physical therapy and is making steady progress toward functional goals.  Patient is appropriate to continue with skilled physical therapy intervention, as indicated by initial plan of care.    Goal Status:  Patient will reach / maintain arm movement: overhead;for home chores;for dressing;with full ROM;in 4 weeks    Patient will have a decreased volume in : left;by 5%;to decrease risk of infection;for better fit of clothing;for improved body image;for ease of movement;in 4 weeks;Comment;Progressing toward  Comment: decreased left chest wall edema.  Patient will have decreased fibrosis, scar tissue for improved lymphatic mobilitiy : in 4 weeks;Progressing toward  Patient will perform, verbalize self-management of: skin care;self-monitoring;exercise;massage;self-compression;infection prevention;in 4 weeks      Plan / Patient Education:     Continue with  "initial plan of care.  Progress with home program as tolerated.  Plan for next visit: MFR, MLD, shoulder exercises, HEP. Consider KT    Subjective:     Pain Ratin/10 today.  \"my left chest feel full\".        Objective:     Caregiver present: no    Observation of swelling: left chest wall with mild edema.    Volume measurements taken:  no    Skin condition is:  Intact,  decreased skin mobility.      Compression:Has compression sleeve, rarely uses      Medication for infection:  None.    Treatment Today     TREATMENT MINUTES COMMENTS   Evaluation     Self-care/ Home management     Manual therapy 58 MLD L torso in supine and R axilla to axilla anastomosis, MFR  With elongation to L anterior, lateral torso, MFR and SCS R UT scapular region along  with PNF D1D2 patterns , surgical scar gentle release.   Neuromuscular Re-education     Therapeutic Activity     Therapeutic Exercises     Gait training     Modality__________________                Total 58    Blank areas are intentional and mean the treatment did not include these items.       NORY May-SAI  2019  "

## 2021-06-24 NOTE — TELEPHONE ENCOUNTER
Patient Returning Call  Reason for call:  patient is returning call  Information relayed to patient:   Found nothing to relay  Patient has additional questions:  Yes  If YES, what are your questions/concerns:  Why she was called    Okay to leave a detailed message?: Yes

## 2021-06-24 NOTE — PROGRESS NOTES
[facility]   Lymphedema Initial Evaluation      Patient Name: Mayra Ramirez  Date of evaluation: 2/12/2019  Referral Diagnosis: Swelling:  Lymphatic;  Left arm, Contracture(s):  Left  shoulder, Post mastectomy/lumpectomy/LND, Axillary LND and Fibrosis/Scarring.  Slight strain right upper trapezius.  Referring provider: Malia Escamilla, *  Visit Diagnosis:     ICD-10-CM    1. Contracture, left shoulder M24.512     right upper trap strain   2. 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 Scar condition and fibrosis of skin L90.5       with bone graft from shin  Assessment:   Mayra Ramirez is a 53 y.o. female who presents to therapy today with chief complaints of left chest wall swelling, limited left shoulder mobility. Onset date of sx was 2-7-19.  Pt reported h/o left collar bone fracture.  Pain symptoms are 3/10 left shoulder and chest wall. Previous treatments have included therapeutic exercises, PT for balance Functional impairments include reaching above shoulder level.  Pt demo's signs and sx consistent with left shoulder contracture, left chest wall lymphedema, right upper trap strain.   Impairments in  pain, posture, joint mobility, strength, integumentary integrity, swelling  Prognosis to achieve goals is  good    left shoulder flexion 140o  Left shoulder abduction 98o    Patient Outcome Measures :    Shoulder Pain and Disability Index (SPADI) in %: 0.1     Scores range from 0-100%, where a score of 0% represents minimal pain and maximal function. The minimal clincically important difference is a score reduction of 10%.    Pt. is appropriate for skilled PT intervention as outlined in the Plan of Care (POC).  Pt. is a good candidate for skilled PT services to improve pain levels and function.  Pt. would be a good candidate for edema management and to develop a home management program.    Diagnoses and all orders for this visit:    Contracture, left shoulder  Comments:  right upper trap  "strain    Post-mastectomy lymphedema syndrome    Scar condition and fibrosis of skin         Goals:   Patient will reach / maintain arm movement: overhead;for home chores;for dressing;with full ROM;in 4 weeks    Patient will have a decreased volume in : left;by 5%;to decrease risk of infection;for better fit of clothing;for improved body image;for ease of movement;in 4 weeks;Comment  Comment: decreased left chest wall edema.  Patient will have decreased fibrosis, scar tissue for improved lymphatic mobilitiy : in 4 weeks  Patient will perform, verbalize self-management of: skin care;self-monitoring;exercise;massage;self-compression;infection prevention;in 4 weeks      Patient\"s Goal: \"decrease lymphedema in my chest\".    Goals and plan of care were set in collaboration with the patient.    Patient's expectations/goals are realistic.    Barriers to Learning or Achieving Goals:  No Barriers.    Lymphedema Category:  IV - Stage 1 with Mod-High Functional Demand       Plan / Patient Instructions:      Plan of Care:   Authorization / Certification Start Date: 02/12/19  Authorization / Certification End Date: 03/14/19  Authorization / Certification Number of Visits: 6-10  Communication with: Referral Source  Patient Related Instruction: Nature of Condition;Treatment plan and rationale;Self Care instruction;Basis of treatment;Body mechanics;Posture;Precautions;Next steps;Expected outcome  Times per Week: 2-3  Number of Weeks: 3-4  Number of Visits: 10-12  Discharge Planning: patient will be discharge to [facility] care when goals met.  Precautions / Restrictions : left clavicle fracture and surgery   Therapeutic Exercise: ROM;Stretching;Strengthening;Lymphedema  Manual Therapy: myofascial release;lymphatic drainage massage  Functional Training (ADL's): skin care;self care;lymphedema precautions    Treatment techniques, plan of care, and goals were discussed with the patient. The patient agrees to the plan as outlined. The " plan of care is dynamic and will be modified on an ongoing basis.  Plan for next visit: MFR, MLD, shoulder exercises, HEP.     Subjective:         Social information:   Living Situation:single family home and lives with others    Occupation:retired .   Work Status:NA   Equipment Available: None    History of Present Illness:    Mayra is a 53 y.o. female who presents to therapy today with complaints of left chest wall edema, decreased left shoulder mobility right neck pain. Date of onset/duration of symptoms is 2-7-19. Onset was gradual. Symptoms are intermittent and not improving. She denies history of similar symptoms. She describes their previous level of function as not limited    Pain Rating:3  Pain rating at best: 0  Pain rating at worst: 3  Pain description: aching and weakness    Functional limitations are described as occurring with:   reaching overhead    Patient reports benefit from:  movement or exercise        Objective:       Measure in supine:  Upper Extremity Lymphedema  2/12/2019   cm to wrist (cm) 8   cm to Tip of 3rd Finger 10   R Thumb (cm) 7.1   R Index (cm) 6.6   R Middle (cm) 6.2   R Ring (cm) 6.1   R Little (cm) 5.5   R --cm from 3rd fingertip 19.5   R --cm to smallest wrist 16.9   R 8cm Proximal to Wrist (cm) 18.6   R 16cm Proximal to Wrist (cm) 25.3   R 24cm Proximal to Wrist (cm) 26   R 32cm Proximal to Wrist (cm) 27.6   R 40cm Proximal to Wrist (cm) 33.1   Right Upper Extremity Total Estimated Volume (cm3) 2185.33   L Thumb (cm) 6.1   L Index (cm) 6.2   L Middle (cm) 6.1   L Ring (cm) 5.8   L Little (cm) 5.2   L --cm from 3rd fingertip (cm) 19   L --cm to smallest wrist (cm) 16.5   L 8cm Proximal to Wrist (cm) 18.5   L 16cm Proximal to Wrist (cm) 24.2   L 24cm Proximal to Wrist (cm) 26   L 32cm Proximal to Wrist (cm) 29.3   L 40cm Proximal to Wrist (cm) 33.2   Left Upper Extremity Total Estimated Volume (cm3) 2199.05   Swelling Description Left>Right   Upper Extremity  Swelling Comparison (%) 0.62     Patient Outcome Measures :    Shoulder Pain and Disability Index (SPADI) in %: 0.1     Scores range from 0-100%, where a score of 0% represents minimal pain and maximal function. The minimal clincically important difference is a score reduction of 10%.    Lymphedema Assessment 2/12/2019   Right Upper Extremity Total Estimated Volume (cm3) 2185.33   Left Upper Extremity Total Estimated Volume (cm3) 2199.05   Swelling Description Left>Right   Upper Extremity Swelling Comparison (%) 0.62         Upper Extremity Lymphedema  2/12/2019   cm to wrist (cm) 8   cm to Tip of 3rd Finger 10   R Thumb (cm) 7.1   R Index (cm) 6.6   R Middle (cm) 6.2   R Ring (cm) 6.1   R Little (cm) 5.5   R --cm from 3rd fingertip 19.5   R --cm to smallest wrist 16.9   R 8cm Proximal to Wrist (cm) 18.6   R 16cm Proximal to Wrist (cm) 25.3   R 24cm Proximal to Wrist (cm) 26   R 32cm Proximal to Wrist (cm) 27.6   R 40cm Proximal to Wrist (cm) 33.1   Right Upper Extremity Total Estimated Volume (cm3) 2185.33   L Thumb (cm) 6.1   L Index (cm) 6.2   L Middle (cm) 6.1   L Ring (cm) 5.8   L Little (cm) 5.2   L --cm from 3rd fingertip (cm) 19   L --cm to smallest wrist (cm) 16.5   L 8cm Proximal to Wrist (cm) 18.5   L 16cm Proximal to Wrist (cm) 24.2   L 24cm Proximal to Wrist (cm) 26   L 32cm Proximal to Wrist (cm) 29.3   L 40cm Proximal to Wrist (cm) 33.2   Left Upper Extremity Total Estimated Volume (cm3) 2199.05   Swelling Description Left>Right   Upper Extremity Swelling Comparison (%) 0.62           Degree of swelling: Minimal < 10%    Areas of most significant swelling: left chest wall.        Examination  1. Contracture, left shoulder      right upper trap strain   2. Post-mastectomy lymphedema syndrome     3. Scar condition and fibrosis of skin       Precautions/Restrictions: None  Posture Observation:      General sitting posture is  fair.  General standing posture is fair.  Shoulder/Thoracic complex: Moderate  left scapular winging    Surgery: Mastectomy:  Bilateral  1. Breast carcinoma (2004 left, left mod rad, right simple mastec, rad + chemo+solitary brain met)  Involved area: Left Chest  Edema: 1+ and Minimal  Induration: No  Fibrosis: absent  Temperature: Normal  Sensation: Hyposensitive  Skin color: Normal  Skin texture: Dry, decreased skin mobility on surgical scars.  Scars: Location: bilateral chest wall and left clavicle.  Size: each chest scar 8 cm, left clavicle 10cm, thick scar tissue.  Wounds: Absent  Muscle tone: Atrophy  Capillary refill: symmetrical.    Gait: independent.      Treatment Today     TREATMENT MINUTES COMMENTS   Evaluation 30 low   Self-care/ Home management     Manual therapy 30 MFR anterior chest wall, scar release.   Neuromuscular Re-education     Therapeutic Activity     Therapeutic Exercises     Gait training     Modality__________________                Total 60    Blank areas are intentional and mean the treatment did not include these items.     PT Evaluation Code: (Please list factors)  Patient History/Comorbidities: history of breast cancer.  Examination: left chest wall lymphedema, decreased left shoulder mobility.  Clinical Presentation: stable.  Clinical Decision Making: low.    Patient History/  Comorbidities Examination  (body structures and functions, activity limitations, and/or participation restrictions) Clinical Presentation Clinical Decision Making (Complexity)   No documented Comorbidities or personal factors 1-2 Elements Stable and/or uncomplicated Low   1-2 documented comorbidities or personal factor 3 Elements Evolving clinical presentation with changing characteristics Moderate   3-4 documented comorbidities or personal factors 4 or more Unstable and unpredictable High            Sandra Menchaca PT, CLT-SAI  2/12/2019  10:22 AM

## 2021-06-24 NOTE — PROGRESS NOTES
[facility] Daily Progress     Patient Name: Mayra Ramirez  Date: 2/26/2019  Visit #: 5/10-12  PTA visit #:  2  Referral Diagnosis: Swelling:  Lymphatic;  Left arm, Contracture(s):  Left  shoulder, Post mastectomy/lumpectomy/LND, Axillary LND and Fibrosis/Scarring.  Slight strain right upper trapezius.  Referring provider: Malia Escamilla, *  Visit Diagnosis:       ICD-10-CM     1. Contracture, left shoulder M24.512       right upper trap strain   2. 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 Scar condition and fibrosis of skin L90.5            History of Present Illness:    Mayra is a 53 y.o. female who presents to therapy today with complaints of left chest wall edema, decreased left shoulder mobility right neck pain. Date of onset/duration of symptoms is 2-7-19. Onset was gradual. Symptoms are intermittent and not improving. She denies history of similar symptoms. She describes their previous level of function as not limited     Assessment:     Left skin mobility improved.  Left shoulder is WFL, limited surgical scar mobility.  Left lateral trunk edema is decreased.    Patient is benefitting from skilled physical therapy and is making steady progress toward functional goals.  Patient is appropriate to continue with skilled physical therapy intervention, as indicated by initial plan of care.    Goal Status:  Patient will reach / maintain arm movement: overhead;for home chores;for dressing;with full ROM;in 4 weeks    Patient will have a decreased volume in : left;by 5%;to decrease risk of infection;for better fit of clothing;for improved body image;for ease of movement;in 4 weeks;Comment;Progressing toward  Comment: decreased left chest wall edema.  Patient will have decreased fibrosis, scar tissue for improved lymphatic mobilitiy : in 4 weeks;Progressing toward  Patient will perform, verbalize self-management of: skin care;self-monitoring;exercise;massage;self-compression;infection  "prevention;in 4 weeks      Plan / Patient Education:     Continue with initial plan of care.  Progress with home program as tolerated.  Plan for next visit: MFR, MLD, shoulder exercises, HEP. Consider KT  Subjective:     Pain Ratin/10 today.  \"I'm doing my exercises\".        Objective:     Caregiver present: no    Observation of swelling: left lateral trunk with mild edema.    Volume measurements taken:  no    Skin condition is:  Intact,  thickened scar along L clavicle with mild tenderness to touch, decreased skin mobility.      Compression:Has compression sleeve, rarely uses      Medication for infection:  None.    Treatment Today     TREATMENT MINUTES COMMENTS   Evaluation     Self-care/ Home management     Manual therapy 58 MLD L torso in supine and R axilla to axilla anastomosis, MFR  With elongation to L anterior, lateral torso, MFR and SCS R UT scapular region along  with PNF D1D2 patterns , surgical scar gentle release.   Neuromuscular Re-education     Therapeutic Activity     Therapeutic Exercises     Gait training     Modality__________________                Total 58    Blank areas are intentional and mean the treatment did not include these items.       Sandra Osorio,NORY-SAI  2019  "

## 2021-06-24 NOTE — PROGRESS NOTES
[facility] Daily Progress     Patient Name: Myara Ramirez  Date: 3/5/2019  Visit #: 7/10-12  PTA visit #:  2  Referral Diagnosis: Swelling:  Lymphatic;  Left arm, Contracture(s):  Left  shoulder, Post mastectomy/lumpectomy/LND, Axillary LND and Fibrosis/Scarring.  Slight strain right upper trapezius.  Referring provider: Malia Escamilla, *  Visit Diagnosis:       ICD-10-CM     1. Contracture, left shoulder M24.512       right upper trap strain   2. 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 Scar condition and fibrosis of skin L90.5            History of Present Illness:    Mayra is a 53 y.o. female who presents to therapy today with complaints of left chest wall edema, decreased left shoulder mobility right neck pain. Date of onset/duration of symptoms is 2-7-19. Onset was gradual. Symptoms are intermittent and not improving. She denies history of similar symptoms. She describes their previous level of function as not limited     Assessment:     Left clavicular surgical scar with increased mobility.  Left chest wall with decreased edema.    Patient is benefitting from skilled physical therapy and is making steady progress toward functional goals.  Patient is appropriate to continue with skilled physical therapy intervention, as indicated by initial plan of care.    Goal Status:  Patient will reach / maintain arm movement: overhead;for home chores;for dressing;with full ROM;in 4 weeks    Patient will have a decreased volume in : left;by 5%;to decrease risk of infection;for better fit of clothing;for improved body image;for ease of movement;in 4 weeks;Comment;Progressing toward  Comment: decreased left chest wall edema.  Patient will have decreased fibrosis, scar tissue for improved lymphatic mobilitiy : in 4 weeks;Progressing toward  Patient will perform, verbalize self-management of: skin care;self-monitoring;exercise;massage;self-compression;infection prevention;in 4 weeks      Plan / Patient  "Education:     Continue with initial plan of care.  Progress with home program as tolerated.  Plan for next visit: MFR, MLD, shoulder exercises, HEP. Consider KT    Subjective:      Pain Ratin/10 today.  \"I did my exercises \".        Objective:     Caregiver present: no    Observation of swelling: left chest wall with mild edema.    Volume measurements taken:  no    Skin condition is:  Intact, skin mobility increased.      Compression:Has compression sleeve, rarely uses      Medication for infection:  None.    Treatment Today     TREATMENT MINUTES COMMENTS   Evaluation     Self-care/ Home management     Manual therapy 58 Scar release to left clavicular area,MLD L torso in supine and R axilla to axilla anastomosis, MFR  With elongation to L anterior, lateral torso, MFR and SCS R UT scapular region along  with PNF D1D2 patterns .Instructed on self massage.   Neuromuscular Re-education     Therapeutic Activity     Therapeutic Exercises     Gait training     Modality__________________                Total 58    Blank areas are intentional and mean the treatment did not include these items.       Sandra Osorio,NORY-SAI  3/5/2019  "

## 2021-06-25 NOTE — PROGRESS NOTES
Progress Notes by Bryson Babcock at 2/24/2017  8:00 AM     Author: Bryson Babcock Service: -- Author Type: Nurse Practitioner    Filed: 3/25/2017  3:09 PM Encounter Date: 2/24/2017 Status: Signed    : [facility]    Date of Service: 2/24/2017  Primary Provider: No Primary Care Provider    Patient Care Team:  No Primary Care Provider as PCP - General     ______________________________________________________________________     Patient's Pharmacy:    [id number] [place] [facility] [address]  [address] [facility] [address]  Phone: [phone] Fax: [phone]     Patient's Insurance:    Payor: BLUE CROSS / Plan: [place] OF [place] / Product Type: PPO/POS/FFS /      ______________________________________________________________________     Mayra LEIGHTON Ramirez is a 51 y.o. female who comes in today for:    Chief Complaint   Patient presents with   ? Knee Pain     Had fallen a week ago at home while chasing her dog. Slipped on the ice. Injured the right knee. Hurts to go up and down the stairs. Swelling in the morning.    ? Foot Injury     Left foot. Thinks that she hit the foot on the side, and unable to wear certain shoes. Hurts to walk on. Has been taking advil for the pain.      Past Medical History:   Diagnosis Date   ? Breast cancer left    2004   ? Metastasis to brain 2015   ? Osteopenia     2/2 breast CA tx.     Past Surgical History:   Procedure Laterality Date   ? MASTECTOMY MODIFIED RADICAL Left    ? MT REMOVAL OF OVARY(S)      Description: Oophorectomy;  Recorded: 10/29/2013;   ? SIMPLE MASTECTOMY Right    ? TONSILLECTOMY       Current Outpatient Prescriptions   Medication Sig   ? biotin 5 mg cap Take by mouth.   ? calcium carbonate-vitamin D3 (CALCIUM 600 + D,3,) 600 mg calcium- 200 unit cap Take by mouth 2 times a day at 6:00 am and 4:00 pm.   ? cholecalciferol, vitamin D3, (VITAMIN D3) 2,000 unit Tab Take  1 tablet by mouth daily.   ? DOCOSAHEXANOIC ACID/EPA (FISH OIL ORAL) Take by mouth.   ? ibandronate (BONIVA) 150 mg tablet Take 150 mg by mouth every 30 (thirty) days. Take in AM with glass of water prior to food, don't lie down for 30 minutes.   ? L. RHAMNOSUS GG/INULIN (CULTURELLE PROBIOTICS ORAL) Take by mouth.   ? letrozole (FEMARA) 2.5 mg tablet Take 2.5 mg by mouth daily.   ? MELATONIN ORAL Take 300 mg by mouth daily.   ? MULTIVITAMIN ORAL Take by mouth.   ? ranitidine (ZANTAC) 75 MG tablet Take 150 mg by mouth as needed for heartburn.     Social History     Social History   ? Marital status:      Spouse name: N/A   ? Number of children: N/A   ? Years of education: N/A     Occupational History   ?  [employer] [address] [place] [address]     Social History Main Topics   ? Smoking status: Never Smoker   ? Smokeless tobacco: Never Used   ? Alcohol use No   ? Drug use: No   ? Sexual activity: Yes     Partners: Male     Other Topics Concern   ? Not on file     Social History Narrative     ______________________________________________________________________     History of present illness:  Mayra Ramirez is a pleasant 51 year-old female who presents in clinic today for a couple of issues she would like evaluated, #1 right knee pain, and #2 left foot pain.  Issue #1: Right knee pain -she states that she fell while chasing her dog down the ice approximately 1-1/2 weeks ago.  She is unable to describe how she fell but notes that she developed right knee pain shortly after the fall.  She has pain when she puts pressure on her right knee is with standing up from sitting or with stooping down.  But the pain is more of an ache within the right knee joint.  Her symptoms are there constantly but are worse when she stands up and puts pressure on her right knee.  Issue #2: Left foot pain -this injury also occurred with her fall as noted above.  She states that she has pain on the left foot along the medial aspect of the  "metatarsal just behind the great toe which causes her pain with any weightbearing or ambulation.  She states the pain is worse with increased activity or walking on it all day.  She notes there was a small abrasion in this area and she has treated this with Neosporin ointment and Band-Aid over the last 2-3 days.  These symptoms have somewhat improved over the last 2 days.  She also states that she has had 1-2 instances of left foot numbness when she has been on her feet too long or with climbing stairs.  In addition she also has tried over-the-counter ibuprofen 600 mg occasionally, 2-3 times daily as well as some ice/heat therapy all of which she has found of benefit.      On review of systems, the patient has no fever, headaches, lightheadedness or dizziness, cough, shortness of breath or chest pain.  Positive for symptoms as noted in the HPI.    ______________________________________________________________________      Wt Readings from Last 3 Encounters:   02/24/17 175 lb (79.4 kg)   06/28/16 161 lb 11.2 oz (73.3 kg)   04/08/16 164 lb 6.4 oz (74.6 kg)     BP Readings from Last 3 Encounters:   02/24/17 96/66   06/28/16 104/68   04/08/16 110/70      Visit Vitals   ? BP 96/66   ? Pulse 74   ? Ht 5' 8\" (1.727 m)   ? Wt 175 lb (79.4 kg)   ? BMI 26.61 kg/m2        Physical Exam:  General Appearance: Alert, cooperative, no distress, appears stated age  Lungs: Clear to auscultation bilaterally, respirations unlabored  Heart: Regular rate and rhythm, S1 and S2 normal, no murmur, rub, or gallop  Musculoskeletal: Normal range of motion. No joint instability. Right knee joint with mild swelling medial > lateral,  no deformity.   Extremities: Extremities normal, atraumatic, no cyanosis or edema; Left metatarsal, medial aspect behind Great toe with small, nearly healed abrasion, slight discomfort elicited with palpation. No ROM limitations.  Skin: Skin color, texture, turgor normal, no rashes or lesions  Neurologic: She is " alert & oriented x 3. Sensory intact in LE's, bilaterally.  Psychiatric: She has a normal mood and affect.     ______________________________________________________________________     Assessment:    1. Acute pain of right knee - likely due to right lateral knee strain.   2. Foot pain, left - suspect deep bruise on left metatarsal at medial aspect behind Great toe.      ______________________________________________________________________       Plan:  Patient Instructions   1. Ibuprofen 600 mg by mouth 2-3x daily for 7 days.  2. May try ice and/or heat therapy to the affected areas as needed    Bryson Babcock, [facility]  Internal Medicine  [facility]    Return if symptoms worsen or fail to improve.

## 2021-06-25 NOTE — PROGRESS NOTES
Progress Notes by Afua Waggoner PTA at 10/27/2017 12:30 PM     Author: Afua Waggoner PTA Service: -- Author Type: Physical Therapist Assistant    Filed: 10/27/2017  1:27 PM Encounter Date: 10/27/2017 Status: Attested    : Afua Waggoner PTA (Physical Therapist Assistant) Cosigner: Sandra Menchaca PT at 10/30/2017  4:33 PM    Attestation signed by Sandra Menchaca PT at 10/30/2017  4:33 PM    Physical Therapist observed treatment and reviewed patient's subjective and objective progress, progress towards goals and plan of care with the PTA.  It is appropriate to continue per Plan of Care.                  [facility] Daily Progress     Patient Name: Mayra Ramirez  Date: 10/27/2017  Visit #: 10/12  PTA visit #:  1     Date of evaluation: 9/11/2017  Referral Diagnosis: Left-sided chest wall pain  Referring provider: Malia Escamilla  Precautions/Restrictions: Balance; left clavicle fracture and surgery December 2015      Visit Diagnosis:     ICD-10-CM    1. Left-sided chest wall pain R07.89    2. Scar condition and fibrosis of skin L90.5    3. Contracture, left shoulder M24.512    4. 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 Decreased ROM of left shoulder M25.612    6. Localized edema R60.0    7. History of breast cancer Z85.3          Assessment:     Left chest wall tightness and edema  Decreased with MT  Pt is  pleased with results from MT   Patient demonstrates understanding/independence with home program.  Patient is appropriate to continue with skilled physical therapy intervention, as indicated by initial plan of care.    Goal Status:  Patient will have a decreased volume in : left;for ease of movement;in 4 weeks;Comment;Progressing toward  Comment: upper chest wall  Patient will have decreased fibrosis, scar tissue for improved lymphatic mobilitiy : in 4 weeks;Progressing toward  Patient will perform, verbalize self-management of: skin  care;self-monitoring;exercise;massage;self-compression;compression wear;compression care;infection prevention;in 4 weeks;Progressing toward  Pt will: have full left shoulder ROM for ADL's in 4 weeks progressing.    Plan for next visits:     Continue with initial plan of care.  Progress with home program as tolerated. MFR, MLD, exercises, HEP.  Consider adding scapular and core stabilization to HEP  Subjective:      .  Pain Ratin   L ant chest feeling more tight and swollen since last PT visit    Objective:     Left pectoral insertion minimally tender  Left shoulder ROM is WNL's and is pain free at end of visit.      Treatment Today      TREATMENT MINUTES COMMENTS   Evaluation     Self-care/ Home management     Manual therapy 55 MLD as per left upper extremity protocol and MFR to lateral left trunk, left shoulder and pectoral release,left shoulder mob, left arm pull. Arm slide performed with therapist, x3 each side   Neuromuscular Re-education     Therapeutic Activity     Therapeutic Exercises     Gait training     Modality__________________                Total 55    Blank areas are intentional and mean the treatment did not include these items.       Afua Waggoner, PTA,CLT

## 2021-06-25 NOTE — TELEPHONE ENCOUNTER
Mayra was called on 6/9 after not showing up for her 11:00 am appnt. A VM was left with the scheduling line for rescheduling.
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
## 2021-06-26 NOTE — TELEPHONE ENCOUNTER
Mayra was called on 6/22 after not showing up for her 11:00 am appnt. A VM was left with the scheduling line for rescheduling.
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
## 2021-06-27 NOTE — PROGRESS NOTES
Progress Notes by Avel Vu PA-C at 8/2/2019 12:20 PM     Author: Avel Vu PA-C Service: -- Author Type: Physician Assistant    Filed: 8/2/2019  1:32 PM Encounter Date: 8/2/2019 Status: Signed    : Avel Vu PA-C (Physician Assistant)       Subjective:      Patient ID: Mayra Ramirez is a 53 y.o. female.    Chief Complaint:    HPI  Mayra Ramirez is a 53 y.o. female who presents today complaining of two week worsening history of irritative voiding symptoms to include urinary hesitancy urgency frequency and dysuria.  Patient denies gross hematuria.  Denies fever chills night sweats fatigue or other red flag symptoms to include back or flank pain and no vaginal discharge.        Past Medical History:   Diagnosis Date   ? Breast cancer (H) left    2004 - HER2 pos, ER+, NM-   ? Metastasis to brain (H) 2005   ? Osteopenia     2/2 breast CA tx.       Past Surgical History:   Procedure Laterality Date   ? BRAIN SURGERY  2005    removal of cerebellar tumor   ? MASTECTOMY MODIFIED RADICAL Left    ? ORIF CLAVICLE FRACTURE  12/2016    plate and screws   ? NM REMOVAL OF OVARY(S)      Description: Oophorectomy;  Recorded: 10/29/2013;   ? SIMPLE MASTECTOMY Right    ? TONSILLECTOMY         Family History   Problem Relation Age of Onset   ? Cancer Mother         thyroid    ? Hypertension Mother    ? Breast cancer Maternal Grandmother    ? Diabetes Maternal Grandmother    ? Diabetes Paternal Grandmother        Social History     Tobacco Use   ? Smoking status: Never Smoker   ? Smokeless tobacco: Never Used   Substance Use Topics   ? Alcohol use: No   ? Drug use: No       Review of Systems  As above in HPI, otherwise balance of Review of Systems are negative.    Objective:     /69   Pulse 67   Temp 98  F (36.7  C) (Oral)   Resp 18   Wt 161 lb (73 kg)   SpO2 98%   BMI 24.48 kg/m      Physical Exam   Constitutional: She is oriented to person, place, and time. She appears well-developed and well-nourished. No  distress.   HENT:   Head: Normocephalic and atraumatic.   Mouth/Throat: No oropharyngeal exudate.   Eyes: Pupils are equal, round, and reactive to light. EOM are normal. No scleral icterus.   Neck: Normal range of motion. Neck supple.   Cardiovascular: Normal rate and regular rhythm.   Pulmonary/Chest: Effort normal and breath sounds normal.   Abdominal: Soft. She exhibits no mass. There is no tenderness. There is no rebound and no guarding.   No CVA tenderness to percussion  There is suprapubic tenderness to palpation and does reproduce the sense to urinate   Neurological: She is alert and oriented to person, place, and time.   Skin: Skin is dry. No rash noted. She is not diaphoretic.   Psychiatric: She has a normal mood and affect.     Lab:  Recent Results (from the past 24 hour(s))   Urinalysis-UC if Indicated   Result Value Ref Range    Color, UA Yellow Colorless, Yellow, Straw, Light Yellow    Clarity, UA Slightly Cloudy (!) Clear    Glucose, UA Negative Negative    Bilirubin, UA Negative Negative    Ketones, UA Negative Negative    Specific Gravity, UA 1.015 1.005 - 1.030    Blood, UA Small (!) Negative    pH, UA 7.5 5.0 - 8.0    Protein,  mg/dL (!) Negative mg/dL    Urobilinogen, UA 0.2 E.U./dL 0.2 E.U./dL, 1.0 E.U./dL    Nitrite, UA Negative Negative    Leukocytes, UA Large (!) Negative    Bacteria, UA Many (!) None Seen hpf    RBC, UA 3-5 (!) None Seen, 0-2 hpf    WBC, UA >100 (!) None Seen, 0-5 hpf    Squam Epithel, UA 0-5 None Seen, 0-5 lpf       Assessment:     Procedures    The primary encounter diagnosis was Urinary tract infection without hematuria, site unspecified. A diagnosis of Urinary frequency was also pertinent to this visit.    Plan:     1. Urinary tract infection without hematuria, site unspecified  cephalexin (KEFLEX) 500 MG capsule   2. Urinary frequency  Urinalysis-UC if Indicated    Culture, Urine         Patient Instructions   Increased fluids and rest.  Discussed signs and  symptoms of ascending urinary tract infection symptoms to include pyelonephritis. Instructed to turn to clinic if there are increased fever chills night sweats fatigue abdominal pain or flank pain  Antibiotic as written. Risks and benefits of medication discussed.  Indication for return to clinic.        Urinary Tract Infections in Women    Urinary tract infections (UTIs) are most often caused by bacteria (germs). These bacteria enter the urinary tract. The bacteria may come from outside the body. Or they may travel from the skin outside the rectum or vagina into the urethra. Female anatomy makes it easier for bacteria from the bowel to enter a womans urinary tract, which is the most common source of UTI. This means women develop UTIs more often than men. Pain in or around the urinary tract is a common UTI symptom. But the only way to know for sure if you have a UTI for the health care provider to test your urine. The two tests that may be done are the urinalysis and urine culture.  Types of UTIs    Cystitis: A bladder infection (cystitis) is the most common UTI in women. You may have urgent or frequent urination. You may also have pain, burning when you urinate, and bloody urine.    Urethritis: This is an inflamed urethra, which is the tube that carries urine from the bladder to outside the body. You may have lower stomach or back pain. You may also have urgent or frequent urination.    Pyelonephritis: This is a kidney infection. If not treated, it can be serious and damage your kidneys. In severe cases, you may be hospitalized. You may have a fever and lower back pain.  Medications to treat a UTI  Most UTIs are treated with antibiotics. These kill the bacteria. The length of time you need to take them depends on the type of infection. It may be as short as 3 days. If you have repeated UTIs, a low-dose antibiotic may be needed for several months. Take antibiotics exactly as directed. Dont stop taking them until all  of the medication is gone. If you stop taking the antibiotic too soon, the infection may not go away, and you may develop a resistance to the antibiotic. This can make it much harder to treat.  Lifestyle changes to treat and prevent UTIs  The lifestyle changes below will help get rid of your UTI. They may also help prevent future UTIs.    Drink plenty of fluids. This includes water, juice, or other caffeine-free drinks. Fluids help flush bacteria out of your body.    Empty your bladder. Always empty your bladder when you feel the urge to urinate. And always urinate before going to sleep. Urine that stays in your bladder can lead to infection. Try to urinate before and after sex as well.    Practice good personal hygiene. Wipe yourself from front to back after using the toilet. This helps keep bacteria from getting into the urethra.    Use condoms during sex. These help prevent UTIs caused by sexually transmitted bacteria. Also, avoid using spermicides during sex. These can increase the risk of UTIs. Choose other forms of birth control instead. For women who tend to get UTIs after sex, a low-dose of a preventive antibiotic may be used. Be sure to discuss this option with your health care provider.    Follow up with your health care provider as directed. He or she may test to make sure the infection has cleared. If necessary, additional treatment may be started.  Date Last Reviewed: 9/8/2014 2000-2016 [employer]. [address] [place] [address], [place] [address]. All rights reserved. This information is not intended as a substitute for professional medical care. Always follow your healthcare professional's instructions.

## 2021-06-29 NOTE — PROGRESS NOTES
Progress Notes by Malia Escamilla MD at 8/10/2020  1:45 PM     Author: Malia Escamilla MD Service: -- Author Type: Physician    Filed: 8/10/2020  4:48 PM Encounter Date: 8/10/2020 Status: Signed    : Malia Escamilla MD (Physician)                                                          Date Of Service: 08/10/20    Date last Seen:  07/15/19    PCP: Sariah Corbin MD    Impression:   1. Left chest wall swelling and left arm/shoulder tightness-now with left axillary cording  2. Secondary post mastectomy lymphedema  3. Complex left clavicular fracture-s/p graft and plate removal with revision 6/18 [facility]  4. Decreased balance since treatment for brain met  5. Breast carcinoma (2004 left, left mod rad, right simple mastec, rad + chemo+solitary brain met)     Plan:        1.   Questions were answered.  1. She has a compression garment to be used when she needs it.    2. Continue exercises.  3. Has antibiotics and been trained on how to use appropriately.    4. Increased contracture/fibrosis left axilla.  Suspect increased radiation injury complicated by healed severe clavicular fracture.  Will ask therapy to work on cording.  5. I will see her in follow up in 4 months or when needed.  Discussed [employer] recommendations and modifications.      Time spent with patient 25 minutes, with greater than 50% time in consultation, education and coordination of care.  _____________________________________________________________________    Chief Complaint:  left chest and left arm/shoulder tightness, complex left clavicular surgery     History of Present Illness:  Mayra Ramirez returns to the [facility] for her left chest fullness and left arm/shoulder tightness due to post mastectomy lymphedema with fibrosis and scarring with fractures after being diagnosed diagnosed with left breast cancer in 2004 and treated with left modified radical mastectomy, right  simple mastectomy, and chemotherapy and and radiation followed by development of brain met requiring surgery then whole brain radiation and oophorectomy.  In Dec. 2015 she sustained a complex left clavicular fracture requiring plating with residual significant scarring requiring complex surgical revision at [place]. There has been no recurrent disease and she sees Dr. Wu.    She is here for follow up.  She reports the swelling in the arm is good.  However she is getting more chest wall feeling of swelling and tightness in the left axilla.  She is very good about doing range of motion and wearing her compression when needed.  She does her exercises.   There is no new numbness, tingling or weakness. There has been no new unexplained weight loss, loss of appetite, nausea and/or vomiting, shortness of breath, weight loss and chest pain.       Past Medical History:   Diagnosis Date   ? Breast cancer (H) left    2004 - HER2 pos, ER+, CT-   ? Fracture of clavicle with nonunion 6/4/2018    Overview:  Added automatically from request for surgery [id number]   ? Metastasis to brain (H) 2005   ? Osteopenia     2/2 breast CA tx.       Past Surgical History:   Procedure Laterality Date   ? BRAIN SURGERY  2005    removal of cerebellar tumor   ? MASTECTOMY MODIFIED RADICAL Left    ? ORIF CLAVICLE FRACTURE  12/2016    plate and screws   ? CT REMOVAL OF OVARY(S)      Description: Oophorectomy;  Recorded: 10/29/2013;   ? SIMPLE MASTECTOMY Right    ? TONSILLECTOMY           Current Outpatient Medications:   ?  ALOE VERA ORAL, Take 2 oz by mouth daily. juice, Disp: , Rfl:   ?  calcium carbonate-vitamin D3 (CALCIUM 600 + D,3,) 600 mg calcium- 200 unit cap, Take by mouth 2 times a day at 6:00 am and 4:00 pm., Disp: , Rfl:   ?  coenzyme Q10 400 mg cap, Take 1 capsule by mouth daily., Disp: , Rfl:   ?  fluticasone (FLONASE) 50 mcg/actuation nasal spray, , Disp: , Rfl:   ?  LACTOBACILLUS ACIDOPHILUS (PROBIOTIC ORAL), Take by mouth daily.,  Disp: , Rfl:   ?  magnesium 250 mg Tab, Take by mouth., Disp: , Rfl:   ?  melatonin 3 mg Tab tablet, Take by mouth., Disp: , Rfl:   ?  MULTIVITAMIN ORAL, Take by mouth., Disp: , Rfl:   ?  MYRBETRIQ 25 mg Tb24 ER tablet, TAKE 1 TABLET BY MOUTH EVERY DAY, Disp: 90 tablet, Rfl: 1  ?  NON FORMULARY, Eye Pro - Daily eye supplement, Disp: , Rfl:   ?  omeprazole (PRILOSEC) 40 MG capsule, TAKE 1 CAPSULE BY MOUTH EVERY DAY BEFORE A MEAL, Disp: , Rfl:   ?  UNABLE TO FIND, Med Name: Trancor, Disp: , Rfl:   ?  UNABLE TO FIND, Med Name: Endefin, Disp: , Rfl:   ?  UNABLE TO FIND, Med Name: Perimine, Disp: , Rfl:   ?  UNABLE TO FIND, Med Name: Thyrosol, Disp: , Rfl:   ?  UNABLE TO FIND, Med Name: Bone Builder, Disp: , Rfl:   ?  UNABLE TO FIND, Med Name: Adrenogen, Disp: , Rfl:     Allergies   Allergen Reactions   ? Sulfa (Sulfonamide Antibiotics) Hives   ? Sulfamethoxazole-Trimethoprim    ? Sulfasalazine Hives       Social History     Socioeconomic History   ? Marital status:      Spouse name: Not on file   ? Number of children: Not on file   ? Years of education: Not on file   ? Highest education level: Not on file   Occupational History   ? Occupation:      Employer: [employer]     Comment: medical leave 4/2017-?   Social Needs   ? Financial resource strain: Not on file   ? Food insecurity     Worry: Not on file     Inability: Not on file   ? Transportation needs     Medical: Not on file     Non-medical: Not on file   Tobacco Use   ? Smoking status: Never Smoker   ? Smokeless tobacco: Never Used   Substance and Sexual Activity   ? Alcohol use: No   ? Drug use: No   ? Sexual activity: Yes     Partners: Male   Lifestyle   ? Physical activity     Days per week: Not on file     Minutes per session: Not on file   ? Stress: Not on file   Relationships   ? Social connections     Talks on phone: Not on file     Gets together: Not on file     Attends Denominational service: Not on file     Active member of  club or organization: Not on file     Attends meetings of clubs or organizations: Not on file     Relationship status: Not on file   ? Intimate partner violence     Fear of current or ex partner: Not on file     Emotionally abused: Not on file     Physically abused: Not on file     Forced sexual activity: Not on file   Other Topics Concern   ? Not on file   Social History Narrative    .  4 kids.  Retired .         Family History   Problem Relation Age of Onset   ? Cancer Mother         thyroid    ? Hypertension Mother    ? Breast cancer Maternal Grandmother    ? Diabetes Maternal Grandmother    ? Diabetes Paternal Grandmother        Review of Systems:  Review of systems is otherwise negative, except as noted above.  Full 12 point review of systems was completed.    Imaging:    I personally reviewed the following imaging results today and those on care everywhere, if indicated    DX Clavicle Left 2 Views6/17/2019  [facility]  Result Impression   No significant change since 12/10/2018. Plate and screw fixation  across a healed mid left clavicular fracture. Near anatomic alignment. Surgical  clips left axilla. No evidence of hardware failure.   Result Narrative   EXAM:  DX CLAVICLE LEFT 2 VIEWS   Other Result Information   Interface,  [facility] Generic [id number] - 06/17/2019 11:18 AM CDT  EXAM:  DX CLAVICLE LEFT 2 VIEWS    IMPRESSION:  No significant change since 12/10/2018. Plate and screw fixation  across a healed mid left clavicular fracture. Near anatomic alignment. Surgical  clips left axilla. No evidence of hardware failure.       Labs:    I personally reviewed the following lab results today and those on care everywhere, if indicated    Vitamin D, Total (25-Hydroxy)   Date Value Ref Range Status   07/12/2017 42.4 30.0 - 80.0 ng/mL Final       Lab Results   Component Value Date    TSH 3.09 02/16/2018     No results found for: SEDRATE      No results found for: CRP         Lab Results   Component Value Date    CREATININE 0.78 11/25/2019      No results found for: HGBA1C        Lab Results   Component Value Date    BUN 11 11/25/2019              No results found for: LABPROT, ALBUMIN    Vitamin D, Total (25-Hydroxy)   Date Value Ref Range Status   07/12/2017 42.4 30.0 - 80.0 ng/mL Final       Lab Results   Component Value Date    TSH 3.09 02/16/2018     Lab Results   Component Value Date    WBC 5.1 11/25/2019    HGB 13.0 11/25/2019    HCT 37.6 11/25/2019    MCV 89 11/25/2019     11/25/2019       Physical Exam:  Vitals:    08/10/20 1409   BP: 102/64   Pulse: 60   Resp: 18   Temp: 98.3  F (36.8  C)      BMI 24.94  Weight 164 pounds      Circumferential measures:    Vasc Edema 8/15/2018 11/15/2018 2/7/2019 7/15/2019 8/10/2020   Right-just above MCP 18.5 19.2 19.8 19.5 19.5   Right Wrist 15.4 16.5 16.3 16 16.3   Right Up 10cm 21.4 21.5 22.5 23.3 23   Right Up 10cm From Elbow 27.7 29.8 32 31.7 30.5   Left-just above MCP 18.5 19.1 19 19.5 19   Left Wrist 15.2 16.3 15.7 15.7 16   Left Up 10cm 21.4 22 23.5 22.2 21.9   Left Up 10cm From Elbow 29 30.8 32.5 31 31     Numbers are stable.    General:  54 y.o. female in no apparent distress.      Psych:  Alert and oriented x 3.  Cooperative. Affect normal.    HEENT:  Atraumaticand normocephalic.    Range of Motion:  Range of motion of shoulders, elbows, wrists is normal bilaterally without active joint synovitis, erythema, joint swelling, crepitus or joint laxity. The left clavicle is looking good.  In the left anterior axilla there is cording now palpated into the left pectoral lateral muscle.  This is painful to palpation and causes recurrence of her tightness and feelings of swelling.     Sensation: Intact to pinprick and light touch throughout the upper extremities bilaterally.       Strength:  Normal strength testing in shoulder abduction, elbow flexion, elbow extension, wrist extension, forearm supination, forearm pronation, hand  intrinsics bilaterally.       Lymph nodes: No cervical, supraclavicular, infraclavicular, or axillary lymphadenopathy palpated.     Vascular: No unusual venous distention.  Radial arterial pulses strong and equal at the wrists bilaterally.      Skin: No unusual rubor, calor, masses or rashes along the skin in either arm.  No significant fibrosity or scarring.  No pain to palpation.  Left arm skin is soft.      Malia Escamilla MD, Diplomate ABWMS, FACCWS, FAAPMR  Medical Director Wound Care and Lymphedema  [facility]  [phone]

## 2021-06-30 NOTE — PROGRESS NOTES
Progress Notes by Malia Escamilla MD at 5/5/2021 10:45 AM     Author: Malia Escamilla MD Service: -- Author Type: Physician    Filed: 5/5/2021 12:55 PM Encounter Date: 5/5/2021 Status: Signed    : Malia Escamilla MD (Physician)                                                          Date Of Service: 05/05/21    Date last Seen:  12/03/20    PCP: Sariah Corbin MD    Impression:   1. Left chest wall swelling and left arm/shoulder tightness-stable  2. Secondary post mastectomy lymphedema-stable  3. Complex left clavicular fracture-s/p graft and plate removal with revision 6/18 [id number] very well  4. Decreased balance since treatment for brain met-stable  5. Osteoporosis  6. Breast carcinoma (2004 left, left mod rad, right simple mastec, rad + chemo+solitary brain met)     Plan:    1. Questions were answered.   2. She has a compression garment to be used when she needs it.    3. Continue exercises.  4. Has antibiotics and been trained on how to use appropriately.    5. Discussed importance of and how to exercise on a regular basis.  Modifications reviewed with recommendations on using the internet and cable for additional options. Reviewed exercises again for weight bearing, balance and strengthening, especially in view of osteoporosis and risk of falls.     6. I will see her in follow up in 1 year, or when needed.       On day of encounter time spent in chart review and with patient in consultation, exam, education and coordination of care:  25 minutes  _____________________________________________________________________    Chief Complaint:  left chest and left arm/shoulder tightness, complex left clavicular surgery     History of Present Illness:  Mayra Ramirez returns to the [facility] for left chest fullness with left arm/shoulder tightness due to post mastectomy lymphedema with fibrosis and scarring complicated by complex left clavicular  fracture after being diagnosed diagnosed with left breast cancer in 2004.  She was treated with left modified radical mastectomy, right simple mastectomy, chemotherapy and radiation followed by development of brain met requiring surgery then whole brain radiation and oophorectomy.  In Dec. 2015 she sustained a complex left clavicular fracture requiring plating with residual significant scarring requiring complex surgical revision at [place]. There has been no recurrent disease.  She has osteoporosis.  At follow up today she reports the swelling in the arm is under good control.   She still gets some tightness in the left shoulder and upper outer chest wall on the right that she points to.    She continues to do range of motion and wears her compression when needed.   She continues to have balance problems.   There is no new numbness, tingling or weakness. There has been no new unexplained weight loss, loss of appetite, nausea and/or vomiting, shortness of breath, weight loss and chest pain.  She has had no evidence of recurrent carcinoma.      Past Medical History:   Diagnosis Date   ? Breast cancer (H) left    2004 - HER2 pos, ER+, [place]-   ? Fracture of clavicle with nonunion 6/4/2018    Overview:  Added automatically from request for surgery [id number]   ? Metastasis to brain (H) 2005   ? Osteopenia     2/2 breast CA tx.       Past Surgical History:   Procedure Laterality Date   ? BRAIN SURGERY  2005    removal of cerebellar tumor   ? MASTECTOMY MODIFIED RADICAL Left    ? ORIF CLAVICLE FRACTURE  12/2016    plate and screws   ? [place] REMOVAL OF OVARY(S)      Description: Oophorectomy;  Recorded: 10/29/2013;   ? SIMPLE MASTECTOMY Right    ? TONSILLECTOMY           Current Outpatient Medications:   ?  ALOE VERA ORAL, Take 2 oz by mouth daily. juice, Disp: , Rfl:   ?  calcium carbonate-vitamin D3 (CALCIUM 600 + D,3,) 600 mg calcium- 200 unit cap, Take by mouth 2 times a day at 6:00 am and 4:00 pm., Disp: , Rfl:   ?  coenzyme  Q10 400 mg cap, Take 1 capsule by mouth daily., Disp: , Rfl:   ?  fluticasone (FLONASE) 50 mcg/actuation nasal spray, , Disp: , Rfl:   ?  LACTOBACILLUS ACIDOPHILUS (PROBIOTIC ORAL), Take by mouth daily., Disp: , Rfl:   ?  magnesium 250 mg Tab, Take by mouth., Disp: , Rfl:   ?  melatonin 3 mg Tab tablet, Take by mouth., Disp: , Rfl:   ?  MULTIVITAMIN ORAL, Take by mouth., Disp: , Rfl:   ?  MYRBETRIQ 25 mg Tb24 ER tablet, TAKE 1 TABLET BY MOUTH EVERY DAY, Disp: 90 tablet, Rfl: 1  ?  omeprazole (PRILOSEC) 40 MG capsule, TAKE 1 CAPSULE BY MOUTH EVERY DAY BEFORE A MEAL, Disp: , Rfl:   ?  romosozumab-aqqg (EVENITY SUBQ), Inject under the skin every 30 (thirty) days. 2 shots every month, Disp: , Rfl:   ?  UNABLE TO FIND, Med Name: Trancor, Disp: , Rfl:   ?  UNABLE TO FIND, Med Name: Endefin, Disp: , Rfl:   ?  UNABLE TO FIND, Med Name: Thyrosol, Disp: , Rfl:   ?  UNABLE TO FIND, Med Name: Bone Builder, Disp: , Rfl:   ?  FLUCELVAX QUAD 1594-3896, PF, 60 mcg (15 mcg x 4)/0.5 mL Syrg, PHARMACY ADMINISTERED, Disp: , Rfl:     Allergies   Allergen Reactions   ? Septra [Sulfamethoxazole-Trimethoprim]    ? Sulfa (Sulfonamide Antibiotics) Hives   ? Sulfasalazine Hives       Social History     Socioeconomic History   ? Marital status:      Spouse name: Not on file   ? Number of children: Not on file   ? Years of education: Not on file   ? Highest education level: Not on file   Occupational History   ? Occupation:      Employer: [employer]     Comment: medical leave 4/2017-?   Social Needs   ? Financial resource strain: Not on file   ? Food insecurity     Worry: Not on file     Inability: Not on file   ? Transportation needs     Medical: Not on file     Non-medical: Not on file   Tobacco Use   ? Smoking status: Never Smoker   ? Smokeless tobacco: Never Used   Substance and Sexual Activity   ? Alcohol use: No   ? Drug use: No   ? Sexual activity: Yes     Partners: Male   Lifestyle   ? Physical  activity     Days per week: Not on file     Minutes per session: Not on file   ? Stress: Not on file   Relationships   ? Social connections     Talks on phone: Not on file     Gets together: Not on file     Attends Sikhism service: Not on file     Active member of club or organization: Not on file     Attends meetings of clubs or organizations: Not on file     Relationship status: Not on file   ? Intimate partner violence     Fear of current or ex partner: Not on file     Emotionally abused: Not on file     Physically abused: Not on file     Forced sexual activity: Not on file   Other Topics Concern   ? Not on file   Social History Narrative    .  4 kids.  Retired .         Family History   Problem Relation Age of Onset   ? Cancer Mother         thyroid    ? Hypertension Mother    ? Breast cancer Maternal Grandmother    ? Diabetes Maternal Grandmother    ? Diabetes Paternal Grandmother        Review of Systems:  Review of systems is otherwise negative, except as noted above.  Full 12 point review of systems was completed.    Imaging:    I personally reviewed the following imaging results today and those on care everywhere, if indicated    EXAM DATE:         10/30/2020     EXAM: X-RAY RIBS, UNILATERAL PLUS PA CHEST  LOCATION: [facility] RADIOLOGY [place]  DATE/TIME: 10/30/2020 3:00 PM     INDICATION: R lower rib pain after falling  COMPARISON: Left shoulder radiograph dated 12/17/2015.     IMPRESSION: The visualized heart and lungs are negative. No rib fractures. Prior  left clavicle repair noted.    10/26/20  LOCATION: [facility]    Osteoporosis Risk Factors: Early menopause- Chemotherapy 2004    COMPARISON: Comparisons to prior scans are invalid due to change in   calibration of the machinery 10/2020. Future trending information will not   specify if changes in bone mineral density are considered statistically   significant, as [place] law prohibits  DXA precision testing ([place]   Administration Rules Chapter 4732.0850)    FINDINGS:    Lumbar spine bone mineral density: 0.804 g/cm2   T-score -3.0   z-score -2.5    Right total femur bone mineral density: 0.730 g/cm2   T-score -2.2   z-score -1.7    Right femoral neck bone mineral density: 0.752 g/cm2   T-score -2.1   z-score -1.2    Left total femur bone mineral density: 0.733 g/cm2   T-score -2.2   z-score -1.7    Left femoral neck bone mineral density: 0.721 g/cm2   T-score -2.3   z-score -1.7        DIAGNOSIS: Osteoporosis        FRAX scores were developed and validated only for persons with osteopenia   not already on advanced treatment.  The WHO recommends treatment for persons with 20% or greater risk of major   osteoporotic fracture or 3% or greater risk of hip fracture    FOLLOW UP SCAN RECOMMENDATION: 2 years    ADDITIONAL RECOMMENDATIONS/COMMENTS: Advanced treatment recommended.    Neri Garcia MD 10/26/2020 1:44 PM     Nothing new to review    Labs:    I personally reviewed the following lab results today and those on care everywhere, if indicated    Vitamin D, Total (25-Hydroxy)   Date Value Ref Range Status   07/12/2017 42.4 30.0 - 80.0 ng/mL Final       Lab Results   Component Value Date    TSH 3.09 02/16/2018     No results found for: SEDRATE      No results found for: CRP        Lab Results   Component Value Date    CREATININE 0.78 11/25/2019      No results found for: HGBA1C        Lab Results   Component Value Date    BUN 11 11/25/2019              No results found for: LABPROT, ALBUMIN    Vitamin D, Total (25-Hydroxy)   Date Value Ref Range Status   07/12/2017 42.4 30.0 - 80.0 ng/mL Final       Lab Results   Component Value Date    TSH 3.09 02/16/2018     Lab Results   Component Value Date    WBC 5.1 11/25/2019    HGB 13.0 11/25/2019    HCT 37.6 11/25/2019    MCV 89 11/25/2019     11/25/2019     1/15/20  Chol 188  Trigly 105  HDL 50  LDL  117    Physical Exam:  Vitals:    05/05/21 1104    BP: 104/70   Pulse: (!) 56   Resp: 16   Temp: 97.5  F (36.4  C)      BMI 26.27  Weight 172 (+1) pounds      Circumferential measures:    Vasc Edema 2/7/2019 7/15/2019 8/10/2020 12/3/2020 5/5/2021   Right-just above MCP 19.8 19.5 19.5 19.2 19.1   Right Wrist 16.3 16 16.3 16 16.4   Right Up 10cm 22.5 23.3 23 22.8 24.3   Right Up 10cm From Elbow 32 31.7 30.5 30.5 31.7   Left-just above MCP 19 19.5 19 18.8 19   Left Wrist 15.7 15.7 16 16 16.3   Left Up 10cm 23.5 22.2 21.9 23 23.2   Left Up 10cm From Elbow 32.5 31 31 31 32.4     Numbers are stable and looking good.    General:  55 y.o. female in no apparent distress.      Psych:  Alert and oriented x 3.  Cooperative. Affect normal.    HEENT:  Atraumaticand normocephalic.    Range of Motion:  Range of motion of shoulders, elbows, wrists is normal bilaterally without active joint synovitis, erythema, joint swelling, crepitus or joint laxity.  In the left anterior axilla there is mild fibrosis and cording in the left axilla.      Sensation: Intact to pinprick and light touch throughout the upper extremities bilaterally.       Strength:  Normal strength testing in shoulder abduction, elbow flexion, elbow extension, wrist extension and hand intrinsics bilaterally.       Lymph nodes: No cervical, supraclavicular, infraclavicular, or axillary lymphadenopathy palpated.     Vascular: No unusual venous distention.  Radial arterial pulses strong and equal at the wrists bilaterally.      Skin: No unusual rubor, calor, masses or rashes along the skin in either arm.  No significant fibrosity or scarring.  No pain to palpation.  Left arm skin is soft.    Malia Escamilla MD, Founding Diplomate ABWMS, FACCWS, FAAPMR  Medical Director Wound Care and Lymphedema  [facility]  [phone]

## 2021-06-30 NOTE — PROGRESS NOTES
Progress Notes by Malia Escamilla MD at 12/3/2020 11:15 AM     Author: Malia Escamilla MD Service: -- Author Type: Physician    Filed: 12/3/2020  5:08 PM Encounter Date: 12/3/2020 Status: Signed    : Malia Escamilla MD (Physician)                                                          Date Of Service: 12/03/20    Date last Seen:  08/10/20    PCP: Sariah Corbin MD    Impression:   1. Left chest wall swelling and left arm/shoulder tightness-stable  2. Secondary post mastectomy lymphedema-stable  3. Complex left clavicular fracture-s/p graft and plate removal with revision 6/18 [place]-stable  4. Decreased balance since treatment for brain met-stable  5. Osteoporosis  6. Breast carcinoma (2004 left, left mod rad, right simple mastec, rad + chemo+solitary brain met)     Plan:    1. Questions were answered.   2. She has a compression garment to be used when she needs it.    3. Continue exercises.  4. Has antibiotics and been trained on how to use appropriately.    5. Discussed importance of and how to exercise on a regular basis.  Modifications reviewed with recommendations on using the internet and cable for additional options. Reviewed exercises for weight bearing, balance and strengthening, especially in view of osteoporosis and risk of falls.     6. I will see her in follow up in 6 months or when needed.     Time spent with patient 25 minutes, with greater than 50% time in consultation, education and coordination of care.  _____________________________________________________________________    Chief Complaint:  left chest and left arm/shoulder tightness, complex left clavicular surgery     History of Present Illness:  Mayra Ramirez returns to the [facility] for left chest fullness with left arm/shoulder tightness due to post mastectomy lymphedema with fibrosis and scarring complicated by complex left clavicular fracture after being diagnosed diagnosed  with left breast cancer in 2004.  She was treated with left modified radical mastectomy, right simple mastectomy, chemotherapy and radiation followed by development of brain met requiring surgery then whole brain radiation and oophorectomy.  In Dec. 2015 she sustained a complex left clavicular fracture requiring plating with residual significant scarring requiring complex surgical revision at [place]. There has been no recurrent disease and she sees Dr. Wu.   At follow up today she reports the swelling in the arm is good.  She fell at the end of October and XR was negative for fracture.  She has been diagnosed with osteoporosis.  She continues to do range of motion and wears her compression when needed.  She does her exercises.   She continues to have balance problems. She is scheduled to see endocrinology at [place] soon.  There is no new numbness, tingling or weakness. There has been no new unexplained weight loss, loss of appetite, nausea and/or vomiting, shortness of breath, weight loss and chest pain.       Past Medical History:   Diagnosis Date   ? Breast cancer (H) left    2004 - HER2 pos, ER+, [place]-   ? Fracture of clavicle with nonunion 6/4/2018    Overview:  Added automatically from request for surgery [id number]   ? Metastasis to brain (H) 2005   ? Osteopenia     2/2 breast CA tx.       Past Surgical History:   Procedure Laterality Date   ? BRAIN SURGERY  2005    removal of cerebellar tumor   ? MASTECTOMY MODIFIED RADICAL Left    ? ORIF CLAVICLE FRACTURE  12/2016    plate and screws   ? [place] REMOVAL OF OVARY(S)      Description: Oophorectomy;  Recorded: 10/29/2013;   ? SIMPLE MASTECTOMY Right    ? TONSILLECTOMY           Current Outpatient Medications:   ?  ALOE VERA ORAL, Take 2 oz by mouth daily. juice, Disp: , Rfl:   ?  calcium carbonate-vitamin D3 (CALCIUM 600 + D,3,) 600 mg calcium- 200 unit cap, Take by mouth 2 times a day at 6:00 am and 4:00 pm., Disp: , Rfl:   ?  coenzyme Q10 400 mg cap, Take 1 capsule  by mouth daily., Disp: , Rfl:   ?  FLUCELVAX QUAD 8223-9108, PF, 60 mcg (15 mcg x 4)/0.5 mL Syrg, PHARMACY ADMINISTERED, Disp: , Rfl:   ?  fluticasone (FLONASE) 50 mcg/actuation nasal spray, , Disp: , Rfl:   ?  LACTOBACILLUS ACIDOPHILUS (PROBIOTIC ORAL), Take by mouth daily., Disp: , Rfl:   ?  magnesium 250 mg Tab, Take by mouth., Disp: , Rfl:   ?  melatonin 3 mg Tab tablet, Take by mouth., Disp: , Rfl:   ?  metroNIDAZOLE (METROGEL) 0.75 % vaginal gel, PLACE 1 APPLICATOR (5 G) VAGINALLY DAILY, Disp: , Rfl:   ?  MULTIVITAMIN ORAL, Take by mouth., Disp: , Rfl:   ?  MYRBETRIQ 25 mg Tb24 ER tablet, TAKE 1 TABLET BY MOUTH EVERY DAY, Disp: 90 tablet, Rfl: 1  ?  NON FORMULARY, Eye Pro - Daily eye supplement, Disp: , Rfl:   ?  omeprazole (PRILOSEC) 40 MG capsule, TAKE 1 CAPSULE BY MOUTH EVERY DAY BEFORE A MEAL, Disp: , Rfl:   ?  UNABLE TO FIND, Med Name: Trancor, Disp: , Rfl:   ?  UNABLE TO FIND, Med Name: Endefin, Disp: , Rfl:   ?  UNABLE TO FIND, Med Name: Perimine, Disp: , Rfl:   ?  UNABLE TO FIND, Med Name: Thyrosol, Disp: , Rfl:   ?  UNABLE TO FIND, Med Name: Bone Builder, Disp: , Rfl:   ?  UNABLE TO FIND, Med Name: Adrenogen, Disp: , Rfl:     Allergies   Allergen Reactions   ? Septra [Sulfamethoxazole-Trimethoprim]    ? Sulfa (Sulfonamide Antibiotics) Hives   ? Sulfasalazine Hives       Social History     Socioeconomic History   ? Marital status:      Spouse name: Not on file   ? Number of children: Not on file   ? Years of education: Not on file   ? Highest education level: Not on file   Occupational History   ? Occupation:      Employer: [employer]     Comment: medical leave 4/2017-?   Social Needs   ? Financial resource strain: Not on file   ? Food insecurity     Worry: Not on file     Inability: Not on file   ? Transportation needs     Medical: Not on file     Non-medical: Not on file   Tobacco Use   ? Smoking status: Never Smoker   ? Smokeless tobacco: Never Used   Substance and  Sexual Activity   ? Alcohol use: No   ? Drug use: No   ? Sexual activity: Yes     Partners: Male   Lifestyle   ? Physical activity     Days per week: Not on file     Minutes per session: Not on file   ? Stress: Not on file   Relationships   ? Social connections     Talks on phone: Not on file     Gets together: Not on file     Attends Episcopal service: Not on file     Active member of club or organization: Not on file     Attends meetings of clubs or organizations: Not on file     Relationship status: Not on file   ? Intimate partner violence     Fear of current or ex partner: Not on file     Emotionally abused: Not on file     Physically abused: Not on file     Forced sexual activity: Not on file   Other Topics Concern   ? Not on file   Social History Narrative    .  4 kids.  Retired .         Family History   Problem Relation Age of Onset   ? Cancer Mother         thyroid    ? Hypertension Mother    ? Breast cancer Maternal Grandmother    ? Diabetes Maternal Grandmother    ? Diabetes Paternal Grandmother        Review of Systems:  Review of systems is otherwise negative, except as noted above.  Full 12 point review of systems was completed.    Imaging:    I personally reviewed the following imaging results today and those on care everywhere, if indicated    EXAM DATE:         10/30/2020     EXAM: X-RAY RIBS, UNILATERAL PLUS PA CHEST  LOCATION: [facility] RADIOLOGY [place]  DATE/TIME: 10/30/2020 3:00 PM     INDICATION: R lower rib pain after falling  COMPARISON: Left shoulder radiograph dated 12/17/2015.     IMPRESSION: The visualized heart and lungs are negative. No rib fractures. Prior  left clavicle repair noted.    10/26/20  LOCATION: [facility]    Osteoporosis Risk Factors: Early menopause- Chemotherapy 2004    COMPARISON: Comparisons to prior scans are invalid due to change in   calibration of the machinery 10/2020. Future trending information  will not   specify if changes in bone mineral density are considered statistically   significant, as [place] law prohibits DXA precision testing ([place]   Administration Rules Chapter 4732.0850)    FINDINGS:    Lumbar spine bone mineral density: 0.804 g/cm2   T-score -3.0   z-score -2.5    Right total femur bone mineral density: 0.730 g/cm2   T-score -2.2   z-score -1.7    Right femoral neck bone mineral density: 0.752 g/cm2   T-score -2.1   z-score -1.2    Left total femur bone mineral density: 0.733 g/cm2   T-score -2.2   z-score -1.7    Left femoral neck bone mineral density: 0.721 g/cm2   T-score -2.3   z-score -1.7        DIAGNOSIS: Osteoporosis        FRAX scores were developed and validated only for persons with osteopenia   not already on advanced treatment.  The WHO recommends treatment for persons with 20% or greater risk of major   osteoporotic fracture or 3% or greater risk of hip fracture    FOLLOW UP SCAN RECOMMENDATION: 2 years    ADDITIONAL RECOMMENDATIONS/COMMENTS: Advanced treatment recommended.    Neri Garcia MD 10/26/2020 1:44 PM     Labs:    I personally reviewed the following lab results today and those on care everywhere, if indicated    Vitamin D, Total (25-Hydroxy)   Date Value Ref Range Status   07/12/2017 42.4 30.0 - 80.0 ng/mL Final       Lab Results   Component Value Date    TSH 3.09 02/16/2018     No results found for: SEDRATE      No results found for: CRP        Lab Results   Component Value Date    CREATININE 0.78 11/25/2019      No results found for: HGBA1C        Lab Results   Component Value Date    BUN 11 11/25/2019              No results found for: LABPROT, ALBUMIN    Vitamin D, Total (25-Hydroxy)   Date Value Ref Range Status   07/12/2017 42.4 30.0 - 80.0 ng/mL Final       Lab Results   Component Value Date    TSH 3.09 02/16/2018     Lab Results   Component Value Date    WBC 5.1 11/25/2019    HGB 13.0 11/25/2019    HCT 37.6 11/25/2019    MCV 89 11/25/2019      11/25/2019       Physical Exam:  Vitals:    12/03/20 1219   BP: 100/70   Pulse: 60   Resp: 20   Temp: 97.1  F (36.2  C)      BMI 26.00  Weight 171 (+7) pounds      Circumferential measures:    Vasc Edema 11/15/2018 2/7/2019 7/15/2019 8/10/2020 12/3/2020   Right-just above MCP 19.2 19.8 19.5 19.5 19.2   Right Wrist 16.5 16.3 16 16.3 16   Right Up 10cm 21.5 22.5 23.3 23 22.8   Right Up 10cm From Elbow 29.8 32 31.7 30.5 30.5   Left-just above MCP 19.1 19 19.5 19 18.8   Left Wrist 16.3 15.7 15.7 16 16   Left Up 10cm 22 23.5 22.2 21.9 23   Left Up 10cm From Elbow 30.8 32.5 31 31 31     Numbers are stable and looking good.    General:  55 y.o. female in no apparent distress.      Psych:  Alert and oriented x 3.  Cooperative. Affect normal.    HEENT:  Atraumaticand normocephalic.    Range of Motion:  Range of motion of shoulders, elbows, wrists is normal bilaterally without active joint synovitis, erythema, joint swelling, crepitus or joint laxity. The left clavicle is looking good.  In the left anterior axilla there is decreased fibrosis and cording in the left axilla.      Sensation: Intact to pinprick and light touch throughout the upper extremities bilaterally.       Strength:  Normal strength testing in shoulder abduction, elbow flexion, elbow extension, wrist extension, forearm supination, forearm pronation, hand intrinsics bilaterally.       Lymph nodes: No cervical, supraclavicular, infraclavicular, or axillary lymphadenopathy palpated.     Vascular: No unusual venous distention.  Radial arterial pulses strong and equal at the wrists bilaterally.      Skin: No unusual rubor, calor, masses or rashes along the skin in either arm.  No significant fibrosity or scarring.  No pain to palpation.  Left arm skin is soft.      Malia Escamilla MD, Founding Diplomate ABWMS, FACCWS, FAAPMR  Medical Director Wound Care and Lymphedema  [facility]  [phone]

## 2021-07-04 NOTE — ADDENDUM NOTE
Addendum Note by Zee Zuñiga, RN at 5/5/2021 10:45 AM     Author: Zee Zuñiga RN Service: -- Author Type: Registered Nurse    Filed: 6/10/2021  3:20 PM Encounter Date: 5/5/2021 Status: Signed    : Zee Zuñiga RN (Registered Nurse)    Addended by: ZEE ZUÑIGA on: 6/10/2021 03:20 PM        Modules accepted: Orders

## 2021-07-13 ENCOUNTER — OFFICE VISIT (OUTPATIENT)
Dept: OBGYN | Facility: CLINIC | Age: 56
End: 2021-07-13
Payer: COMMERCIAL

## 2021-07-13 ENCOUNTER — NURSE TRIAGE (OUTPATIENT)
Dept: NURSING | Facility: CLINIC | Age: 56
End: 2021-07-13

## 2021-07-13 VITALS — SYSTOLIC BLOOD PRESSURE: 118 MMHG | BODY MASS INDEX: 26.76 KG/M2 | DIASTOLIC BLOOD PRESSURE: 66 MMHG | WEIGHT: 176 LBS

## 2021-07-13 DIAGNOSIS — N81.11 CYSTOCELE, MIDLINE: ICD-10-CM

## 2021-07-13 DIAGNOSIS — N89.8 VAGINAL DISCHARGE: Primary | ICD-10-CM

## 2021-07-13 LAB
CLUE CELLS: ABNORMAL
TRICHOMONAS, WET PREP: ABNORMAL
WBC'S/HIGH POWER FIELD, WET PREP: ABNORMAL
YEAST, WET PREP: PRESENT

## 2021-07-13 PROCEDURE — 99213 OFFICE O/P EST LOW 20 MIN: CPT | Performed by: OBSTETRICS & GYNECOLOGY

## 2021-07-13 PROCEDURE — 87210 SMEAR WET MOUNT SALINE/INK: CPT | Performed by: OBSTETRICS & GYNECOLOGY

## 2021-07-13 RX ORDER — FLUCONAZOLE 150 MG/1
150 TABLET ORAL DAILY
Qty: 3 TABLET | Refills: 0 | Status: SHIPPED | OUTPATIENT
Start: 2021-07-13 | End: 2022-01-01

## 2021-07-13 NOTE — PROGRESS NOTES
Mayra Ramirez is a 55 year old female  postmenopausal status post BSO not presently sexually active presents today for a follow-up of the visit of 2021.  At that time she had noticed a cystocele and after reviewing options with her decision was made to fit her with a pessary.  Initially the patient was fitted with a #3 diaphragm pessary but found this uncomfortable.  The patient was then  Re- fitted with a #2 diaphragm type pessary.  The patient presents today for a follow-up visit.  The pt does notice some intermittent vaginal discharge without itching or irritation.  The pessary otherwise is working well.  She inserts it in the morning and removes at night    Past Medical History:   Diagnosis Date     Abnormal Pap smear of cervix 05/15/1996    in Care Everywhere - ASCUS pap (no HPV result seen)     Brain metastases (H)     Recurrent metastatic breast cancer     Breast cancer (H) left     - HER2 pos, ER+, MI-     Fracture of clavicle with nonunion 2018    Overview:  Added automatically from request for surgery [id number]     Lymphoedema 2012     Malignant neoplasm of breast (female), unspecified site     Breast cancer     Metastasis to brain (H)      Osteopenia     2/2 breast CA tx.     Current Outpatient Medications   Medication     ALOE VERA JUICE PO     BIOTIN PO     CALCIUM + D OR     Coenzyme Q10 (COQ10 PO)     MELATONIN PO     Mirabegron (MYRBETRIQ PO)     Multiple Minerals-Vitamins (BONE DENSITY BUILDER PO)     order for DME     Probiotic Product (PROBIOTIC ADVANCED PO)     Romosozumab-aqqg (EVENITY SC)     romosozumab-aqqg (EVENITY) 105 MG/1.17ML SOSY injection     UNABLE TO FIND     UNABLE TO FIND     UNABLE TO FIND     UNABLE TO FIND     UNABLE TO FIND     UNABLE TO FIND     UNABLE TO FIND     UNABLE TO FIND     vitamin D2 (ERGOCALCIFEROL) 27504 units (1250 mcg) capsule     No current facility-administered medications for this visit.     /66   Wt 79.8 kg  (176 lb)   BMI 26.76 kg/m    Constitutional: healthy, alert and no distress  Genitourinary: Normal external genitalia without lesions and the #2 diaphragm pessary was removed and cleansed with soap and water.  A wet prep was performed and was positive for monilia.  Bimanual exam the uterus is parous mobile adnexa without masses or enlargement, midline cystocele grade 2 is noted.  No evidence of any ulceration or irritation.  The pessary was redressed with Trimo-Zayas gel and replaced with good resulting pelvic floor support    (N89.8) Vaginal discharge  (primary encounter diagnosis)  Comment: Wet prep positive for monilia  Plan: Wet prep - Clinic Collect        I will send a prescription for Diflucan 150 mg orally daily for 3 days to her listed pharmacy    (N81.11) Cystocele, midline  Comment: Symptoms well controlled with above plan  Plan: I will see her back in 2 to 3 months for follow-up or as needed concerns

## 2021-07-13 NOTE — PATIENT INSTRUCTIONS
You can reach your [facility] Care Team any time of the day by calling [phone]. This number will put you in touch with the 24 hour nurse line if the clinic is closed.    To contact your OB/GYN Station Coordinator/Surgery Scheduler please call [phone]. This is a direct number for your care team between 8 a.m. and 4 p.m. Monday through Friday.    [facility] is open for your convenience:  Monday through Friday 8 a.m. to 6 p.m.  Closed weekends and all major holidays.

## 2021-07-13 NOTE — TELEPHONE ENCOUNTER
"Dr Enriquez prescribed treatment for vaginal yeast infection. Pt asks if vaginal cream to tx yeast is compatible w/ her pessary. EHR shows  prescribed oral Diflucan instead so this is not an issue. Pt voiced understanding and agreement.       Reason for Disposition    Caller has medication question only, adult not sick, and triager answers question    Additional Information    Negative: Drug overdose and triager unable to answer question    Negative: Caller requesting information unrelated to medicine    Negative: Caller requesting a prescription for Strep throat and has a positive culture result    Negative: Rash while taking a medication or within 3 days of stopping it    Negative: Immunization reaction suspected    Negative: [1] Asthma and [2] having symptoms of asthma (cough, wheezing, etc.)    Negative: [1] Influenza symptoms AND [2] anti-viral med prescription request, such as Tamiflu    Negative: [1] Symptom of illness (e.g., headache, abdominal pain, earache, vomiting) AND [2] more than mild    Negative: MORE THAN A DOUBLE DOSE of a prescription or over-the-counter (OTC) drug    Negative: [1] DOUBLE DOSE (an extra dose or lesser amount) of over-the-counter (OTC) drug AND [2] any symptoms (e.g., dizziness, nausea, pain, sleepiness)    Negative: [1] DOUBLE DOSE (an extra dose or lesser amount) of prescription drug AND [2] any symptoms (e.g., dizziness, nausea, pain, sleepiness)    Negative: Took another person's prescription drug    Negative: [1] DOUBLE DOSE (an extra dose or lesser amount) of prescription drug AND [2] NO symptoms (Exception: a double dose of antibiotics)    Negative: Diabetes drug error or overdose (e.g., took wrong type of insulin or took extra dose)    Negative: [1] Request for URGENT new prescription or refill of \"essential\" medication (i.e., likelihood of harm to patient if not taken) AND [2] triager unable to fill per unit policy    Negative: [1] Prescription not at pharmacy AND [2] was " prescribed by PCP recently    Negative: [1] Pharmacy calling with prescription questions AND [2] triager unable to answer question    Negative: [1] Caller has URGENT medication question about med that PCP or specialist prescribed AND [2] triager unable to answer question    Negative: [1] Caller has NON-URGENT medication question about med that PCP prescribed AND [2] triager unable to answer question    Negative: [1] Caller requesting a NON-URGENT new prescription or refill AND [2] triager unable to refill per unit policy    Negative: [1] Caller has medication question about med not prescribed by PCP AND [2] triager unable to answer question (e.g., compatibility with other med, storage)    Negative: Caller requesting a CONTROLLED substance prescription refill (e.g., narcotics, ADHD medicines)    Negative: Caller wants to use a complementary or alternative medicine    Negative: [1] Prescription prescribed recently is not at pharmacy AND [2] triager has access to patient's EMR AND [3] prescription is recorded in the EMR    Negative: [1] DOUBLE DOSE (an extra dose or lesser amount) of over-the-counter (OTC) drug AND [2] NO symptoms    Negative: [1] DOUBLE DOSE (an extra dose or lesser amount) of antibiotic drug AND [2] NO symptoms    Protocols used: MEDICATION QUESTION CALL-A-

## 2021-07-13 NOTE — NURSING NOTE
"Chief Complaint   Patient presents with     Pessary Check/Fit/Insert       Initial /66   Wt 79.8 kg (176 lb)   BMI 26.76 kg/m   Estimated body mass index is 26.76 kg/m  as calculated from the following:    Height as of 12/3/20: 1.727 m (5' 8\").    Weight as of this encounter: 79.8 kg (176 lb).  BP completed using cuff size: regular    Questioned patient about current smoking habits.  Pt. has never smoked.          The following HM Due: NONE      The following patient reported/Care Every where data was sent to:  P ABSTRACT QUALITY INITIATIVES [[id number]]        Vanessa Willingham [facility]                 "

## 2021-07-14 ENCOUNTER — NURSE TRIAGE (OUTPATIENT)
Dept: NURSING | Facility: CLINIC | Age: 56
End: 2021-07-14

## 2021-07-14 NOTE — TELEPHONE ENCOUNTER
Trying to walk up the stairs, fell back and hit head on wooden door frame   -just a couple of stairs down   -15 min ago    Really painful on the back of head   -rates 8/10  Headache  -rates 7/10    Has been putting ice on it    *of note did have cancer with a brain surgery 15 years     No laceration   No bleeding   No bump or lump   No loss of consciousness  No numbness  No weakness  No neck pain  No nausea/vomiting  No confusion   No slurred speech  No difficulty with speech  No dizziness/lightheadedness  No vision changes   No fluid dripping from nose/ears  No black eyes    Triaged to a disposition of Home Care, for now. Home care advice given. Patient is agreeable. Will try home care, if worsening symptoms will call back. Discussed reasons to call back vs ED vs EMS. They are agreeable.     COVID 19 Nurse Triage Plan/Patient Instructions    Please be aware that novel coronavirus (COVID-19) may be circulating in the community. If you develop symptoms such as fever, cough, or SOB or if you have concerns about the presence of another infection including coronavirus (COVID-19), please contact your health care provider or visit [email or web address].     Disposition/Instructions    Home care recommended. Follow home care protocol based instructions.    Thank you for taking steps to prevent the spread of this virus.  o Limit your contact with others.  o Wear a simple mask to cover your cough.  o Wash your hands well and often.    Resources    [facility]: About COVID-19: [email or web address]    CDC: What to Do If You're Sick: www.cdc.gov/coronavirus/2019-ncov/about/steps-when-sick.html    CDC: Ending Home Isolation: www.cdc.gov/coronavirus/2019-ncov/hcp/disposition-in-home-patients.html     CDC: Caring for Someone: www.cdc.gov/coronavirus/2019-ncov/if-you-are-sick/care-for-someone.html     [facility]: Interim Guidance for Hospital Discharge to Home:  "www.health.[email or web address]/[email or web address]/[email or web address]/hcp/hospdischarge.pdf    [facility] clinical trials (COVID-19 research studies): [email or web address].[facility]/umn-clinical-trials     Below are the COVID-19 hotlines at the [place] Department of Health ([facility]). Interpreters are available.   o For health questions: Call 274-709-1743 or 1-346.911.2397 (7 a.m. to 7 p.m.)  o For questions about schools and childcare: Call 542-733-3116 or 1-379.993.7632 (7 a.m. to 7 p.m.)     Reason for Disposition    Scalp swelling, bruise or pain    Additional Information    Negative: [1] ACUTE NEURO SYMPTOM AND [2] present now  (DEFINITION: difficult to awaken OR confused thinking and talking OR slurred speech OR weakness of arms OR unsteady walking)    Negative: Knocked out (unconscious) > 1 minute    Negative: Seizure (convulsion) occurred  (Exception: prior history of seizures and now alert and without Acute Neuro Symptoms)    Negative: Penetrating head injury (e.g., knife, gun shot wound, metal object)    Negative: [1] Major bleeding (e.g., actively dripping or spurting) AND [2] can't be stopped    Negative: [1] Dangerous mechanism of injury (e.g., MVA, diving, trampoline, contact sports, fall > 10 feet or 3 meters) AND [2] NECK pain AND [3] began < 1 hour after injury    Negative: Sounds like a life-threatening emergency to the triager    Negative: [1] Diagnosed with concussion AND [2] within last 14 days    Negative: [1] Traumatic brain injury (mTBI; concussion) AND [2] more than 14 days since head injury    Negative: Can't remember what happened (amnesia)    Negative: Vomiting once or more    Negative: [1] Loss of vision or double vision AND [2] present now    Negative: Watery or blood-tinged fluid dripping from the NOSE or EARS now  (Exception: tears from crying or nosebleed from nasal trauma)    Negative: [1] One or two \"black eyes\" (bruising, purple color of eyelids) AND [2] onset within 24 hours of head injury    Negative: " Large swelling or bruise > 2 inches (5 cm)    Negative: Skin is split open or gaping  (or length > 1/2 inch or 12 mm)    Negative: [1] Bleeding AND [2] won't stop after 10 minutes of direct pressure (using correct technique)    Negative: Sounds like a serious injury to the triager    Negative: [1] ACUTE NEURO SYMPTOM AND [2] now fine  (DEFINITION: difficult to awaken OR confused thinking and talking OR slurred speech OR weakness of arms OR unsteady walking)    Negative: [1] Knocked out (unconscious) < 1 minute AND [2] now fine    Negative: [1] SEVERE headache AND [2] not improved 2 hours after pain medicine/ice packs    Negative: Dangerous injury (e.g., MVA, diving, trampoline, contact sports, fall > 10 feet or 3 meters) or severe blow from hard object (e.g., golf club or baseball bat)    Negative: Taking Coumadin (warfarin) or other strong blood thinner, or known bleeding disorder (e.g., thrombocytopenia)    Negative: Suspicious history for the injury    Negative: [1] Age over 65 years AND [2] swelling or bruise    Negative: Patient is confused or is an unreliable provider of information (e.g., dementia, severe intellectual disability, alcohol intoxication)    Negative: [1] No prior tetanus shots (or is not fully vaccinated) AND [2] any wound (e.g., cut, scrape)    Negative: [1] HIV positive or severe immunodeficiency (severely weak immune system) AND [2] DIRTY cut or scrape    Negative: [1] Last tetanus shot > 5 years ago AND [2] DIRTY cut or scrape    Negative: [1] Last tetanus shot > 10 years ago AND [2] CLEAN cut or scrape (e.g., object AND skin were clean)    Negative: [1] After 72 hours AND [2] headache persists    Protocols used: HEAD INJURY-A-    Tenisha Benavides RN on 7/14/2021 at 1:58 AM

## 2021-07-16 ENCOUNTER — OFFICE VISIT (OUTPATIENT)
Dept: FAMILY MEDICINE | Facility: CLINIC | Age: 56
End: 2021-07-16
Payer: COMMERCIAL

## 2021-07-16 VITALS
TEMPERATURE: 98.9 F | HEART RATE: 68 BPM | DIASTOLIC BLOOD PRESSURE: 68 MMHG | RESPIRATION RATE: 16 BRPM | OXYGEN SATURATION: 97 % | SYSTOLIC BLOOD PRESSURE: 107 MMHG

## 2021-07-16 DIAGNOSIS — S00.03XA CONTUSION OF SCALP, INITIAL ENCOUNTER: Primary | ICD-10-CM

## 2021-07-16 PROCEDURE — 99213 OFFICE O/P EST LOW 20 MIN: CPT | Performed by: PHYSICIAN ASSISTANT

## 2021-07-16 NOTE — PATIENT INSTRUCTIONS
Patient Education     Scalp Bruise  A bruise (contusion) happens when small blood vessels break open and leak blood into the nearby area. A bruise on the scalp can result from a bump, hit, or fall. Newborns may have a bruised scalp from the birthing process. Symptoms can include changes in skin color. For instance, the skin may turn blue or black. Swelling and pain may also occur.   The swelling should go down in a few days. Bruising and pain may take longer to go away.   Home care  General care    You may use acetaminophen to control pain, unless another pain medicine was prescribed. Don t take aspirin or NSAIDs (nonsteroidal anti-inflammatory drugs) or blood-thinners (anticoagulants) such as warfarin without talking with your provider first. These can increase the risk of bleeding.    To help reduce swelling and pain, apply a cold pack to the injured area for up to 20 minutes at a time. Do this as often as directed. Use a cold pack or bag of ice wrapped in a thin towel. Never put a cold pack or ice directly on your skin.    If you have cuts or scrapes around the site of the bruise, care for them as directed.    Note about concussion  Because the injury was to your head, it is possible that you could have a mild brain injury (concussion). Symptoms of a concussion can show up later. For this reason, be alert for symptoms of concussion once you re home. Seek emergency medical care if you have any of the symptoms below over the next hours to days:     Headache    Nausea or vomiting    Dizziness    Sensitivity to light or noise    Unusual sleepiness or grogginess    Trouble falling asleep    Personality changes    Vision changes    Memory loss    Confusion    Trouble walking or clumsiness    Loss of consciousness (even for a short time)    Inability to be awakened  During the time period that you re watching for concussion symptoms:     Don t drink alcohol or use sedatives or medicines that make you sleepy.    Don t  drive or operate machinery.    Don t do anything strenuous, such as heavy lifting or straining.    Limit tasks that need concentration. This includes reading, watching TV, using a smartphone or computer, and playing video games.    Don t return to sports, exercise, or other activity that could result in another injury.  Ask your healthcare provider when you can safely resume these activities.   Follow-up care  Follow up with your healthcare provider, or as directed. If imaging tests were done, they will be reviewed by a doctor. You will be told the results and any new findings that may affect your care.   When to seek medical advice  Call your healthcare provider right away if any of these occur:     Pain that worsens or that can t be relieved with medicines    New or increased swelling or bruising    Fever of 100.4 F (38 C) or higher, or as directed by your healthcare provider    Redness, warmth, or drainage from the injured area    Any depression or bony abnormality in the injured area    Fluid drainage or bleeding from the nose or ears  Call 911  Call 911 if any of these occur:     Stiff neck    Weakness or numbness in any part of the body    Seizures    Trouble staying awake or confusion  Trevor last reviewed this educational content on 9/1/2019 2000-2021 [employer]. All rights reserved. This information is not intended as a substitute for professional medical care. Always follow your healthcare professional's instructions.         Ice the area as needed.   Follow up with a primary care provider or neurology as needed.

## 2021-07-19 DIAGNOSIS — R39.15 URINARY URGENCY: Primary | ICD-10-CM

## 2021-07-19 NOTE — TELEPHONE ENCOUNTER
Reason for Call:  Medication Refill  Do you use a [facility]? [place] of the pharmacy and phone number for the current request: [facility] [place]    Name of the medication requested: Myrbetriq    Other request: Pt is requesting refill - she has established care appt for Dr. Montoya scheduled for end of August     Can we leave a detailed message on this number? YES    Phone number patient can be reached at: Cell number on file:    Telephone Information:   Mobile [phone]   Mobile [phone]       Best Time: Any    Call taken on 7/19/2021 at 1:01 PM by Afua Martinez

## 2021-07-20 ENCOUNTER — DOCUMENTATION ONLY (OUTPATIENT)
Dept: ORTHOPEDICS | Facility: CLINIC | Age: 56
End: 2021-07-20

## 2021-07-20 RX ORDER — MIRABEGRON 25 MG/1
25 TABLET, FILM COATED, EXTENDED RELEASE ORAL DAILY
Qty: 90 TABLET | Refills: 1 | Status: SHIPPED | OUTPATIENT
Start: 2021-07-20 | End: 2022-01-05

## 2021-07-20 NOTE — PROGRESS NOTES
S: Mayra came in on 7/20/2021 to try on the compression bras ordered in for her from [employer] [facility]. RX on-file is current from Dr. Escamilla.    A: Mayra was assisted in donning the compression bras. She only tried on the [employer] #519 compression bras with pockets for her breast forms. The best fitting one was the [employer] 40 N/M in beige since it wasn't as tight in the band as the 28 N/M. Mayra signed for the item and took it home today in a bag as she left the clinic. Instructions were provided for use and care of the item.    P: Mayra is to call with any further needs. She knows to call back for any issues or to reorder more.  Goal is to maintain a home program.

## 2021-07-20 NOTE — TELEPHONE ENCOUNTER
Last visit: 7/22/2020-Dr. Corbin- Dysuria    Last filled:  MYRBETRIQ 25 mg Tb24 ER tablet 90 tablet 1 10/12/2020  No   Sig: TAKE 1 TABLET BY MOUTH EVERY DAY   Sent to pharmacy as: Myrbetriq 25 mg tablet,extended release (mirabegron)   E-Prescribing Status: Receipt confirmed by pharmacy (10/12/2020  7:24 AM CDT)     Next visit: [facility] 8/30/2021- Dr. Montoya

## 2021-08-30 ENCOUNTER — OFFICE VISIT (OUTPATIENT)
Dept: FAMILY MEDICINE | Facility: CLINIC | Age: 56
End: 2021-08-30
Payer: COMMERCIAL

## 2021-08-30 VITALS
SYSTOLIC BLOOD PRESSURE: 104 MMHG | DIASTOLIC BLOOD PRESSURE: 70 MMHG | BODY MASS INDEX: 27.69 KG/M2 | RESPIRATION RATE: 16 BRPM | HEART RATE: 64 BPM | WEIGHT: 176.4 LBS | HEIGHT: 67 IN

## 2021-08-30 DIAGNOSIS — M81.8 OTHER OSTEOPOROSIS WITHOUT CURRENT PATHOLOGICAL FRACTURE: ICD-10-CM

## 2021-08-30 DIAGNOSIS — R26.89 IMPAIRMENT OF BALANCE: ICD-10-CM

## 2021-08-30 DIAGNOSIS — Z23 NEED FOR SHINGLES VACCINE: ICD-10-CM

## 2021-08-30 DIAGNOSIS — R26.89 BALANCE PROBLEMS: ICD-10-CM

## 2021-08-30 DIAGNOSIS — Z23 NEED FOR 23-POLYVALENT PNEUMOCOCCAL POLYSACCHARIDE VACCINE: ICD-10-CM

## 2021-08-30 DIAGNOSIS — R27.0 ATAXIA: Primary | ICD-10-CM

## 2021-08-30 DIAGNOSIS — S42.002G FRACTURE OF UNSPECIFIED PART OF LEFT CLAVICLE, SUBSEQUENT ENCOUNTER FOR FRACTURE WITH DELAYED HEALING: ICD-10-CM

## 2021-08-30 DIAGNOSIS — N81.11 CYSTOCELE, MIDLINE: ICD-10-CM

## 2021-08-30 DIAGNOSIS — Z86.39 HISTORY OF HASHIMOTO THYROIDITIS: ICD-10-CM

## 2021-08-30 DIAGNOSIS — Z86.0100 HISTORY OF COLONIC POLYPS: ICD-10-CM

## 2021-08-30 DIAGNOSIS — C50.919 METASTATIC BREAST CANCER: ICD-10-CM

## 2021-08-30 PROBLEM — R41.3 POOR SHORT-TERM MEMORY: Status: ACTIVE | Noted: 2017-04-24

## 2021-08-30 PROBLEM — S42.009K: Status: ACTIVE | Noted: 2018-06-04

## 2021-08-30 PROBLEM — R42 DIZZINESS: Status: RESOLVED | Noted: 2021-01-06 | Resolved: 2021-08-30

## 2021-08-30 PROBLEM — L90.5 SCAR CONDITION AND FIBROSIS OF SKIN: Status: ACTIVE | Noted: 2017-08-28

## 2021-08-30 PROBLEM — Z85.3 HISTORY OF BREAST CANCER: Status: ACTIVE | Noted: 2018-09-13

## 2021-08-30 PROBLEM — R07.89 LEFT-SIDED CHEST WALL PAIN: Status: ACTIVE | Noted: 2017-08-28

## 2021-08-30 PROBLEM — M24.512 CONTRACTURE, LEFT SHOULDER: Status: ACTIVE | Noted: 2017-08-28

## 2021-08-30 PROBLEM — M94.9 DISORDER OF BONE AND CARTILAGE: Status: RESOLVED | Noted: 2021-08-30 | Resolved: 2021-08-30

## 2021-08-30 PROBLEM — M89.9 DISORDER OF BONE AND CARTILAGE: Status: RESOLVED | Noted: 2021-08-30 | Resolved: 2021-08-30

## 2021-08-30 PROBLEM — I89.0 CHRONIC ACQUIRED LYMPHEDEMA: Status: ACTIVE | Noted: 2020-12-23

## 2021-08-30 PROBLEM — G89.29 OTHER CHRONIC PAIN: Status: ACTIVE | Noted: 2018-09-13

## 2021-08-30 PROBLEM — M94.9 DISORDER OF BONE AND CARTILAGE: Status: ACTIVE | Noted: 2021-08-30

## 2021-08-30 PROBLEM — R79.89 LOW SERUM VITAMIN D: Status: ACTIVE | Noted: 2018-09-13

## 2021-08-30 PROBLEM — R42 DIZZINESS: Status: ACTIVE | Noted: 2021-01-06

## 2021-08-30 PROBLEM — M89.9 DISORDER OF BONE AND CARTILAGE: Status: ACTIVE | Noted: 2021-08-30

## 2021-08-30 PROBLEM — I97.2 POSTMASTECTOMY LYMPHEDEMA SYNDROME: Status: ACTIVE | Noted: 2017-08-28

## 2021-08-30 PROBLEM — R26.9 ABNORMALITY OF GAIT: Status: ACTIVE | Noted: 2021-08-30

## 2021-08-30 LAB — TSH SERPL DL<=0.005 MIU/L-ACNC: 2.96 UIU/ML (ref 0.3–5)

## 2021-08-30 PROCEDURE — 90732 PPSV23 VACC 2 YRS+ SUBQ/IM: CPT | Performed by: STUDENT IN AN ORGANIZED HEALTH CARE EDUCATION/TRAINING PROGRAM

## 2021-08-30 PROCEDURE — 36415 COLL VENOUS BLD VENIPUNCTURE: CPT | Performed by: STUDENT IN AN ORGANIZED HEALTH CARE EDUCATION/TRAINING PROGRAM

## 2021-08-30 PROCEDURE — 99215 OFFICE O/P EST HI 40 MIN: CPT | Mod: 25 | Performed by: STUDENT IN AN ORGANIZED HEALTH CARE EDUCATION/TRAINING PROGRAM

## 2021-08-30 PROCEDURE — 90471 IMMUNIZATION ADMIN: CPT | Performed by: STUDENT IN AN ORGANIZED HEALTH CARE EDUCATION/TRAINING PROGRAM

## 2021-08-30 PROCEDURE — 84443 ASSAY THYROID STIM HORMONE: CPT | Performed by: STUDENT IN AN ORGANIZED HEALTH CARE EDUCATION/TRAINING PROGRAM

## 2021-08-30 ASSESSMENT — MIFFLIN-ST. JEOR: SCORE: 1426.74

## 2021-08-30 NOTE — PROGRESS NOTES
Assessment & Plan   Problem List Items Addressed This Visit        Musculoskeletal and Integumentary    Osteoporosis       Urinary    Cystocele, midline       Other    Impairment of balance    Relevant Orders    Physical Therapy Referral    Balance problems      Other Visit Diagnoses     Need for 23-polyvalent pneumococcal polysaccharide vaccine    -  Primary    Relevant Orders    Pneumococcal vaccine 23 valent PPSV23  (Pneumovax) [62877] (Completed)    Need for shingles vaccine        Relevant Orders    ZOSTER VACCINE RECOMBINANT ADJUVANTED IM NJX (Completed)    History of Hashimoto thyroiditis        Relevant Orders    TSH with free T4 reflex (Completed)    History of colonic polyps        Ataxia        Metastatic breast cancer (H)        Fracture of unspecified part of left clavicle, subsequent encounter for fracture with delayed healing             Ataxia/balance issues:  Ongoing and thought to be secondary to the whole brain radiation and cerebellar resection in 2005.  She used to do physical therapy which helped immensely but then her insurance stopped paying for it.   We will try to re-refer her to physical therapy as she does have pronounced balance issues in the office today and has been  having more falls at home. Denies any LOC or head trauma associated with the fall and not on AC.  No associated confusion with it.  Does have poor short-term memory which seems to be at baseline.    Left clavicular fracture:  Complex left clavicular fracture 0215-s/p graft and plate removal with revision 6/18 Viot-utfumc-oeyya very well  She is following with [facility] for this.  She also has chronic left-sided chest pain/lymphedema which she reports is associated with the graft as well as mastectomy. Is following with Dr. Escamilla for this and has comrpressive wear she uses in the area.     Osteoporosis:  Patient is on Evenity.  Used to be on Boniva many years ago due to low bone density from letrozole (Femara) for  her breast cancer.  Osteoporosis is being managed by [facility].  Is doing well on the medication.    Metastatic breast cancer s/p chemo, bilateral mastectomy and whole brain radiation  The patient was treated for breast cancer with chemotherapy in 2004. 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  Secondary to short-term memory deficits observed by the patient in the last year she underwent a neuropsychological evaluation in 2016.  This was repeated in June, 2017 at [facility].  The patient's results are reported to show mild to moderate impairments in attention, encoding, and verbal memory.  However, findings were stable from 2351-6343 with the conclusion that the patient does not have a neurodegenerative illness. Is continuing to follow with Heme.Onc team.Did get in touch with the heme/onc team regarding increased falls + HA ove rthe last month. (+) concern for brain mets and repeat brain MRI has been ordered.     Cystocele  Urinary incontinence  Is on Myrbetriq and has a pessary in place.  The pessary was inserted about a year ago and she has follow-up coming up with OB/GYN.  Is doing okay with the pessary and her incontinence is improved.    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 Patient report that this was a one time diagnosis at time when she was very ill but has not had an issue with it since.     Colonic polyps   Colonoscopy done in 6/2021 - want her to come back in 5 years.     Follows with [facility] for annual skin checks     EGD 8/2020 - has esophageal spasms listed on it? Unclear if patient is symptomatic from these.       53 minutes spent on the date of the encounter doing chart review, history and exam, documentation and further activities per the note    Return in about 3 months (around 11/30/2021) for Balance issue /, Routine preventive.    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  Wants to establish with another PCP.  Review of Systems   As per HPI       Objective    /70 (BP Location: Right arm, Patient Position: Sitting, Cuff Size: Adult Large)   Pulse 64   Resp 16   Ht 1.708 m (5' 7.25\")   Wt 80 kg (176 lb 6.4 oz)   Breastfeeding No   BMI 27.42 kg/m    Body mass index is 27.42 kg/m .  Physical Exam   GENERAL: healthy, alert and no distress  NECK: no adenopathy, no asymmetry, masses, or scars and thyroid normal to palpation  RESP: lungs clear to auscultation - no rales, rhonchi or wheezes  CV: regular rate and rhythm, normal S1 S2, no S3 or S4, no murmur, click or rub, no peripheral edema and peripheral pulses strong  ABDOMEN: soft, nontender, no hepatosplenomegaly, no masses and bowel sounds normal  MS/Neuro: no gross musculoskeletal defects noted, no edema  (+) healed left clavicular fracture with mild lymphedema in the area   (+) notable gait and balance issues with walking  - has a wide stance gait and propioception issues   No inherent muscle weakness   CN 2- 12 intact   PERRLA bilaterally and EOMI bilaterally   Normal thought process and judgment   Has poor short term memory deficits but long term memory seems to be intact       "

## 2021-09-02 ENCOUNTER — PATIENT OUTREACH (OUTPATIENT)
Dept: ONCOLOGY | Facility: CLINIC | Age: 56
End: 2021-09-02

## 2021-09-02 DIAGNOSIS — C79.31 BRAIN METASTASIS: Primary | ICD-10-CM

## 2021-09-06 ENCOUNTER — NURSE TRIAGE (OUTPATIENT)
Dept: NURSING | Facility: CLINIC | Age: 56
End: 2021-09-06

## 2021-09-06 ENCOUNTER — APPOINTMENT (OUTPATIENT)
Dept: MRI IMAGING | Facility: CLINIC | Age: 56
End: 2021-09-06
Attending: EMERGENCY MEDICINE
Payer: COMMERCIAL

## 2021-09-06 ENCOUNTER — HOSPITAL ENCOUNTER (OUTPATIENT)
Facility: CLINIC | Age: 56
Setting detail: OBSERVATION
Discharge: HOME | End: 2021-09-08
Attending: EMERGENCY MEDICINE | Admitting: STUDENT IN AN ORGANIZED HEALTH CARE EDUCATION/TRAINING PROGRAM
Payer: COMMERCIAL

## 2021-09-06 ENCOUNTER — APPOINTMENT (OUTPATIENT)
Dept: GENERAL RADIOLOGY | Facility: CLINIC | Age: 56
End: 2021-09-06
Attending: EMERGENCY MEDICINE
Payer: COMMERCIAL

## 2021-09-06 DIAGNOSIS — R26.89 BALANCE PROBLEMS: ICD-10-CM

## 2021-09-06 DIAGNOSIS — R29.6 FALLS FREQUENTLY: ICD-10-CM

## 2021-09-06 DIAGNOSIS — Z11.52 ENCOUNTER FOR SCREENING LABORATORY TESTING FOR SEVERE ACUTE RESPIRATORY SYNDROME CORONAVIRUS 2 (SARS-COV-2): ICD-10-CM

## 2021-09-06 DIAGNOSIS — C79.31 BRAIN METASTASIS: ICD-10-CM

## 2021-09-06 DIAGNOSIS — R26.89 IMPAIRMENT OF BALANCE: Primary | ICD-10-CM

## 2021-09-06 DIAGNOSIS — M62.81 MUSCLE WEAKNESS (GENERALIZED): ICD-10-CM

## 2021-09-06 LAB
ALBUMIN SERPL-MCNC: 3.5 G/DL (ref 3.4–5)
ALBUMIN UR-MCNC: NEGATIVE MG/DL
ALP SERPL-CCNC: 76 U/L (ref 40–150)
ALT SERPL W P-5'-P-CCNC: 26 U/L (ref 0–50)
AMMONIA PLAS-SCNC: 21 UMOL/L (ref 10–50)
ANION GAP SERPL CALCULATED.3IONS-SCNC: 5 MMOL/L (ref 3–14)
APPEARANCE UR: CLEAR
AST SERPL W P-5'-P-CCNC: 16 U/L (ref 0–45)
BASOPHILS # BLD AUTO: 0.1 10E3/UL (ref 0–0.2)
BASOPHILS NFR BLD AUTO: 1 %
BILIRUB SERPL-MCNC: 0.4 MG/DL (ref 0.2–1.3)
BILIRUB UR QL STRIP: NEGATIVE
BUN SERPL-MCNC: 8 MG/DL (ref 7–30)
CALCIUM SERPL-MCNC: 8.6 MG/DL (ref 8.5–10.1)
CHLORIDE BLD-SCNC: 107 MMOL/L (ref 94–109)
CK SERPL-CCNC: 78 U/L (ref 30–225)
CO2 SERPL-SCNC: 26 MMOL/L (ref 20–32)
COLOR UR AUTO: ABNORMAL
CREAT SERPL-MCNC: 0.76 MG/DL (ref 0.52–1.04)
EOSINOPHIL # BLD AUTO: 0 10E3/UL (ref 0–0.7)
EOSINOPHIL NFR BLD AUTO: 0 %
ERYTHROCYTE [DISTWIDTH] IN BLOOD BY AUTOMATED COUNT: 13.4 % (ref 10–15)
GFR SERPL CREATININE-BSD FRML MDRD: 88 ML/MIN/1.73M2
GLUCOSE BLD-MCNC: 98 MG/DL (ref 70–99)
GLUCOSE UR STRIP-MCNC: NEGATIVE MG/DL
HCT VFR BLD AUTO: 41.8 % (ref 35–47)
HGB BLD-MCNC: 13.7 G/DL (ref 11.7–15.7)
HGB UR QL STRIP: NEGATIVE
HOLD SPECIMEN: NORMAL
IMM GRANULOCYTES # BLD: 0.1 10E3/UL
IMM GRANULOCYTES NFR BLD: 1 %
KETONES UR STRIP-MCNC: NEGATIVE MG/DL
LEUKOCYTE ESTERASE UR QL STRIP: NEGATIVE
LYMPHOCYTES # BLD AUTO: 0.3 10E3/UL (ref 0.8–5.3)
LYMPHOCYTES NFR BLD AUTO: 3 %
MCH RBC QN AUTO: 29.5 PG (ref 26.5–33)
MCHC RBC AUTO-ENTMCNC: 32.8 G/DL (ref 31.5–36.5)
MCV RBC AUTO: 90 FL (ref 78–100)
MONOCYTES # BLD AUTO: 0.7 10E3/UL (ref 0–1.3)
MONOCYTES NFR BLD AUTO: 7 %
MUCOUS THREADS #/AREA URNS LPF: PRESENT /LPF
NEUTROPHILS # BLD AUTO: 9.4 10E3/UL (ref 1.6–8.3)
NEUTROPHILS NFR BLD AUTO: 88 %
NITRATE UR QL: NEGATIVE
NRBC # BLD AUTO: 0 10E3/UL
NRBC BLD AUTO-RTO: 0 /100
PH UR STRIP: 8 [PH] (ref 5–7)
PLATELET # BLD AUTO: 203 10E3/UL (ref 150–450)
POTASSIUM BLD-SCNC: 3.7 MMOL/L (ref 3.4–5.3)
PROCALCITONIN SERPL-MCNC: <0.05 NG/ML
PROT SERPL-MCNC: 7.4 G/DL (ref 6.8–8.8)
RBC # BLD AUTO: 4.65 10E6/UL (ref 3.8–5.2)
RBC URINE: 1 /HPF
SARS-COV-2 RNA RESP QL NAA+PROBE: NEGATIVE
SODIUM SERPL-SCNC: 138 MMOL/L (ref 133–144)
SP GR UR STRIP: 1.01 (ref 1–1.03)
SQUAMOUS EPITHELIAL: 2 /HPF
TROPONIN I SERPL-MCNC: <0.015 UG/L (ref 0–0.04)
UROBILINOGEN UR STRIP-MCNC: NORMAL MG/DL
VIT B12 SERPL-MCNC: 846 PG/ML (ref 193–986)
WBC # BLD AUTO: 10.6 10E3/UL (ref 4–11)
WBC URINE: 1 /HPF

## 2021-09-06 PROCEDURE — 85025 COMPLETE CBC W/AUTO DIFF WBC: CPT | Performed by: EMERGENCY MEDICINE

## 2021-09-06 PROCEDURE — 84145 PROCALCITONIN (PCT): CPT | Performed by: NURSE PRACTITIONER

## 2021-09-06 PROCEDURE — 80053 COMPREHEN METABOLIC PANEL: CPT | Performed by: EMERGENCY MEDICINE

## 2021-09-06 PROCEDURE — 36415 COLL VENOUS BLD VENIPUNCTURE: CPT | Performed by: EMERGENCY MEDICINE

## 2021-09-06 PROCEDURE — C9803 HOPD COVID-19 SPEC COLLECT: HCPCS

## 2021-09-06 PROCEDURE — G0378 HOSPITAL OBSERVATION PER HR: HCPCS

## 2021-09-06 PROCEDURE — 99285 EMERGENCY DEPT VISIT HI MDM: CPT | Mod: 25 | Performed by: EMERGENCY MEDICINE

## 2021-09-06 PROCEDURE — 255N000002 HC RX 255 OP 636: Performed by: EMERGENCY MEDICINE

## 2021-09-06 PROCEDURE — 70553 MRI BRAIN STEM W/O & W/DYE: CPT | Mod: 26 | Performed by: RADIOLOGY

## 2021-09-06 PROCEDURE — 93005 ELECTROCARDIOGRAM TRACING: CPT

## 2021-09-06 PROCEDURE — 87086 URINE CULTURE/COLONY COUNT: CPT | Performed by: EMERGENCY MEDICINE

## 2021-09-06 PROCEDURE — 84425 ASSAY OF VITAMIN B-1: CPT | Performed by: NURSE PRACTITIONER

## 2021-09-06 PROCEDURE — 99285 EMERGENCY DEPT VISIT HI MDM: CPT | Mod: 25

## 2021-09-06 PROCEDURE — 250N000013 HC RX MED GY IP 250 OP 250 PS 637: Performed by: NURSE PRACTITIONER

## 2021-09-06 PROCEDURE — 71046 X-RAY EXAM CHEST 2 VIEWS: CPT | Mod: 26 | Performed by: RADIOLOGY

## 2021-09-06 PROCEDURE — 81001 URINALYSIS AUTO W/SCOPE: CPT | Performed by: EMERGENCY MEDICINE

## 2021-09-06 PROCEDURE — U0005 INFEC AGEN DETEC AMPLI PROBE: HCPCS | Performed by: EMERGENCY MEDICINE

## 2021-09-06 PROCEDURE — 99207 PR APP CREDIT; MD BILLING SHARED VISIT: CPT | Performed by: NURSE PRACTITIONER

## 2021-09-06 PROCEDURE — 82550 ASSAY OF CK (CPK): CPT | Performed by: NURSE PRACTITIONER

## 2021-09-06 PROCEDURE — 99220 PR INITIAL OBSERVATION CARE,LEVEL III: CPT | Performed by: STUDENT IN AN ORGANIZED HEALTH CARE EDUCATION/TRAINING PROGRAM

## 2021-09-06 PROCEDURE — A9585 GADOBUTROL INJECTION: HCPCS | Performed by: EMERGENCY MEDICINE

## 2021-09-06 PROCEDURE — 82140 ASSAY OF AMMONIA: CPT | Performed by: EMERGENCY MEDICINE

## 2021-09-06 PROCEDURE — 70553 MRI BRAIN STEM W/O & W/DYE: CPT

## 2021-09-06 PROCEDURE — 82607 VITAMIN B-12: CPT | Performed by: NURSE PRACTITIONER

## 2021-09-06 PROCEDURE — 84484 ASSAY OF TROPONIN QUANT: CPT | Performed by: EMERGENCY MEDICINE

## 2021-09-06 PROCEDURE — 93010 ELECTROCARDIOGRAM REPORT: CPT | Performed by: EMERGENCY MEDICINE

## 2021-09-06 PROCEDURE — 71046 X-RAY EXAM CHEST 2 VIEWS: CPT

## 2021-09-06 RX ORDER — GADOBUTROL 604.72 MG/ML
7.5 INJECTION INTRAVENOUS ONCE
Status: COMPLETED | OUTPATIENT
Start: 2021-09-06 | End: 2021-09-06

## 2021-09-06 RX ORDER — ONDANSETRON 2 MG/ML
4 INJECTION INTRAMUSCULAR; INTRAVENOUS EVERY 6 HOURS PRN
Status: DISCONTINUED | OUTPATIENT
Start: 2021-09-06 | End: 2021-09-08 | Stop reason: HOSPADM

## 2021-09-06 RX ORDER — MIRABEGRON 25 MG/1
25 TABLET, FILM COATED, EXTENDED RELEASE ORAL DAILY
Status: DISCONTINUED | OUTPATIENT
Start: 2021-09-07 | End: 2021-09-08 | Stop reason: HOSPADM

## 2021-09-06 RX ORDER — ONDANSETRON 4 MG/1
4 TABLET, ORALLY DISINTEGRATING ORAL EVERY 6 HOURS PRN
Status: DISCONTINUED | OUTPATIENT
Start: 2021-09-06 | End: 2021-09-08 | Stop reason: HOSPADM

## 2021-09-06 RX ORDER — IBUPROFEN 400 MG/1
400 TABLET, FILM COATED ORAL EVERY 6 HOURS PRN
Status: DISCONTINUED | OUTPATIENT
Start: 2021-09-06 | End: 2021-09-08 | Stop reason: HOSPADM

## 2021-09-06 RX ORDER — AMOXICILLIN 250 MG
2 CAPSULE ORAL 2 TIMES DAILY PRN
Status: DISCONTINUED | OUTPATIENT
Start: 2021-09-06 | End: 2021-09-08 | Stop reason: HOSPADM

## 2021-09-06 RX ORDER — ACETAMINOPHEN 325 MG/1
975 TABLET ORAL EVERY 8 HOURS
Status: DISCONTINUED | OUTPATIENT
Start: 2021-09-06 | End: 2021-09-08 | Stop reason: HOSPADM

## 2021-09-06 RX ORDER — AMOXICILLIN 250 MG
1 CAPSULE ORAL 2 TIMES DAILY PRN
Status: DISCONTINUED | OUTPATIENT
Start: 2021-09-06 | End: 2021-09-08 | Stop reason: HOSPADM

## 2021-09-06 RX ADMIN — GADOBUTROL 7.5 ML: 604.72 INJECTION INTRAVENOUS at 16:24

## 2021-09-06 RX ADMIN — ACETAMINOPHEN 975 MG: 325 TABLET, FILM COATED ORAL at 22:20

## 2021-09-06 ASSESSMENT — ENCOUNTER SYMPTOMS
CONSTIPATION: 0
PHOTOPHOBIA: 0
LIGHT-HEADEDNESS: 0
PALPITATIONS: 0
SHORTNESS OF BREATH: 0
FREQUENCY: 1
WEAKNESS: 1
COLOR CHANGE: 0
NAUSEA: 0
ARTHRALGIAS: 0
FEVER: 0
ABDOMINAL PAIN: 0
COUGH: 0
DIARRHEA: 0
DIZZINESS: 1
MYALGIAS: 0
NUMBNESS: 0
FATIGUE: 1
APPETITE CHANGE: 1
HEADACHES: 1
VOMITING: 0
CONFUSION: 1
TREMORS: 0

## 2021-09-06 ASSESSMENT — MIFFLIN-ST. JEOR: SCORE: 1442.73

## 2021-09-06 NOTE — TELEPHONE ENCOUNTER
Fell twice in last month, about a year ago watching spots in brain.     This AM found her naked and prone on bathroom floor. Confused, unable to get dressed herself. Caller put Mayra on the phone and she is oriented x2 (person and place), able to move all limbs but weak and unable to walk. Unsure if she hit her head during fall this morning.   FNA advised 911, discussed options of family driving her vs ambulance and where they would be taken. FNA advised likely [facility] but EMS has discretion and more information.   Caller voiced understanding and will follow disposition.   Kemi Mahoney, RN  [facility] Nurse Advisor            Reason for Disposition    [1] Difficult to awaken or acting confused (e.g., disoriented, slurred speech) AND [2] present now AND [3] new onset    Additional Information    Negative: [1] Difficult to awaken or acting confused (e.g., disoriented, slurred speech) AND [2] present now AND [3] has diabetes (diabetes mellitus)    Protocols used: CONFUSION - DELIRIUM-A-
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
## 2021-09-06 NOTE — ED TRIAGE NOTES
Pt BIBA c/o increasing weakness & dizziness this AM, pt states she lost her balance & fell but denies hitting her head. Hx of breast cancer w/brain mets. A&Ox2, forgetful per baseline.

## 2021-09-07 ENCOUNTER — APPOINTMENT (OUTPATIENT)
Dept: PHYSICAL THERAPY | Facility: CLINIC | Age: 56
End: 2021-09-07
Attending: NURSE PRACTITIONER
Payer: COMMERCIAL

## 2021-09-07 LAB
ATRIAL RATE - MUSE: 85 BPM
BACTERIA UR CULT: NO GROWTH
BASOPHILS # BLD AUTO: 0.1 10E3/UL (ref 0–0.2)
BASOPHILS NFR BLD AUTO: 1 %
DIASTOLIC BLOOD PRESSURE - MUSE: NORMAL MMHG
EOSINOPHIL # BLD AUTO: 0.2 10E3/UL (ref 0–0.7)
EOSINOPHIL NFR BLD AUTO: 3 %
ERYTHROCYTE [DISTWIDTH] IN BLOOD BY AUTOMATED COUNT: 13.6 % (ref 10–15)
HCT VFR BLD AUTO: 37.6 % (ref 35–47)
HGB BLD-MCNC: 12.6 G/DL (ref 11.7–15.7)
HOLD SPECIMEN: NORMAL
IMM GRANULOCYTES # BLD: 0 10E3/UL
IMM GRANULOCYTES NFR BLD: 1 %
INTERPRETATION ECG - MUSE: NORMAL
LYMPHOCYTES # BLD AUTO: 0.8 10E3/UL (ref 0.8–5.3)
LYMPHOCYTES NFR BLD AUTO: 14 %
MCH RBC QN AUTO: 29.6 PG (ref 26.5–33)
MCHC RBC AUTO-ENTMCNC: 33.5 G/DL (ref 31.5–36.5)
MCV RBC AUTO: 88 FL (ref 78–100)
MONOCYTES # BLD AUTO: 1 10E3/UL (ref 0–1.3)
MONOCYTES NFR BLD AUTO: 17 %
NEUTROPHILS # BLD AUTO: 3.8 10E3/UL (ref 1.6–8.3)
NEUTROPHILS NFR BLD AUTO: 64 %
NRBC # BLD AUTO: 0 10E3/UL
NRBC BLD AUTO-RTO: 0 /100
P AXIS - MUSE: 74 DEGREES
PLATELET # BLD AUTO: 187 10E3/UL (ref 150–450)
PR INTERVAL - MUSE: 166 MS
QRS DURATION - MUSE: 80 MS
QT - MUSE: 390 MS
QTC - MUSE: 464 MS
R AXIS - MUSE: 30 DEGREES
RBC # BLD AUTO: 4.26 10E6/UL (ref 3.8–5.2)
SYSTOLIC BLOOD PRESSURE - MUSE: NORMAL MMHG
T AXIS - MUSE: 23 DEGREES
VENTRICULAR RATE- MUSE: 85 BPM
WBC # BLD AUTO: 5.9 10E3/UL (ref 4–11)

## 2021-09-07 PROCEDURE — 97116 GAIT TRAINING THERAPY: CPT | Mod: GP

## 2021-09-07 PROCEDURE — 99226 PR SUBSEQUENT OBSERVATION CARE,LEVEL III: CPT | Performed by: STUDENT IN AN ORGANIZED HEALTH CARE EDUCATION/TRAINING PROGRAM

## 2021-09-07 PROCEDURE — 36415 COLL VENOUS BLD VENIPUNCTURE: CPT | Performed by: NURSE PRACTITIONER

## 2021-09-07 PROCEDURE — 258N000003 HC RX IP 258 OP 636: Performed by: STUDENT IN AN ORGANIZED HEALTH CARE EDUCATION/TRAINING PROGRAM

## 2021-09-07 PROCEDURE — G0378 HOSPITAL OBSERVATION PER HR: HCPCS

## 2021-09-07 PROCEDURE — 97530 THERAPEUTIC ACTIVITIES: CPT | Mod: GP

## 2021-09-07 PROCEDURE — 99207 PR CDG-CODE CATEGORY CHANGED: CPT | Performed by: STUDENT IN AN ORGANIZED HEALTH CARE EDUCATION/TRAINING PROGRAM

## 2021-09-07 PROCEDURE — 85025 COMPLETE CBC W/AUTO DIFF WBC: CPT | Performed by: NURSE PRACTITIONER

## 2021-09-07 PROCEDURE — 250N000013 HC RX MED GY IP 250 OP 250 PS 637: Performed by: NURSE PRACTITIONER

## 2021-09-07 PROCEDURE — 97162 PT EVAL MOD COMPLEX 30 MIN: CPT | Mod: GP

## 2021-09-07 PROCEDURE — 97161 PT EVAL LOW COMPLEX 20 MIN: CPT | Mod: GP

## 2021-09-07 RX ADMIN — MIRABEGRON 25 MG: 25 TABLET, FILM COATED, EXTENDED RELEASE ORAL at 09:36

## 2021-09-07 RX ADMIN — SODIUM CHLORIDE 500 ML: 9 INJECTION, SOLUTION INTRAVENOUS at 10:51

## 2021-09-07 RX ADMIN — ACETAMINOPHEN 975 MG: 325 TABLET, FILM COATED ORAL at 06:59

## 2021-09-07 ASSESSMENT — MIFFLIN-ST. JEOR: SCORE: 1438.65

## 2021-09-07 NOTE — PROGRESS NOTES
09/07/21 1111   Quick Adds   Type of Visit Initial PT Evaluation   Living Environment   People in home spouse;child([place]), dependent  (4 kids - 1 in college, 3 out of college living at home)   Current Living Arrangements house   Home Accessibility stairs to enter home;stairs within home   Number of Stairs, Main Entrance   ('a few')   Stair Railings, Main Entrance railings on both sides of stairs   Number of Stairs, Within Home, Primary   (2 flights, 1 to upstairs, 1 to basement)   Stair Railings, Within Home, Primary railing on right side (ascending)   Transportation Anticipated car, drives self;family or friend will provide   Living Environment Comments Pt lives in a house with her spouse and 2 of her children, 2 of her children are in college. Pt reports her spouse and 2 daughters work, there are times she is home alone however 1 of her children is able to work from home. Reports entering home on main level with a full flight to the upstairs as well as the downstairs, these are each split up with a landing.    Self-Care   Usual Activity Tolerance good   Current Activity Tolerance moderate   Regular Exercise Yes   Activity/Exercise Type walking   Exercise Amount/Frequency   (walks the dog around the block daily)   Equipment Currently Used at Home none   Activity/Exercise/Self-Care Comment pt denies use of DME at home, reports walking her dog outside daily however the source of her falls are related to tripping over the dog leash or items to block the dog from going on the stairs.    Disability/Function   Hearing Difficulty or Deaf no   Wear Glasses or Blind yes   Vision Management glasses   Concentrating, Remembering or Making Decisions Difficulty yes   Concentration Management sometimes she forgets things   Difficulty Communicating no   Difficulty Eating/Swallowing no   Walking or Climbing Stairs Difficulty no   Dressing/Bathing Difficulty other (see comments)   Toileting issues other (see comments)   Doing  Errands Independently Difficulty (such as shopping) no   Number of times patient has fallen within last six months 3  (describes 3 all involving tripping over dog leash/dog items)   Change in Functional Status Since Onset of Current Illness/Injury yes   General Information   Onset of Illness/Injury or Date of Surgery 09/06/21   Referring Physician Keerthi Farr APRN CNP   Patient/Family Therapy Goals Statement (PT) pt states 'to iimprove balance'   Pertinent History of Current Problem (include personal factors and/or comorbidities that impact the POC) Mayra Ramirez is a 55 year old female admitted on 9/6/2021. 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 Long history of weakness, vertigo, and frequent falls. This morning, however, she was found prone on the bathroom floor according to EMS documentation and had altered mental status. Admitted to medicine team for altered mental status, falls, weakness, and evaluation for disposition planning.   Existing Precautions/Restrictions fall   General Observations RN oks activity   Cognition   Orientation Status (Cognition) person;situation;time  (knew she was in a hospital but not which)   Cognitive Status Comments pt was oriented to self, month and situation however did not know which specific hospital she was in.    Pain Assessment   Patient Currently in Pain No   Integumentary/Edema   Integumentary/Edema no deficits were identifed   Posture    Posture Forward head position;Protracted shoulders   Range of Motion (ROM)   ROM Quick Adds ROM WFL   Strength   Manual Muscle Testing Quick Adds Strength WFL   Bed Mobility   Comment (Bed Mobility) IND supine <> sit   Transfers   Transfer Safety Comments SBA to Mod I sit <> stand up to FWW vs IV pole   Gait/Stairs (Locomotion)   Comment (Gait/Stairs) amb x  20' with FWW, no device and IV pole, mild instability throughout with lateral pathway deviations.    Balance   Balance Comments CGA wiht gait belt for stability with dynamic tasks, improves with FWW.    Sensory Examination   Sensory Perception Comments denies deficits   Clinical Impression   Criteria for Skilled Therapeutic Intervention yes, treatment indicated   PT Diagnosis (PT) impaired functional mobility   Influenced by the following impairments weakness, fatigue, balance, cognition   Functional limitations due to impairments below baseline performance and tolerance of gait, transfers and stairs   Clinical Presentation Evolving/Changing   Clinical Presentation Rationale pt presentation, clinical reasoning   Clinical Decision Making (Complexity) moderate complexity   Therapy Frequency (PT) 4x/week   Predicted Duration of Therapy Intervention (days/wks) 1 week   Planned Therapy Interventions (PT) balance training;home exercise program;patient/family education;strengthening;stair training;home program guidelines;transfer training   Anticipated Equipment Needs at Discharge (PT)   (FWW)   Risk & Benefits of therapy have been explained evaluation/treatment results reviewed;care plan/treatment goals reviewed;risks/benefits reviewed;current/potential barriers reviewed;participants voiced agreement with care plan;participants included;patient   Clinical Impression Comments pt presents with below baseline performance and tolerance of functional mobility with impaired balance and activity tolerance. Pt reports history of intermittent cognition deficits involving difficulty recalling. will benefit from continued PT to address balance deficits and improve safety with mobility   PT Discharge Planning    PT Discharge Recommendation (DC Rec) home with assist;home with home care physical therapy;home with outpatient physical therapy   PT Rationale for DC Rec pt presents with impaired balance, activity tolerance and safety with  IND mobility. would benefit from continued skilled PT to improve safety with mobility, pt has had good success with OP Balance therapy per pt report, may benefit from home safeyt evaluation of PT and OT and progression to OP therapies given falls at home setting and pt reporting these falls are related to tripping on objects in the home. Would also benefit from a FWW for added stability however pt denies ability to use this in the home. Pt would also benefit from increased supervision from family given falls. Pt declines TCU need.    PT Brief overview of current status  CGA with gait belt and walker.    Total Evaluation Time   Total Evaluation Time (Minutes) 16

## 2021-09-07 NOTE — CONSULTS
Care Management Follow Up    Length of Stay (days): 1    Expected Discharge Date: 09/08/2021  Concerns to be Addressed:     Home PT/OT per PT recs  Patient plan of care discussed at interdisciplinary rounds: Yes  Anticipated Discharge Disposition:  Home  Anticipated Discharge Services:  Home PT/OT  Anticipated Discharge DME:  none  Education Provided on the Discharge Plan:  Yes  Patient/Family in Agreement with the Plan:  Yes    Referrals Placed by CM/SW:    [facility] - referral sent    Additional Information:  Patient is a 55 year old female admitted on 9/6/2021. 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 Long history of weakness, vertigo, and frequent falls. This morning, however, she was found prone on the bathroom floor according to EMS documentation and had altered mental status. Admitted to medicine team for altered mental status, falls, weakness, and evaluation for disposition planning.    No previous admissions or ED visits within last 6 months per chart review.    RNCC will continue to follow and assist as needed.      Angelo Howard RN    Angelo Howard RN, BSN  5B RN Care Coordinator  [phone] phone  [phone] pager    Weekend or Holiday Care Coordinator [phone] pager  Job Code [id number]

## 2021-09-07 NOTE — PLAN OF CARE
D/I: Pt is alert and oriented x 4, but intermittently forgetful. Bed alarm on for safety. Vitally stable on room air. Calm and cooperative with cares. PIV saline locked. Assist x2 with ambulating, but up to bedside commode with assist of 1. Very weak with unsteady gait. Bed alarm activated. Pain is controlled with scheduled tylenol. Bruising on bilateral arms from fall yesterday at home. Pure wick over night.   P: Continue to monitor pt and follow plan of care

## 2021-09-07 NOTE — PLAN OF CARE
D/I: Pt is alert and oriented x 3 and disoriented to time with intermittent forgetfulness. Bed alarm on for safety. Vitally stable on room air. Calm and cooperative with cares. PIV saline locked. Assist x2 with ambulating, but up to bedside commode with assist of 1. Very weak with unsteady gait. Bed alarm activated. Pain is controlled with scheduled tylenol. Bruising on bilateral arms from fall yesterday at home. Pure wick over night.   P: Continue to monitor pt and follow plan of care

## 2021-09-07 NOTE — PROVIDER NOTIFICATION
Writer sent text page to Provider regarding Pt  wondering what thoughts are on discharge PT has ordered walker for home?

## 2021-09-07 NOTE — PLAN OF CARE
[facility]      OUTPATIENT PHYSICAL THERAPY EVALUATION  PLAN OF TREATMENT FOR OUTPATIENT REHABILITATION  (COMPLETE FOR INITIAL CLAIMS ONLY)  Patient's Last Name, First Name, M.I.  YOB: 1965  Mayra Ramirez  DOMINIK DEJAH                        Provider's Name  [facility] Medical Record No.  [id number]                               Onset Date:  09/06/21   Start of Care Date:         Type:     _X_PT   ___OT   ___SLP Medical Diagnosis:                           PT Diagnosis:  impaired functional mobility   Visits from SOC:  1   _________________________________________________________________________________  Plan of Treatment/Functional Goals    Planned Interventions: balance training, home exercise program, patient/family education, strengthening, stair training, home program guidelines, transfer training     Goals: See Physical Therapy Goals on Care Plan in [facility] electronic health record.    Therapy Frequency: 4x/week  Predicted Duration of Therapy Intervention: 1 week  _________________________________________________________________________________    I CERTIFY THE NEED FOR THESE SERVICES FURNISHED UNDER        THIS PLAN OF TREATMENT AND WHILE UNDER MY CARE     (Physician co-signature of this document indicates review and certification of the therapy plan).               ,      Referring Physician: Keerthi Farr APRN CNP            Initial Assessment        See Physical Therapy evaluation dated   in Epic electronic health record.

## 2021-09-07 NOTE — PLAN OF CARE
Reason for admission: generalized weakness, fall at home, repeat MRI tomorrow.   Admitted from: ED  Report received from: Cierra FULLER   2 RN skin assessment completed by: Kendal Amado and Trish Bill RN  Bed Algorithm reevaluated: yes  Was Pulsate ordered?: no, pt turns independently, no skin issues, no sensation deficits, adequate nutrition  Care plan (primary problem) and Education initiated: yes, pt educated on observation status.   Detailed Belongings: Top, Pant, Glasses, Color free shirt, Jeans, Shoes    VSS on RA. Oriented to self and situation but struggles w/ location and time. PIV saline locked. Assist x2 ambulating in room. Very weak and unstable. Bedside commode ordered. Pt needs to be on bed alarm and frequently reeducated on importance of calling before getting up. Pt states understanding. Mild headache w/ 1x tylenol given. Skin CDI except bruising on bilateral arms from fall in AM at home. Pt incontinent of urine.

## 2021-09-07 NOTE — PLAN OF CARE
"OT 5A Defer. Per discussion with PT, Pt with recent falls at home from \"tripping\" over objects and pets. Pt near baseline function for ADLs, though below baseline for balance and activity tolerance. PT to follow Pt during IP stay to address safe discharge plans. Pt may benefit from  OT for home safety eval to decrease fall risk and provide strategies for safety and IND.   "

## 2021-09-07 NOTE — PROGRESS NOTES
Observation goal is safe discharge plan goal has not been met at this time.     Pt is A/Ox4 denies pain. Forgetful. PIV saline locked. A/2 with ambulating, weak when ambulating. Bruising to BUE from fall at home.      Continue to monitor per plan of care update Provider when goal is me  t.

## 2021-09-07 NOTE — PROGRESS NOTES
[facility]    Medicine Progress Note - Hospitalist Service, Gold 7       Date of Admission:  9/6/2021    Assessment & Plan           Mayra Ramirez is a 55 year old female admitted on 9/6/2021. 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 Long history of weakness, vertigo, and frequent falls. This morning, however, she was found prone on the bathroom floor according to EMS documentation and had altered mental status. Admitted to medicine team for altered mental status, falls, weakness, and evaluation for disposition planning.     Generalized weakness  Frequent falls  Altered mental status vs short term memory loss  Vertigo, dizziness  Headache  This is an ongoing problem reported to be worsening over time. 'dizziness and balance issues' dating all the way back to 2014 in the medical record. Hx noted of breast cancer with brain mets. Patient has seen neurology, referred to ENT, participated in physical therapy. Thus far in ED 9/6, all diagnostic laboratory testing is negative for acute pathological process including: CBC, CMP, CK, troponin, TSH, COVID-19, UA. EKG with normal sinus rhythm. CXR clear. Brain MRI w/wo contrast without acute pathology. Does reveal nonspecific, stable T2/FLAIR hyperintensities c/w microvascular ischemic change, mild-moderate generalized cerebral atrophy, and known, stable lesion inferior aspect of C2 vertebral body. Had minimal oral intake prior to event and did endorse dizziness. Most likely diagnosis is orthostasis vs ongoing sequelae from radiation on brain. MRI negative for new pathology. Ammonia WNL. B12 WNL   - PT and OT consulted   - 500 ml bolus   - Orthostatics   - Thiamine pending    - Will consult neurology pending work up with therapies as there does not seem to be any  focal deficits or new concerns on imaging.     - fall precautions    - may continue pta ibuprofen for HA    Neutrophilia  WBC above patient's baseline (BL). BL appears ~5, currently 10.6 and neutrophils up to 9.4. Suspect this is reactive in the setting of recent COVID vaccine, though will consider infection as well.    - Monitor     Chronic medical problems of note:  #history of breast cancer (2005) with cerebellar mets, subsequent MCI    -no current treatment  #history of incontinence    -continue prior to admission myrbetriq  #osteopenia    -on Evenity injections every 5 months        Diet: Regular Diet Adult    DVT Prophylaxis: Low Risk/Ambulatory with no VTE prophylaxis indicated  Salgado Catheter: Not present  Central Lines: None  Code Status: Full Code      Disposition Plan   Expected discharge: 09/08/2021   recommended to prior living arrangement once mental status at baseline and safe disposition plan/ TCU bed available.     The patient's care was discussed with the Bedside Nurse, Patient and Patient's Family.    Vannessa Peñaloza MD  Hospitalist Service, [address]  Securely message with the Vocera Web Console (learn more here)  Text page via [facility] Paging/Directory  Please see sign in/sign out for up to date coverage information    ______________________________________________________________________    Interval History   Admitted overnight for falls and memory issues.  at bedside. She is not complaining of any pain. Does seem to still have difficulty with remembering events -  Per  had to be reminded multiple times that she had a catheter in place for urination. Denies shortness of breath or chest pain. Has been having significant difficulty with balance and movement.     Data reviewed today: I reviewed all medications, new labs and imaging results over the last 24 hours. Labs and Imaging reviewed in Epic, pertinent discussed in A&P.        Physical Exam   Vital Signs: Temp: 98.5  F (36.9  C) Temp src: Oral BP: 108/67 Pulse: 69   Resp: 16 SpO2: 95 % O2 Device: None (Room air)    Weight: 177 lbs 4.8 oz    Gen: NAD, alert, pleasant, cooperative, non-toxic  HEENT: EOMI, no conjunctival icterus, tracking appropriately  Resp: CTAB, no crackles or wheezes, no increased WOB  Cardiac: RRR, no S3/S4, no M/R/G appreciated  GI: soft, non-tender, non-distended  Ext: WWP, no edema, spontaneous movement in all 4  Neuro: AOx3, CN 2-12 intact, appropriate mentation, minimal patellar reflexes but difficult to elicit as she was in bed, no clonus, strength intact and equal in upper and lower extremities.

## 2021-09-07 NOTE — PROGRESS NOTES
Observation goal is safe discharge plan goal has not been met at this time.    Pt is A/Ox4 denies pain. Forgetful. PIV saline locked. A/2 with ambulating, weak when ambulating. Bruising to BUE from fall at home.     Continue to monitor per plan of care update Provider when goal is met.

## 2021-09-08 ENCOUNTER — PATIENT OUTREACH (OUTPATIENT)
Dept: CARE COORDINATION | Facility: CLINIC | Age: 56
End: 2021-09-08

## 2021-09-08 VITALS
HEART RATE: 75 BPM | RESPIRATION RATE: 16 BRPM | OXYGEN SATURATION: 98 % | BODY MASS INDEX: 26.73 KG/M2 | HEIGHT: 68 IN | TEMPERATURE: 98 F | WEIGHT: 176.4 LBS | SYSTOLIC BLOOD PRESSURE: 136 MMHG | DIASTOLIC BLOOD PRESSURE: 83 MMHG

## 2021-09-08 LAB
ERYTHROCYTE [DISTWIDTH] IN BLOOD BY AUTOMATED COUNT: 13.6 % (ref 10–15)
HCT VFR BLD AUTO: 41.3 % (ref 35–47)
HGB BLD-MCNC: 13.2 G/DL (ref 11.7–15.7)
MCH RBC QN AUTO: 29.9 PG (ref 26.5–33)
MCHC RBC AUTO-ENTMCNC: 32 G/DL (ref 31.5–36.5)
MCV RBC AUTO: 93 FL (ref 78–100)
PLATELET # BLD AUTO: 205 10E3/UL (ref 150–450)
RBC # BLD AUTO: 4.42 10E6/UL (ref 3.8–5.2)
WBC # BLD AUTO: 5.3 10E3/UL (ref 4–11)

## 2021-09-08 PROCEDURE — 99207 PR CDG-CODE CATEGORY CHANGED: CPT | Performed by: STUDENT IN AN ORGANIZED HEALTH CARE EDUCATION/TRAINING PROGRAM

## 2021-09-08 PROCEDURE — G0378 HOSPITAL OBSERVATION PER HR: HCPCS

## 2021-09-08 PROCEDURE — 85014 HEMATOCRIT: CPT | Performed by: STUDENT IN AN ORGANIZED HEALTH CARE EDUCATION/TRAINING PROGRAM

## 2021-09-08 PROCEDURE — 99217 PR OBSERVATION CARE DISCHARGE: CPT | Performed by: STUDENT IN AN ORGANIZED HEALTH CARE EDUCATION/TRAINING PROGRAM

## 2021-09-08 PROCEDURE — 36415 COLL VENOUS BLD VENIPUNCTURE: CPT | Performed by: STUDENT IN AN ORGANIZED HEALTH CARE EDUCATION/TRAINING PROGRAM

## 2021-09-08 PROCEDURE — 250N000013 HC RX MED GY IP 250 OP 250 PS 637: Performed by: NURSE PRACTITIONER

## 2021-09-08 RX ADMIN — MIRABEGRON 25 MG: 25 TABLET, FILM COATED, EXTENDED RELEASE ORAL at 08:31

## 2021-09-08 NOTE — PLAN OF CARE
Pt alert and oriented x4, Forgetful. BA on for safety. PIV SL. A/2 with walker for ambulating due to unsteadiness. Bruising to BUE for fall at home. C/o pain to R neck area that pt states is from strain at home. Heat packs applied with good relief. T pump ordered. Will continue to monitor.       Observation goal is safe discharge plan goal has not been met at this time.

## 2021-09-08 NOTE — DISCHARGE SUMMARY
[facility]  Hospitalist Discharge Summary      Date of Admission:  9/6/2021  Date of Discharge:  9/8/2021 10:53 AM  Discharging Provider: Vannessa Peñaloza MD  Discharge Team: Hospitalist Service Gold 7    Discharge Diagnoses   Generalized weakness  Frequent falls  Altered mental status vs short term memory loss  Vertigo, dizziness  Headache    Follow-ups Needed After Discharge   Follow-up Appointments     Adult [facility] Follow-up and recommended labs and tests      Follow up with your oncology team within 1 month      Appointments on [place] and/or [facility] (with [facility] or [facility]   provider or service). Call [phone] if you haven't heard regarding   these appointments within 7 days of discharge.             Unresulted Labs Ordered in the Past 30 Days of this Admission     Date and Time Order Name Status Description    9/6/2021  9:16 PM Vitamin B1 whole blood In process       These results will be followed up by inpatient team    Discharge Disposition   Discharged to home  Condition at discharge: Good    Hospital Course     Mayra Ramirez is a 55 year old female admitted on 9/6/2021. 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 Long history of weakness, vertigo, and frequent falls. This morning, however, she was found prone on the bathroom floor according to EMS documentation and had altered mental status. Admitted to medicine team for altered mental status, falls, weakness, and evaluation for disposition planning.     Generalized weakness  Frequent falls  Altered mental status vs short term memory loss  Vertigo, dizziness  Headache  This is an ongoing problem reported to be worsening over time. 'dizziness and balance issues' dating all the way back to 2014 in the medical record. Hx noted of breast  cancer with brain mets. Patient has seen neurology, referred to ENT, participated in physical therapy. Thus far in ED 9/6, all diagnostic laboratory testing is negative for acute pathological process including: CBC, CMP, CK, troponin, TSH, COVID-19, UA. EKG with normal sinus rhythm. CXR clear. Brain MRI w/wo contrast without acute pathology. Does reveal nonspecific, stable T2/FLAIR hyperintensities c/w microvascular ischemic change, mild-moderate generalized cerebral atrophy, and known, stable lesion inferior aspect of C2 vertebral body. Had minimal oral intake prior to event and did endorse dizziness. Most likely diagnosis is orthostasis vs ongoing sequelae from radiation on brain. MRI negative for new pathology. Ammonia WNL. B12 WNL PT and OT consulted while inpatient, with recommendations for home PT and OT. Highly concerning for decline 2/2 to radiation. Orthostatics WNL.    - Neurology referral placed on discharged   - Counseled on dangers of driving and highly recommended she does not drive until she has a driving test and is seen by neurology.     Consultations This Hospital Stay   PHYSICAL THERAPY ADULT IP CONSULT  OCCUPATIONAL THERAPY ADULT IP CONSULT  NEUROLOGY GENERAL ADULT IP CONSULT  CARE MANAGEMENT / SOCIAL WORK IP CONSULT    Code Status   Prior    Time Spent on this Encounter   I, Vannessa Peñaloza MD, personally saw the patient today and spent greater than 30 minutes discharging this patient.       Vannessa Peñaloza MD  [facility] [address] [address]  Phone: [phone]  ______________________________________________________________________    Physical Exam   Vital Signs: Temp: 98  F (36.7  C) Temp src: Oral BP: 136/83 Pulse: 75   Resp: 16 SpO2: 98 % O2 Device: None (Room air)    Weight: 176 lbs 6.4 oz     Gen: NAD, alert cooperative, non-toxic  HEENT: EOMI, no conjunctival icterus, tracking appropriately  Resp: CTAB, no crackles or wheezes, no increased  WOB  Cardiac: RRR, no S3/S4, no M/R/G appreciated  GI: soft, non-tender, non-distended  Ext: WWP, no edema, spontaneous movement in all 4  Neuro: AOx3, CN 2-12 intact, appropriate mentation, observed her while she attempted to call her  during our interview - she forgot she was going to call him multiple times and had to be reminded numerous times. She struggled with using her phone when she was not acutely focused on her task.        Primary Care Physician   Sariah Corbin    Discharge Orders      Home Care OT Referral for Hospital Discharge      Home Care PT Referral for Hospital Discharge      Neurology Adult Referral      Care Coordination Referral      MD face to face encounter    Documentation of Face to Face and Certification for Home Health Services    I certify that patient: Mayra Ramirez is under my care and that I, or a nurse practitioner or physician's assistant working with me, had a face-to-face encounter that meets the physician face-to-face encounter requirements with this patient on: 9/7/2021.    This encounter with the patient was in whole, or in part, for the following medical condition, which is the primary reason for home health care: significant balance and movement challenges likely related to past brain radiation.    I certify that, based on my findings, the following services are medically necessary home health services: Occupational Therapy and Physical Therapy.    My clinical findings support the need for the above services because: Occupational Therapy Services are needed to assess and treat cognitive ability and address ADL safety due to impairment. and Physical Therapy Services are needed to assess and treat the following functional impairments: balance and weakness with recurrent falls.     Further, I certify that my clinical findings support that this patient is homebound (i.e. absences from home require considerable and taxing effort and are for medical reasons or  [employer] services or infrequently or of short duration when for other reasons) because: Requires assistance of another person or specialized equipment to access medical services because patient: Range of motion limitations prevents ability to exit home safely...    Based on the above findings. I certify that this patient is confined to the home and needs intermittent skilled nursing care, physical therapy and/or speech therapy.  The patient is under my care, and I have initiated the establishment of the plan of care.  This patient will be followed by a physician who will periodically review the plan of care.  Physician/Provider to provide follow up care: Sariah Corbin    Attending hospital physician (the Medicare certified [place] provider): Vannessa Peñaloza MD  Physician Signature: See electronic signature associated with these discharge orders.  Date: 9/7/2021     Reason for your hospital stay    Dear Mayra Ramirez    Your were hospitalized at [facility] with dizziness and recurrent falls recently - also with some concerning short term memory loss. Your treatment consisted of physical and occupational therapy assessments.  Over your hospitalization your balance improved with using a walker and today you are ready to be discharged home.  If you continue physical and occupational therapies you should continue to improve but if you develop fever, shortness of breath, light headedness, chest pain or more falls please seek medical attention.    It was a pleasure meeting with you today. Thank you for allowing me and my team the privilege of caring for you today. You are the reason we are here, and I truly hope we provided you with the excellent service you deserve. Please let us know if there is anything else we can do for you so that we can be sure you are leaving completely satisfied with your care experience.    Your hospital unit at the time of discharge is 5B so if you have any  questions please call the hospital at [phone] and ask to talk to a nurse on 5B.    Take care!    Vannessa Peñaloza MD  Internal Medicine Hospitalist  [facility]     Activity    Your activity upon discharge: as tolerated - no driving until retested and seen by neurology and use your walker for stability.     Adult [facility] Follow-up and recommended labs and tests    Follow up with your oncology team within 1 month      Appointments on [place] and/or [facility] (with [facility] or [facility] provider or service). Call [phone] if you haven't heard regarding these appointments within 7 days of discharge.     Discharge Instructions    We are suggesting the following medication changes:   - No new meds    Please get the following tests done:   - Driving assessment    Please set up an appointment with:   - Oncology team   - Neurology (referral was placed)     Walker Order    DME Documentation:   Describe the reason for need to support medical necessity: impaired functional mobility.     I, the undersigned, certify that the above prescribed supplies are medically necessary for this patient and is both reasonable and necessary in reference to accepted standards of medical and necessary in reference to accepted standards of medical practice in the treatment of this patient's condition and is not prescribed as a convenience.     Diet    Follow this diet upon discharge: Orders Placed This Encounter      Regular Diet Adult       Significant Results and Procedures   Results for orders placed or performed during the hospital encounter of 09/06/21   MR Brain w/o & w Contrast    Narrative    EXAM: MR BRAIN W/O and W CONTRAST  LOCATION: [facility]  DATE/TIME: 9/6/2021 3:50 PM    INDICATION: Question brain metastasis with new ataxia and vertigo.  COMPARISON: Brain MR 2/5/2020, head CT 1/27/2020.  CONTRAST: 7.5mL of Gadavist injected.   TECHNIQUE: Routine multiplanar  multisequence head MRI without and with intravenous contrast.    FINDINGS:  INTRACRANIAL CONTENTS: Stable changes of right suboccipital craniotomy with resection cavity encephalomalacia, and gliosis involving the right inferior cerebellar hemisphere. No residual abnormal enhancement or residual mass effect in this region. The   right transverse and sigmoid sinus remain patent. There is a stable nonenhancing focus of abnormal T2 FLAIR signal within the posterior aspect of the left middle cerebellar peduncle without associated abnormal enhancement. No acute or subacute infarct.   No mass, acute hemorrhage, or extra-axial fluid collections. Confluent nonspecific T2/FLAIR hyperintensities within the cerebral white matter most consistent with advanced microvascular ischemic change. This could be on a post-treatment or chronic small   vessel vascular change basis. It does not appear significantly changed. Stable region of susceptibility in the midline supratentorial compartment which may reflect an area of mineralization as seen on comparison CT study 1/27/2020. No convincing evidence   for intracranial blood products. Mild to moderate generalized cerebral atrophy. No hydrocephalus. Normal position of the cerebellar tonsils. No pathologic contrast enhancement.    SELLA: Partially empty sella.    OSSEOUS STRUCTURES/SOFT TISSUES: There is a stable lesion which enhances in the inferior aspect of the C2 vertebral body, see sagittal image 79 of series 11, measuring 6 x 6 mm. Otherwise there is normal marrow signal. The major intracranial vascular   flow voids are maintained.     ORBITS: Prior bilateral cataract surgery. Visualized portions of the orbits are otherwise unremarkable.     SINUSES/MASTOIDS: No paranasal sinus mucosal disease. No middle ear or mastoid effusion.       Impression    IMPRESSION:  1.  No convincing evidence of intracranial metastatic disease. No evidence of acute intracranial process.    2.  Previous  right suboccipital craniotomy with resection cavity, containing encephalomalacia and gliosis, unchanged.    3.  Stable extensive periventricular and deep white matter T2 FLAIR hyperintensities likely reflective of chronic small vessel vascular changes and/or post-treatment changes.    4.  Stable lesion in the inferior aspect of the C2 vertebral body, which demonstrates enhancement. This, in retrospect, was identified on brain MR 2/5/2020. Stability over time is reassuring.       Chest XR,  PA & LAT    Narrative    EXAMINATION: XR CHEST 2 VW, 9/6/2021 7:19 PM    COMPARISON: None    HISTORY: weakness    FINDINGS: Heart size is normal. Lungs are clear. No pneumothorax or  pleural effusion.      Impression    IMPRESSION: Clear chest.     EZE TOLBERT MD         SYSTEM ID:  [id number]       Discharge Medications   Discharge Medication List as of 9/8/2021 10:07 AM      CONTINUE these medications which have NOT CHANGED    Details   mirabegron (MYRBETRIQ) 25 MG 24 hr tablet Take 1 tablet (25 mg) by mouth daily, Disp-90 tablet, R-1, E-Prescribe      vitamin D2 (ERGOCALCIFEROL) 76157 units (1250 mcg) capsule TAKE 1 CAPSULE BY MOUTH ONCE A WEEK, Disp-8 capsule, R-0, E-Prescribe      ALOE VERA JUICE PO Historical      BIOTIN PO Historical      CALCIUM + D OR None Entered, Historical      Coenzyme Q10 (COQ10 PO) Take 1 tablet by mouth daily., Historical      MELATONIN PO Historical      Multiple Minerals-Vitamins (BONE DENSITY BUILDER PO) Historical      order for DME 2 Mastectomy Bras and 2 ProsthesisDisp-2 Units, R-1, Local Print      Probiotic Product (PROBIOTIC ADVANCED PO) Historical      !! Romosozumab-aqqg (EVENITY SC) Historical      !! romosozumab-aqqg (EVENITY) 105 MG/1.17ML SOSY injection Inject 210 mg Subcutaneous, Historical      !! UNABLE TO FIND MEDICATION NAME: GI Microb-X , Historical      !! UNABLE TO FIND MEDICATION NAME: Eye Pro, Historical      !! UNABLE TO FIND MEDICATION NAME: Multi Powder , Historical       !! UNABLE TO FIND MEDICATION NAME: Trancor , Historical      !! UNABLE TO FIND MEDICATION NAME: Endefin, Historical      !! UNABLE TO FIND MEDICATION NAME: Acid Soothe, Historical      !! UNABLE TO FIND MEDICATION NAME: Brain Restore , Historical       !! - Potential duplicate medications found. Please discuss with provider.        Allergies   Allergies   Allergen Reactions     Septra [Bactrim] Hives     Hives     Sulfa Drugs Hives     Sulfamethoxazole-Trimethoprim      Sulfasalazine Hives

## 2021-09-08 NOTE — PROGRESS NOTES
Observation goal is safe discharge plan goal has not been met at this time    Pt Up 1-2 assist to the bathroom x2 with Walker. No BM. VSS on RA. C/o pain to R shoulder. Given heat packs throughout night. Will continue to monitor.

## 2021-09-08 NOTE — PLAN OF CARE
[facility] [phone]. VSS on RA. A1-2 to the bathroom w/ walker. Voiding spontaneously and no BM. Slept throughout shift. Will continue to monitor.      Observation goal is safe discharge plan goal has not been met at this time.

## 2021-09-08 NOTE — PROGRESS NOTES
Care Management Discharge Note    Discharge Date: 09/08/2021       Discharge Disposition:  Home    Discharge Services:  Home PT/OT [facility] Start of Care 9/16    Discharge DME:  none    Discharge Transportation:family or friend will provide    Education Provided on the Discharge Plan:  Yes  Persons Notified of Discharge Plans: Yes  Patient/Family in Agreement with the Plan:  Yes    Handoff Referral Completed: Yes    Additional Information:  Patient discharge home with family. [facility] to do home PT/OT.    ALINA Rivero RN, BSN  5B RN Care Coordinator  [phone] phone  [phone] pager    Weekend or Holiday Care Coordinator [phone] pager  Job Code [id number]

## 2021-09-08 NOTE — PLAN OF CARE
Physical Therapy Discharge Summary    Reason for therapy discharge:    Discharged to home with home therapy.    Progress towards therapy goal(s). See goals on Care Plan in [facility] electronic health record for goal details.  Goals partially met.  Barriers to achieving goals:   discharge from facility.    Therapy recommendation(s):    Continued therapy is recommended.  Rationale/Recommendations:  to improve safety with mobility.    Met pt at bedside prior to discharge, reviewed recommendations for HH therapies and use of FWW as well as having assistance from family. Pt tearful due to feelings of losing independence.

## 2021-09-08 NOTE — PROGRESS NOTES
Observation goal met. Pt discharged to home left unit with spouse utilizing walker to help aid with locomotion.

## 2021-09-09 ENCOUNTER — PATIENT OUTREACH (OUTPATIENT)
Dept: CARE COORDINATION | Facility: CLINIC | Age: 56
End: 2021-09-09

## 2021-09-09 ENCOUNTER — TELEPHONE (OUTPATIENT)
Dept: FAMILY MEDICINE | Facility: CLINIC | Age: 56
End: 2021-09-09

## 2021-09-09 LAB — VIT B1 PYROPHOSHATE BLD-SCNC: 176 NMOL/L

## 2021-09-09 NOTE — LETTER
[facility] CARE COORDINATION  [address] [facility] [address]  Phone: [phone]      September 9, 2021      Mayra Simpson Ramirez  [address]  [place] [address]    Dear Mayra,    We have been trying to reach you to introduce you to [facility] s Care Coordination program.  The goal of care coordination is to help you manage your health and improve access to the [facility] system in the most efficient manner.  The Care Coordinator is a nurse or  who understands the healthcare system and will assist you in improving your access to care.     As your Physician and Care Coordinator we partner to help you achieve your health care goals.     We will continue to reach out; however, if you are able to call your Care Coordinator at [phone], that would be appreciated.  We at [facility] are focused on providing you with the highest-quality healthcare experience possible.      It is a pleasure to partner with you as we work towards achieving your optimal state of wellness.        Sincerely,        Nancy Cox, Sariah Rosales  None   [address] [address] / [facility] [address]

## 2021-09-09 NOTE — TELEPHONE ENCOUNTER
Dr. Montoya-  Patient was referred to [facility] following hospitalization for PT services. We are requesting verbal orders for PT start of care on 9/11. Please let us know if you are okay with that.  Thank you!  Gisselle Ambriz RN

## 2021-09-09 NOTE — PROGRESS NOTES
Contact  [facility]/Voicemail    Referral Source: Care Team  Clinical Data:  Outreach  Outreach attempted x 2.  Left message on voicemail with call back information and requested return call.  Plan:  will send care coordination introduction letter with care coordinator contact information and explanation of care coordination services.  will do no further outreaches at this time.  Nancy Cox,   [facility]  [phone]

## 2021-09-11 ENCOUNTER — MEDICAL CORRESPONDENCE (OUTPATIENT)
Dept: HEALTH INFORMATION MANAGEMENT | Facility: CLINIC | Age: 56
End: 2021-09-11
Payer: COMMERCIAL

## 2021-09-13 ENCOUNTER — TELEPHONE (OUTPATIENT)
Dept: FAMILY MEDICINE | Facility: CLINIC | Age: 56
End: 2021-09-13

## 2021-09-13 NOTE — TELEPHONE ENCOUNTER
Tabby with [facility] requesting verbal orders.    Pt was admitted on 9/11/21 for PT requesting verbal orders as follows:  2 visit per week for 3 weeks  1 visit per week for 6 weeks  To work on balance, strengthening, and mobility in general.    SN evaluation  Speech Therapy evaluation    Secure and confidential call back number is

## 2021-09-14 ENCOUNTER — PATIENT OUTREACH (OUTPATIENT)
Dept: ONCOLOGY | Facility: CLINIC | Age: 56
End: 2021-09-14

## 2021-09-14 NOTE — PROGRESS NOTES
Pt called the clinic.  She was seen in ED and admitted to the hospital from 9/6/21 to 9/8/21 with weakness, frequent falls, altered mental status, vertigo, and headache.  Pt had brain MRI done on 9/6/2021:    IMPRESSION:  1.  No convincing evidence of intracranial metastatic disease. No evidence of acute intracranial process.     2.  Previous right suboccipital craniotomy with resection cavity, containing encephalomalacia and gliosis, unchanged.     3.  Stable extensive periventricular and deep white matter T2 FLAIR hyperintensities likely reflective of chronic small vessel vascular changes and/or post-treatment changes.     4.  Stable lesion in the inferior aspect of the C2 vertebral body, which demonstrates enhancement. This, in retrospect, was identified on brain MR 2/5/2020. Stability over time is reassuring.      Pt states that she continues to have symptoms.  She has had a total of 3 falls in the past month.  She continues to have problems with balance issues, dizziness, memory issues. Also states that she continues with daily headaches at the back of her head. Pt states that it feels like the headaches are on the outside of her head. Rates pain at 4 out of 10.      1. Pt is requesting that Dr. Wu review the brain MRI that was done on 9/6/2021 and provide advice.    2. Pt states that she was told that she needs to follow-up with oncology within 1 month.   Will check to see when Dr. Wu wants to see pt.    3. Pt has an appt tomorrow with Physical Therapy for neuro evaluation.  Does Dr. Wu recommend other follow-up with neurology?    Will check with Dr. Wu for advice.  OK to leave a message if unable to reach pt.  Jody Dutton, RN, BSN, OCN, CBCN

## 2021-09-15 ENCOUNTER — HOSPITAL ENCOUNTER (OUTPATIENT)
Dept: PHYSICAL THERAPY | Facility: REHABILITATION | Age: 56
End: 2021-09-15
Attending: STUDENT IN AN ORGANIZED HEALTH CARE EDUCATION/TRAINING PROGRAM
Payer: COMMERCIAL

## 2021-09-15 DIAGNOSIS — M62.81 MUSCLE WEAKNESS (GENERALIZED): Primary | ICD-10-CM

## 2021-09-15 DIAGNOSIS — R26.89 IMPAIRMENT OF BALANCE: ICD-10-CM

## 2021-09-15 DIAGNOSIS — R29.6 FALLS FREQUENTLY: ICD-10-CM

## 2021-09-15 PROCEDURE — 97161 PT EVAL LOW COMPLEX 20 MIN: CPT | Mod: GP

## 2021-09-15 PROCEDURE — 97110 THERAPEUTIC EXERCISES: CPT | Mod: GP

## 2021-09-15 PROCEDURE — 97112 NEUROMUSCULAR REEDUCATION: CPT | Mod: GP

## 2021-09-15 NOTE — PROGRESS NOTES
[facility]        OUTPATIENT PHYSICAL THERAPY FUNCTIONAL EVALUATION  PLAN OF TREATMENT FOR OUTPATIENT REHABILITATION  (COMPLETE FOR INITIAL CLAIMS ONLY)  Patient's Last Name, First Name, M.I.  YOB: 1965  Mayra Ramirez     Provider's Name   [facility]   Medical Record No.  [id number]     Start of Care Date:  09/15/21   Onset Date:      Type:     _X__PT   ____OT  ____SLP Medical Diagnosis:  R26.89 (ICD-10-CM) - Impairment of balance     PT Diagnosis:  Balance and gait impairment, generalized weakness Visits from SOC:  1                              __________________________________________________________________________________  Plan of Treatment/Functional Goals:  ADL retraining, balance training, gait training, neuromuscular re-education, strengthening, transfer training           GOALS  HEP  Patient will be independent with home exercises to facilitate improvement in function.   10/06/21    Tandem Stance  Patient will be able to balance in tandem stance for at least 30s to improve balance in positions with decreased base of support  11/03/21    Sit to Stands  Patient will demonstrate at least 15 repeated sit to stands in 30s to improve functional balance.   11/03/21    Gait  Patient will demonstrate ability to walk in a straight line for at least 100ft to improve stability when ambulating at home and in the community.   11/03/21       Therapy Frequency:  1 time/week   Predicted Duration of Therapy Intervention:  10 weeks    Scott Parmer, PT, DPT                                    I CERTIFY THE NEED FOR THESE SERVICES FURNISHED UNDER        THIS PLAN OF TREATMENT AND WHILE UNDER MY CARE     (Physician co-signature of this document indicates review and certification of the therapy plan).                Certification Date From:  09/15/21   Certification  Date To:  11/10/21    Referring Provider:  Jojo Montoya DO    Initial Assessment  See Epic Evaluation- Start of Care Date: 09/15/21        09/15/21 1000   Quick Adds   Quick Adds Certification   Type of Visit Initial OP PT Evaluation       Present No   General Information   Start of Care Date 09/15/21   Referring Physician Jojo Montoya DO   Orders Evaluate and Treat as Indicated   Order Date 08/30/21   Medical Diagnosis R26.89 (ICD-10-CM) - Impairment of balance   Precautions/Limitations fall precautions   Surgical/Medical history reviewed Yes   Pertinent history of current problem (include personal factors and/or comorbidities that impact the POC) Patient was dx with breast cancer in 2004 and had a bilateral mastectomy. Patient then had a check up PET scan and caught brain metastasis in 2006. Patient attributes cognition and balance issues to the brain cancer. Within the last 6 months or so, patient has had more balance issues. Patient feels generally unsteady on her feet and often will swerve when walking. Patient was seen here for physical therapy last year for balance issues which went well overall and seemed to help. Patient's goals for physical therapy are to improve balance. Patient has fallen multiple times over the course of the last year, mostly to be attributed to tripping over her dog in the home or when walking it. Patient did injure self when taking the dog for a walk when she fell on her face and suffered some cuts and bruises.   Prior level of functional mobility Ambulation;ADL   Ambulation Began having balance impairments about 6 months ago, previously did not feel as unsteady   ADL Able to walk dog and complete housework without difficulty.    Previous/Current Treatment Physical Therapy   Improvement after PT Moderate   Current Community Support Personal care attendant;Family/friend caregiver   Patient role/Employment history Retired   Living environment  [facility]   Current Assistive Devices Walking Poles   Fall Risk Screen   Fall screen completed by PT   Have you fallen 2 or more times in the past year? Yes   Have you fallen and had an injury in the past year? Yes  (Walking dog and fell face forward)   Timed Up and Go score (seconds) 13.61   Is patient a fall risk? Yes   Abuse Screen (yes response referral indicated)   Feels Unsafe at Home or Work/School no   Feels Threatened by Someone no   Does Anyone Try to Keep You From Having Contact with Others or Doing Things Outside Your Home? no   Physical Signs of Abuse Present no   Pain   Patient currently in pain No   Observation   Observation Grossly unsteady on feet, postural sway at rest   Posture   Posture Forward head position;Kyphosis  (Anterior pelvic tilt)   Posture Comments Postural swaying in standing   Range of Motion (ROM)   ROM Quick Adds Hip, Left;Hip, Right;Trunk   Trunk Flexion ROM   Trunk Flexion PROM - degrees WFL   Trunk Extension ROM   Trunk Extension PROM - degrees WFL   Left Trunk Lateral Flexion ROM   Left Trunk Lateral Flexion PROM - degrees WFL   Left Trunk Rotation ROM   Left Trunk Rotation PROM - degrees WFL   Right Trunk Lateral Flexion ROM   Right Trunk Lateral Flexion PROM - degrees WFL   Right Trunk Rotation ROM   Right Trunk Rotation PROM - degrees WFL   Left Hip Flexion ROM   Left Hip Flexion PROM - degrees WFL   Left Hip Extension ROM   Left Hip Extension PROM - degrees WFL   Left Hip ABduction ROM   Left Hip ABduction PROM - degrees WFL   Left Hip External Rotation ROM   Left Hip External Rotation PROM - degrees WFL   Left Hip Internal Rotation ROM   Left Hip Internal Rotation PROM - degrees WFL   Right Hip Flexion ROM   Right Hip Flexion PROM - degrees WFL   Right Hip Extension ROM   Right Hip Extension PROM - degrees WFL   Right Hip ABduction ROM   Right Hip ABduction PROM - degrees WFL   Right Hip ADduction ROM   Right Hip ADduction PROM - degrees WFL   Right Hip External  Rotation ROM   Right Hip External Rotation PROM - degrees WFL   Right Hip Internal Rotation ROM   Right Hip Internal Rotation PROM - degrees WFL   Strength   Manual Muscle Testing Quick Adds MMT: Hip;MMT: Knee;MMT: Ankle   MMT: Hip, Rehab Eval   Hip Flexion - Left Side (4/5) good, left   Hip Extension - Left Side (4-/5) good minus, left   Hip ABduction - Left Side (4/5) good, left   Hip Flexion - Right Side (4/5) good, right   Hip Extension - Right Side (4-/5) good minus, right   Hip ABduction - Right Side (4-/5) good minus, right   MMT: Knee, Rehab Eval   Knee Flexion - Left Side (5/5) normal,left   Knee Extension - Left Side (5/5) normal,left   Knee Flexion - Right Side (5/5) normal,right   Knee Extension - Right Side (5/5) normal,right   MMT: Ankle, Rehab Eval   Ankle Dorsiflexion - Left Side (5/5) normal,left   Ankle Plantarflexion - Left Side (5/5) normal,left   Ankle Inversion - Left Side (5/5) normal,left   Ankle Eversion - Left Side (5/5) normal,left   Ankle Dorsiflexion - Right Side (5/5) normal,right   Ankle Plantarflexion - Right Side (5/5) normal,right   Bed Mobility   Bed Mobility Bed mobility skill: Rolling/Turning   Bed Mobility Comments Takes time as to not fall off the PT table   Bed Mobility Skill: Rolling/Turning   Level of [place]: Rolling/Turning independent   Gait   Gait Gait Analysis   Gait Comments Widened base of support, patient does swerve and sway significantly during gait, uses wall for balance occasionally   Balance   Balance other (describe)   Balance Comments Grossly unsteady on feet, particularly during gait   Balance Special Tests   Balance Special Tests Timed up and go;Single leg stance right;Single leg stance left;Modified CTSIB Conditions;Sit to stand reps   Balance Special Tests Timed Up and Go   Seconds 13.61 Seconds   Balance Special Tests Single Leg Stance Right,   Right, seconds 0 Seconds   Comments Unable without support   Balance Special Tests Single Leg Stance Left    Left, seconds 0 Seconds   Comments Unable without support   Balance Special Tests Modified CTSIB Conditions   Condition 1, seconds 30 Seconds   Condition 2, seconds 16 Seconds   Condition 4, seconds 11.4 Seconds   Condition 5, seconds 4.5 Seconds   Balance Special Tests Sit to Stand Reps in 30 Seconds   Height Chair height   Sensory Examination   Sensory Perception no deficits were identified   Sensory Perception Comments Sensation and proprioception intact B   Coordination   Coordination no deficits were identified   Muscle Tone   Muscle Tone no deficits were identified   Planned Therapy Interventions   Planned Therapy Interventions ADL retraining;balance training;gait training;neuromuscular re-education;strengthening;transfer training   Clinical Impression   Criteria for Skilled Therapeutic Interventions Met yes, treatment indicated   PT Diagnosis Balance and gait impairment, generalized weakness   Functional limitations due to impairments Unsteadiness when walking, completing housework, walking dog   Clinical Presentation Stable/Uncomplicated   Clinical Decision Making (Complexity) Low complexity   Therapy Frequency 1 time/week   Predicted Duration of Therapy Intervention (days/wks) 10 weeks   Risk & Benefits of therapy have been explained Yes   Patient, Family & other staff in agreement with plan of care Yes   Clinical Impression Comments Patient is a 55 year old female presenting to physical therapy with balance and gait impairments. Patient has a hx of breast cancer with brain met which is likely the primary contributor to the patient's current balance impairment. Patient also has some difficulty with cognition and memory since the brain cancer treatment. Patient presents with the following impairments: gait and balance impairment, generalized weakness, decreased activity tolerance, and difficulty performing upright housework and ADLs without loss of balance. Patient's prognosis to improve balance and  function is good due to previous success with physical therapy and patient's willingness to participate in PT. Patient would benefit from outpatient physical therapy to address balance deficits and improve function.    Education Assessment   Barriers to Learning Cognitive  (Memory)   GOALS   PT Eval Goals 1;2;3;4   Goal 1   Goal Identifier HEP   Goal Description Patient will be independent with home exercises to facilitate improvement in function.    Target Date 10/06/21   Goal 2   Goal Identifier Tandem Stance   Goal Description Patient will be able to balance in tandem stance for at least 30s to improve balance in positions with decreased base of support   Target Date 11/03/21   Goal 3   Goal Identifier Sit to Stands   Goal Description Patient will demonstrate at least 15 repeated sit to stands in 30s to improve functional balance.    Target Date 11/03/21   Goal 4   Goal Identifier Gait   Goal Description Patient will demonstrate ability to walk in a straight line for at least 100ft to improve stability when ambulating at home and in the community.    Target Date 11/03/21   Total Evaluation Time   PT Fermín, Low Complexity Minutes (72100) 28   Therapy Certification   Certification date from 09/15/21   Certification date to 11/10/21   Medical Diagnosis R26.89 (ICD-10-CM) - Impairment of balance   Certification I certify the need for these services furnished under this plan of treatment and while under my care.  (Physician co-signature of this document indicates review and certification of the therapy plan).     Scott Parmer, PT, DPT

## 2021-09-15 NOTE — PROGRESS NOTES
David Wu MD Nielsen, Karla, RN  Shes needs to follow up with neurology before any appt with me as theres no cancer there     Pt was notified with recommendations per Dr. Wu.  Pt will continue with PT for the dizziness.  She will also call Dr Jeff's office tomorrow to schedule an appt. Dr Jeff is the neurologist that pt has seen in the past. Pt was provided with the number to Dr Jeff's office.  Writer also offered to call pt's daughter with recommendations, but pt refuses. Pt states that she wants to take care of this on her own.  Patient verbalized understanding and agreement with plan.  Pt was instructed to call the clinic with any questions, concerns, or worsening symptoms.  Jody Dutton, ALINA, BSN, OCN, CBCN

## 2021-09-23 ENCOUNTER — TELEPHONE (OUTPATIENT)
Dept: FAMILY MEDICINE | Facility: CLINIC | Age: 56
End: 2021-09-23

## 2021-09-23 NOTE — ADDENDUM NOTE
Encounter addended by: Parmer, Scott, PT on: 9/23/2021 1:36 PM   Actions taken: Clinical Note Signed

## 2021-09-23 NOTE — TELEPHONE ENCOUNTER
Deyanira calling today for verbal orders on the following:    Continue speech therapy 1x per week for 6 weeks.    Please call Deyanira at [phone]. Secure VM if needed.

## 2021-09-25 ENCOUNTER — HEALTH MAINTENANCE LETTER (OUTPATIENT)
Age: 56
End: 2021-09-25

## 2021-09-28 ENCOUNTER — OFFICE VISIT (OUTPATIENT)
Dept: OBGYN | Facility: CLINIC | Age: 56
End: 2021-09-28
Payer: COMMERCIAL

## 2021-09-28 VITALS — DIASTOLIC BLOOD PRESSURE: 70 MMHG | WEIGHT: 182 LBS | BODY MASS INDEX: 27.67 KG/M2 | SYSTOLIC BLOOD PRESSURE: 108 MMHG

## 2021-09-28 DIAGNOSIS — N81.11 CYSTOCELE, MIDLINE: Primary | ICD-10-CM

## 2021-09-28 PROCEDURE — 99213 OFFICE O/P EST LOW 20 MIN: CPT | Performed by: OBSTETRICS & GYNECOLOGY

## 2021-09-28 NOTE — NURSING NOTE
"Chief Complaint   Patient presents with     Pessary Check/Fit/Insert       Initial /70   Wt 82.6 kg (182 lb)   BMI 27.67 kg/m   Estimated body mass index is 27.67 kg/m  as calculated from the following:    Height as of 21: 1.727 m (5' 8\").    Weight as of this encounter: 82.6 kg (182 lb).  BP completed using cuff size: regular    Questioned patient about current smoking habits.  Pt. has never smoked.          The following HM Due: NONE      The following patient reported/Care Every where data was sent to:  P ABSTRACT QUALITY INITIATIVES [[id number]]        Vanessa Willingham [facility]                 "

## 2021-09-28 NOTE — PATIENT INSTRUCTIONS
You can reach your [facility] Care Team any time of the day by calling [phone]. This number will put you in touch with the 24 hour nurse line if the clinic is closed.    To contact your OB/GYN Station Coordinator/Surgery Scheduler please call [phone]. This is a direct number for your care team between 8 a.m. and 4 p.m. Monday through Friday.    [facility] is open for your convenience:  Monday through Friday 8 a.m. to 6 p.m.  Closed weekends and all major holidays.

## 2021-10-04 PROCEDURE — G0180 MD CERTIFICATION HHA PATIENT: HCPCS | Performed by: FAMILY MEDICINE

## 2021-10-06 ENCOUNTER — MEDICAL CORRESPONDENCE (OUTPATIENT)
Dept: HEALTH INFORMATION MANAGEMENT | Facility: CLINIC | Age: 56
End: 2021-10-06
Payer: COMMERCIAL

## 2021-10-08 DIAGNOSIS — N81.11 CYSTOCELE, MIDLINE: Primary | ICD-10-CM

## 2021-10-08 DIAGNOSIS — Z46.89 PESSARY MAINTENANCE: ICD-10-CM

## 2021-10-13 ENCOUNTER — TELEPHONE (OUTPATIENT)
Dept: OBGYN | Facility: CLINIC | Age: 56
End: 2021-10-13

## 2021-10-13 NOTE — TELEPHONE ENCOUNTER
Patient calling:  Received a RX for   Oxyquinoline-Sod Lauryl Sulf 0.025-0.01 % GEL  Which is different from Trimo Zayas gel.    The applicator is strange and not sure how to use it.    Advised pt to contact pharmacy for instructions.        Lucy Lazo RN

## 2021-10-14 NOTE — TELEPHONE ENCOUNTER
Patient calling again. Last tube was like a tube of toothpaste, with no applicator.  The new one has a bulb with a straw in it.  Informed pt that she can either go back to pharmacy to review instructions (none came with the tube), or ignore the bulb and use the gel like she did with her last RX.  She will try that and let us know if she has any further questions.  Lucy Lazo RN

## 2021-10-18 ENCOUNTER — PATIENT OUTREACH (OUTPATIENT)
Dept: NURSING | Facility: CLINIC | Age: 56
End: 2021-10-18

## 2021-10-18 NOTE — PROGRESS NOTES
Contact   Chart Review     Situation: Patient chart reviewed by .    Background: Care Coordination attempted to reach patient in September.      Assessment: Call from patient who retrieved VM left in September after she was discharged from hospital. She did not realize the VM were that old. She continues to receive home care and it is going well.   She is going to establish with a new PCP at her next appointment on November 30.  Reviewed role of care coordination and she is declining that support at this time. Gave phone number to reach helpdesk for My Chart as she has not been able to get into it for several months.      Plan/Recommendations: No further outreach.  Letter was sent via My Chart in September so she will have it when she can get into My Chart.     Nancy Cox,   [facility]  [phone]

## 2021-10-28 ENCOUNTER — TELEPHONE (OUTPATIENT)
Dept: FAMILY MEDICINE | Facility: CLINIC | Age: 56
End: 2021-10-28
Payer: COMMERCIAL

## 2021-10-28 DIAGNOSIS — Z53.9 DIAGNOSIS NOT YET DEFINED: Primary | ICD-10-CM

## 2021-10-28 NOTE — TELEPHONE ENCOUNTER
Avel with [facility] calling for verbal orders    Cancel PT week of 11/1    Add PT visit the week of 11/8   Final teaching for discharge and Therapist continuity.    Secure and confidential call back number is .  Please leave detail message if Avel is unable to answer returning call.

## 2021-11-03 ENCOUNTER — MEDICAL CORRESPONDENCE (OUTPATIENT)
Dept: HEALTH INFORMATION MANAGEMENT | Facility: CLINIC | Age: 56
End: 2021-11-03
Payer: COMMERCIAL

## 2021-11-08 ENCOUNTER — MEDICAL CORRESPONDENCE (OUTPATIENT)
Dept: HEALTH INFORMATION MANAGEMENT | Facility: CLINIC | Age: 56
End: 2021-11-08
Payer: COMMERCIAL

## 2021-11-08 NOTE — TELEPHONE ENCOUNTER
Order signed and has been faxed. Will hold order original for 1 week then send to HIM   Matthew Choudhray CMA on 11/8/2021 at 10:48 AM

## 2021-11-11 ENCOUNTER — TELEPHONE (OUTPATIENT)
Dept: FAMILY MEDICINE | Facility: CLINIC | Age: 56
End: 2021-11-11
Payer: COMMERCIAL

## 2021-11-11 DIAGNOSIS — F09 MILD COGNITIVE DISORDER: ICD-10-CM

## 2021-11-11 DIAGNOSIS — R26.89 IMPAIRMENT OF BALANCE: Primary | ICD-10-CM

## 2021-11-11 NOTE — TELEPHONE ENCOUNTER
Avel with [facility] calling     Requesting order/referral for he following services    Speech - Speech Therapist is recommending utilizing the [facility] on [address] near [address]    Working on high level cognitive training    Also requesting PT orders for high level balance and core strengthening.     Any questions for Avel call back umber is .  Voice mail is secure and confidential.

## 2021-11-12 ENCOUNTER — OFFICE VISIT (OUTPATIENT)
Dept: FAMILY MEDICINE | Facility: CLINIC | Age: 56
End: 2021-11-12
Payer: COMMERCIAL

## 2021-11-12 VITALS
DIASTOLIC BLOOD PRESSURE: 71 MMHG | OXYGEN SATURATION: 98 % | TEMPERATURE: 97.8 F | SYSTOLIC BLOOD PRESSURE: 111 MMHG | HEART RATE: 68 BPM | RESPIRATION RATE: 16 BRPM

## 2021-11-12 DIAGNOSIS — T14.90XA TRAUMA: Primary | ICD-10-CM

## 2021-11-12 PROCEDURE — 99213 OFFICE O/P EST LOW 20 MIN: CPT | Performed by: EMERGENCY MEDICINE
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
## 2021-11-12 NOTE — PATIENT INSTRUCTIONS
Patient Education     Nasal Contusion  Your nose has bruising (contusion). You don t appear to have any broken bones. A bruise may cause pain, swelling, and stuffiness of the nose. You may also have bleeding.   Home care    To ease pain and swelling, wrap a bag of ice, cold pack, or frozen peas in a thin towel. Place the cold source on your nose for 10 minutes at a time. Do this every 2 hours during the first 24 hours. Then continue 4 times a day for the next 2 days.    Take pain medicines as directed. Talk with your healthcare provider before taking aspirin, ibuprofen, or naproxen to help control pain.    Tell the healthcare provider if you are taking aspirin or blood thinners.    Don't blow your nose for the first 2 days. After this, blow your nose gently. This helps prevent new bleeding.    Don t drink alcohol or hot liquids for the next 2 days. These can dilate blood vessels in your nose and cause bleeding.    Sleep with your head elevated for a couple of days until the swelling and pain being to lessen.    Don't do any activity that could result in another head injury until you are given the OK to do so.     Symptoms of concussion  Because the injury was to your head, it's possible that you could have a concussion (mild brain injury). Symptoms of concussion can show up later. For this reason, be alert for signs and symptoms of a concussion. Seek emergency medical care if any of these develop over the next hours to days:     Headache    Nausea or vomiting    Dizziness    Sensitivity to light or noise    Unusual sleepiness or grogginess    Trouble falling asleep    Personality changes    Vision changes    Memory loss    Confusion    Trouble walking or clumsiness    Loss of consciousness (even for a short time)    Inability to be awakened  Follow-up care  Follow up with your healthcare provider, or as advised. If you have been referred to a specialist, make an appointment within 3 to 5 days of the injury.    Note: If X-rays were taken, you will be notified of any new findings that may affect your care.   When to seek medical advice  Call your healthcare provider right away if any of these occur:     Bleeding from your nose that won't stop    Your nose looks crooked    You can't breathe through -one or both sides of your nose    Facial swelling, pain, or redness that gets worse    Fever of 100.4 F (38 C) or higher, or as directed by your healthcare provider    Pus or clear discharge from your nose    Skin on the nose is split open or has a gap    Sinus pain  Trevor last reviewed this educational content on 8/1/2020 2000-2021 [employer]. All rights reserved. This information is not intended as a substitute for professional medical care. Always follow your healthcare professional's instructions.

## 2021-11-15 NOTE — ADDENDUM NOTE
Encounter addended by: Parmer, Scott, PT on: 11/15/2021 1:57 PM   Actions taken: Episode resolved, Clinical Note Signed, Flowsheet accepted

## 2021-11-15 NOTE — PROGRESS NOTES
Outpatient Physical Therapy Discharge Note     Patient: Mayra Ramirez  : 1965    Beginning/End Dates of Reporting Period:  9/15/2021 to 9/15/2021    Referring Provider: Jojo Montoya DO Therapy Diagnosis: Balance impairment, generalized weakness      Client Self Report:       Goals:  Goal Identifier HEP   Goal Description Patient will be independent with home exercises to facilitate improvement in function.    Target Date 10/06/21   Date Met      Progress (detail required for progress note):       Goal Identifier Tandem Stance   Goal Description Patient will be able to balance in tandem stance for at least 30s to improve balance in positions with decreased base of support   Target Date 21   Date Met      Progress (detail required for progress note):       Goal Identifier Sit to Stands   Goal Description Patient will demonstrate at least 15 repeated sit to stands in 30s to improve functional balance.    Target Date 21   Date Met      Progress (detail required for progress note):       Goal Identifier Gait   Goal Description Patient will demonstrate ability to walk in a straight line for at least 100ft to improve stability when ambulating at home and in the community.    Target Date 21   Date Met      Progress (detail required for progress note):             Plan:  Discharge from therapy.    Discharge:    Reason for Discharge: Change in medical status.    Equipment Issued: N/A    Discharge Plan: Patient to continue home program.    Scott Parmer, PT, DPT

## 2021-11-30 ENCOUNTER — OFFICE VISIT (OUTPATIENT)
Dept: FAMILY MEDICINE | Facility: CLINIC | Age: 56
End: 2021-11-30
Payer: COMMERCIAL

## 2021-11-30 ENCOUNTER — TELEPHONE (OUTPATIENT)
Dept: FAMILY MEDICINE | Facility: CLINIC | Age: 56
End: 2021-11-30

## 2021-11-30 VITALS
BODY MASS INDEX: 27.95 KG/M2 | HEART RATE: 68 BPM | RESPIRATION RATE: 20 BRPM | WEIGHT: 184.4 LBS | SYSTOLIC BLOOD PRESSURE: 92 MMHG | HEIGHT: 68 IN | DIASTOLIC BLOOD PRESSURE: 62 MMHG

## 2021-11-30 DIAGNOSIS — R29.6 FALLS FREQUENTLY: ICD-10-CM

## 2021-11-30 DIAGNOSIS — Z85.89 HISTORY OF CANCER METASTATIC TO BRAIN: ICD-10-CM

## 2021-11-30 DIAGNOSIS — R27.0 ATAXIA: ICD-10-CM

## 2021-11-30 DIAGNOSIS — F09 MILD COGNITIVE DISORDER: ICD-10-CM

## 2021-11-30 DIAGNOSIS — Z00.00 ENCOUNTER FOR MEDICARE ANNUAL WELLNESS EXAM: Primary | ICD-10-CM

## 2021-11-30 DIAGNOSIS — E55.9 VITAMIN D DEFICIENCY: Primary | ICD-10-CM

## 2021-11-30 DIAGNOSIS — R26.89 POOR BALANCE: ICD-10-CM

## 2021-11-30 DIAGNOSIS — R41.3 POOR SHORT-TERM MEMORY: ICD-10-CM

## 2021-11-30 DIAGNOSIS — Z86.0100 HISTORY OF COLONIC POLYPS: ICD-10-CM

## 2021-11-30 DIAGNOSIS — M81.8 OTHER OSTEOPOROSIS WITHOUT CURRENT PATHOLOGICAL FRACTURE: ICD-10-CM

## 2021-11-30 LAB
CHOLEST SERPL-MCNC: 258 MG/DL
FASTING STATUS PATIENT QL REPORTED: ABNORMAL
FASTING STATUS PATIENT QL REPORTED: NORMAL
GLUCOSE BLD-MCNC: 89 MG/DL (ref 70–125)
HDLC SERPL-MCNC: 45 MG/DL
LDLC SERPL CALC-MCNC: 139 MG/DL
TRIGL SERPL-MCNC: 368 MG/DL

## 2021-11-30 PROCEDURE — 90471 IMMUNIZATION ADMIN: CPT | Performed by: STUDENT IN AN ORGANIZED HEALTH CARE EDUCATION/TRAINING PROGRAM

## 2021-11-30 PROCEDURE — G0438 PPPS, INITIAL VISIT: HCPCS | Performed by: STUDENT IN AN ORGANIZED HEALTH CARE EDUCATION/TRAINING PROGRAM

## 2021-11-30 PROCEDURE — 99213 OFFICE O/P EST LOW 20 MIN: CPT | Mod: 25 | Performed by: STUDENT IN AN ORGANIZED HEALTH CARE EDUCATION/TRAINING PROGRAM

## 2021-11-30 PROCEDURE — 90750 HZV VACC RECOMBINANT IM: CPT | Performed by: STUDENT IN AN ORGANIZED HEALTH CARE EDUCATION/TRAINING PROGRAM

## 2021-11-30 PROCEDURE — 80061 LIPID PANEL: CPT | Performed by: STUDENT IN AN ORGANIZED HEALTH CARE EDUCATION/TRAINING PROGRAM

## 2021-11-30 PROCEDURE — 82947 ASSAY GLUCOSE BLOOD QUANT: CPT | Performed by: STUDENT IN AN ORGANIZED HEALTH CARE EDUCATION/TRAINING PROGRAM

## 2021-11-30 PROCEDURE — 36415 COLL VENOUS BLD VENIPUNCTURE: CPT | Performed by: STUDENT IN AN ORGANIZED HEALTH CARE EDUCATION/TRAINING PROGRAM

## 2021-11-30 ASSESSMENT — MIFFLIN-ST. JEOR: SCORE: 1461.99

## 2021-11-30 ASSESSMENT — ACTIVITIES OF DAILY LIVING (ADL): CURRENT_FUNCTION: TRANSPORTATION REQUIRES ASSISTANCE

## 2021-11-30 NOTE — TELEPHONE ENCOUNTER
Daughter is calling, shares her mother had appt with Dr. Montoya.  Mother has dementia and forgot to ask for Vitamin D level to be tested.    Consent to communicate only list , [place].  Maria Isabel is not listed.

## 2021-11-30 NOTE — PATIENT INSTRUCTIONS
Patient Education   Personalized Prevention Plan  You are due for the preventive services outlined below.  Your care team is available to assist you in scheduling these services.  If you have already completed any of these items, please share that information with your care team to update in your medical record.  Health Maintenance Due   Topic Date Due     ANNUAL REVIEW OF HM ORDERS  Never done     Discuss Advance Care Planning  Never done     HIV Screening  Never done     Hepatitis C Screening  Never done     Activities of Daily Living    Your Health Risk Assessment indicates you have difficulties with activities of daily living such as housework, bathing, preparing meals, taking medication, etc. Please make a follow up appointment for us to address this issue in more detail.    Urinary Incontinence, Female (Adult)   Urinary incontinence means loss of bladder control. This problem affects many women, especially as they get older. If you have incontinence, you may be embarrassed to ask for help. But know that this problem can be treated.   Types of Incontinence  There are different types of incontinence. Two of the main types are described here. You can have more than one type.     Stress incontinence. With this type, urine leaks when pressure (stress) is put on the bladder. This may happen when you cough, sneeze, or laugh. Stress incontinence most often occurs because the pelvic floor muscles that support the bladder and urethra are weak. This can happen after pregnancy and vaginal childbirth or a hysterectomy. It can also be due to excess body weight or hormone changes.    Urge incontinence (also called overactive bladder). With this type, a sudden urge to urinate is felt often. This may happen even though there may not be much urine in the bladder. The need to urinate often during the night is common. Urge incontinence most often occurs because of bladder spasms. This may be due to bladder irritation or  infection. Damage to bladder nerves or pelvic muscles, constipation, and certain medicines can also lead to urge incontinence.  Treatment depends on the cause. Further evaluation is needed to find the type you have. This will likely include an exam and certain tests. Based on the results, you and your healthcare provider can then plan treatment. Until a diagnosis is made, the home care tips below can help ease symptoms.   Home care    Do pelvic floor muscle exercises, if they are prescribed. The pelvic floor muscles help support the bladder and urethra. Many women find that their symptoms improve when doing special exercises that strengthen these muscles. To do the exercises, contract the muscles you would use to stop your stream of urine. But do this when you re not urinating. Hold for 10 seconds, then relax. Repeat 10 to 20 times in a row, at least 3 times a day. Your healthcare provider may give you other instructions for how to do the exercises and how often.    Keep a bladder diary. This helps track how often and how much you urinate over a set period of time. Bring this diary with you to your next visit with the provider. The information can help your provider learn more about your bladder problem.    Lose weight, if advised to by your provider. Extra weight puts pressure on the bladder. Your provider can help you create a weight-loss plan that s right for you. This may include exercising more and making certain diet changes.    Don't have foods and drinks that may irritate the bladder. These can include alcohol and caffeinated drinks.    Quit smoking. Smoking and other tobacco use can lead to a long-term (chronic) cough that strains the pelvic floor muscles. Smoking may also damage the bladder and urethra. Talk with your provider about treatments or methods you can use to quit smoking.    If drinking large amounts of fluid makes you have symptoms, you may be advised to limit your fluid intake. You may also be  advised to drink most of your fluids during the day and to limit fluids at night.    If you re worried about urine leakage or accidents, you may wear absorbent pads to catch urine. Change the pads often. This helps reduce discomfort. It may also reduce the risk of skin or bladder infections.    Follow-up care  Follow up with your healthcare provider, or as directed. It may take some to find the right treatment for your problem. But healthy lifestyle changes can be made right away. These include such things as exercising on a regular basis, eating a healthy diet, losing weight (if needed), and quitting smoking. Your treatment plan may include special therapies or medicines. Certain procedures or surgery may also be options. Talk about any questions you have with your provider.   When to seek medical advice  Call the healthcare provider right away if any of these occur:    Fever of 100.4 F (38 C) or higher, or as directed by your provider    Bladder pain or fullness    Belly swelling    Nausea or vomiting    Back pain    Weakness, dizziness, or fainting  Trevor last reviewed this educational content on 1/1/2020 2000-2021 [employer]. All rights reserved. This information is not intended as a substitute for professional medical care. Always follow your healthcare professional's instructions.
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
## 2021-12-01 NOTE — TELEPHONE ENCOUNTER
Faby Montoya. Please see message from keeley.   Were you wanting vitamin d levels checked at all. No consent on file to speak with daugher but we can call son back to discuss as well   Her next appt with you is not scheduled till February   Matthew Choudhary CMA on 12/1/2021 at 8:01 AM

## 2021-12-02 NOTE — TELEPHONE ENCOUNTER
Please let her know     Apologies, I forgot to add on the Vit D. Have put in order. She can come in for lab only draw at her convenience.

## 2021-12-03 NOTE — TELEPHONE ENCOUNTER
Called and LM for Mayra; and called  Remington--helped schedule a lab only appt for next Friday at 1:00 to have Vit D levels checked. He will relay to wife Mayra, ok to communicate with Remington.

## 2021-12-08 ENCOUNTER — PATIENT OUTREACH (OUTPATIENT)
Dept: ONCOLOGY | Facility: CLINIC | Age: 56
End: 2021-12-08
Payer: COMMERCIAL

## 2021-12-08 DIAGNOSIS — C50.012 MALIGNANT NEOPLASM INVOLVING BOTH NIPPLE AND AREOLA OF LEFT BREAST IN FEMALE, UNSPECIFIED ESTROGEN RECEPTOR STATUS (H): Primary | ICD-10-CM

## 2021-12-08 DIAGNOSIS — I97.2 POSTMASTECTOMY LYMPHEDEMA SYNDROME: ICD-10-CM

## 2021-12-08 NOTE — PROGRESS NOTES
Mayra's daughter, Jane, left a voicemail 12/7/21 evening on the clinic answering machine in regards to a new DME for masectomy bras for Mayra. Writer returned call to gather additional information, as patient has not been seen in clinic since 2/12/20. Left voicemail for patient to return call.    Mayra Springer, RN, BSN, OCN  Nurse Care Coordinator  [facility] [place]  P: [phone]     F: [phone]

## 2021-12-08 NOTE — PROGRESS NOTES
Writer faxed signed DME prescription for mastectomy bra with bilateral prosthesis and compression mastectomy bra to [facility] in [place] at [phone]. Receipt confirmed via [employer]ax. Writer updated patient and daughter.    Mayra Springer, RN, BSN, OCN  Nurse Care Coordinator  [facility] [place]  P: [phone]     F: [phone]

## 2021-12-09 ENCOUNTER — TELEPHONE (OUTPATIENT)
Dept: NURSING | Facility: CLINIC | Age: 56
End: 2021-12-09
Payer: COMMERCIAL

## 2021-12-09 DIAGNOSIS — E55.9 VITAMIN D DEFICIENCY: Primary | ICD-10-CM

## 2021-12-09 NOTE — TELEPHONE ENCOUNTER
Patient's daughter, Jane calling, but is not on consent to communicate form. She is requesting a message be sent to patient's provider.  Patient had an abnormal test result for lipid panel that patient would like to discuss.     Daughter states that patient was not fasting prior to that draw.    Patient was also told to come back in to the clinic for a lab draw for Viamin D level that was not drawn at last appointment. Is that lab draw currently scheduled and should patient have a redraw of her lipid panel since she was not fasting?        Component Ref Range & Units 9 d ago   (11/30/21) 1 yr ago   (1/15/20) 1 yr ago   (1/15/20) 2 yr ago   (1/9/19) 3 yr ago   (2/16/18) 3 yr ago   (2/16/18) 6 yr ago   (6/23/15)    Cholesterol <=199 mg/dL 258 High   188  188  232 High  R, CM  221 High    238 High      Triglycerides <=149 mg/dL 368 High   105  105  140 R, CM  92   145     Direct Measure HDL >=50 mg/dL 45 Low   50  50  47 Low  R  51   43 R    Comment: HDL Cholesterol Reference Range:      Please message patient back via [employer] or call her , Remington, at [phone].     RN/Writer routing message to care team to advise.    Karly Conde RN  12/09/21 11:48 AM  [facility] Nurse Advisor

## 2021-12-10 NOTE — TELEPHONE ENCOUNTER
Attempted to call patient/spouse back to clarify questions.  There have been many messages back-and-forth.    If the daughter or patient or spouse call back, okay to let them know:    Yes, the cholesterol levels are high and it is okay that she was not fasting.  Her overall risk for heart attack/stroke is not significantly elevated.  She is below the threshold where she needs medications for cholesterol.    The only thing that they need to come in for is a vitamin D draw because it was requested by the patient.

## 2021-12-13 ENCOUNTER — TELEPHONE (OUTPATIENT)
Dept: FAMILY MEDICINE | Facility: CLINIC | Age: 56
End: 2021-12-13
Payer: COMMERCIAL

## 2021-12-15 ENCOUNTER — LAB (OUTPATIENT)
Dept: LAB | Facility: CLINIC | Age: 56
End: 2021-12-15
Payer: COMMERCIAL

## 2021-12-15 DIAGNOSIS — E55.9 VITAMIN D DEFICIENCY: ICD-10-CM

## 2021-12-15 PROCEDURE — 36415 COLL VENOUS BLD VENIPUNCTURE: CPT

## 2021-12-15 PROCEDURE — 82306 VITAMIN D 25 HYDROXY: CPT

## 2021-12-16 LAB — DEPRECATED CALCIDIOL+CALCIFEROL SERPL-MC: 30 UG/L (ref 30–80)

## 2021-12-22 ENCOUNTER — HOSPITAL ENCOUNTER (OUTPATIENT)
Dept: OCCUPATIONAL THERAPY | Facility: REHABILITATION | Age: 56
End: 2021-12-22
Attending: STUDENT IN AN ORGANIZED HEALTH CARE EDUCATION/TRAINING PROGRAM
Payer: COMMERCIAL

## 2021-12-22 DIAGNOSIS — R41.3 MEMORY LOSS: ICD-10-CM

## 2021-12-22 DIAGNOSIS — Z78.9 DECREASED ACTIVITIES OF DAILY LIVING (ADL): Primary | ICD-10-CM

## 2021-12-22 PROCEDURE — 97165 OT EVAL LOW COMPLEX 30 MIN: CPT | Mod: GO | Performed by: OCCUPATIONAL THERAPIST

## 2021-12-22 PROCEDURE — 97535 SELF CARE MNGMENT TRAINING: CPT | Mod: GO | Performed by: OCCUPATIONAL THERAPIST

## 2021-12-22 NOTE — PROGRESS NOTES
12/22/21 1000   Quick Adds   Type of Visit Initial Outpatient Occupational Therapy Evaluation   General Information   Start Of Care Date 12/22/21   Referring Physician Dr. Jojo Montoya   Orders Evaluate and treat as indicated   Orders Date 11/30/21   Medical Diagnosis falls, imbalance, altered mental state   Onset of Illness/Injury or Date of Surgery 09/06/21   Precautions/Limitations No known precautions/limitations   Surgical/Medical History Reviewed Yes   Additional Occupational Profile Info/Pertinent History of Current Problem Patient has a complicated medical history including metastatic breast cancer with cerebellar lesions, status post resection. Patient was doing well after recovery from that until 09/06/2021 when she suffered a fall in her bathroom. She was on the ground and called 911. She was brought to the [facility] with balance problems and altered mental state. It was recommended that she no longer drive. Patient recieved rehab thru home health for about 6 weeks post hospitalization. Patient reports that she has not fully recovered since that hospitalization. She also states that she is currently independent with most ADL's but not many IADL's due to memory changes and general change in function   Role/Living Environment   Current Living Environment House   Role/Living Environment Comments Patient lives with her  in 2 story home. They have cleaning lady that comes daily. That person also does miscellaneous tasks like walking the dog, making meals. Patient's  states that she has been on disability for about 4 years because of poor memory   Pain   Patient currently in pain No   Fall Risk Screen   Fall screen completed by OT   Have you fallen 2 or more times in the past year? Yes   Have you fallen and had an injury in the past year? No   Is patient a fall risk? Yes   Abuse Screen (yes response referral indicated)   Feels Unsafe at Home or Work/School no   Feels  Threatened by Someone no   Does Anyone Try to Keep You From Having Contact with Others or Doing Things Outside Your Home? no   Physical Signs of Abuse Present no   Cognitive Status Examination   Orientation Orientation to person, place and time   Follows Commands and Answers Questions 100% of the time   Cognitive Comment Plan to further evaluate at next visit   Visual Perception   Visual Perception Comments Patient reports no deficits   Sensation   Sensation Comments Patient reports normal sensation   Range of Motion (ROM)   ROM Comments Patient has WNL UE AROM   Strength   Strength Comments Bilateral UE WFL strength. Shoulders are 4-/5   Hand Strength   Hand Dominance Right   Left Hand  (pounds) 61 pounds   Right Hand  (pounds) 72 pounds   Coordination   Coordination Comments Patient reports no deficits with UE coordination   Transfer Skill   Level of [place]: Transfers independent   Bathing   Level of [place] - Bathing independent   Upper Body Dressing   Level of [place]: Dress Upper Body independent   Lower Body Dressing   Level of [place]: Dress Lower Body independent   Lower Body Dressing Comments sits down to dress and undress   Toileting   Level of [place]: Toilet independent   Grooming   Level of [place]: Grooming independent   Eating/Self-Feeding   Level of [place]: Eating independent   Instrumental Activities of Daily Living Assessment   IADL Assessment/Observations Patient's  has managed the finances for the past 10 years. Patient does not do any major meal preparation but does use the microwave. She does like to bake. Patient is having difficulty managing medications effectively.   Planned Therapy Interventions   Planned Therapy Interventions ADL training;IADL training;Cognitive performance testing;Therapeutic activities    OT Goal 1   Goal Description Patient will be able to plan and prepare simple meal independently   Target Date 03/22/22    OT Goal 2    Goal Description Patient will effectively use a medication system with alarm to set up and take medications with supervision only   Target Date 03/22/22    OT Goal 3   Goal Description Patient will identify at least one leisure activity that she can safely perform independently on a regular basis   Target Date 03/22/22   Clinical Impression   Criteria for Skilled Therapeutic Interventions Met Yes, treatment indicated   OT Diagnosis decreased ADL and IADL function, memory changes   Clinical Decision Making (Complexity) Low complexity   Therapy Frequency once a week   Predicted Duration of Therapy Intervention (days/wks) 12 weeks   Risks and Benefits of Treatment have been explained. Yes   Patient, Family & other staff in agreement with plan of care Yes   Clinical Impression Comments Patient has had recent changes in memory and functional activity. She would benefit from OT for stated goals and plan of care.   Education Assessment   Barriers To Learning No Barriers   Total Evaluation Time   OT Eval, Low Complexity Minutes (61740) 40                                                                                [facility]          OUTPATIENT OCCUPATIONAL THERAPY  EVALUATION  PLAN OF TREATMENT FOR OUTPATIENT REHABILITATION  (COMPLETE FOR INITIAL CLAIMS ONLY)  Patient's Last Name, First Name, M.I.  YOB: 1965  Mayra Ramirez                        Provider's Name  [facility] Medical Record No.  [id number]                               Onset Date:     09/06/21   Start of Care Date:     12/22/21   Type:     ___PT   _X_OT   ___SLP Medical Diagnosis:     falls, imbalance, altered mental state                          OT Diagnosis:     decreased ADL and IADL function, memory changes [place] [facility] [place]   _________________________________________________________________________________  Plan of Treatment/Functional Goals:  ADL training,IADL  training,Cognitive performance testing,Therapeutic activities     Goals  Goal Description: Patient will be able to plan and prepare simple meal independently  Target Date: 03/22/22     Goal Description: Patient will effectively use a medication system with alarm to set up and take medications with supervision only  Target Date: 03/22/22     Goal Description: Patient will identify at least one leisure activity that she can safely perform independently on a regular basis  Target Date: 03/22/22    Therapy Frequency: once a week     Predicted Duration of Therapy Intervention (days/wks): 12 weeks  Linette Marrero OT          I CERTIFY THE NEED FOR THESE SERVICES FURNISHED UNDER        THIS PLAN OF TREATMENT AND WHILE UNDER MY CARE     (Physician co-signature of this document indicates review and certification of the therapy plan).              Referring Physician: Dr. Jojo Montoya     Initial Assessment        See Epic Evaluation      Start Of Care Date: 12/22/21

## 2021-12-24 NOTE — ADDENDUM NOTE
Encounter addended by: Linette Marrero, OT on: 12/24/2021 9:48 AM   Actions taken: Clinical Note Signed, Document created, Document edited

## 2021-12-31 ENCOUNTER — HOSPITAL ENCOUNTER (OUTPATIENT)
Dept: PHYSICAL THERAPY | Facility: REHABILITATION | Age: 56
End: 2021-12-31
Attending: STUDENT IN AN ORGANIZED HEALTH CARE EDUCATION/TRAINING PROGRAM
Payer: COMMERCIAL

## 2021-12-31 DIAGNOSIS — Z85.89 HISTORY OF CANCER METASTATIC TO BRAIN: ICD-10-CM

## 2021-12-31 DIAGNOSIS — R29.6 FALLS FREQUENTLY: ICD-10-CM

## 2021-12-31 DIAGNOSIS — R26.89 POOR BALANCE: ICD-10-CM

## 2021-12-31 PROCEDURE — 97161 PT EVAL LOW COMPLEX 20 MIN: CPT | Mod: GP | Performed by: PHYSICAL THERAPIST

## 2021-12-31 PROCEDURE — 97110 THERAPEUTIC EXERCISES: CPT | Mod: GP | Performed by: PHYSICAL THERAPIST

## 2021-12-31 NOTE — PROGRESS NOTES
[facility]                                                                                   OUTPATIENT PHYSICAL THERAPY FUNCTIONAL EVALUATION  PLAN OF TREATMENT FOR OUTPATIENT REHABILITATION  (COMPLETE FOR INITIAL CLAIMS ONLY)  Patient's Last Name, First Name, M.I.  YOB: 1965  Mayra Ramirez  DOMINIK DEJAH     Provider's Name   [facility]   Medical Record No.  [id number]     Start of Care Date:  12/31/21   Onset Date:  09/06/21   Type:     _X__PT   ____OT  ____SLP Medical Diagnosis:        PT Diagnosis:  (P) Balance Impairment; Gait instability; LE weakness Visits from [facility]:  1                              __________________________________________________________________________________  Plan of Treatment/Functional Goals:  (P) balance training,gait training,neuromuscular re-education,motor coordination training,ROM,strengthening,stretching           GOALS     (P) Pt will be independent with her HEP for ongoing symptom management in 12 weeks.           (P) Pt will improve gait stability to 23/30 on the FGA to reduce fall risk in 12 weeks.           (P) Pt will improve efficiciency of transfers as evidenced by her TUG <10 seconds in 12 weeks.           (P) Pt will improve functional LE strength to 13 stands in 30 seconds in 12 weeks.                                                    Therapy Frequency:  (P) 1 time/week   Predicted Duration of Therapy Intervention:  (P) 12 weeks; up to 12 visits    Gill Collier, PT                                    I CERTIFY THE NEED FOR THESE SERVICES FURNISHED UNDER        THIS PLAN OF TREATMENT AND WHILE UNDER MY CARE     (Physician co-signature of this document indicates review and certification of the therapy plan).                Certification Date From:      Certification Date To:       Referring Provider:  Dr. Jan Lam  "Assessment  See Epic Evaluation- Start of Care Date: 12/31/21 12/31/21 1300   Quick Adds   Type of Visit Initial OP PT Evaluation   General Information   Start of Care Date 12/31/21   Referring Physician Dr. Montoya   Orders Evaluate and Treat as Indicated   Order Date 11/30/21   Medical Diagnosis Poor Balance   Onset of illness/injury or Date of Surgery 09/06/21   Precautions/Limitations fall precautions   Surgical/Medical history reviewed Yes   Pertinent history of current problem (include personal factors and/or comorbidities that impact the POC) Patient has a complicated medical history including metastatic breast cancer (2004) with cerebellar lesions, status post resection (2005).  Pt eventually had an oophorectomy as well.   Patient was doing well after recovery from that until 09/06/2021 when she suffered a fall in her bathroom. She was on the ground and called 911. She was brought to the [facility] with balance problems and altered mental state. It was recommended that she no longer drive. Patient received rehab thru home health for about 6 weeks post hospitalization. Pt reports that balance and memory continue to be challenging.  With the cold weather, it has dramatically reduced her walking and thus her balance has worsened.  Pt had a fall about 3 nights ago in the bathroom, when she struck her head.  Pt does own a 4WW but refuses to use, \"I don't need it\".  On average, pt falls about 1x/wk.  Notably the pt's spouse observes that when the pt is making sharp turns can be challenging for her balance.  The pt feels like stair climbing and heel to toe walking is challenging.    Prior level of function comment Prior to September, pt feels like her balance was much better and falls were much more infrequent.    Current Community Support Family/friend caregiver;Housekeeping service   Patient role/Employment history Disabled   Living environment House/[facility]   Home/Community " Accessibility Comments Pt has a multilevel home and has to navigate stairs daily.  Pt reports she has to be careful with stairs.  She has to use a HR to feel safe.    ADL Devices Shower/Tub Grab Bar   Assistive Devices Comments Has a 4WW but refuses   Fall Risk Screen   Fall screen completed by PT   Have you fallen 2 or more times in the past year? Yes   Have you fallen and had an injury in the past year? No   Is patient a fall risk? Yes   Abuse Screen (yes response referral indicated)   Feels Unsafe at Home or Work/School no   Feels Threatened by Someone no   Does Anyone Try to Keep You From Having Contact with Others or Doing Things Outside Your Home? no   Physical Signs of Abuse Present no   Pain   Patient currently in pain Denies   Cognitive Status Examination   Orientation orientation to person, place and time   Level of Consciousness alert   Follows Commands and Answers Questions 100% of the time   Personal Safety and Judgment impaired   Memory impaired   Bed Mobility   Bed Mobility Comments Independent   Transfer Skills   Transfer Comments Independent   Locomotion   Wheel Chair Mobility Comments NA   Gait   Gait Comments Unsteady, increased JERARDO, mild path deviation, reduced step length B'ly   Gait Special Tests   Gait Special Tests FUNCTIONAL GAIT ASSESSMENT;25 FOOT TIMED WALK   Gait Special Tests 25 Foot Timed Walk   Comments 6.19 sec in 6m: 0.96 m/s; no AD   Gait Special Tests Functional Gait Assessment Score out of 30   Score out of 30 9   Comments See FGA tab for details   Balance Special Tests   Balance Special Tests Timed up and go;Modified CTSIB Conditions;Sharpened Romberg;Sit to stand reps;Single leg stance right;Single leg stance left   Balance Special Tests Timed Up and Go   Seconds 12.04 Seconds   Comments no AD   Balance Special Tests Single Leg Stance Right,   Comments unable   Balance Special Tests Single Leg Stance Left   Comments unable   Balance Special Tests Modified CTSIB Conditions  "  Condition 1, seconds 30 Seconds   Condition 2, seconds 30 Seconds   Condition 4, seconds 0 Seconds   Condition 5, seconds 0 Seconds   Modified CTSIB Comments unable on foam   Balance Special Tests Sit to Stand Reps in 30 Seconds   Reps in 30 seconds 9   Height 17\"   Comments without UE assist   Sensory Examination   Sensory Perception no deficits were identified   Muscle Tone   Muscle Tone no deficits were identified   Planned Therapy Interventions   Planned Therapy Interventions balance training;gait training;neuromuscular re-education;motor coordination training;ROM;strengthening;stretching   Clinical Impression   Criteria for Skilled Therapeutic Interventions Met yes, treatment indicated   PT Diagnosis Balance Impairment; Gait instability; LE weakness   Influenced by the following impairments Balance Impairment; Gait instability; LE weakness   Functional limitations due to impairments Increased fall risk   Clinical Presentation Stable/Uncomplicated   Clinical Decision Making (Complexity) Low complexity   Therapy Frequency 1 time/week   Predicted Duration of Therapy Intervention (days/wks) 12 weeks; up to 12 visits   Risk & Benefits of therapy have been explained Yes   Patient, Family & other staff in agreement with plan of care Yes   Clinical Impression Comments Pt is a 57 yo female who has a h/o metastatic breast CA to her brain but has been in remission since 2005.  The pt's balance became impaired after a fall in Sept 2021 and is now averaging falls about 1x/wk.  PT eval reveals LE weakness and impaired gait stability (9/30 on the FGA).  The pt may likely have an impaired vestibular system as many CA drugs are ototoxic in nature.  Pt will benefit from skilled 1:1 PT to address her balance and increased fall risk.     Education Assessment   Barriers to Learning No barriers   GOALS   PT Eval Goals 1;3;2;4   Goal 1   Goal Description Pt will be independent with her HEP for ongoing symptom management in 12 " weeks.    Goal 2   Goal Description Pt will improve gait stability to 23/30 on the FGA to reduce fall risk in 12 weeks.    Goal 3   Goal Description Pt will improve efficiciency of transfers as evidenced by her TUG <10 seconds in 12 weeks.    Goal 4   Goal Description Pt will improve functional LE strength to 13 stands in 30 seconds in 12 weeks.    Total Evaluation Time   PT Eval, Low Complexity Minutes (62957) 40

## 2022-01-01 ENCOUNTER — TELEPHONE (OUTPATIENT)
Dept: OBGYN | Facility: CLINIC | Age: 57
End: 2022-01-01

## 2022-01-01 ENCOUNTER — VIRTUAL VISIT (OUTPATIENT)
Dept: FAMILY MEDICINE | Facility: CLINIC | Age: 57
End: 2022-01-01
Payer: MEDICARE

## 2022-01-01 ENCOUNTER — VIRTUAL VISIT (OUTPATIENT)
Dept: SURGERY | Facility: CLINIC | Age: 57
End: 2022-01-01
Payer: COMMERCIAL

## 2022-01-01 ENCOUNTER — TRANSFERRED RECORDS (OUTPATIENT)
Dept: HEALTH INFORMATION MANAGEMENT | Facility: CLINIC | Age: 57
End: 2022-01-01
Payer: COMMERCIAL

## 2022-01-01 ENCOUNTER — HOSPITAL ENCOUNTER (OUTPATIENT)
Dept: PHYSICAL THERAPY | Facility: REHABILITATION | Age: 57
Discharge: HOME | End: 2022-08-01
Payer: MEDICARE

## 2022-01-01 ENCOUNTER — HOSPITAL ENCOUNTER (OUTPATIENT)
Dept: PHYSICAL THERAPY | Facility: REHABILITATION | Age: 57
Discharge: HOME | End: 2022-07-20
Payer: MEDICARE

## 2022-01-01 ENCOUNTER — TELEPHONE (OUTPATIENT)
Dept: FAMILY MEDICINE | Facility: CLINIC | Age: 57
End: 2022-01-01

## 2022-01-01 ENCOUNTER — HOSPITAL ENCOUNTER (OUTPATIENT)
Dept: PHYSICAL THERAPY | Facility: REHABILITATION | Age: 57
Discharge: HOME | End: 2022-08-22
Payer: MEDICARE

## 2022-01-01 ENCOUNTER — OFFICE VISIT (OUTPATIENT)
Dept: OBGYN | Facility: CLINIC | Age: 57
End: 2022-01-01
Payer: MEDICARE

## 2022-01-01 ENCOUNTER — NURSE TRIAGE (OUTPATIENT)
Dept: NURSING | Facility: CLINIC | Age: 57
End: 2022-01-01
Payer: COMMERCIAL

## 2022-01-01 ENCOUNTER — HOSPITAL ENCOUNTER (OUTPATIENT)
Dept: PHYSICAL THERAPY | Facility: REHABILITATION | Age: 57
Discharge: HOME | End: 2022-08-17
Payer: MEDICARE

## 2022-01-01 ENCOUNTER — VIRTUAL VISIT (OUTPATIENT)
Dept: NUTRITION | Facility: CLINIC | Age: 57
End: 2022-01-01
Payer: MEDICARE

## 2022-01-01 ENCOUNTER — HOSPITAL ENCOUNTER (OUTPATIENT)
Dept: PHYSICAL THERAPY | Facility: REHABILITATION | Age: 57
Discharge: HOME | End: 2022-07-27
Payer: MEDICARE

## 2022-01-01 ENCOUNTER — TELEPHONE (OUTPATIENT)
Dept: OBGYN | Facility: CLINIC | Age: 57
End: 2022-01-01
Payer: COMMERCIAL

## 2022-01-01 ENCOUNTER — HOSPITAL ENCOUNTER (OUTPATIENT)
Dept: PHYSICAL THERAPY | Facility: REHABILITATION | Age: 57
Discharge: HOME | End: 2022-07-11
Payer: MEDICARE

## 2022-01-01 ENCOUNTER — NURSE TRIAGE (OUTPATIENT)
Dept: NURSING | Facility: CLINIC | Age: 57
End: 2022-01-01

## 2022-01-01 ENCOUNTER — HOSPITAL ENCOUNTER (OUTPATIENT)
Dept: PHYSICAL THERAPY | Facility: REHABILITATION | Age: 57
Discharge: HOME | End: 2022-10-12
Payer: MEDICARE

## 2022-01-01 ENCOUNTER — ANCILLARY PROCEDURE (OUTPATIENT)
Dept: MAMMOGRAPHY | Facility: CLINIC | Age: 57
End: 2022-01-01
Attending: STUDENT IN AN ORGANIZED HEALTH CARE EDUCATION/TRAINING PROGRAM
Payer: MEDICARE

## 2022-01-01 ENCOUNTER — HOSPITAL ENCOUNTER (OUTPATIENT)
Dept: PHYSICAL THERAPY | Facility: REHABILITATION | Age: 57
Discharge: HOME | End: 2022-06-13
Payer: MEDICARE

## 2022-01-01 ENCOUNTER — OFFICE VISIT (OUTPATIENT)
Dept: FAMILY MEDICINE | Facility: CLINIC | Age: 57
End: 2022-01-01
Payer: MEDICARE

## 2022-01-01 ENCOUNTER — TRANSFERRED RECORDS (OUTPATIENT)
Dept: HEALTH INFORMATION MANAGEMENT | Facility: CLINIC | Age: 57
End: 2022-01-01

## 2022-01-01 ENCOUNTER — TELEPHONE (OUTPATIENT)
Dept: SURGERY | Facility: CLINIC | Age: 57
End: 2022-01-01

## 2022-01-01 ENCOUNTER — HOSPITAL ENCOUNTER (OUTPATIENT)
Dept: PHYSICAL THERAPY | Facility: REHABILITATION | Age: 57
Discharge: HOME | End: 2022-08-08
Payer: MEDICARE

## 2022-01-01 VITALS
BODY MASS INDEX: 28.59 KG/M2 | SYSTOLIC BLOOD PRESSURE: 108 MMHG | WEIGHT: 188 LBS | TEMPERATURE: 98 F | RESPIRATION RATE: 20 BRPM | OXYGEN SATURATION: 99 % | DIASTOLIC BLOOD PRESSURE: 72 MMHG | HEART RATE: 74 BPM

## 2022-01-01 VITALS — SYSTOLIC BLOOD PRESSURE: 108 MMHG | WEIGHT: 194 LBS | BODY MASS INDEX: 29.5 KG/M2 | DIASTOLIC BLOOD PRESSURE: 72 MMHG

## 2022-01-01 VITALS
DIASTOLIC BLOOD PRESSURE: 70 MMHG | WEIGHT: 193 LBS | HEIGHT: 68 IN | BODY MASS INDEX: 29.25 KG/M2 | SYSTOLIC BLOOD PRESSURE: 108 MMHG

## 2022-01-01 VITALS — SYSTOLIC BLOOD PRESSURE: 118 MMHG | BODY MASS INDEX: 29.8 KG/M2 | WEIGHT: 196 LBS | DIASTOLIC BLOOD PRESSURE: 70 MMHG

## 2022-01-01 VITALS — BODY MASS INDEX: 29.8 KG/M2 | SYSTOLIC BLOOD PRESSURE: 110 MMHG | DIASTOLIC BLOOD PRESSURE: 74 MMHG | WEIGHT: 196 LBS

## 2022-01-01 VITALS — BODY MASS INDEX: 29.8 KG/M2 | DIASTOLIC BLOOD PRESSURE: 72 MMHG | SYSTOLIC BLOOD PRESSURE: 120 MMHG | WEIGHT: 196 LBS

## 2022-01-01 VITALS — WEIGHT: 196 LBS | DIASTOLIC BLOOD PRESSURE: 74 MMHG | SYSTOLIC BLOOD PRESSURE: 110 MMHG | BODY MASS INDEX: 29.8 KG/M2

## 2022-01-01 VITALS — WEIGHT: 196 LBS | SYSTOLIC BLOOD PRESSURE: 120 MMHG | BODY MASS INDEX: 29.8 KG/M2 | DIASTOLIC BLOOD PRESSURE: 76 MMHG

## 2022-01-01 VITALS
TEMPERATURE: 97.5 F | RESPIRATION RATE: 20 BRPM | WEIGHT: 188.1 LBS | SYSTOLIC BLOOD PRESSURE: 109 MMHG | BODY MASS INDEX: 30.59 KG/M2 | DIASTOLIC BLOOD PRESSURE: 74 MMHG | HEART RATE: 66 BPM | OXYGEN SATURATION: 97 %

## 2022-01-01 DIAGNOSIS — Z85.3 HISTORY OF BREAST CANCER: ICD-10-CM

## 2022-01-01 DIAGNOSIS — I97.2 POST-MASTECTOMY LYMPHEDEMA SYNDROME: Primary | ICD-10-CM

## 2022-01-01 DIAGNOSIS — M24.512 CONTRACTURE, LEFT SHOULDER: ICD-10-CM

## 2022-01-01 DIAGNOSIS — N89.8 VAGINAL ITCHING: Primary | ICD-10-CM

## 2022-01-01 DIAGNOSIS — R60.0 LOCALIZED EDEMA: ICD-10-CM

## 2022-01-01 DIAGNOSIS — B37.31 MONILIAL VAGINITIS: ICD-10-CM

## 2022-01-01 DIAGNOSIS — R26.89 POOR BALANCE: ICD-10-CM

## 2022-01-01 DIAGNOSIS — N76.0 VAGINITIS AND VULVOVAGINITIS: Primary | ICD-10-CM

## 2022-01-01 DIAGNOSIS — R63.5 WEIGHT GAIN: Primary | ICD-10-CM

## 2022-01-01 DIAGNOSIS — R92.8 OTHER ABNORMAL AND INCONCLUSIVE FINDINGS ON DIAGNOSTIC IMAGING OF BREAST: ICD-10-CM

## 2022-01-01 DIAGNOSIS — C79.31 BREAST CANCER METASTASIZED TO BRAIN, UNSPECIFIED LATERALITY (H): ICD-10-CM

## 2022-01-01 DIAGNOSIS — L90.5 SCAR CONDITION AND FIBROSIS OF SKIN: ICD-10-CM

## 2022-01-01 DIAGNOSIS — C50.919 BREAST CANCER METASTASIZED TO BRAIN, UNSPECIFIED LATERALITY (H): ICD-10-CM

## 2022-01-01 DIAGNOSIS — M25.512 ACUTE PAIN OF BOTH SHOULDERS: ICD-10-CM

## 2022-01-01 DIAGNOSIS — R29.6 FALLS FREQUENTLY: ICD-10-CM

## 2022-01-01 DIAGNOSIS — N76.0 BV (BACTERIAL VAGINOSIS): Primary | ICD-10-CM

## 2022-01-01 DIAGNOSIS — M79.89 LEFT ARM SWELLING: ICD-10-CM

## 2022-01-01 DIAGNOSIS — M62.81 MUSCLE WEAKNESS (GENERALIZED): ICD-10-CM

## 2022-01-01 DIAGNOSIS — M25.612 DECREASED ROM OF LEFT SHOULDER: Primary | ICD-10-CM

## 2022-01-01 DIAGNOSIS — N81.11 CYSTOCELE, MIDLINE: Primary | ICD-10-CM

## 2022-01-01 DIAGNOSIS — N81.11 CYSTOCELE, MIDLINE: ICD-10-CM

## 2022-01-01 DIAGNOSIS — M25.612 DECREASED ROM OF LEFT SHOULDER: ICD-10-CM

## 2022-01-01 DIAGNOSIS — N94.89 VAGINAL BURNING: ICD-10-CM

## 2022-01-01 DIAGNOSIS — Z85.89 HISTORY OF CANCER METASTATIC TO BRAIN: ICD-10-CM

## 2022-01-01 DIAGNOSIS — E66.3 OVERWEIGHT (BMI 25.0-29.9): ICD-10-CM

## 2022-01-01 DIAGNOSIS — R26.89 IMPAIRMENT OF BALANCE: ICD-10-CM

## 2022-01-01 DIAGNOSIS — M25.511 ACUTE PAIN OF BOTH SHOULDERS: ICD-10-CM

## 2022-01-01 DIAGNOSIS — N76.0 VAGINITIS AND VULVOVAGINITIS: ICD-10-CM

## 2022-01-01 DIAGNOSIS — R30.0 BURNING WITH URINATION: Primary | ICD-10-CM

## 2022-01-01 DIAGNOSIS — M54.50 ACUTE LEFT-SIDED LOW BACK PAIN WITHOUT SCIATICA: Primary | ICD-10-CM

## 2022-01-01 DIAGNOSIS — N89.8 VAGINAL DISCHARGE: Primary | ICD-10-CM

## 2022-01-01 DIAGNOSIS — N89.8 VAGINAL ITCHING: ICD-10-CM

## 2022-01-01 DIAGNOSIS — I97.2 POST-MASTECTOMY LYMPHEDEMA SYNDROME: ICD-10-CM

## 2022-01-01 DIAGNOSIS — Z53.9 ERRONEOUS ENCOUNTER--DISREGARD: Primary | ICD-10-CM

## 2022-01-01 DIAGNOSIS — R30.0 BURNING WITH URINATION: ICD-10-CM

## 2022-01-01 DIAGNOSIS — Z46.89 PESSARY MAINTENANCE: ICD-10-CM

## 2022-01-01 DIAGNOSIS — R07.89 LEFT-SIDED CHEST WALL PAIN: ICD-10-CM

## 2022-01-01 DIAGNOSIS — R26.81 GAIT INSTABILITY: ICD-10-CM

## 2022-01-01 DIAGNOSIS — B96.89 BV (BACTERIAL VAGINOSIS): Primary | ICD-10-CM

## 2022-01-01 DIAGNOSIS — R26.9 ABNORMALITY OF GAIT: ICD-10-CM

## 2022-01-01 DIAGNOSIS — N89.8 VAGINAL DISCHARGE: ICD-10-CM

## 2022-01-01 DIAGNOSIS — R63.5 WEIGHT GAIN: ICD-10-CM

## 2022-01-01 DIAGNOSIS — E78.00 ELEVATED CHOLESTEROL: ICD-10-CM

## 2022-01-01 DIAGNOSIS — N94.89 VAGINAL BURNING: Primary | ICD-10-CM

## 2022-01-01 LAB
ALBUMIN UR-MCNC: NEGATIVE MG/DL
AMORPH CRY #/AREA URNS HPF: ABNORMAL /HPF
APPEARANCE UR: ABNORMAL
APPEARANCE UR: CLEAR
BACTERIA #/AREA URNS HPF: ABNORMAL /HPF
BACTERIA UR CULT: NO GROWTH
BILIRUB UR QL STRIP: NEGATIVE
CLUE CELLS: ABNORMAL
CLUE CELLS: NORMAL
CLUE CELLS: PRESENT
COLOR UR AUTO: YELLOW
GLUCOSE UR STRIP-MCNC: NEGATIVE MG/DL
HGB UR QL STRIP: ABNORMAL
HGB UR QL STRIP: NEGATIVE
KETONES UR STRIP-MCNC: NEGATIVE MG/DL
LEUKOCYTE ESTERASE UR QL STRIP: NEGATIVE
NITRATE UR QL: NEGATIVE
PH UR STRIP: 6 [PH] (ref 5–7)
PH UR STRIP: 7.5 [PH] (ref 5–7)
PH UR STRIP: 8.5 [PH] (ref 5–8)
RBC #/AREA URNS AUTO: ABNORMAL /HPF
SP GR UR STRIP: 1.01 (ref 1–1.03)
SP GR UR STRIP: <=1.005 (ref 1–1.03)
SQUAMOUS #/AREA URNS AUTO: ABNORMAL /LPF
SQUAMOUS #/AREA URNS AUTO: ABNORMAL /[HPF]
TRICHOMONAS, WET PREP: ABNORMAL
TRICHOMONAS, WET PREP: NORMAL
UROBILINOGEN UR STRIP-ACNC: 0.2 E.U./DL
WBC #/AREA URNS AUTO: ABNORMAL /HPF
WBC'S/HIGH POWER FIELD, WET PREP: ABNORMAL
WBC'S/HIGH POWER FIELD, WET PREP: NORMAL
YEAST, WET PREP: ABNORMAL
YEAST, WET PREP: NORMAL
YEAST, WET PREP: PRESENT

## 2022-01-01 PROCEDURE — 97140 MANUAL THERAPY 1/> REGIONS: CPT | Mod: GP | Performed by: PHYSICAL THERAPIST

## 2022-01-01 PROCEDURE — 87210 SMEAR WET MOUNT SALINE/INK: CPT | Performed by: OBSTETRICS & GYNECOLOGY

## 2022-01-01 PROCEDURE — 76642 ULTRASOUND BREAST LIMITED: CPT | Mod: 50

## 2022-01-01 PROCEDURE — 97803 MED NUTRITION INDIV SUBSEQ: CPT | Mod: 95

## 2022-01-01 PROCEDURE — 99213 OFFICE O/P EST LOW 20 MIN: CPT | Mod: GT | Performed by: STUDENT IN AN ORGANIZED HEALTH CARE EDUCATION/TRAINING PROGRAM

## 2022-01-01 PROCEDURE — 99213 OFFICE O/P EST LOW 20 MIN: CPT | Performed by: OBSTETRICS & GYNECOLOGY

## 2022-01-01 PROCEDURE — 81001 URINALYSIS AUTO W/SCOPE: CPT | Performed by: OBSTETRICS & GYNECOLOGY

## 2022-01-01 PROCEDURE — 97110 THERAPEUTIC EXERCISES: CPT | Mod: GP | Performed by: PHYSICAL THERAPIST

## 2022-01-01 PROCEDURE — 99212 OFFICE O/P EST SF 10 MIN: CPT | Performed by: FAMILY MEDICINE

## 2022-01-01 PROCEDURE — 87086 URINE CULTURE/COLONY COUNT: CPT | Performed by: OBSTETRICS & GYNECOLOGY

## 2022-01-01 PROCEDURE — 87210 SMEAR WET MOUNT SALINE/INK: CPT | Performed by: PHYSICIAN ASSISTANT

## 2022-01-01 PROCEDURE — 81003 URINALYSIS AUTO W/O SCOPE: CPT | Performed by: OBSTETRICS & GYNECOLOGY

## 2022-01-01 PROCEDURE — 99213 OFFICE O/P EST LOW 20 MIN: CPT | Performed by: PHYSICIAN ASSISTANT

## 2022-01-01 PROCEDURE — 99214 OFFICE O/P EST MOD 30 MIN: CPT | Performed by: NURSE PRACTITIONER

## 2022-01-01 PROCEDURE — 97161 PT EVAL LOW COMPLEX 20 MIN: CPT | Mod: GP | Performed by: PHYSICAL THERAPIST

## 2022-01-01 PROCEDURE — 81001 URINALYSIS AUTO W/SCOPE: CPT | Performed by: PHYSICIAN ASSISTANT

## 2022-01-01 RX ORDER — ATORVASTATIN CALCIUM 10 MG/1
10 TABLET, FILM COATED ORAL DAILY
Qty: 60 TABLET | Refills: 0 | Status: SHIPPED | OUTPATIENT
Start: 2022-01-01 | End: 2022-01-01

## 2022-01-01 RX ORDER — PHENAZOPYRIDINE HYDROCHLORIDE 200 MG/1
200 TABLET, FILM COATED ORAL 3 TIMES DAILY PRN
Qty: 9 TABLET | Refills: 0 | Status: SHIPPED | OUTPATIENT
Start: 2022-01-01 | End: 2022-01-01

## 2022-01-01 RX ORDER — PANTOPRAZOLE SODIUM 40 MG/1
TABLET, DELAYED RELEASE ORAL EVERY 12 HOURS
COMMUNITY
Start: 2022-01-01

## 2022-01-01 RX ORDER — METRONIDAZOLE 7.5 MG/G
1 GEL VAGINAL DAILY
Qty: 70 G | Refills: 0 | Status: SHIPPED | OUTPATIENT
Start: 2022-01-01

## 2022-01-01 RX ORDER — FLUCONAZOLE 150 MG/1
150 TABLET ORAL DAILY
Qty: 3 TABLET | Refills: 0 | Status: SHIPPED | OUTPATIENT
Start: 2022-01-01 | End: 2022-01-01

## 2022-01-01 RX ORDER — FLUCONAZOLE 150 MG/1
150 TABLET ORAL
Qty: 3 TABLET | Refills: 0 | Status: SHIPPED | OUTPATIENT
Start: 2022-01-01 | End: 2022-01-01

## 2022-01-01 RX ORDER — ROMOSOZUMAB-AQQG 105 MG/1.17ML
INJECTION, SOLUTION SUBCUTANEOUS
COMMUNITY
Start: 2021-06-03

## 2022-01-01 RX ORDER — METRONIDAZOLE 7.5 MG/G
1 GEL VAGINAL DAILY
Qty: 70 G | Refills: 0 | Status: SHIPPED | OUTPATIENT
Start: 2022-01-01 | End: 2022-01-01

## 2022-01-01 RX ORDER — NITROFURANTOIN 25; 75 MG/1; MG/1
100 CAPSULE ORAL 2 TIMES DAILY
Qty: 14 CAPSULE | Refills: 0 | Status: SHIPPED | OUTPATIENT
Start: 2022-01-01 | End: 2022-01-01

## 2022-01-01 ASSESSMENT — ENCOUNTER SYMPTOMS
CHILLS: 0
ARTHRALGIAS: 1
FEVER: 0
DYSURIA: 0
BACK PAIN: 1
ABDOMINAL PAIN: 0
FREQUENCY: 1
FEVER: 0

## 2022-01-05 ENCOUNTER — TRANSFERRED RECORDS (OUTPATIENT)
Dept: HEALTH INFORMATION MANAGEMENT | Facility: CLINIC | Age: 57
End: 2022-01-05
Payer: COMMERCIAL

## 2022-01-05 DIAGNOSIS — R39.15 URINARY URGENCY: ICD-10-CM

## 2022-01-05 RX ORDER — MIRABEGRON 25 MG/1
50 TABLET, FILM COATED, EXTENDED RELEASE ORAL DAILY
Qty: 90 TABLET | Refills: 0 | Status: SHIPPED | OUTPATIENT
Start: 2022-01-05 | End: 2022-03-30

## 2022-01-05 NOTE — TELEPHONE ENCOUNTER
"Routing refill request to provider for review/approval because:  Patient reports taking medication differently.    Last Written Prescription Date:  7/20/21  Last Fill Quantity: 90,  # refills: 1   Last office visit provider:  11/30/21     Requested Prescriptions   Pending Prescriptions Disp Refills     mirabegron (MYRBETRIQ) 25 MG 24 hr tablet 90 tablet 1     Sig: Take 1 tablet (25 mg) by mouth daily       Beta 3 Adrenergic Agonists Passed - 1/5/2022  1:26 PM        Passed - Most recent BP less than 140/90 on record     BP Readings from Last 3 Encounters:   11/30/21 92/62   11/12/21 111/71   09/28/21 108/70                 Passed - Recent or future visit with authorizing provider's specialty     Patient has had an office visit with the authorizing provider or a provider within the authorizing providers department within the previous 12 mos or has a future within next 30 days. See \"Patient Info\" tab in inbasket, or \"Choose Columns\" in Meds & Orders section of the refill encounter.              Passed - Medication is active on med list        Passed - Most recent eGFR greater than or equal to 30 within past 12 months     Recent Labs   Lab Test 09/06/21  1228 12/05/19  1119   GFRESTIMATED 88 84   GFRESTBLACK  --  >90             Passed - Patient is of age 18 years or older        Passed - Patient is not pregnant        Passed - Patient has not had a positive pregnancy test within the past 12 months             Chalino Vargas RN 01/05/22 1:30 PM  "

## 2022-01-14 ENCOUNTER — HOSPITAL ENCOUNTER (OUTPATIENT)
Dept: OCCUPATIONAL THERAPY | Facility: REHABILITATION | Age: 57
End: 2022-01-14
Payer: COMMERCIAL

## 2022-01-14 ENCOUNTER — HOSPITAL ENCOUNTER (OUTPATIENT)
Dept: PHYSICAL THERAPY | Facility: REHABILITATION | Age: 57
End: 2022-01-14
Payer: COMMERCIAL

## 2022-01-14 DIAGNOSIS — Z85.89 HISTORY OF CANCER METASTATIC TO BRAIN: Primary | ICD-10-CM

## 2022-01-14 DIAGNOSIS — R41.3 MEMORY LOSS: ICD-10-CM

## 2022-01-14 DIAGNOSIS — Z78.9 DECREASED ACTIVITIES OF DAILY LIVING (ADL): Primary | ICD-10-CM

## 2022-01-14 DIAGNOSIS — R29.6 FALLS FREQUENTLY: ICD-10-CM

## 2022-01-14 DIAGNOSIS — R26.89 IMPAIRMENT OF BALANCE: ICD-10-CM

## 2022-01-14 DIAGNOSIS — R26.89 POOR BALANCE: ICD-10-CM

## 2022-01-14 PROCEDURE — 97112 NEUROMUSCULAR REEDUCATION: CPT | Mod: GP | Performed by: PHYSICAL THERAPIST

## 2022-01-14 PROCEDURE — 97110 THERAPEUTIC EXERCISES: CPT | Mod: GP | Performed by: PHYSICAL THERAPIST

## 2022-01-14 PROCEDURE — 97535 SELF CARE MNGMENT TRAINING: CPT | Mod: GO | Performed by: OCCUPATIONAL THERAPIST

## 2022-01-21 ENCOUNTER — HOSPITAL ENCOUNTER (OUTPATIENT)
Dept: PHYSICAL THERAPY | Facility: REHABILITATION | Age: 57
End: 2022-01-21
Payer: COMMERCIAL

## 2022-01-21 DIAGNOSIS — M62.81 MUSCLE WEAKNESS (GENERALIZED): ICD-10-CM

## 2022-01-21 DIAGNOSIS — R26.89 POOR BALANCE: Primary | ICD-10-CM

## 2022-01-21 PROCEDURE — 97112 NEUROMUSCULAR REEDUCATION: CPT | Mod: GP | Performed by: PHYSICAL THERAPIST

## 2022-01-21 PROCEDURE — 97110 THERAPEUTIC EXERCISES: CPT | Mod: GP | Performed by: PHYSICAL THERAPIST

## 2022-01-25 ENCOUNTER — OFFICE VISIT (OUTPATIENT)
Dept: OBGYN | Facility: CLINIC | Age: 57
End: 2022-01-25
Payer: COMMERCIAL

## 2022-01-25 VITALS — SYSTOLIC BLOOD PRESSURE: 102 MMHG | WEIGHT: 188 LBS | BODY MASS INDEX: 29.01 KG/M2 | DIASTOLIC BLOOD PRESSURE: 70 MMHG

## 2022-01-25 DIAGNOSIS — N89.8 VAGINAL DISCHARGE: ICD-10-CM

## 2022-01-25 DIAGNOSIS — Z01.411 ENCOUNTER FOR GYNECOLOGICAL EXAMINATION WITH ABNORMAL FINDING: Primary | ICD-10-CM

## 2022-01-25 DIAGNOSIS — R63.5 WEIGHT GAIN: ICD-10-CM

## 2022-01-25 DIAGNOSIS — E78.00 ELEVATED CHOLESTEROL: ICD-10-CM

## 2022-01-25 DIAGNOSIS — Z82.49 FAMILY HISTORY OF ISCHEMIC HEART DISEASE: ICD-10-CM

## 2022-01-25 LAB
CLUE CELLS: ABNORMAL
TRICHOMONAS, WET PREP: ABNORMAL
WBC'S/HIGH POWER FIELD, WET PREP: ABNORMAL
YEAST, WET PREP: ABNORMAL

## 2022-01-25 PROCEDURE — 99214 OFFICE O/P EST MOD 30 MIN: CPT | Performed by: OBSTETRICS & GYNECOLOGY

## 2022-01-25 PROCEDURE — 80061 LIPID PANEL: CPT | Performed by: OBSTETRICS & GYNECOLOGY

## 2022-01-25 PROCEDURE — 36415 COLL VENOUS BLD VENIPUNCTURE: CPT | Performed by: OBSTETRICS & GYNECOLOGY

## 2022-01-25 PROCEDURE — 80053 COMPREHEN METABOLIC PANEL: CPT | Performed by: OBSTETRICS & GYNECOLOGY

## 2022-01-25 PROCEDURE — 87210 SMEAR WET MOUNT SALINE/INK: CPT | Performed by: OBSTETRICS & GYNECOLOGY

## 2022-01-25 NOTE — NURSING NOTE
"Chief Complaint   Patient presents with     Physical       Initial /70   Wt 85.3 kg (188 lb)   BMI 29.01 kg/m   Estimated body mass index is 29.01 kg/m  as calculated from the following:    Height as of 21: 1.715 m (5' 7.5\").    Weight as of this encounter: 85.3 kg (188 lb).  BP completed using cuff size: regular    Questioned patient about current smoking habits.  Pt. has never smoked.          The following HM Due: NONE      The following patient reported/Care Every where data was sent to:  P ABSTRACT QUALITY INITIATIVES [[id number]]        Vanessa Willingham CMA                 "
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
## 2022-01-25 NOTE — RESULT ENCOUNTER NOTE
Mayra, all your results are within normal limits.  I do not see any evidence of a vaginal infection.  You can reinsert her pessary with the Trimo-Zayas gel.  Please let me know if you have any questions or concerns  Thank you  Bryson Enriquez M.D.

## 2022-01-26 LAB
ALBUMIN SERPL-MCNC: 4 G/DL (ref 3.4–5)
ALP SERPL-CCNC: 70 U/L (ref 40–150)
ALT SERPL W P-5'-P-CCNC: 30 U/L (ref 0–50)
ANION GAP SERPL CALCULATED.3IONS-SCNC: 5 MMOL/L (ref 3–14)
AST SERPL W P-5'-P-CCNC: 18 U/L (ref 0–45)
BILIRUB SERPL-MCNC: 0.5 MG/DL (ref 0.2–1.3)
BUN SERPL-MCNC: 14 MG/DL (ref 7–30)
CALCIUM SERPL-MCNC: 9 MG/DL (ref 8.5–10.1)
CHLORIDE BLD-SCNC: 106 MMOL/L (ref 94–109)
CHOLEST SERPL-MCNC: 251 MG/DL
CO2 SERPL-SCNC: 27 MMOL/L (ref 20–32)
CREAT SERPL-MCNC: 0.77 MG/DL (ref 0.52–1.04)
FASTING STATUS PATIENT QL REPORTED: NO
GFR SERPL CREATININE-BSD FRML MDRD: 89 ML/MIN/1.73M2
GLUCOSE BLD-MCNC: 102 MG/DL (ref 70–99)
HDLC SERPL-MCNC: 45 MG/DL
LDLC SERPL CALC-MCNC: 159 MG/DL
NONHDLC SERPL-MCNC: 206 MG/DL
POTASSIUM BLD-SCNC: 4.2 MMOL/L (ref 3.4–5.3)
PROT SERPL-MCNC: 7.6 G/DL (ref 6.8–8.8)
SODIUM SERPL-SCNC: 138 MMOL/L (ref 133–144)
TRIGL SERPL-MCNC: 236 MG/DL

## 2022-01-28 ENCOUNTER — HOSPITAL ENCOUNTER (OUTPATIENT)
Dept: OCCUPATIONAL THERAPY | Facility: REHABILITATION | Age: 57
End: 2022-01-28
Payer: COMMERCIAL

## 2022-01-28 ENCOUNTER — HOSPITAL ENCOUNTER (OUTPATIENT)
Dept: PHYSICAL THERAPY | Facility: REHABILITATION | Age: 57
End: 2022-01-28
Payer: COMMERCIAL

## 2022-01-28 DIAGNOSIS — R26.89 POOR BALANCE: Primary | ICD-10-CM

## 2022-01-28 DIAGNOSIS — M62.81 MUSCLE WEAKNESS (GENERALIZED): ICD-10-CM

## 2022-01-28 DIAGNOSIS — R41.3 MEMORY LOSS: ICD-10-CM

## 2022-01-28 DIAGNOSIS — Z78.9 DECREASED ACTIVITIES OF DAILY LIVING (ADL): Primary | ICD-10-CM

## 2022-01-28 PROCEDURE — 96125 COGNITIVE TEST BY HC PRO: CPT | Mod: GO | Performed by: OCCUPATIONAL THERAPIST

## 2022-01-28 PROCEDURE — 97110 THERAPEUTIC EXERCISES: CPT | Mod: GP | Performed by: PHYSICAL THERAPIST

## 2022-01-28 NOTE — PROGRESS NOTES
"Cognitive Performance Test    SUMMARY OF TEST:    The Cognitive Performance Test (CPT) is a standardized performance-based assessment to measure working memory/executive function processing capacities that underlie functional performance. Subtasks include common basic and instrumental activities of daily living (ADL/IADL) which are rated based on the manner in which patients respond to task demands of varying complexity. The total CPT score describes a level of functioning that indicates how information is processed, implications for functional activities, potential safety risks and a recommended level of supervision or assist based on cognitive function. The highest total score on this test is in the range of 5.6 to 5.8.    DATE OF TESTIN22    RESULTS OF TESTING:                                                                                         CPT Subtest Results    MEDBOX: 4.5/6 SHOP/GLOVES: 5.0/6 PHONE: 4.0/6   WASH:   TOAST: 45   TOTAL CPT SCORE:  .     Average CPT Score  4.5/5.6    INTERPRETATION OF TEST RESULTS:    Based on the Cognitive Performance Test, this patient scored at CPT Level 4.5.  See CPT Levels reference below.    Summary of functional cognitive status:   Medbox: 4.5/6    Patient required specific cue to correct PRN medication as she put it in the weekly box. Patient also required a specific cue to correct the medication with instruction of \"take 2 tablets twice daily\". She filled the evening box and when given a general cue to check the directions, she was unable to correct. Patient made error with the medication stating \"take 2 pills every other morning starting with Monday\". She placed the leftover pills in random slots and was unable to correct with general cues.   Shop:    Patient was able to select a pair of gloves that fit her and that she could afford with the money in her wallet. Patient did not complete task with initial instructions to pay for the gloves. " "Patient needed an additional 2 directives to pay for the gloves. She paid 6 dollars plus some change. She was unable to give the correct change.  Wash: 5/5 Patient did not have any difficulty with this task  Toast: 4/5  Patient was able to locate an outlet for the toaster. Once patient plugged the toaster in, she needed the initial directions repeated.  Phone: 4/6   Patient was not able to find a phone number using the phone book and the number was given to her. When she made the call, she stated \"I'm looking for a gallon of white paint\" but did not remember to ask for the price as instructed.     Factors affecting performance:  No additional problems noted    Recommendations:  Patient should not resume driving. If patient does not agree with this, a formal driving evaluation is recommended.  Assist for ADL/IADL:  Meal preparation, Finances and Medication management  Supervision for ADL/IADL:  Laundry  Supervision in living setting:  Daily checks                                                       TIME ADMINISTERING TEST: 35    TIME FOR INTERPRETATION AND PREPARATION OF REPORT: 15    TOTAL TIME: 50      CPT Levels Reference:    Patient's Average CPT Score:  4.5                                                                                                                                                  Individual scores range along a continuum as outlined below.  In addition to cognitive status, other factors may affect safety in a home environment.  Please refer to specific recommendations for this patient.    ___5.6-5.8  Normal functioning (absence of cognitive-functional disability).  Independent in managing personal affairs, monitors and directs own behavior.  Uses complex information to carry out daily activities with safety and accuracy.    Proficient with instrumental activities of daily living (IADL) and learning new activity.  Problems are anticipated, errors are avoided, and consequences of actions are " "considered.      ___5.0   Mild cognitive-functional disability; deficits in working memory and executive thought processes. Difficulty using complex information. Problems may be observed with recent memory, judgment, reasoning and planning ahead. May be impulsive or have difficulty anticipating consequences.  Safety:  May require assistance to plan ahead; or to manage complex medication schedules, appointments or finances.  Hazardous activities may need to be monitored or limited.  ADL:  Mild functional decline.  Able to complete basic self-care and routine household tasks.  May have difficulty with complex daily tasks such as reading, writing, meal preparation, shopping or driving.   Learns through hands on teaching. Self-centered behavior or difficulty considering the needs of others may be seen related to trouble seeing the  whole picture\". Can appear disorganized or uninhibited.    _X__4.5  Mild to moderate cognitive-functional disability. Significant deficits in working memory and executive thought processes. Judgment, reasoning and planning show obvious impairment.  Distractible with inability to shift attention/actions given competing stimuli.  Difficulty with problem solving and managing details. Complex daily tasks performed with inconsistency, difficulty, or error.     Safety:  Medications should be monitored, stove use may require supervision, and driving ability may be affected.  Impaired safety awareness with inability to anticipate potential problems.  May not recognize or respond to emergent situations. Requires frequent check-in support.   ADL:  Mild difficulty with simple everyday self-care tasks. Benefits from structured, routine activity.  Will likely need reminders to complete tasks outside of the routine. Requires assistance with planning and IADL tasks like shopping and finances. Learns concrete tasks through repetition, but performance may not generalize. Tends to be impulsive with poor " insight. Self centered behavior or inability to consider the needs of others is common.    ___4.0  Moderate cognitive-functional disability; abstract to concrete thought processes. Working memory and executive function impairments are obvious. Difficulty with planning and problem solving.  Behavior is goal-directed, but unable to follow multi-step directions, is easily distracted, and may not recognize mistakes.  Inability to anticipate hazards or understand precautions.  Safety:  Recommend 24-hour supervision for safety. Supervision needed for medication management and for hazardous activities. May not be able to follow a restricted diet. Can get lost in unfamiliar surroundings. Generally, persons functioning at level 4 should not be driving.   ADL:  Some decline in quality or frequency of ADL.  Wood enhanced by use of a routine, simple concrete directions, and caregiver set-up of needed items. Complex tasks such as money or home management typically requires assistance.  Relies heavily on vision to guide behavior; will ignore objects/hazards not in plain sight and can be distracted by irrelevant objects. Often has poor insight.  Able to carry out social conversation and may verbally  cover  for deficits leading caregivers to believe they are capable of functioning independently.       ___3.5  Moderate cognitive-functional disability; increased cues needed for task completion. Aware of concrete task steps but needs prompting or cues to initiate and complete simple tasks. Attention span is limited, simple directions may need to be repeated, and re-focus to a topic or task may be required.  Safety:  24-hour supervision required for safety and for assistance with daily tasks. Assistance required with medications, and access to medication should be limited. Meals, nutrition and dietary restrictions need to be monitored.  All hazardous activities should be restricted or supervised. Should not drive. Prone to  wandering and can become lost.  ADL:  Moderate functional decline. Familiar tasks usually requires set-up of supplies and directions to complete steps. May need objects handed to them for task initiation. Function best with a set schedule in familiar surroundings with familiar people. All complex tasks must be done by others. Vocabulary is diminished and speech often unfocused.

## 2022-02-04 ENCOUNTER — HOSPITAL ENCOUNTER (OUTPATIENT)
Dept: OCCUPATIONAL THERAPY | Facility: REHABILITATION | Age: 57
End: 2022-02-04

## 2022-02-04 ENCOUNTER — HOSPITAL ENCOUNTER (OUTPATIENT)
Dept: PHYSICAL THERAPY | Facility: REHABILITATION | Age: 57
End: 2022-02-04
Payer: COMMERCIAL

## 2022-02-04 DIAGNOSIS — R41.3 MEMORY LOSS: ICD-10-CM

## 2022-02-04 DIAGNOSIS — Z78.9 DECREASED ACTIVITIES OF DAILY LIVING (ADL): Primary | ICD-10-CM

## 2022-02-04 DIAGNOSIS — R26.89 POOR BALANCE: Primary | ICD-10-CM

## 2022-02-04 DIAGNOSIS — M62.81 MUSCLE WEAKNESS (GENERALIZED): ICD-10-CM

## 2022-02-04 PROCEDURE — 97535 SELF CARE MNGMENT TRAINING: CPT | Mod: GO | Performed by: OCCUPATIONAL THERAPIST

## 2022-02-04 PROCEDURE — 97112 NEUROMUSCULAR REEDUCATION: CPT | Mod: GP | Performed by: PHYSICAL THERAPIST

## 2022-02-04 PROCEDURE — 97110 THERAPEUTIC EXERCISES: CPT | Mod: GP | Performed by: PHYSICAL THERAPIST

## 2022-02-07 ENCOUNTER — TELEPHONE (OUTPATIENT)
Dept: OBGYN | Facility: CLINIC | Age: 57
End: 2022-02-07
Payer: COMMERCIAL

## 2022-02-07 DIAGNOSIS — E78.00 ELEVATED CHOLESTEROL: Primary | ICD-10-CM

## 2022-02-07 RX ORDER — ATORVASTATIN CALCIUM 10 MG/1
10 TABLET, FILM COATED ORAL DAILY
Qty: 60 TABLET | Refills: 0 | Status: SHIPPED | OUTPATIENT
Start: 2022-02-07 | End: 2022-04-13

## 2022-02-07 NOTE — TELEPHONE ENCOUNTER
I spoke to the patient this evening and reviewed the results of her most recent lipid panel and compared to earlier results from November 2021.  We discussed the risk benefits and alternative forms of therapy and after this discussion made a decision to begin Lipitor 10 mg daily.  In 1 month she will repeat a fasting lipid panel and comprehensive metabolic panel with appropriate therapy to follow.  A prescription was sent to her listed pharmacy.  Future lab orders placed  Bryson Enriquez MD FACOG

## 2022-02-07 NOTE — TELEPHONE ENCOUNTER
Spoke with pt and her daughter.    They are planning on reaching out to her PCP before making any decisions.    Jamia LUNA RN

## 2022-02-07 NOTE — TELEPHONE ENCOUNTER
I left the patient a voicemail message to return my call.  I had like to discuss her elevated lipid panel and to have her consider beginning a statin.  I would like her to consider beginning Lipitor 10 mg daily and then after she has been on the medication for a month repeating a fasting lipid panel and comprehensive metabolic panel with appropriate therapy to follow.  Can you reach out to the patient please to relay this information  Thank you

## 2022-02-14 ENCOUNTER — TELEPHONE (OUTPATIENT)
Dept: OBGYN | Facility: CLINIC | Age: 57
End: 2022-02-14
Payer: COMMERCIAL

## 2022-02-14 NOTE — TELEPHONE ENCOUNTER
Pt calling.   States that she received a call from Dr. Enriquez asking her to call him back.  There is a TE from 2/7/22 that states that Dr. Enriquez spoke with the pt.     I reviewed this message with the pt and she will  the med that was sent in on 2/7 and then she will have a fasting lab appt in 1 month.    Jamia LUNA RN

## 2022-02-15 NOTE — PROGRESS NOTES
Patient:   GIA NEW            MRN: [id number]            FIN: [id number]               Age:   10 years     Sex:  Male     :  2007   Associated Diagnoses:   Acute otitis externa of left ear   Author:   Baltazar Geronimo MD      Visit Information      Date of Service: 2018 05:05 pm  Performing Location: [facility]  Encounter#: [id number]      Primary Care Provider (PCP):  Baltazar Geronimo MD    NPI# [id number]      Referring Provider:  Baltazar Geronimo MD# [id number]      Chief Complaint   2018 5:07 PM CDT    Left ear pain and plugging.  Ongoing x2-3 days        History of Present Illness   chief complaint and symptoms as noted above confirmed with patient   no uri or fever  swimming alot      Review of Systems   Constitutional:  Negative except as documented in history of present illness.    Ear/Nose/Mouth/Throat:  Negative except as documented in history of present illness.    Respiratory:  Negative.    Integumentary:  Negative.    Neurologic:  Negative.       Health Status   Allergies:    Allergic Reactions (Selected)  No Known Medication Allergies   Medications:  (Selected)   Prescriptions  Prescribed  FLUoxetine 10 mg oral capsule: 1 cap(s) ( 10 mg ), PO, Daily, # 90 cap(s), 1 Refill(s), Type: Maintenance, Pharmacy: [facility] [id number], 1 cap(s) po daily  indomethacin 25 mg oral capsule: 1 cap(s) ( 25 mg ), PO, TID, PRN: for pain, # 30 cap(s), 0 Refill(s), Type: Maintenance, Pharmacy: [facility] [id number], 1 cap(s) po tid,PRN:for pain  ofloxacin 0.3% otic solution: 5 drop(s), Ear-Left, BID, # 10 mL, 0 Refill(s), Type: Maintenance, Pharmacy: [facility] [id number], 5 drop(s) Ear-Left bid,x10 day(s)  Documented Medications  Documented  Children's Ibuprofen Berry: ( 400 mg ), po, q4 hrs, PRN: as needed for fever, 0 Refill(s), Type: Maintenance  melatonin 5 mg oral tablet: 1 tab(s) ( 5 mg ), po, hs, Instructions: PRN, 0 Refill(s), Type:  Assessment & Plan     Contusion of scalp, initial encounter  See patient instructions   Remington is concerned about her more frequent falling. Mayra is not concerned about it. We discussed putting up handrails in the bathroom and she is opposed. We discussed follow up with her primary doctor if her balance continues to worsen. She states she will need to find a new PCP as hers is taking on more administrative responsibilities.     Diagnosis and treatment plan were discussed with patient and/or parent. If symptoms worsen or do not improve in the next few days, follow-up with your primary care provider or visit an [facility] urgent care clinic location.  Patient verbalizes understanding of all things discussed. All questions were addressed and answered.    Return in about 1 week (around 7/23/2021) for If not better, sooner if worsening.    Isidra Younger PA-C  [facility] FARIBACASSANDRA Nunez is a 55 year old female who presents to clinic today for the following health issues:  Chief Complaint   Patient presents with     Head Injury     fell downstairs yesterday and hit back of head on door at the bottom of stairs     HPI  Falling more than normal. Fell three time this week.   Was trying to go up the stairs and loss her balance. Fell backwards down the stairs and hit her head on the bathroom door 2 nights. No loss of consciousness.   Painful to the touch now. No bleeding.   She has iced it and no tylenol or ibuprofen.    Past medical history significant for metastatic breast cancer w/ cerebellar metastases.  Has some mild cognitive impairment from that.     Review of Systems  Constitutional, HEENT, cardiovascular, pulmonary, gi and gu systems are negative, except as otherwise noted.      Objective    /68   Pulse 68   Temp 98.9  F (37.2  C) (Oral)   Resp 16   SpO2 97%   Physical Exam   GENERAL: healthy, alert and no distress  NECK: no adenopathy, no asymmetry, masses, or  Maintenance,    Medications          *denotes recorded medication          FLUoxetine 10 mg oral capsule: 10 mg, 1 cap(s), PO, Daily, 90 cap(s), 1 Refill(s).          *Children's Ibuprofen Berry: 400 mg, po, q4 hrs, PRN: as needed for fever, 0 Refill(s).          indomethacin 25 mg oral capsule: 25 mg, 1 cap(s), PO, TID, PRN: for pain, 30 cap(s), 0 Refill(s).          *melatonin 5 mg oral tablet: 5 mg, 1 tab(s), po, hs, PRN, 0 Refill(s).          ofloxacin 0.3% otic solution: 5 drop(s), Ear-Left, BID, for 10 day(s), 10 mL, 0 Refill(s).     Problem list:    All Problems (Selected)  Situational anxiety / SNOMED CT 334782627 / Confirmed  Obesity / SNOMED CT 4019329323 / Probable      Histories   Past Medical History:    Resolved  Recurrent infections (62010565):  Resolved.  Comments:  2011 CST 4:14 PM CST - [facility]red Begum  respiratory   Family History:    Kidney disease  Grandfather (M)     Procedure history:    Circumcision by clamp procedure on  (SNOMED CT 60109174).   Social History:        Alcohol Assessment            Never      Tobacco Assessment            Never (less than 100 in lifetime)        Physical Examination   Vital Signs   2018 5:07 PM CDT Temperature Tympanic 99.3 DegF    Peripheral Pulse Rate 109 bpm  HI    HR Method Electronic    Systolic Blood Pressure 101 mmHg    Diastolic Blood Pressure 67 mmHg    Mean Arterial Pressure 78 mmHg    BP Method Electronic      Measurements from flowsheet : Measurements   2018 5:07 PM CDT    Weight Measured - Standard                171.4 lb     General:  Alert and oriented, No acute distress.    HENT:       Ear: Left ear, Canal ( Erythematous, Pain with pinna motion ).    Neck:  Supple, Non-tender, No lymphadenopathy.    Neurologic:  Alert, Oriented.       Impression and Plan   Diagnosis     Acute otitis externa of left ear (GYT09-VC H60.502).     Course:  Not progressing as expected.    Plan:  see drops  fu 1 week if not better sooner if  scars and thyroid normal to palpation  RESP: lungs clear to auscultation - no rales, rhonchi or wheezes  CV: regular rate and rhythm, normal S1 S2, no S3 or S4, no murmur, click or rub, no peripheral edema and peripheral pulses strong  ABDOMEN: soft, nontender, no hepatosplenomegaly, no masses and bowel sounds normal  MS: no gross musculoskeletal defects noted, no edema  NEURO: Normal strength and tone, mentation intact and speech normal, no pronator drift.             worse.    Patient Instructions:       Counseled: Patient, Family, Regarding diagnosis, Regarding treatment, Regarding medications, Activity.

## 2022-02-18 ENCOUNTER — HOSPITAL ENCOUNTER (OUTPATIENT)
Dept: PHYSICAL THERAPY | Facility: REHABILITATION | Age: 57
End: 2022-02-18
Payer: COMMERCIAL

## 2022-02-18 ENCOUNTER — HOSPITAL ENCOUNTER (OUTPATIENT)
Dept: OCCUPATIONAL THERAPY | Facility: REHABILITATION | Age: 57
End: 2022-02-18
Payer: COMMERCIAL

## 2022-02-18 DIAGNOSIS — M62.81 MUSCLE WEAKNESS (GENERALIZED): ICD-10-CM

## 2022-02-18 DIAGNOSIS — Z78.9 DECREASED ACTIVITIES OF DAILY LIVING (ADL): Primary | ICD-10-CM

## 2022-02-18 DIAGNOSIS — R41.3 MEMORY LOSS: ICD-10-CM

## 2022-02-18 DIAGNOSIS — R26.89 POOR BALANCE: Primary | ICD-10-CM

## 2022-02-18 PROCEDURE — 97112 NEUROMUSCULAR REEDUCATION: CPT | Mod: GP | Performed by: PHYSICAL THERAPIST

## 2022-02-18 PROCEDURE — 97110 THERAPEUTIC EXERCISES: CPT | Mod: GP | Performed by: PHYSICAL THERAPIST

## 2022-02-18 PROCEDURE — 97535 SELF CARE MNGMENT TRAINING: CPT | Mod: GO | Performed by: OCCUPATIONAL THERAPIST

## 2022-02-28 ENCOUNTER — OFFICE VISIT (OUTPATIENT)
Dept: FAMILY MEDICINE | Facility: CLINIC | Age: 57
End: 2022-02-28
Payer: COMMERCIAL

## 2022-02-28 VITALS
WEIGHT: 190.8 LBS | BODY MASS INDEX: 30.67 KG/M2 | SYSTOLIC BLOOD PRESSURE: 112 MMHG | HEIGHT: 66 IN | DIASTOLIC BLOOD PRESSURE: 66 MMHG

## 2022-02-28 DIAGNOSIS — R41.3 POOR MEMORY: Primary | ICD-10-CM

## 2022-02-28 DIAGNOSIS — R26.81 GAIT INSTABILITY: ICD-10-CM

## 2022-02-28 PROCEDURE — 99214 OFFICE O/P EST MOD 30 MIN: CPT | Performed by: STUDENT IN AN ORGANIZED HEALTH CARE EDUCATION/TRAINING PROGRAM

## 2022-02-28 RX ORDER — LIFITEGRAST 50 MG/ML
SOLUTION/ DROPS OPHTHALMIC EVERY 12 HOURS
COMMUNITY
Start: 2022-01-05

## 2022-02-28 RX ORDER — MULTIVIT-MIN/FA/LYCOPEN/LUTEIN .4-300-25
1 TABLET ORAL
COMMUNITY
Start: 2022-01-05 | End: 2022-01-01

## 2022-02-28 RX ORDER — PANTOPRAZOLE SODIUM 20 MG/1
40 TABLET, DELAYED RELEASE ORAL DAILY
COMMUNITY

## 2022-02-28 NOTE — PROGRESS NOTES
Assessment & Plan   Problem List Items Addressed This Visit     None      Visit Diagnoses     Poor memory    -  Primary    Gait instability             At this point, I do not feel that patient is safe to drive.  Neither does PT, OT or her spouse.  Would have her hold off on the driving indefinitely.  Patient ultimately amenable.    Already has follow-up with neurology set up for further work-up for her memory loss and has the memory test at [facility] next week.  At this time, I would not recommend any additional interventions.  We will continue to follow-up with him in a few months to see how things are going.  Patient and spouse are amenable to that.  They have no further questions.    33 minutes spent on the date of the encounter doing chart review, history and exam, documentation and further activities per the note      No follow-ups on file.    Jojo Montoya DO  [facility]    Ruben Nunez is a 56 year old who presents for the following health issues  accompanied by her spouse.    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  The last time she was here, was sent to PT/OT for gait imbalance and recurrent falls.  Both PT and OT felt that she was not safe to drive.  Patient is somewhat disheartened by this information.  She thinks that she may be able to drive.    PT/OT have been helpful in gait management and the amount of fall she has had have decreased.    Her memory, however, is declining faster within the last month or 2.  This is based  "on observations from her .  Her  will be retiring soon and consulting afterwards.  He will now have more time to take care of her.  However, he thinks that she is able to remember less and less.  Gets confused more easily.  They do have the 4-hour memory test coming up with [facility] next week.  They are also following with male for work-up.  They have done a CT and a brain MRI.  They are going to be following up with neurology for medication recommendations soon.    Current Outpatient Medications   Medication     ALOE VERA JUICE PO     atorvastatin (LIPITOR) 10 MG tablet     CALCIUM + D OR     lifitegrast (XIIDRA) 5 % opthalmic solution     mirabegron (MYRBETRIQ) 25 MG 24 hr tablet     Multiple Minerals-Vitamins (BONE DENSITY BUILDER PO)     Multiple Vitamins-Minerals (CENTRUM SILVER ADULT 50+) TABS     order for DME     Oxyquinoline-Sod Lauryl Sulf 0.025-0.01 % GEL     pantoprazole (PROTONIX) 20 MG EC tablet     Probiotic Product (PROBIOTIC ADVANCED PO)     vitamin D2 (ERGOCALCIFEROL) 78176 units (1250 mcg) capsule     No current facility-administered medications for this visit.         Review of Systems   As per HPI      Objective    /66 (BP Location: Right arm, Patient Position: Sitting, Cuff Size: Adult Regular)   Ht 1.67 m (5' 5.75\")   Wt 86.5 kg (190 lb 12.8 oz)   BMI 31.03 kg/m    Body mass index is 31.03 kg/m .  Physical Exam   GENERAL: healthy, alert and no distress  MS:   Unstable, wide stance gait.  Psych:  Poor short-term memory recall  Overall pleasant, good insight, normal thought process.        "

## 2022-03-04 ENCOUNTER — HOSPITAL ENCOUNTER (OUTPATIENT)
Dept: PHYSICAL THERAPY | Facility: REHABILITATION | Age: 57
End: 2022-03-04
Payer: COMMERCIAL

## 2022-03-04 DIAGNOSIS — M62.81 MUSCLE WEAKNESS (GENERALIZED): ICD-10-CM

## 2022-03-04 DIAGNOSIS — R26.89 POOR BALANCE: Primary | ICD-10-CM

## 2022-03-04 PROCEDURE — 97112 NEUROMUSCULAR REEDUCATION: CPT | Mod: GP | Performed by: PHYSICAL THERAPIST

## 2022-03-04 PROCEDURE — 97110 THERAPEUTIC EXERCISES: CPT | Mod: GP | Performed by: PHYSICAL THERAPIST

## 2022-03-11 ENCOUNTER — HOSPITAL ENCOUNTER (OUTPATIENT)
Dept: PHYSICAL THERAPY | Facility: REHABILITATION | Age: 57
Discharge: HOME | End: 2022-03-11
Payer: COMMERCIAL

## 2022-03-11 DIAGNOSIS — M62.81 MUSCLE WEAKNESS (GENERALIZED): ICD-10-CM

## 2022-03-11 DIAGNOSIS — R26.89 POOR BALANCE: Primary | ICD-10-CM

## 2022-03-11 PROCEDURE — 97750 PHYSICAL PERFORMANCE TEST: CPT | Mod: GP | Performed by: PHYSICAL THERAPIST

## 2022-03-11 PROCEDURE — 97110 THERAPEUTIC EXERCISES: CPT | Mod: GP | Performed by: PHYSICAL THERAPIST

## 2022-03-16 ENCOUNTER — OFFICE VISIT (OUTPATIENT)
Dept: FAMILY MEDICINE | Facility: CLINIC | Age: 57
End: 2022-03-16
Payer: COMMERCIAL

## 2022-03-16 ENCOUNTER — NURSE TRIAGE (OUTPATIENT)
Dept: NURSING | Facility: CLINIC | Age: 57
End: 2022-03-16
Payer: COMMERCIAL

## 2022-03-16 VITALS
SYSTOLIC BLOOD PRESSURE: 111 MMHG | BODY MASS INDEX: 30.45 KG/M2 | TEMPERATURE: 97.6 F | WEIGHT: 187.2 LBS | OXYGEN SATURATION: 99 % | HEART RATE: 71 BPM | DIASTOLIC BLOOD PRESSURE: 76 MMHG | RESPIRATION RATE: 16 BRPM

## 2022-03-16 DIAGNOSIS — S69.92XA WRIST INJURY, LEFT, INITIAL ENCOUNTER: ICD-10-CM

## 2022-03-16 DIAGNOSIS — S63.502A WRIST SPRAIN, LEFT, INITIAL ENCOUNTER: Primary | ICD-10-CM

## 2022-03-16 PROCEDURE — 99214 OFFICE O/P EST MOD 30 MIN: CPT | Performed by: FAMILY MEDICINE

## 2022-03-16 NOTE — TELEPHONE ENCOUNTER
calling without wife.  She fell on ice at home and hurt her wrist.  Going to go home and get her and bring [facility] to [facility]    No triage as not with patient    Vaishali Rey RN  [facility] Nurse Advisor
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
## 2022-03-16 NOTE — PATIENT INSTRUCTIONS
Patient Education     Self-Care for Strains and Sprains  Most minor strains and sprains can be treated with self-care. Recovering from a strain or sprain may take 6 to 8 weeks. Your self-care goal is to reduce pain and immobilize the injury to speed healing.  Support the injured area  Wrapping the injured area provides support for short, necessary activities. Be careful not to wrap the area too tightly. This could cut off the blood supply.    Support a wrist, elbow, or shoulder with a sling.    Wrap an ankle or knee with an elastic bandage.    Tape a finger or toe to the one next to it.  Use cold and heat  Cold reduces swelling. Both cold and heat reduce pain. Heat should not be used in the initial treatment of the injury. When using cold or heat, always place a thin towel between the pack and your skin.    Apply ice or a cold pack 10 to 15 minutes every hour you re awake for the first 2 days.    After the swelling goes down, use cold or heat to control pain. Don t use heat late in the day, since it can cause swelling when you re not active.  Rest and elevate  Rest and elevation help your injury heal faster.    Raise the injured area above your heart level.    Keep the injured area from moving.    Limit the use of the joint or limb.  Use medicine    Aspirin reduces pain and swelling. (Note: Don t give aspirin to a child 18 or younger unless prescribed by the doctor.)    Non-steroidal anti-inflammatory medicines, such as ibuprofen, may reduce pain and swelling, as well. Ask your healthcare provider for advice.    When to call your healthcare provider  Call your healthcare provider if:    The injured joint won t move, or bones make a grating sound when they move    You can t put weight on the injured area, even after 24 hours    The injured body part is cold, blue, tingling, or numb    The joint or limb appears bent or crooked.    Pain increases or doesn t improve in 4 days    When pressing along the injured area,  you notice a spot that is especially painful  Trevor last reviewed this educational content on 5/1/2018 2000-2021 [employer]. All rights reserved. This information is not intended as a substitute for professional medical care. Always follow your healthcare professional's instructions.

## 2022-03-18 NOTE — PROGRESS NOTES
[facility]    Outpatient Physical Therapy Discharge Note  Patient: Mayra Ramirez  : 1965    Beginning/End Dates of Reporting Period:  21 to 3/11/22    Referring Provider: Dr. Montoya    Therapy Diagnosis: Imbalance     Client Self Report: No major complaints this date.  Pt denies any falls.  She has been working on the elliptical at home more often.  She is exhausted after 15 min. Pt is accompanied by her PCA.    Objective Measurements:  Objective Measure: FGA  Details:   Objective Measure: TUG  Details: 10.70 sec; no AD  Objective Measure: 30 sec chair stand  Details: 9 stands         Goals:  Goal Identifier     Goal Description Pt will be independent with her HEP for ongoing symptom management in 12 weeks.    Target Date     Date Met  22   Progress (detail required for progress note): MET     Goal Identifier     Goal Description Pt will improve gait stability to 23/30 on the FGA to reduce fall risk in 12 weeks.    Target Date     Date Met      Progress (detail required for progress note): NOT MET; No change, pt scored the same at initial eval,      Goal Identifier     Goal Description Pt will improve efficiciency of transfers as evidenced by her TUG <10 seconds in 12 weeks.    Target Date     Date Met      Progress (detail required for progress note): Progressed towards: 10.70 seconds     Goal Identifier     Goal Description Pt will improve functional LE strength to 13 stands in 30 seconds in 12 weeks.    Target Date     Date Met      Progress (detail required for progress note): NOT MET; no change since initial eval; 9 stands     Plan:  Discharge from therapy.    Discharge:    Reason for Discharge: No further expectation of progress.  Pt appears at baseline for her mobility.  Her ongoing, worsening cognition appears to be playing a role as well.  The pt will continue to  follow up at [place] for ongoing diagnostic to determine her decline in function.  The pt is well supported at home with a PCA and a spouse to ensure daily HEP compliance.     Equipment Issued: None    Discharge Plan: Patient to continue home program.

## 2022-03-27 DIAGNOSIS — R39.15 URINARY URGENCY: ICD-10-CM

## 2022-03-30 RX ORDER — MIRABEGRON 25 MG/1
50 TABLET, FILM COATED, EXTENDED RELEASE ORAL DAILY
Qty: 90 TABLET | Refills: 0 | Status: SHIPPED | OUTPATIENT
Start: 2022-03-30 | End: 2022-05-08

## 2022-04-06 ENCOUNTER — NURSE TRIAGE (OUTPATIENT)
Dept: NURSING | Facility: CLINIC | Age: 57
End: 2022-04-06
Payer: COMMERCIAL

## 2022-04-06 NOTE — TELEPHONE ENCOUNTER
"Contacted patient who expressed the following-    1.  Has not progressed at all.  Area of concern is dorsal aspect of both feet, from tips of toes to \"ball of foot.\"  Discoloration has not been present on ventral aspect of foot.    2. No pain, no numbness or tingling.  She states other than discoloration she has no symptoms.    3.  Feels that the area of discoloration (when present) is the same temperature as the rest of her foot/ankle.    4. No new meds or OTC products.        Offered to schedule patient to see PCP in the next 1-2 weeks and she declined.  States my  is more worried about it than I am.  Informed patient this will be routed to PCP to review and advise.  "

## 2022-04-06 NOTE — TELEPHONE ENCOUNTER
Feet are purple in the mornings when she gets up. Going on for past two weeks.  Normal temp and sensation. Not spots or dots. Not sunburn-like. Afebrile.  Return to normal color in a short period of time.    Mother and sister both with history of feet turning white. She couldn't remember what it is called.      I wasn't able to find a protocol that fit Mayra's symptoms.  The one I chose, Rash or Redness - Localized, recommends being seen within two weeks.    I'm asking for Dr Montoya's input.  Mayra wanted to wait for Dr Montoya's recommendation before scheduling an appointment.    Mayra would like a call once Dr Montoya has addressed this.    Reason for Disposition    Localized area of skin darkening or thickening on lower legs or ankles and has NOT been evaluated by a doctor (or NP/PA)    Additional Information    Negative: Sounds like a life-threatening emergency to the triager    Negative: Fever and localized purple or blood-colored spots or dots that are not from injury or friction    Negative: Fever and localized rash is very painful    Negative: Patient sounds very sick or weak to the triager    Negative: Looks like a boil, infected sore, deep ulcer, or other infected rash (spreading redness, pus)    Negative: Painful rash with multiple small blisters grouped together (i.e., dermatomal distribution or 'band' or 'stripe')    Negative: Localized rash is very painful (no fever)    Negative: Localized purple or blood-colored spots or dots that are not from injury or friction (no fever)    Negative: Lyme disease suspected (e.g., bull's-eye rash or tick bite / exposure)    Negative: Patient wants to be seen    Negative: Tender bumps in armpits    Negative: Pimples (localized) and no improvement after using CARE ADVICE    Negative: SEVERE local itching persists after 2 days of steroid cream    Negative: Applying cream or ointment and it causes severe itch, burning, or pain    Negative: Localized rash present > 7  days    Negative: Red, moist, irritated area between skin folds (or under larger breasts)    Protocols used: RASH OR REDNESS - [id number]    Lizeth AGUILAR RN [facility] Nurse Advisors

## 2022-04-07 ENCOUNTER — TELEPHONE (OUTPATIENT)
Dept: OBGYN | Facility: CLINIC | Age: 57
End: 2022-04-07
Payer: COMMERCIAL

## 2022-04-07 DIAGNOSIS — B37.31 YEAST INFECTION OF THE VAGINA: Primary | ICD-10-CM

## 2022-04-07 NOTE — TELEPHONE ENCOUNTER
Pt calling, states when she removed her pessary today she noted cottage cheese like discharge and an odor.  Is concerned about an infection.    No openings with you today or tomorrow.  Please advise.    Maria Isabel Walker RN

## 2022-04-08 ENCOUNTER — VIRTUAL VISIT (OUTPATIENT)
Dept: NUTRITION | Facility: CLINIC | Age: 57
End: 2022-04-08
Attending: OBSTETRICS & GYNECOLOGY
Payer: COMMERCIAL

## 2022-04-08 DIAGNOSIS — R63.5 WEIGHT GAIN: ICD-10-CM

## 2022-04-08 PROCEDURE — 97802 MEDICAL NUTRITION INDIV IN: CPT | Mod: 95

## 2022-04-08 RX ORDER — FLUCONAZOLE 150 MG/1
150 TABLET ORAL DAILY
Qty: 3 TABLET | Refills: 0 | Status: SHIPPED | OUTPATIENT
Start: 2022-04-08 | End: 2022-04-11

## 2022-04-08 NOTE — PATIENT INSTRUCTIONS
1. Try to add weights or use resistance bands to help build muscle, which can help increase metabolism. Fredo Chi may help with balance or yoga has certain poses to work on balance.   -Bosu ball is a great idea for balance  -Check out TPT station in the morning for possible exercises  -Can Google exercises for Chair Dancing, Chair Exercise or Sit and Be Fit  -Check out the Video: Walk Away the Pounds (video to walk in place in your house)    2. With the aerobic activity (time on elliptical or walking) - 150 minutes a week.  -Dancing, swimming, walking    3. You have made great changes to eat to cut back on added sugars/sweets, eat more fruits and vegetables and eat lean meats.  No need to make changes to this today.    Healthy eating style is the Mediterranean diet for heart health.    FOLLOW UP APPOINTMENT: Will call you again on Thursday, June 16th around 1:30pm.    Diane Schwartz RDN, LD, [facility]   [phone]    To reschedule, please call: [phone]

## 2022-04-08 NOTE — PROGRESS NOTES
Type of Service: Telephone Visit    Originating Location (Patient Location): Home  Distant Location (Provider Location): Home  Mode of Communication:  Telephone    Telephone Visit Start Time: 1:00pm  Telephone Visit End Time (telephone visit stop time): 1:55pm    How would patient like to obtain AVS? Mail a copy       Medical Nutrition Therapy  Visit Type:Initial assessment and intervention    Mayra Ramirez presents today for MNT and education related to weight management.   She is accompanied by self and spouse, Remingotn.     ASSESSMENT:   Patient comments/concerns relating to nutrition: Says she has never really gained weight fast until recently.  Says she stopped eating chocolate and started eating more salads. Feels she is continuing to gain weight.     Says Mayra is having more trouble with balance and memory- seeing neurologist. Says in early 2004, was diagnosed with brain tumor and then breast cancer.  Says in 2006, stopped estrogen therapy and was sable until recently- having issues with balance. Says has recent memory changes.     NUTRITION HISTORY:  Wakes: 9-10am:  Breakfast: 11am: Nutrition shake (Isogenic protein shake- with water)   AM: oatmeal   Lunch: 12-12:30pm: salad and spinach with walnuts, cranberries, tomatoes, avocados, light dressing ([employer] Poppyseed dressing or raspberry vinaigrette - small amount) sometimes fruit on side and water  Dinner: 3:30pm: chicken breast and veggies from [employer]  Snacks: apple   Beverages: Not assessed in detail    Misses meals? none  Eats out:  seldom     Previous diet education:  No     Food allergies/intolerances: none noted to food    Diet is high in: fiber  Diet is low in: calcium    EXERCISE: Started to do elliptical most days for 16-20 minutes.  Has been doing this for a couple of weeks.     SOCIO/ECONOMIC:   Lives with: spouse    MEDICATIONS:  Current Outpatient Medications   Medication     ALOE VERA JUICE PO     atorvastatin (LIPITOR) 10 MG tablet      "CALCIUM + D OR     fluconazole (DIFLUCAN) 150 MG tablet     lifitegrast (XIIDRA) 5 % opthalmic solution     mirabegron (MYRBETRIQ) 25 MG 24 hr tablet     Multiple Minerals-Vitamins (BONE DENSITY BUILDER PO)     Multiple Vitamins-Minerals (CENTRUM SILVER ADULT 50+) TABS     order for DME     Oxyquinoline-Sod Lauryl Sulf 0.025-0.01 % GEL     pantoprazole (PROTONIX) 20 MG EC tablet     Probiotic Product (PROBIOTIC ADVANCED PO)     vitamin D2 (ERGOCALCIFEROL) 90988 units (1250 mcg) capsule     No current facility-administered medications for this visit.       LABS:  Last Basic Metabolic Panel:  Lab Results   Component Value Date     01/25/2022     12/05/2019      Lab Results   Component Value Date    POTASSIUM 4.2 01/25/2022    POTASSIUM 3.7 12/05/2019     Lab Results   Component Value Date    CHLORIDE 106 01/25/2022    CHLORIDE 106 12/05/2019     Lab Results   Component Value Date    KEITH 9.0 01/25/2022    KEITH 9.1 12/05/2019     Lab Results   Component Value Date    CO2 27 01/25/2022    CO2 29 12/05/2019     Lab Results   Component Value Date    BUN 14 01/25/2022    BUN 12 12/05/2019     Lab Results   Component Value Date    CR 0.77 01/25/2022    CR 0.79 12/05/2019     Lab Results   Component Value Date     01/25/2022    GLC 91 12/05/2019       ANTHROPOMETRICS:  Vitals: There were no vitals taken for this visit.  Estimated body mass index is 30.45 kg/m  as calculated from the following:    Height as of 2/28/22: 1.67 m (5' 5.75\").    Weight as of 3/16/22: 84.9 kg (187 lb 3.2 oz).        Wt Readings from Last 5 Encounters:   03/16/22 84.9 kg (187 lb 3.2 oz)   02/28/22 86.5 kg (190 lb 12.8 oz)   01/25/22 85.3 kg (188 lb)   11/30/21 83.6 kg (184 lb 6.4 oz)   09/28/21 82.6 kg (182 lb)       Weight Change: Lost 2.6 lbs in the past month    ESTIMATED KCAL REQUIREMENTS:  1741 kcal per day (based on last clinic weight from 3/16/22)    NUTRITION DIAGNOSIS: Overweight/Obesity related to excessive energy intake " as evidenced by low exercise over winter per patient, weight gain and BMI>30.    NUTRITION INTERVENTION:  Nutrition Prescription: Mediterranean style diet for heart-healthy eating   Education given to support: general nutrition guidelines, weight reduction, consistent meals, exercise, fiber, portion control and heart healthy diet  Education Materials Provided: General, Healthful Mediterranean Nutrition Therapy  Motivational Interviewing    Discussion: Commended Mayra on food changes to include more fruits and vegetables by eating salad for lunch and eating apple for snack at night instead of something sweet.  Discussed how increasing fruits and vegetables and following a Mediterranean approach is good for both heart-health and weight loss since Mayra is struggling with memory issues and unsure why; seeing Neurologist next week to check into this more.     No change to current eating recommended but did encouraged Mayra to work on being more active instead since this is a new change. Provided national recommendations to try to reach 150 minutes aerobic activity a week and 2-3 days of strength/resistance.  Discussed different ideas for home and commended Mayra for getting on the ellipical most days for 16-20 minutes.  Walking outside is not an option right now with ice and balance but says there are programs available at the [facility].  Discussed some ideas to also help with balance such as yoga or delmy chi but also options for less stable days such as sit and be fit or other chair exercises.  Mayra has some resistance bands at home and exercises from when cancer was in remission.  She says she feels like she should be able to increase her activity and feels she has some good ideas. Pt verbalized understanding of concepts discussed and recommendations provided.         PATIENT'S BEHAVIOR CHANGE GOALS:   See Patient Instructions for patient stated behavior change goals. AVS was printed and given to patient at today's  appointment.    MONITOR / EVALUATE:  RD will monitor/evaluate:  Food / Beverage / Nutrient intake   Pertinent Labs  Progress toward meeting stated nutrition-related goals  Weight change    FOLLOW-UP:  Call RD with questions/concerns.   Follow-up appointment scheduled on 6/16/22.     Diane Schwartz RDN, LD, [facility]   Time spent in minutes: 55 minutes  Encounter: Individual telephone visit    Says they have double coverage for insurance so wants to proceed with today's visit. Will sign and return ABN.

## 2022-04-08 NOTE — TELEPHONE ENCOUNTER
Discussed with Dr Enriquez.  Diflucan 150mg x 3 days sent in.    Pt advised.  She will CB if sx do not improve.    Maria Isabel Walker RN

## 2022-04-11 ENCOUNTER — VIRTUAL VISIT (OUTPATIENT)
Dept: FAMILY MEDICINE | Facility: CLINIC | Age: 57
End: 2022-04-11
Payer: COMMERCIAL

## 2022-04-11 DIAGNOSIS — R23.9 UNSPECIFIED SKIN CHANGES: ICD-10-CM

## 2022-04-11 DIAGNOSIS — I89.0 LYMPHEDEMA: Primary | ICD-10-CM

## 2022-04-11 DIAGNOSIS — M79.89 LEG SWELLING: ICD-10-CM

## 2022-04-11 PROCEDURE — 99214 OFFICE O/P EST MOD 30 MIN: CPT | Mod: 95 | Performed by: STUDENT IN AN ORGANIZED HEALTH CARE EDUCATION/TRAINING PROGRAM

## 2022-04-11 NOTE — PROGRESS NOTES
Mayra is a 56 year old who is being evaluated via a billable video visit.      How would you like to obtain your AVS? Mail a copy  If the video visit is dropped, the invitation should be resent by: Text to cell phone: [phone]  Will anyone else be joining your video visit? Yes spouse     Video Start Time: 2:04 PM    Assessment & Plan   Problem List Items Addressed This Visit    None     Visit Diagnoses     Lymphedema    -  Primary    Relevant Orders    Compression Sleeve/Stocking Order for DME - ONLY FOR DME         Unclear exactly what the 'rash' on the foot is/ based on the describe, am wondering if it is more hyperpigmentation associated with chronic lymphedema. The swelling of the legs or the discoloration not present in the lower extremites on camera today. Can't rule out ischemic changes. Patient is not experiencing progresive pain, no pain with walking and no systemic sxs. At this point, I would opt for compression stocking nights + ABIs to see what could be causing the changes in skin. Order placed.     Patient was discontinued from PT because she was not making progress. Patient continues to report unsteady gait.     Has appt with neuro to discuss neuropsych results         32 minutes spent on the date of the encounter doing chart review, history and exam, documentation and further activities per the note      No follow-ups on file.    Jojo Montoya DO  [facility]   Mayra is a 56 year old who presents for the following health issues  accompanied by her spouse.    HPI     Patient is on the VV with her  ( recently reitred to be more involved in Mayra's care)     Over the last few days, patient has noticed that she will wake up in the morning some days and her feet will be swollen, and purple on the top.  Will take her a few hours to get going but once he starts to walk around, and swelling in the discoloration goes away.  And does not necessarily bother her.   It is not painful.  Does not seem to be progressive.  She has not pinpointed pattern to the swelling.  Is not necessarily related to food.  Not related to fevers or chills.  She does feel that she has a harder time walking and standing for long periods of time.  After consideration, she notes that it is more so hyperpigmentation/color changes of her feet rather than an actual rash.    Has not happened before    No changes in her medications, no trauma and no new supplements.      Review of Systems   As per HPI       Objective           Vitals:  No vitals were obtained today due to virtual visit.    Physical Exam   GENERAL: Healthy, alert and no distress  EYES: Eyes grossly normal to inspection.  No discharge or erythema, or obvious scleral/conjunctival abnormalities.  RESP: No audible wheeze, cough, or visible cyanosis.  No visible retractions or increased work of breathing.    SKIN: Visible skin clear. No significant rash, abnormal pigmentation or lesions.  Foot examL from what I can, no obvious swelling or discoloation of her feet today bilaterally   NEURO: Cranial nerves grossly intact.  Mentation and speech appropriate for age.  PSYCH: Mentation appears normal, affect normal/bright, judgement and insight intact, normal speech and appearance well-groomed.          Video-Visit Details    Type of service:  Video Visit    Video End Time:2:17 PM    Originating Location (pt. Location): Home    Distant Location (provider location):  [facility]     Platform used for Video Visit: Elevate

## 2022-04-18 ENCOUNTER — TRANSFERRED RECORDS (OUTPATIENT)
Dept: HEALTH INFORMATION MANAGEMENT | Facility: CLINIC | Age: 57
End: 2022-04-18
Payer: COMMERCIAL

## 2022-04-26 ENCOUNTER — TELEPHONE (OUTPATIENT)
Dept: FAMILY MEDICINE | Facility: CLINIC | Age: 57
End: 2022-04-26
Payer: COMMERCIAL

## 2022-04-26 NOTE — TELEPHONE ENCOUNTER
Please call family back and let them know that I am not able to interpret the results for them. They will have to wait for the original ordering provider to get back to them.

## 2022-04-26 NOTE — TELEPHONE ENCOUNTER
Spoke with patient and  regarding spinal tap. Wondering what the results are. They were only told she doesn't have alzheimer's. Also stated about possible antibody. They would like to know if she has this antibody or what the spinal tap showed. They have been waiting for over a week and has left several messages with ordering provider with no call back.

## 2022-04-28 NOTE — TELEPHONE ENCOUNTER
Sent [employer] message with Dr. hong response and to see if they have heard from the ordering provider.

## 2022-05-05 DIAGNOSIS — R39.15 URINARY URGENCY: ICD-10-CM

## 2022-05-05 NOTE — TELEPHONE ENCOUNTER
Pending Prescriptions:                       Disp   Refills    mirabegron (MYRBETRIQ) 25 MG 24 hr tablet 90 tab*0            Sig: Take 2 tablets (50 mg) by mouth daily    Last seen 4/11/2022  No future appt scheduled   Matthew Choudhary CMA on 5/5/2022 at 10:08 AM

## 2022-05-05 NOTE — TELEPHONE ENCOUNTER
Reason for Call:  Medication or medication refill:    Do you use a [facility]?  Name of the pharmacy and phone number for the current request:  [facility] [address], [place], [place] -   [phone]    Name of the medication requested: Mirabegron    Other request: n/a    Can we leave a detailed message on this number? YES    Phone number patient can be reached at: [phone]      Best Time: Any time    Call taken on 5/5/2022 at 9:05 AM by Freeman Azar

## 2022-05-08 RX ORDER — MIRABEGRON 25 MG/1
50 TABLET, FILM COATED, EXTENDED RELEASE ORAL DAILY
Qty: 180 TABLET | Refills: 2 | Status: SHIPPED | OUTPATIENT
Start: 2022-05-08 | End: 2023-01-01

## 2022-05-08 NOTE — TELEPHONE ENCOUNTER
"Last Written Prescription Date:  3/30/22  Last Fill Quantity: 90,  # refills: 0   Last office visit provider:  4/11/22     Requested Prescriptions   Pending Prescriptions Disp Refills     mirabegron (MYRBETRIQ) 25 MG 24 hr tablet 90 tablet 0     Sig: Take 2 tablets (50 mg) by mouth daily       Beta 3 Adrenergic Agonists Passed - 5/8/2022  2:39 PM        Passed - Most recent BP less than 140/90 on record     BP Readings from Last 3 Encounters:   03/16/22 111/76   02/28/22 112/66   01/25/22 102/70                 Passed - Recent or future visit with authorizing provider's specialty     Patient has had an office visit with the authorizing provider or a provider within the authorizing providers department within the previous 12 mos or has a future within next 30 days. See \"Patient Info\" tab in inbasket, or \"Choose Columns\" in Meds & Orders section of the refill encounter.              Passed - Medication is active on med list        Passed - Most recent eGFR greater than or equal to 30 within past 12 months     Recent Labs   Lab Test 01/25/22  1418 09/06/21  1228 12/05/19  1119   GFRESTIMATED 89   < > 84   GFRESTBLACK  --   --  >90    < > = values in this interval not displayed.             Passed - Patient is of age 18 years or older        Passed - Patient is not pregnant        Passed - Patient has not had a positive pregnancy test within the past 12 months             Chalino Vargas RN 05/08/22 2:39 PM  "

## 2022-06-02 ENCOUNTER — OFFICE VISIT (OUTPATIENT)
Dept: VASCULAR SURGERY | Facility: CLINIC | Age: 57
End: 2022-06-02
Attending: STUDENT IN AN ORGANIZED HEALTH CARE EDUCATION/TRAINING PROGRAM
Payer: MEDICARE

## 2022-06-02 VITALS — HEART RATE: 88 BPM | SYSTOLIC BLOOD PRESSURE: 126 MMHG | TEMPERATURE: 97.7 F | DIASTOLIC BLOOD PRESSURE: 64 MMHG

## 2022-06-02 DIAGNOSIS — L90.5 SCAR CONDITION AND FIBROSIS OF SKIN: ICD-10-CM

## 2022-06-02 DIAGNOSIS — C50.919 BREAST CANCER METASTASIZED TO BRAIN, UNSPECIFIED LATERALITY (H): ICD-10-CM

## 2022-06-02 DIAGNOSIS — M24.512 CONTRACTURE, LEFT SHOULDER: ICD-10-CM

## 2022-06-02 DIAGNOSIS — I97.2 POST-MASTECTOMY LYMPHEDEMA SYNDROME: ICD-10-CM

## 2022-06-02 DIAGNOSIS — C79.31 BREAST CANCER METASTASIZED TO BRAIN, UNSPECIFIED LATERALITY (H): ICD-10-CM

## 2022-06-02 DIAGNOSIS — M79.89 LEFT ARM SWELLING: Primary | ICD-10-CM

## 2022-06-02 PROCEDURE — 99215 OFFICE O/P EST HI 40 MIN: CPT | Performed by: STUDENT IN AN ORGANIZED HEALTH CARE EDUCATION/TRAINING PROGRAM

## 2022-06-02 RX ORDER — DONEPEZIL HYDROCHLORIDE 5 MG/1
5 TABLET, FILM COATED ORAL
COMMUNITY
Start: 2022-05-27 | End: 2023-01-01

## 2022-06-02 ASSESSMENT — PAIN SCALES - GENERAL: PAINLEVEL: NO PAIN (0)
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
## 2022-06-02 NOTE — PATIENT INSTRUCTIONS
A referral was sent to [facility] for lymphedema therapy.   You will receive a phone call in 1-2 business days to schedule your appointment.   If for some reason you do not hear from them or wish to schedule sooner, please call [phone] to schedule.    SWELLING/LYMPHEDEMA THERAPY TREATMENT     - What to expect your first visit     Based on your initial evaluation, you will have an individualized program designed by a certified lymphedema therapist, with specialized training in swelling.     It will consist of discussing your prior activity level, goals and limitations.     The therapist will assess your edema, evaluate for swelling, ,measure your motion and strength.      - Treatment usually includes     Swelling education and prevention strategies.     Swelling/lymphedema massage- A special decompressive massage to help improve the lymphatic drainage.     Swelling/lymphatic stimulation exercises     Bandaging or compression garments- To provide increased tissue pressure in the swollen extremity.     Home exercise program instruction     Stretching exercises     Education in how to independently manage edema.        - Follow up care     ONCE FORMAL THERAPY HAS BEEN COMPLETED, THE PATIENT WILL BE EXPECTED TO WEAR THE APPROPRIATE COMPRESSION BANDAGES, BE CONSISTENT WITH THE SKIN CARE PROGRAM  AND PERFORM THE PRESCRIBED SELF -MASSAGE AND /OR EXERCISES AS PRESCRIBED.        [facility] will call you within the next 2 business day to schedule your appointment. If you would like to set it up sooner you may call them at one of the locations below.    Office Locations    [facility]  [address]  [place] [address]  Phone: [phone]  Fax: [phone]    [facility]   [address]  [place] [address]  Phone: [phone]  Fax: [phone]    [facility]  [address]  [place] [address]  Phone [phone]    [facility]  [address]  [place] [address]  Phone: [phone]  Fax: [phone]    [facility]  [address]  [place] [address]  Phone: [phone]  Fax: [phone]    [facility]  [address] [place] [address] [address]  [address]   [place] [address]   Phone: [phone]  Fax: [phone]    [address] [place] [address]   Phone: [phone]  Fax: [phone]

## 2022-06-03 ENCOUNTER — TELEPHONE (OUTPATIENT)
Dept: VASCULAR SURGERY | Facility: CLINIC | Age: 57
End: 2022-06-03
Payer: COMMERCIAL

## 2022-06-06 NOTE — TELEPHONE ENCOUNTER
Patient and her care helper, Deyanira calling to see who called from [place] 15 minutes ago to make appointment.  Patient gave verbal consent to communicate with Deyanira.  Unfortunately,  there isn't a phone encounter for today.  Writer reviewed future appointments and recent visit with patient and Deyanira.  Patient has some cognitive impairment, so she couldn't remember what department was calling.  Writer did note that provider placed some orders on 6-2-22 and reviewed those with patient.  They asked for transfer back to .    Nieves Combs RN  [facility] Nurse Advisor  11:58 AM 6/6/2022  COVID 19 Nurse Triage Plan/Patient Instructions    Please be aware that novel coronavirus (COVID-19) may be circulating in the community. If you develop symptoms such as fever, cough, or SOB or if you have concerns about the presence of another infection including coronavirus (COVID-19), please contact your health care provider or visit [email or web address].     Disposition/Instructions    Home care recommended. Follow home care protocol based instructions.    Thank you for taking steps to prevent the spread of this virus.  o Limit your contact with others.  o Wear a simple mask to cover your cough.  o Wash your hands well and often.    Resources    [facility]: About COVID-19: [email or web address]    CDC: What to Do If You're Sick: www.cdc.gov/coronavirus/2019-ncov/about/steps-when-sick.html    CDC: Ending Home Isolation: www.cdc.gov/coronavirus/2019-ncov/hcp/disposition-in-home-patients.html     CDC: Caring for Someone: www.cdc.gov/coronavirus/2019-ncov/if-you-are-sick/care-for-someone.html     [facility]: Interim Guidance for Hospital Discharge to Home: www.health.Pending sale to [facility].[place]/diseases/coronavirus/hcp/hospdischarge.pdf    [facility] clinical trials (COVID-19 research studies): [email or web address].[email or web address]/umn-clinical-trials     Below are the COVID-19 hotlines at the [facility]  Health ([facility]). Interpreters are available.   o For health questions: Call 316-587-6688 or 1-227.681.6415 (7 a.m. to 7 p.m.)  o For questions about schools and childcare: Call 129-751-7110 or 1-392.548.4361 (7 a.m. to 7 p.m.)                   Additional Information    Information only question and nurse able to answer    Protocols used: INFORMATION ONLY CALL - NO TRIAGE-A-[place]

## 2022-06-07 NOTE — TELEPHONE ENCOUNTER
Pt with pessary calls stating that she has some vaginal itching and burning.    Her and Remington state that the diflucan really helped last time. Can we send this in for her again or do you want to see her? They mention it is hard for them to get to [facility].    Abbie VILLANUEVA RN BSN

## 2022-06-08 NOTE — TELEPHONE ENCOUNTER
Please inform the patient that I sent the prescription for Diflucan to her pharmacy.  Please ask her to let us know if there is no resolution of her symptoms thank you for your help with this

## 2022-06-09 NOTE — TELEPHONE ENCOUNTER
Patient and  Remington calling:  They had appt scheduled for 6/10/22, but it was cancelled.  Having a hard walking because of back pain. (lower hip,back)  2nd day of diflucan. Still having itching.  Denies urinary symptoms.  Wondering if back pain is related to infection.  Recommend, follow up with PCP regarding back pain.    Can also go to urgent care closer to their home to r/o UTI.    They agree with plan.  Lucy Lazo RN

## 2022-06-13 NOTE — PROGRESS NOTES
[facility]        OUTPATIENT PHYSICAL THERAPY EDEMA EVALUATION  PLAN OF TREATMENT FOR OUTPATIENT REHABILITATION  (COMPLETE FOR INITIAL CLAIMS ONLY)  Patient's Last Name, First Name, Mayra Germain                           Provider s Name:   [facility] Medical Record No.  [id number]     Start of Care Date:  06/13/22   Onset Date:      Type:  PT   Medical Diagnosis:  Left arm swelling, Post-mastectomy lymphedema syndrome, Scar condition and fibrosis of skin, Contracture, left shoulder, Breast cancer metastasized to brain, unspecified laterality (H)   Therapy Diagnosis:  secondary lymphedema Visits from SOC:  1                                     __________________________________________________________________________________   Plan of Treatment/Functional Goals:    Manual lymph drainage, Gradient compression bandaging, Fit for compression garment, Exercises, Precautions to prevent infection / exacerbation, Education, Manual therapy, Soft tissue mobilization, Skin care / precautions, Scar mobilization, Myofascial release, Home management program development        GOALS  1. Goal description: Pt will report decreased L chest/axilla pain from 9/10 to <5/10 with L arm movements to improve quality of life.       Target date: 09/05/22  2. Goal description: Pt and spouse will be indep with home management including exercises, MLD and use of compression.       Target date: 09/05/22              Treatment Frequency: 1x/week   Treatment duration: 6-8 visits    Nikita Waldrop, PT                                    I CERTIFY THE NEED FOR THESE SERVICES FURNISHED UNDER        THIS PLAN OF TREATMENT AND WHILE UNDER MY CARE     (Physician co-signature of this document indicates review and certification of the therapy plan).                   Certification date from:  06/13/22       Certification date to: 09/11/22           Referring physician: Dr. Carol Vallecillo   Initial Assessment  See Epic Evaluation- Start of care: 06/13/22 06/13/22 1300   Quick Adds   Quick Adds Certification   Rehab Discipline   Discipline PT   Type of Visit   Type of visit Initial Edema Evaluation   General Information   Start of care 06/13/22   Referring physician Dr. Carol Vallecillo   Orders Evaluate and treat as indicated   Order date 06/02/22   Medical diagnosis Left arm swelling, Post-mastectomy lymphedema syndrome, Scar condition and fibrosis of skin, Contracture, left shoulder, Breast cancer metastasized to brain, unspecified laterality (H)   Onset of illness / date of surgery 06/02/22   Affected body parts Trunk;LUE   Edema etiology Cancer with lymph node dissection;Chemo;Surgery   Location - Cancer with lymph node dissection left breast, left axilla   Pertinent history of current problem (PT: include personal factors and/or comorbidities that impact the POC; OT: include additional occupational profile info) full brain radiation   Community support Family / friend caregiver   Fall Risk Screen   Fall screen completed by PT   Have you fallen 2 or more times in the past year? Yes   Have you fallen and had an injury in the past year? Yes   Is patient a fall risk? Yes   Fall screen comments falls risk handout provided   Abuse Screen (yes response referral indicated)   Feels Unsafe at Home or Work/School no   Feels Threatened by Someone no   Does Anyone Try to Keep You From Having Contact with Others or Doing Things Outside Your Home? no   Physical Signs of Abuse Present no   Patient needs abuse support services and resources No   System Outcome Measures   Outcome Measures Lymphedema   Lymphedema Life Impact Scale (score range 0-72). A higher score indicates greater impairment. 5   Subjective Report   Patient report of symptoms Pt is a 56 year-old woman with chief complaint swelling and limited  scar tissue mobility s/p left breast cancer, mastectomy, chemo, brain metastases and all left axillary lymph nodes removed in 2004. Right mastectomy also performed at that time. She was also treated with full brain radiation and oophorectomy. In 2015 she sustained a complex left clavicular fracture requiring plating with residual significant scarring requiring complex surgical revision at [facility]. Pt reports no left arm swelling. She is feeling pain into her chest wall and into the left axilla. Does have some swelling at her left clavicle. She had a compression bra in the past and a new bra was ordered a few weeks ago. The previous compression bra she hasn t worn in the past year. She never learned exercises or massage in the past, though she had lymphedema therapy in the past.   Patient / Family Goals   Patient / family goals statement Lymphedema check   Pain   Patient currently in pain Yes   Pain location left pec major, axilla and chest wall   Pain rating 9/10   Cognitive Status   Follows commands and answers questions 75% of the time   Memory Impaired   Edema Exam / Assessment   Skin condition Pitting   Skin condition comments very mild pitting into left chest wall and L UE   Pitting 1+   Scar Yes   Location B chest wall, left clavicle   Mobility moderate restriction left chest wall   Stemmer sign Negative   Range of Motion   ROM comments B shoulder flx and abd AROM WFL, mild pulling/pain into chest wall   Transfers   Transfers UE support sit<>stand   Gait / Locomotion   Gait / Locomotion Slow pace, wider JERARDO, no AD   Planned Edema Interventions   Planned edema interventions Manual lymph drainage;Gradient compression bandaging;Fit for compression garment;Exercises;Precautions to prevent infection / exacerbation;Education;Manual therapy;Soft tissue mobilization;Skin care / precautions;Scar mobilization;Myofascial release;Home management program development   Clinical Impression   Criteria for skilled therapeutic  intervention met Yes   Therapy diagnosis secondary lymphedema   Influenced by the following impairments / conditions Stage 1   Functional limitations due to impairments / conditions difficulty reaching with the left arm d/t pain   Clinical Presentation Stable/Uncomplicated   Clinical Decision Making (Complexity) Low complexity   Treatment Frequency 1x/week   Treatment duration 6-8 visits   Patient / family and/or staff in agreement with plan of care Yes   Risks and benefits of therapy have been explained Yes   Clinical impression comments Pt is a 56 year-old woman with chief complaint swelling and limited scar tissue mobility s/p left breast cancer, mastectomy, radiation, chemo, brain metastases and all left axillary lymph nodes removed in 2004. She reports pain in the left clavicle/pectoral/axillary region and demonstrates tightness into the pectoralis musculature that may be contributing to her pain. She has relatively mild swelling into the left chest wall and left UE and was recently fitted for a compression bra. She hasn t been instructed in home management techniques for swelling or scar tissue mobilization and d/t pt s cognitive changes after full brain radiation, her spouse is appropriate to help with home management. She would benefit from short course of therapy to instruct in HEP for shoulder ROM/stretching, MLD and scar mobilization techniques and to teach spouse techniques for home.   Goals   Edema Eval Goals 1;2   Goal 1   Goal identifier Pain   Goal description Pt will report decreased L chest/axilla pain from 9/10 to <5/10 with L arm movements to improve quality of life.   Target date 09/05/22   Goal 2   Goal identifier Home management   Goal description Pt and spouse will be indep with home management including exercises, MLD and use of compression.   Target date 09/05/22   Total Evaluation Time   PT Eval, Low Complexity Minutes (85135) 18   Certification   Certification date from 06/13/22    Certification date to 09/11/22   Medical Diagnosis Left arm swelling, Post-mastectomy lymphedema syndrome, Scar condition and fibrosis of skin, Contracture, left shoulder, Breast cancer metastasized to brain, unspecified laterality (H)   Certification I certify the need for these services furnished under this plan of treatment and while under my care.  (Physician co-signature of this document indicates review and certification of the therapy plan).      Nikita Waldrop, PT, DPT, CLT  6/13/2022

## 2022-06-16 NOTE — PROGRESS NOTES
Type of Service: Telephone Visit    Audio only visit done, as patient does not have access to audio-visual technology.    Waiver discussed again today.    Originating Location (Patient Location): Home  Distant Location (Provider Location): Home  Mode of Communication:  Telephone    Telephone Visit Start Time: 1:33pm  Telephone Visit End Time (telephone visit stop time): 2:10pm    How would patient like to obtain AVS? Mail a copy     Medical Nutrition Therapy  Visit Type:Reassessment and intervention    Mayra Ramirez presents today for MNT and education related to weight management.   She is accompanied by self and spouse.     ASSESSMENT:   Patient comments/concerns relating to nutrition: Says lower back kept getting sore.  Slowly getting better.     Per Remington, Mayra would like to lose more weight- sees advertisements for QBE. Says has maintained weight.     NUTRITION HISTORY:    Breakfast: 1/2 cup Oatmeal with water and blueberries and protein shake (16 oz glass)  Lunch: salad with fruit, 2 hard boiled eggs, 2 Tbsp. craisin/raisins, 1-2 Tbsp. walnuts, [employer] salad dressing and fruit  PM: yogurt (Greek - unsweetened low-fat)- 80 kcal with Museli  Dinner: Hello Fresh   Snacks: watermelon OR 1 cup popcorn  Beverages: Water all day    Misses meals? none  Eats out:  seldom     Previous diet education:  Yes     Food allergies/intolerances: none    Diet is high in: fiber  Diet is low in: calcium    EXERCISE: Yesterday was on elliptical for 15 minutes.  Walked with nicer weather. Says doing elliptical every other day.     SOCIO/ECONOMIC:   Lives with: spouse    MEDICATIONS:  Current Outpatient Medications   Medication     ALOE VERA JUICE PO     atorvastatin (LIPITOR) 10 MG tablet     CALCIUM + D OR     denosumab (PROLIA) 60 MG/ML SOSY injection     donepezil (ARICEPT) 5 MG tablet     fluconazole (DIFLUCAN) 150 MG tablet     lifitegrast (XIIDRA) 5 % opthalmic solution     mirabegron (MYRBETRIQ) 25 MG 24 hr tablet      "Multiple Minerals-Vitamins (BONE DENSITY BUILDER PO)     Multiple Vitamins-Minerals (CENTRUM SILVER ADULT 50+) TABS     NEW MED     order for DME     Oxyquinoline-Sod Lauryl Sulf 0.025-0.01 % GEL     pantoprazole (PROTONIX) 20 MG EC tablet     Probiotic Product (PROBIOTIC ADVANCED PO)     vitamin D2 (ERGOCALCIFEROL) 71448 units (1250 mcg) capsule     No current facility-administered medications for this visit.       LABS:  Last Basic Metabolic Panel:  Lab Results   Component Value Date     01/25/2022     12/05/2019      Lab Results   Component Value Date    POTASSIUM 4.2 01/25/2022    POTASSIUM 3.7 12/05/2019     Lab Results   Component Value Date    CHLORIDE 106 01/25/2022    CHLORIDE 106 12/05/2019     Lab Results   Component Value Date    KEITH 9.0 01/25/2022    KEITH 9.1 12/05/2019     Lab Results   Component Value Date    CO2 27 01/25/2022    CO2 29 12/05/2019     Lab Results   Component Value Date    BUN 14 01/25/2022    BUN 12 12/05/2019     Lab Results   Component Value Date    CR 0.77 01/25/2022    CR 0.79 12/05/2019     Lab Results   Component Value Date     01/25/2022    GLC 91 12/05/2019       ANTHROPOMETRICS:  Vitals: There were no vitals taken for this visit.  Estimated body mass index is 30.45 kg/m  as calculated from the following:    Height as of 2/28/22: 1.67 m (5' 5.75\").    Weight as of 3/16/22: 84.9 kg (187 lb 3.2 oz).       Wt Readings from Last 5 Encounters:   03/16/22 84.9 kg (187 lb 3.2 oz)   02/28/22 86.5 kg (190 lb 12.8 oz)   01/25/22 85.3 kg (188 lb)   11/30/21 83.6 kg (184 lb 6.4 oz)   09/28/21 82.6 kg (182 lb)       Weight Change: per patient, maintaining weight. No new weight since last nutrition visit.    ESTIMATED KCAL REQUIREMENTS:  1741 kcal per day (based on last clinic weight from 3/16/22)    NUTRITION DIAGNOSIS: Food- and nutrition-related knowledge deficit related to other changes to make for weight loss as evidenced by patient stating help is appreciated for " weight loss since uncertain what else to try as she has been maintaining weight.    NUTRITION INTERVENTION:  Nutrition Prescription: Mediterranean style diet for heart-healthy eating   Education given to support: general nutrition guidelines, weight reduction, consistent meals, exercise, behavior modification, portion control and heart healthy diet  Motivational Interviewing    Discussion: Mayra says she has not seen weight loss since last visit but has been maintaining weight. She was wondering if any of the over the counter supplements such as GoLo would  Help. Informed Mayra they have not been studied for safety and efficacy so would recommend if wants to try, check with pharmacist that nothing in supplement would interact with current medications and then give it 1 month/1 container and stop if not seeing favorable results.  Otherwise, informed them that there are medications approved for help with weight loss and would recommend discussing with PCP about prescribing or programs. They verbalized understanding.    Since weight has been maintained, focus on ways to increase activity and/or reduce calories slightly from current intake to help with weight loss. Since she tends to have both dried and fresh fruit at lunch, suggested she could cut out dried fruit and since she is getting 2-3 oz protein at meals and her afternoon snack, suggested reducing AM protein shake to 8 oz instead of 16 oz. She also is not liking Hello Fresh so discussed some other meal delivery and quick meal options.  Pt verbalized understanding of concepts discussed and recommendations provided.       PATIENT'S BEHAVIOR CHANGE GOALS:   See Patient Instructions for patient stated behavior change goals. AVS and Waiver/ABN printed and mailed.    MONITOR / EVALUATE:  RD will monitor/evaluate:  Food / Beverage / Nutrient intake   Pertinent Labs  Progress toward meeting stated nutrition-related goals  Weight change    FOLLOW-UP:  Call RD with  questions/concerns.   Follow-up appointment scheduled on 9/22/22.     Diane Schwartz RDN, LD, CDCES   Time spent in minutes: 37 minutes  Encounter: Individual telephone visit    Says they have double coverage for insurance so wants to proceed with today's visit. Will sign and return waiver.

## 2022-06-20 PROBLEM — R10.13 EPIGASTRIC PAIN: Status: ACTIVE | Noted: 2022-01-01

## 2022-06-20 PROBLEM — K21.00 GASTRO-ESOPHAGEAL REFLUX DISEASE WITH ESOPHAGITIS: Status: ACTIVE | Noted: 2018-04-24

## 2022-06-20 PROBLEM — D12.5 BENIGN NEOPLASM OF SIGMOID COLON: Status: ACTIVE | Noted: 2018-04-24

## 2022-06-20 PROBLEM — K63.5 POLYP OF COLON: Status: ACTIVE | Noted: 2018-04-20

## 2022-06-20 PROBLEM — Z86.0100 HISTORY OF COLONIC POLYPS: Status: ACTIVE | Noted: 2021-06-03

## 2022-06-20 NOTE — PATIENT INSTRUCTIONS
Take ibuprofen as needed for pain 400 mg every 4-6 hours.     Consider adding back to PT order - can address with neurology if you want     Shoulder seem ok now.      Warm packs to back .     Watch posture.

## 2022-06-20 NOTE — PROGRESS NOTES
Assessment & Plan     Acute left-sided low back pain without sciatica      Acute pain of both shoulders      Abnormality of gait         Patient with some balance and cognitive sequelae from brain tumor diagnosis radiation 16 years ago with low back pain and bilateral shoulder plain.    Patient says she is doing better currently.  Some left lower back tenderness.  May take ibuprofen 400 mg every 4-6 hours as needed, warm packs    Try to sit up more straight.  She is hunched forward when she sits when I see her.    Range of motion is normal and negative rotator cuff tests.     May talk to neurology who ordered PT for balance coming up and see if they want to add on PT for the low back.  Otherwise can address back with PCP if not better with rest and ibuprofen.              Return in about 2 weeks (around 7/4/2022) for If no better.    Lulu Borjas, [facility]    Ruben Nunez is a 56 year old female who presents to clinic today for the following health issues:  Chief Complaint   Patient presents with     Back Pain     Lower back dull pain x 1 week. Pt states painful when rubbed.     Musculoskeletal Problem     Sore on both shoulders on and off aching pain x last week.      HPI    Low back pain x 1 week, off and on.  Non radiating.      Took ibuprofen once in awhile.  Last dose 2 days ago.  OK now with sitting.      +Shoulder and hip pain.  Had aye put in the left collarbone numerous years ago and the pain is also located in this area.    Saw vascular for lymphedema.      Memory loss and balance issues evident from brain radiation 16 years ago.      Has PT for balance.            Review of Systems   Constitutional: Negative for chills and fever.   Musculoskeletal: Positive for arthralgias and back pain.   Neurological:        No injuries or falls           Objective    /74 (BP Location: Right arm, Patient Position: Sitting, Cuff Size: Adult Regular)   Pulse 66   Temp  97.5  F (36.4  C) (Oral)   Resp 20   Wt 85.3 kg (188 lb 1.6 oz)   LMP  (LMP Unknown)   SpO2 97%   BMI 30.59 kg/m    Physical Exam  Constitutional:       General: She is not in acute distress.     Appearance: She is well-developed.   Eyes:      General:         Right eye: No discharge.         Left eye: No discharge.      Conjunctiva/sclera: Conjunctivae normal.   Cardiovascular:      Pulses: Normal pulses.   Pulmonary:      Effort: Pulmonary effort is normal.   Musculoskeletal:         General: Tenderness (Tenderness left low back/superior buttock area.  No tenderness elsewhere identified.) present. Normal range of motion.      Comments: This with a hunched forward posture    Normal rotator cuff exam and range of motion of bilateral shoulders.   Skin:     General: Skin is warm and dry.      Capillary Refill: Capillary refill takes less than 2 seconds.   Neurological:      Mental Status: She is alert and oriented to person, place, and time.      Motor: No weakness.      Gait: Gait abnormal.   Psychiatric:         Mood and Affect: Mood normal.         Behavior: Behavior normal.         Thought Content: Thought content normal.         Judgment: Judgment normal.

## 2022-06-27 NOTE — PROGRESS NOTES
[facility]    Outpatient Occupational Therapy Discharge Note  Patient: Mayra Ramirez  : 1965    Beginning/End Dates of Reporting Period:  21 to 22    Referring Provider: Dr. Jojo Montoya    Therapy Diagnosis: decreased ADL and IADL function, memory changes    Goals:     Goal Identifier     Goal Description Patient will be able to plan and prepare simple meal independently   Target Date 22   Date Met      Progress (detail required for progress note): Patient has not wanted to try this      Goal Identifier     Goal Description Patient will effectively use a medication system with alarm to set up and take medications with supervision only   Target Date 22   Date Met  22   Progress (detail required for progress note):       Goal Identifier     Goal Description Patient will identify at least one leisure activity that she can safely perform independently on a regular basis   Target Date 22   Date Met  22   Progress (detail required for progress note): Patient is enjoying jigsaw puzzles, games on her phone and [employer] movies     Plan:  Discharge from therapy.

## 2022-06-27 NOTE — ADDENDUM NOTE
Encounter addended by: Linette Marrero, OT on: 6/27/2022 12:29 PM   Actions taken: Episode resolved, Clinical Note Signed

## 2022-06-29 NOTE — TELEPHONE ENCOUNTER
Patients spouse calling to let provider know Mayra would like a call back to talk about getting on a weight loss medication. They believe this might help her get around and feel a little better.    Call Back to discuss   Call Back   [phone]

## 2022-06-29 NOTE — PROGRESS NOTES
"S:  Unable to get pessary back in since 1/2022     O: /70 (BP Location: Right arm, Patient Position: Chair, Cuff Size: Adult Large)   Ht 1.727 m (5' 8\")   Wt 87.5 kg (193 lb)   LMP  (LMP Unknown)   Breastfeeding No   BMI 29.35 kg/m      GYN PELVIC: NEGATIVE, normal external genitalia, normal vaginal mucosa,   Size 2, diaphragm pessary removed replaced     Assessment: 56 year old y/o with cystocele  Pessary replaced     Plan: Pessary replaced. Instructed to return in 3 months for pessary cleaning    14 minutes spent on the date of the encounter doing chart review, patient visit and documentation       Dr. Selina Resendez, DO   Obstetrics and Gynecology   [facility] [place]     "

## 2022-06-29 NOTE — PATIENT INSTRUCTIONS
Return in 3 months for pessary cleaning     Dr. Selina Resendez, DO    Obstetrics and Gynecology  [facility] [place]

## 2022-07-01 NOTE — TELEPHONE ENCOUNTER
Please let patient know that we will wait for Dr Montoya to return to address this request.  July 11th.    Ruperto Strickland MD on 7/1/2022 at 3:01 PM

## 2022-07-05 NOTE — TELEPHONE ENCOUNTER
Contacted patient's spouse Remington who has CTC on file.  Scheduled patient with PCP for [facility] on 7/27/22 to discuss weight loss medication.

## 2022-07-25 NOTE — PATIENT INSTRUCTIONS
You can reach your [facility] Care Team any time of the day by calling [phone]. This number will put you in touch with the 24 hour nurse line if the clinic is closed.    To contact your OB/GYN Station Coordinator/Surgery Scheduler please call [phone]. This is a direct number for your care team between 8 a.m. and 4 p.m. Monday through Friday.    [facility] is open for your convenience:  Monday through Friday 8 a.m. to 6 p.m.  Closed weekends and all major holidays.     normal...

## 2022-07-25 NOTE — RESULT ENCOUNTER NOTE
Mayra,  your urine analysis results are within normal limits. The vaginal wet prep exam shows yeast as we discussed.  I sent the Rx to your pharmacy.  Please let me know if y ou have any questions  Bryson Enriquez M.D.
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
## 2022-07-25 NOTE — NURSING NOTE
"Chief Complaint   Patient presents with     Vaginal Problem       Initial /72   Wt 88 kg (194 lb)   LMP  (LMP Unknown)   BMI 29.50 kg/m   Estimated body mass index is 29.5 kg/m  as calculated from the following:    Height as of 22: 1.727 m (5' 8\").    Weight as of this encounter: 88 kg (194 lb).  BP completed using cuff size: regular    Questioned patient about current smoking habits.  Pt. has never smoked.          The following HM Due: NONE      The following patient reported/Care Every where data was sent to:  P ABSTRACT QUALITY INITIATIVES [[id number]]        Vanessa Willingham CMA                 "

## 2022-07-27 NOTE — PROGRESS NOTES
"Mayra is a 56 year old who is being evaluated via a billable video visit.      How would you like to obtain your AVS? MyChart  If the video visit is dropped, the invitation should be resent by: Send to e-mail at: [email or web address]  Will anyone else be joining your video visit? Yes          Assessment & Plan   Problem List Items Addressed This Visit    None     Visit Diagnoses     Weight gain    -  Primary    Gait instability        Overweight (BMI 25.0-29.9)               Weight gain:  BMI 29.5  Has been gaining weight over the last few months  Is unable to walk/exercise due to his ongoing gait instability and neurocognitive disorder.  Has made it difficult for her to lose weight.  Is interested in talking to somebody about weight loss medications  Plan: Will refer to weight loss center    Is now doing PT and OT.  It was cleared by insurance.  Is excited about improving her gait.      No follow-ups on file.    Jojo Montoya DO  [facility]    Subjective   Mayra is a 56 year old, presenting for the following health issues:  weight loss meds  (Pt would like to take golo but would like  Advice or approval first. )    Review of Systems         Objective    Vitals - Patient Reported  Weight (Patient Reported): 158 lb (71.7 kg)  Height (Patient Reported): 5' 8\" (172.7 cm)  BMI (Based on Pt Reported Ht/Wt): 24.02      Vitals:  No vitals were obtained today due to virtual visit.    Physical Exam   GENERAL: Healthy, alert and no distress  EYES: Eyes grossly normal to inspection.  No discharge or erythema, or obvious scleral/conjunctival abnormalities.  RESP: No audible wheeze, cough, or visible cyanosis.  No visible retractions or increased work of breathing.    SKIN: Visible skin clear. No significant rash, abnormal pigmentation or lesions.  NEURO: Cranial nerves grossly intact.  Mentation and speech appropriate for age.  PSYCH: Mentation appears normal, affect normal/bright, judgement and " insight intact, normal speech and appearance well-groomed.    Video-Visit Details    Video Start Time: 4:47 PM    Type of service:  Video Visit    Video End Time:4:57 PM    Originating Location (pt. Location): Home    Distant Location (provider location):  [facility]     Platform used for Video Visit: [employer]

## 2022-08-09 NOTE — PATIENT INSTRUCTIONS
Reinserted your pessary to help support her pelvic floor.  This should hopefully help minimize her urinary accidents.  You do have some skin breakdown.  You can apply a barrier such as Desitin or Aquaphor to help protect the skin from urine irritation.  Follow-up with Dr. Enriquez if symptoms fail to improve.

## 2022-08-09 NOTE — PROGRESS NOTES
Patient presents with:  Vaginal Problem: Vaginal itching and discharge x 2 weeks       Clinical Decision Making: Patient experiencing ongoing vaginal itching and irritation following having a yeast infection.  Wet prep is negative today.  I suspect that her vaginal itching is likely coming from skin breakdown due to increased urinary incontinence.  UA is negative for any signs of UTI.  Recommend replacing pessary to help support the pelvic floor to minimize enuresis.  She will also start topical barrier cream such as Desitin and/or Aquaphor.  She was instructed to follow-up with her OB/GYN, and already has an appointment on 8/18/2022.  No concerns for sexually transmitted diseases per the patient.      ICD-10-CM    1. Vaginal itching  N89.8 Wet prep - Clinic Collect     UA macro with reflex to Microscopic and Culture - Clinc Collect     Urine Microscopic       Patient Instructions   Reinserted your pessary to help support her pelvic floor.  This should hopefully help minimize her urinary accidents.  You do have some skin breakdown.  You can apply a barrier such as Desitin or Aquaphor to help protect the skin from urine irritation.  Follow-up with Dr. Enriquez if symptoms fail to improve.      HPI:  Mayra Ramirez is a 56 year old female who presents today complaining of vaginal itching and discharge for the past 2 weeks.  Patient was previously seen by her OB/GYN and diagnosed with yeast infection.  He directed her to remove her pessary and she took Diflucan.  She has been having increased urinary incontinence and still has irritation around the vagina.    History obtained from the patient.    Problem List:  2022-06: Epigastric pain  2021-09: Muscle weakness (generalized)  2021-09: Falls frequently  2021-09: Balance problems  2021-08: Abnormality of gait  2021-08: Disorder of bone and cartilage  2021-06: History of colonic polyps  2021-04: Cystocele, midline  2021-01: Dizziness  2020-12: Chronic acquired  lymphedema  2019-07: Impairment of balance  2018-09: History of breast cancer  2018-09: Low serum vitamin D  2018-09: Other chronic pain  2018-06: Fracture of clavicle with nonunion  2018-04: Benign neoplasm of sigmoid colon  2018-04: Gastro-esophageal reflux disease with esophagitis  2018-04: Polyp of colon  2017-08: Contracture, left shoulder  2017-08: Left-sided chest wall pain  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  2017-08: Scar condition and fibrosis of skin  2017-04: Poor short-term memory  2017-04: Osteoporosis  2015-12: Hyperlipidemia  2010-12: Hashimoto's thyroiditis  2010-10: CARDIOVASCULAR SCREENING; LDL GOAL LESS THAN 160  2010-10: Gastroesophageal reflux disease  2008-06: Breast cancer (H)  2008-06: Atrophic vaginitis  2008-06: Encounter for routine gynecological examination  2008-06: Brain metastasis (H)  2007-05: Hypothyroidism  2007-04: Diabetes insipidus (H)  Breast Cancer      Past Medical History:   Diagnosis Date     Abnormal Pap smear of cervix 05/15/1996    in Care Everywhere - ASCUS pap (no HPV result seen)     Brain metastases (H) 2006    Recurrent metastatic breast cancer     Breast cancer (H) left    2004 - HER2 pos, ER+, [place]-     Fracture of clavicle with nonunion 6/4/2018    Overview:  Added automatically from request for surgery [id number]     Lymphoedema 06/2012     Malignant neoplasm of breast (female), unspecified site 2004    Breast cancer     Metastasis to brain (H) 2005     Osteopenia     2/2 breast CA tx.       Social History     Tobacco Use     Smoking status: Never Smoker     Smokeless tobacco: Never Used   Substance Use Topics     Alcohol use: No       Review of Systems   Constitutional: Negative for fever.   Gastrointestinal: Negative for abdominal pain.   Genitourinary: Positive for enuresis, frequency and vaginal discharge. Negative for dysuria.   m    Vitals:    08/09/22 1154   BP: 108/72   BP Location: Right arm   Patient Position: Sitting   Cuff Size: Adult Large    Pulse: 74   Resp: 20   Temp: 98  F (36.7  C)   TempSrc: Tympanic   SpO2: 99%   Weight: 85.3 kg (188 lb)       Physical Exam  Vitals and nursing note reviewed.   Constitutional:       General: She is not in acute distress.     Appearance: She is not toxic-appearing or diaphoretic.   HENT:      Head: Normocephalic and atraumatic.      Right Ear: External ear normal.      Left Ear: External ear normal.   Eyes:      Conjunctiva/sclera: Conjunctivae normal.   Pulmonary:      Effort: Pulmonary effort is normal. No respiratory distress.   Genitourinary:     Comments: External exam completed only.  There is generalized erythema present on bilateral labia majora and labia minora.  Cystocele appears to be at the vaginal introitus.  Neurological:      Mental Status: She is alert.   Psychiatric:         Mood and Affect: Mood normal.         Behavior: Behavior normal.         Thought Content: Thought content normal.         Judgment: Judgment normal.         Results:  Results for orders placed or performed in visit on 08/09/22   UA macro with reflex to Microscopic and Culture - Clinc Collect     Status: Abnormal    Specimen: Urine, Midstream   Result Value Ref Range    Color Urine Yellow Colorless, Straw, Light Yellow, Yellow    Appearance Urine Cloudy (A) Clear    Glucose Urine Negative Negative mg/dL    Bilirubin Urine Negative Negative    Ketones Urine Negative Negative mg/dL    Specific Gravity Urine 1.015 1.005 - 1.030    Blood Urine Trace (A) Negative    pH Urine 8.5 (H) 5.0 - 8.0    Protein Albumin Urine Negative Negative mg/dL    Urobilinogen Urine 0.2 0.2, 1.0 E.U./dL    Nitrite Urine Negative Negative    Leukocyte Esterase Urine Negative Negative   Urine Microscopic     Status: Abnormal   Result Value Ref Range    Bacteria Urine Few (A) None Seen /HPF    RBC Urine None Seen 0-2 /HPF /HPF    WBC Urine 0-5 0-5 /HPF /HPF    Squamous Epithelials Urine Few (A) None Seen /LPF    Amorphous Crystals Urine Many (A) None Seen  /HPF    Narrative    Urine Culture not indicated   Wet prep - Clinic Collect     Status: Abnormal    Specimen: Vagina; Swab   Result Value Ref Range    Trichomonas Absent Absent    Yeast Absent Absent    Clue Cells Absent Absent    WBCs/high power field 2+ (A) None         At the end of the encounter, I discussed results, diagnosis, medications. Discussed red flags for immediate return to clinic/ER, as well as indications for follow up if no improvement. Patient understood and agreed to plan. Patient was stable for discharge.

## 2022-08-18 NOTE — NURSING NOTE
"Chief Complaint   Patient presents with     Pessary Check/Fit/Insert       Initial /76   Wt 88.9 kg (196 lb)   LMP  (LMP Unknown)   BMI 29.80 kg/m   Estimated body mass index is 29.8 kg/m  as calculated from the following:    Height as of 22: 1.727 m (5' 8\").    Weight as of this encounter: 88.9 kg (196 lb).  BP completed using cuff size: regular    Questioned patient about current smoking habits.  Pt. has never smoked.          The following HM Due: NONE      The following patient reported/Care Every where data was sent to:  P ABSTRACT QUALITY INITIATIVES [[id number]]        Vanessa Willingham [facility]                 "

## 2022-08-18 NOTE — PATIENT INSTRUCTIONS
You can reach your [facility] Care Team any time of the day by calling [phone]. This number will put you in touch with the 24 hour nurse line if the clinic is closed.    To contact your OB/GYN Station Coordinator/Surgery Scheduler please call [phone]. This is a direct number for your care team between 8 a.m. and 4 p.m. Monday through Friday.    [facility] is open for your convenience:  Monday through Friday 8 a.m. to 6 p.m.  Closed weekends and all major holidays.

## 2022-08-19 NOTE — RESULT ENCOUNTER NOTE
Mayra, the results of the vaginal wet prep that I did yesterday to evaluate your symptoms suggested again evidence of a bacterial vaginosis.  I sent a prescription for the antibiotic gel to your pharmacy.  Please use an applicator at bedtime for 7 nights.  The tub will obviously last longer than 7 nights.  Please let me know if you are not doing better  Bryson Enriquez M.D.

## 2022-08-19 NOTE — TELEPHONE ENCOUNTER
I performed a wet prep yesterday August 18, 2022 for evaluation of vulvovaginal symptoms.  The wet prep returned positive for bacterial vaginosis.  I am sending a prescription for MetroGel and applicator at bedtime to her listed pharmacy.  I have sent the patient a [employer] message with this information
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
## 2022-09-12 NOTE — PROGRESS NOTES
Mayra Ramirez is a 56 year old white female  No LMP recorded (lmp unknown). Patient is postmenopausal. status post BSO, menopausal and sexually abstinent for contraception , who presents for evaluation of vaginal discharge and introital irritation.  See the office visit notes of 2022 and 2022 for further details.  At the time of the previous visit on 2022 the patient had inadvertently placed 2 pessaries vaginally.  On removing the pessary as I did a wet prep was positive for BV.  We treated her with MetroGel and applicator at bedtime for 7 nights with the alcohol warning.  She presents today for a follow-up.  The patient states that when she removes the pessary she has a green cheeselike discharge and notices vaginal irritation and itching.  No unusual odor.  Denies any urinary symptoms    Past Medical History:   Diagnosis Date     Abnormal Pap smear of cervix 05/15/1996    in Care Everywhere - ASCUS pap (no HPV result seen)     Brain metastases (H)     Recurrent metastatic breast cancer     Breast cancer (H) left     - HER2 pos, ER+, HI-     Fracture of clavicle with nonunion 2018    Overview:  Added automatically from request for surgery [id number]     Lymphoedema 2012     Malignant neoplasm of breast (female), unspecified site     Breast cancer     Metastasis to brain (H)      Osteopenia     2/2 breast CA tx.     Current Outpatient Medications   Medication     ALOE VERA JUICE PO     CALCIUM + D OR     denosumab (PROLIA) 60 MG/ML SOSY injection     donepezil (ARICEPT) 5 MG tablet     fluconazole (DIFLUCAN) 150 MG tablet     lifitegrast (XIIDRA) 5 % opthalmic solution     metroNIDAZOLE (METROGEL) 0.75 % vaginal gel     mirabegron (MYRBETRIQ) 25 MG 24 hr tablet     Multiple Minerals-Vitamins (BONE DENSITY BUILDER PO)     NEW MED     order for DME     oxyquinoline-sodium lauryl sulfate (TRIMO-SAWANT) 0.025-0.01 % GEL vaginal gel     pantoprazole (PROTONIX) 20 MG EC  tablet     pantoprazole (PROTONIX) 40 MG EC tablet     romosozumab-aqqg (EVENITY) 105 MG/1.17ML SOSY injection     vitamin D2 (ERGOCALCIFEROL) 65230 units (1250 mcg) capsule     No current facility-administered medications for this visit.     /70   Wt 88.9 kg (196 lb)   LMP  (LMP Unknown)   BMI 29.80 kg/m    Constitutional: healthy, alert and mild distress  Genitourinary: Normal external genitalia without lesions and the diaphragm type pessary was removed today.  Was cleaned with soap and water.  Speculum exam mildly atrophic appearing vaginal epithelium a wet prep was taken today and was negative.  Multiparous cervix no lesions seen.  Bimanual exam the uterus is small smooth firm mobile adnexa without masses or enlargement    (N89.8) Vaginal discharge  (primary encounter diagnosis)  Comment: In looking at the diaphragm type pessary that I removed the discharge was highly suspicious for monilia.  I am empirically going to treat her with Diflucan 150 mg orally for 3 days and Vagistat-1 for 1 night.  Patient will call me in 5 to 7 days to document resolution  Plan: Wet prep - Clinic Collect        I gave the patient a detailed written outline of our plan.  The patient is experiencing more difficulty with vertigo.  I did help her down from the exam table today.  We discussed the effect that this is had on her independence and lifestyle    (B37.3) Monilial vaginitis  Comment: As above  Plan: fluconazole (DIFLUCAN) 150 MG tablet        As above    (N89.8) Vaginal itching  Comment: As above  Plan: As above

## 2022-09-12 NOTE — PATIENT INSTRUCTIONS
You can reach your [facility] Care Team any time of the day by calling [phone]. This number will put you in touch with the 24 hour nurse line if the clinic is closed.    To contact your OB/GYN Station Coordinator/Surgery Scheduler please call [phone]. This is a direct number for your care team between 8 a.m. and 4 p.m. Monday through Friday.    [facility] is open for your convenience:  Monday through Friday 8 a.m. to 6 p.m.  Closed weekends and all major holidays.

## 2022-09-12 NOTE — NURSING NOTE
"Chief Complaint   Patient presents with     RECHECK       Initial /70   Wt 88.9 kg (196 lb)   LMP  (LMP Unknown)   BMI 29.80 kg/m   Estimated body mass index is 29.8 kg/m  as calculated from the following:    Height as of 22: 1.727 m (5' 8\").    Weight as of this encounter: 88.9 kg (196 lb).  BP completed using cuff size: regular    Questioned patient about current smoking habits.  Pt. has never smoked.          The following HM Due: NONE      The following patient reported/Care Every where data was sent to:  P ABSTRACT QUALITY INITIATIVES [[id number]]        Vanessa Willingham [facility]                 "

## 2022-09-12 NOTE — RESULT ENCOUNTER NOTE
Mayra, the wet prep that I did today was negative for yeast but when I looked at the discharge on the pessary it certainly is very suspicious.  Especially in the light of the fact that you are having irritation I think it is wise to treat with the Diflucan and Vagistat-1 that we talked about in the office today.  Please let me know if you are not doing better  Thank you  Bryson Enriquez M.D.

## 2022-09-22 NOTE — PATIENT INSTRUCTIONS
Aim for 8-10 cups of fluid a day (64-80 oz a day). Best way to tell if you are getting enough fluid: urine is pale in color.    2. Check out the [facility] Exercise program or try to repeat your exercise during the day if feeling good.     3. Aim for 2055-1309 calories a day for weight loss.   Check out [employer]orieKing.com or Your Survival[employer].      Contact for the Weight Loss Program:  [facility]   [address] [address]   Phone: [phone]       FOLLOW UP APPOINTMENT: Will call you again on Thursday, November 17th at 1:30pm.  If you are working with weight management program and appointment is not needed, please call us at [phone] to cancel this appointment.    Thanks,  Diane Schwartz RDN, LD, [facility]   [phone]

## 2022-09-22 NOTE — PROGRESS NOTES
Type of Service: Telephone Visit    Audio only visit done, as patient does not have access to audio-visual technology.    Individual visit provided, given no group classes are available for 2 months.     Originating Location (Patient Location): Home  Distant Location (Provider Location): Home  Mode of Communication:  Telephone    Telephone Visit Start Time: 1:31pm  Telephone Visit End Time (telephone visit stop time): 2:21pm    How would patient like to obtain AVS? Mail a copy        Medical Nutrition Therapy  Visit Type:Reassessment and intervention    Mayra Simpson Ramirez presents today for MNT and education related to weight management.   She is accompanied by self and spouse, Remington. Patient aware that another Non-Medicare Waiver will be mailed for them to sign and return.    ASSESSMENT:   Patient comments/concerns relating to nutrition: Says she thinks there was too much going on with the holidays and getting together with family- more treats to eat.  Says she feels that played a role.     Says she also needs to do more with exercise.  Says she has a lot of restrictions.  Says thought maybe she can go to the [facility].     One question they had: spoke with main provider.  Says spoke with him about GoLo medication and was told there was an organization on weight loss within the [employer].     Says she was told she needs to drink a lot of water.  Wants to know how much water to drink.     Says cut back on beef but will still eat chicken or turkey and salmon.    NUTRITION HISTORY:    Breakfast: Nutrition Shake with protein- 16 oz (ice-water and protein (240 kcals) and banana - 300 kcal  Lunch: oatmeal (150 kcal for 1/2 cup uncooked) with blueberries (1/4 cup) - 165 kcals  PM: Cheese (60-80 kcal) and a few crackers -Ritz/Club (80) - 150 kcal.  Dinner: Stir guevara with 1 cup couscous and veggies OR avocado and hummus quesadillas   Snacks: apple (80 kcal)  Beverages: Water all day    Misses meals? none  Eats out:   seldom     Previous diet education:  Yes     Food allergies/intolerances: none    Diet is high in: fiber  Diet is low in: calcium and vegetables    EXERCISE: Says exercises at appointment to push feet against the machine. Also working with bands and to strengthen arms and shoulders.  Tries to repeat this at home- 4-5 days a week.     Walking with walker for 15 minutes up and down the hallway most days.     Found a new program with [facility]- helps with exercises for cancer survivors[facility] before, during and after cancer treatment.  Will be specific education and exercises.     SOCIO/ECONOMIC:   Lives with: spouse    MEDICATIONS:  Current Outpatient Medications   Medication     ALOE VERA JUICE PO     CALCIUM + D OR     denosumab (PROLIA) 60 MG/ML SOSY injection     donepezil (ARICEPT) 5 MG tablet     lifitegrast (XIIDRA) 5 % opthalmic solution     metroNIDAZOLE (METROGEL) 0.75 % vaginal gel     mirabegron (MYRBETRIQ) 25 MG 24 hr tablet     Multiple Minerals-Vitamins (BONE DENSITY BUILDER PO)     NEW MED     order for DME     oxyquinoline-sodium lauryl sulfate (TRIMO-SAWANT) 0.025-0.01 % GEL vaginal gel     pantoprazole (PROTONIX) 20 MG EC tablet     pantoprazole (PROTONIX) 40 MG EC tablet     romosozumab-aqqg (EVENITY) 105 MG/1.17ML SOSY injection     vitamin D2 (ERGOCALCIFEROL) 63775 units (1250 mcg) capsule     No current facility-administered medications for this visit.       LABS:  Last Basic Metabolic Panel:  Lab Results   Component Value Date     01/25/2022     12/05/2019      Lab Results   Component Value Date    POTASSIUM 4.2 01/25/2022    POTASSIUM 3.7 12/05/2019     Lab Results   Component Value Date    CHLORIDE 106 01/25/2022    CHLORIDE 106 12/05/2019     Lab Results   Component Value Date    KEITH 9.0 01/25/2022    KEITH 9.1 12/05/2019     Lab Results   Component Value Date    CO2 27 01/25/2022    CO2 29 12/05/2019     Lab Results   Component Value Date    BUN 14 01/25/2022  "   BUN 12 12/05/2019     Lab Results   Component Value Date    CR 0.77 01/25/2022    CR 0.79 12/05/2019     Lab Results   Component Value Date     01/25/2022    GLC 91 12/05/2019       ANTHROPOMETRICS:  Vitals: LMP  (LMP Unknown)   Estimated body mass index is 29.8 kg/m  as calculated from the following:    Height as of 6/29/22: 1.727 m (5' 8\").    Weight as of 9/12/22: 88.9 kg (196 lb).       Wt Readings from Last 5 Encounters:   09/12/22 88.9 kg (196 lb)   08/18/22 88.9 kg (196 lb)   08/09/22 85.3 kg (188 lb)   07/25/22 88 kg (194 lb)   06/29/22 87.5 kg (193 lb)       Weight Change: gained 8.2 lbs in the past 6 months per last clinic weight.     ESTIMATED KCAL REQUIREMENTS:  1834 kcal per day (based on last clinic weight from 9/12/22)    NUTRITION DIAGNOSIS: Excessive energy intake related to eating more over holidays and family gatherings as evidenced by patient stating seeing weight gain and observed weight gain in last clinic weight.    NUTRITION INTERVENTION:  Nutrition Prescription: Energy Intake: 1246-9070 kcal/day for weight loss  Education given to support: general nutrition guidelines, weight reduction, consistent meals, exercise, fiber, behavior modification, portion control and heart healthy diet  Education Materials Provided: 1200 Calorie 5-day Sample Menus  Motivational Interviewing    Discussion: Patient is seeing weight gain and feels it was from recent family gatherings and extra treats over the holidays.  Feels she went back to normal eating but still not seeing weight loss.  Suggested she could try recording meals and informed her estimated energy needs are 0532-4088 calories a day. She is eating an estimated 700 calories between breakfast, lunch and snacks and can easily fall below 1200 calories if dinner is small or eat more if higher calorie foods/dessert is present.  Discussed some resources to help with tracking and estimating calories.     She did have a couple of questions on fluid " needs and said she never heard from the Weight Management Program to schedule a consult for help with weight loss. Found referral from July and provided the clinic's phone number. Answered questions on fluid and discussed different sources and trying to drink 8-10 cups a day (64-80 oz).  Also revisited ways to try to keep building activity and commended Mayra on walking and doing her physical therapy exercises most days.  Pt verbalized understanding of concepts discussed and recommendations provided.       PATIENT'S BEHAVIOR CHANGE GOALS:   See Patient Instructions for patient stated behavior change goals. AVS and Non-Medicare Waiver was printed and mailed.    MONITOR / EVALUATE:  RD will monitor/evaluate:  Food / Beverage / Nutrient intake   Pertinent Labs  Progress toward meeting stated nutrition-related goals  Weight change    FOLLOW-UP:  Call RD with questions/concerns.   Follow-up appointment scheduled on 11/17/22.     Diane Schwartz RDN, LD, [facility]   Time spent in minutes: 50 minutes  Encounter: Individual telephone visit

## 2022-09-26 NOTE — RESULT ENCOUNTER NOTE
Please notify the patient that her wet prep was negative but I still would like her to use the MetroGel and the hygiene techniques that we talked about today as outlined in her office visit note.  Thank you for your help with this

## 2022-09-26 NOTE — NURSING NOTE
"Chief Complaint   Patient presents with     RECHECK       Initial /72   Wt 88.9 kg (196 lb)   LMP  (LMP Unknown)   BMI 29.80 kg/m   Estimated body mass index is 29.8 kg/m  as calculated from the following:    Height as of 22: 1.727 m (5' 8\").    Weight as of this encounter: 88.9 kg (196 lb).  BP completed using cuff size: regular    Questioned patient about current smoking habits.  Pt. has never smoked.          The following HM Due: NONE      The following patient reported/Care Every where data was sent to:  P ABSTRACT QUALITY INITIATIVES [[id number]]        Vanessa Willingham [facility]                 "

## 2022-09-26 NOTE — PATIENT INSTRUCTIONS
You can reach your [facility] Care Team any time of the day by calling [phone]. This number will put you in touch with the 24 hour nurse line if the clinic is closed.    To contact your OB/GYN Station Coordinator/Surgery Scheduler please call [phone]. This is a direct number for your care team between 8 a.m. and 4 p.m. Monday through Friday.    [facility] is open for your convenience:  Monday through Friday 8 a.m. to 6 p.m.  Closed weekends and all major holidays.

## 2022-10-11 NOTE — TELEPHONE ENCOUNTER
Pt calling.   She has been seen recently for vaginal issues.     States that she woke up around 0400 with severe vag burning and irritation.     She is not sure what she should do.     Last OV 9/26/22.    She did use metrogel last night. This was not the first time that she has used this.     I did recommend that she not use the metrogel again, until she hears from the md.    Jamia LUNA RN

## 2022-10-12 NOTE — TELEPHONE ENCOUNTER
Pt called, advised to add to schedule Thursday as Dr Enriquez requested.    Maria Isabel Walker RN

## 2022-10-12 NOTE — PROGRESS NOTES
Psychiatric    OUTPATIENT PHYSICAL THERAPY  PLAN OF TREATMENT FOR OUTPATIENT REHABILITATION AND PROGRESS NOTE           Patient's Last Name, First Name, Mayra Germain Date of Birth  1965   Provider's Name  Psychiatric Medical Record No.  [id number]    Onset Date  6/2/22 Start of Care Date  6/13/22   Type:     _X_PT   ___OT   ___SLP Medical Diagnosis  Left arm swelling, Post-mastectomy lymphedema syndrome, Scar condition and fibrosis of skin, Contracture, left shoulder, Breast cancer metastasized to brain, unspecified laterality (H)   PT Diagnosis  Secondary lymphedema  Plan of Treatment  Frequency/Duration: 1 additional visit   Certification date from 9/11/22 to 12/9/22     Goals:  See below              I CERTIFY THE NEED FOR THESE SERVICES FURNISHED UNDER        THIS PLAN OF TREATMENT AND WHILE UNDER MY CARE     (Physician co-signature of this document indicates review and certification of the therapy plan).              Referring Provider: Dr. Carol Vallecillo           10/12/22 1500   Signing Clinician's Name / Credentials   Signing clinician's name / credentials Nikita Waldrop, PT, CLT   Session Number   Session Number 9/8   Progress Note/Recertification   Progress Note Due Date   (visit 8)   Recertification Due Date 12/09/22   Recertification Completed Date  10/12/22   Goal 1   Goal identifier Pain   Goal description Pt will report decreased L chest/axilla pain from 9/10 to <5/10 with L arm movements to improve quality of life. - Goal improving - pain is 5/10 today with movement   Target date 09/05/22   Goal 2   Goal identifier Home management   Goal description Pt and spouse will be indep with home management including exercises, MLD and use of compression. - Goal met - pt's spouse re-educated in home management techniques on 10/12/22   Target  date 09/05/22   Subjective Report   Subjective Report Pt reports that in the last week her R knee has been sore. The left shoulder hurts when lying on it at night. Reaching overhead is OK. Has itching on her left chest wall.   Objective Measures   Objective Measures Objective Measure 1;Objective Measure 2   Objective Measure 1   Objective Measure ROM   Details L shoulder flex 150 degrees and abd 175 degrees without pain end range, ER 55 degrees with tightness end range, IR T5 without pain or tightness   Objective Measure 2   Objective Measure L chest wall mobility   Details Mod to significant scar adhesions L chest wall with tenderness, mild edema noted left upper breast and anterior axilla   System Outcome Measures   Lymphedema Life Impact Scale (score range 0-72). A higher score indicates greater impairment. 5   Treatment Interventions   Interventions Therapeutic Procedure/Exercise;Manual Therapy   Therapeutic Procedure/exercise   Therapeutic Procedures: strength, endurance, ROM, flexibillity minutes (30363) 10   Skilled Intervention Pt given verbal, visual and tactile cueing with demonstration to perform correct technique and appropriate alignment for exercises attempted. Provided rationale for exercises performed and educated on the importance of completing them as instructed.   Patient Response Tolerate well, understood education   Treatment Detail Pt re-educated in HEP for ROM and lymphatic stimulation exercises and reprinted HEP.   Manual Therapy   Manual Therapy: Mobilization, MFR, MLD, friction massage minutes (11614) 43   Skilled Intervention MLD, STM, scar massage   Patient Response Tolerated well   Treatment Detail Pt s spouse present - educated spouse in MLD techniques with written instructions as follows: Pt supine with legs on bolster, short neck sequence, deep breathing, B axillary and left inguinal node clearing followed by MLD to left chest wall using axillo-axillary, axillo-clavicular and  axillo-inguinal anastomoses.   Education   Learner Patient   Readiness Eager   Method Booklet/handout   Response Verbalizes Understanding   Communication with other professionals   Communication with other professionals patient has left clavicular titanium implant.   Plan   Homework PTRx   Home program lymph stim, scap squeeze, flx AAROM, supine pec stretch   Plan for next session N/A   Comments   Comments Pt is a 56 year-old woman with chief complaint swelling and limited scar tissue mobility s/p left breast cancer, mastectomy, radiation, chemo, brain metastases and all left axillary lymph nodes removed in 2004. Pt and spouse present today to re-learn home management including exercise and particularly MLD techniques. Re-opened plan of care today to accomplish this (pt s spouse called after last session which was initial D/C session) and pt and spouse feel more comfortable with education today to carry on home management. She has now been seen for 9 visits from 6/13/22 to present with treatment focused on therapeutic exercise to improve L shoulder motion and decrease pain and manual techniques to decrease swelling and improve scar/tissue mobility. D/C at this time with better focus on home management.   Total Session Time   Timed Code Treatment Minutes 53   Total Treatment Time (sum of timed and untimed services) 53   AMBULATORY CLINICS ONLY-MEDICAL AND TREATMENT DIAGNOSIS   Medical diagnosis Left arm swelling, Post-mastectomy lymphedema syndrome, Scar condition and fibrosis of skin, Contracture, left shoulder, Breast cancer metastasized to brain, unspecified laterality (H)   Therapy diagnosis secondary lymphedema         Nikita Waldrop, PT    10/12/2022

## 2022-10-12 NOTE — TELEPHONE ENCOUNTER
Per note from Dr Enriquez he wants pt to be worked on on Thurs.  I tried calling and left another msg for pt to CB.    Maria Isabel Walker RN

## 2022-10-12 NOTE — TELEPHONE ENCOUNTER
Called pt, Dr Enriquez has no openings this week.  Planned to offer appt today with other OBGYN if she will see them, other option is eval in urgent care.    LM for pt to Cb.    Maria Isabel Walker RN

## 2022-10-12 NOTE — TELEPHONE ENCOUNTER
"Mayra is calling about continued, and worsening vaginal symptoms.    2 nights ago, she woke up ~4 am with burning, vaginal pain  - Pain has been intermittent  - Itching  - Cottage cheese like discharge  - \"bad smelling\" discharge    She saw Dr. Enriquez on 9/26/22.  - Treated with Metrogel  - Tried monistat 1 day and the 7 day - She states that's when she started having the burning vaginal pain    Advised to see PCP within 24 hours  Scheduling offered - Mayra only wants to be treated by Dr. Enriquez    Notified that message would be sent to provider    Please contact her via Phone at [phone]  (She has not been able to access her [employer] account since Aug.)    Milly Doran RN  [facility] Nurse Advisors      Reason for Disposition    [1] Rash (e.g., redness, tiny bumps, sore) of genital area AND [2] present > 24 hours    Additional Information    Negative: [1] SEVERE abdominal pain (e.g., excruciating) AND [2] present > 1 hour    Negative: Patient sounds very sick or weak to the triager    Negative: [1] Yellow or green vaginal discharge AND [2] fever    Negative: [1] Genital area looks infected (e.g., draining sore, spreading redness) AND [2] fever    Negative: [1] Constant abdominal pain AND [2] present > 2 hours    Negative: [1] Mild lower abdominal pain comes and goes (cramps) AND [2] lasts > 24 hours    Negative: Genital area looks infected (e.g., draining sore, spreading redness)    Negative: [1] Rash is tiny water blisters AND [2] 3 or more    Protocols used: VAGINAL [id number]      "

## 2022-10-13 NOTE — PATIENT INSTRUCTIONS
You can reach your [facility] Care Team any time of the day by calling [phone]. This number will put you in touch with the 24 hour nurse line if the clinic is closed.    To contact your OB/GYN Station Coordinator/Surgery Scheduler please call [phone]. This is a direct number for your care team between 8 a.m. and 4 p.m. Monday through Friday.    [facility] is open for your convenience:  Monday through Friday 8 a.m. to 6 p.m.  Closed weekends and all major holidays.

## 2022-10-13 NOTE — NURSING NOTE
"Chief Complaint   Patient presents with     Follow Up     Vaginal itching and burning       Initial /74   Wt 88.9 kg (196 lb)   LMP  (LMP Unknown)   BMI 29.80 kg/m   Estimated body mass index is 29.8 kg/m  as calculated from the following:    Height as of 22: 1.727 m (5' 8\").    Weight as of this encounter: 88.9 kg (196 lb).  BP completed using cuff size: regular    Questioned patient about current smoking habits.  Pt. has never smoked.          The following HM Due: NONE      The following patient reported/Care Every where data was sent to:  P ABSTRACT QUALITY INITIATIVES [[id number]]        Vanessa Willingham CMA                 "
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
## 2022-10-15 NOTE — RESULT ENCOUNTER NOTE
Mayra, the  results of your urine culture  are within normal limits.  There is no infection present  Bryson Enriquez M.D.

## 2022-10-16 NOTE — TELEPHONE ENCOUNTER
I spoke to the patient today about right lower cautery and discomfort. She last had a menses 3 weeks ago and today had some right lower cautery discomfort that she would rate anywhere between a 3-5 on a 1-10 scale. The patient hasn't taken anything for pain. 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 I discussed trying Tylenol and Advil for the discomfort. If he takes the pain away I think we can manage her expectantly if it doesn't improve her symptoms I told her I be happy to see her at her earliest convenience  Bryson Enriquez M.D.
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
## 2022-10-20 NOTE — PATIENT INSTRUCTIONS
You can reach your [facility] Care Team any time of the day by calling [phone]. This number will put you in touch with the 24 hour nurse line if the clinic is closed.    To contact your OB/GYN Station Coordinator/Surgery Scheduler please call [phone]. This is a direct number for your care team between 8 a.m. and 4 p.m. Monday through Friday.    [facility] is open for your convenience:  Monday through Friday 8 a.m. to 6 p.m.  Closed weekends and all major holidays.

## 2022-10-20 NOTE — NURSING NOTE
"Chief Complaint   Patient presents with     Vaginal Problem       Initial /74   Wt 88.9 kg (196 lb)   LMP  (LMP Unknown)   BMI 29.80 kg/m   Estimated body mass index is 29.8 kg/m  as calculated from the following:    Height as of 22: 1.727 m (5' 8\").    Weight as of this encounter: 88.9 kg (196 lb).  BP completed using cuff size: regular    Questioned patient about current smoking habits.  Pt. has never smoked.          The following HM Due: NONE      The following patient reported/Care Every where data was sent to:  P ABSTRACT QUALITY INITIATIVES [[id number]]        Vanessa Willingham [facility]                 "

## 2022-10-20 NOTE — TELEPHONE ENCOUNTER
Pt calling with concerns of bleeding when peeing after straight cath today from office visit. Noticed blood on toilet paper when wiping, bright red  Denies fever, denies bladder fullness or pain with urinating.        Triage to see PCP within 2 weeks, care advice given. Call back if symptoms worsen. Pt verbalized understanding.    Tammie Mark RN, BSN  10/20/2022 at 7:05 PM  [facility]          Reason for Disposition    All other urine symptoms    Followed a female genital area injury (e.g., vagina, vulva)    Additional Information    Negative: Shock suspected (e.g., cold/pale/clammy skin, too weak to stand, low BP, rapid pulse)    Negative: Sounds like a life-threatening emergency to the triager    Negative: [1] Unable to urinate (or only a few drops) > 4 hours AND [2] bladder feels very full (e.g., palpable bladder or strong urge to urinate)    Negative: [1] Decreased urination and [2] drinking very little AND [2] dehydration suspected (e.g., dark urine, no urine > 12 hours, very dry mouth, very lightheaded)    Negative: Patient sounds very sick or weak to the triager    Negative: Fever > 100.4 F (38.0 C)    Negative: Side (flank) or lower back pain present    Negative: [1] Can't control passage of urine (i.e., urinary incontinence) AND [2] new-onset (< 2 weeks) or worsening    Negative: Urinating more frequently than usual (i.e., frequency)    Negative: Bad or foul-smelling urine    Negative: [1] Can't control passage of urine (i.e., urinary incontinence, wetting self) AND [2] present > 2 weeks    Negative: Urination is difficult to start (i.e., hesitancy) or straining    Negative: Dribbling (losing urine) just after finishing urination (i.e., post-void dribbling)    Negative: Has to get out of bed to urinate > 2 times a night (i.e., nocturia)    Protocols used: URINARY SYMPTOMS-A-AH

## 2022-10-20 NOTE — PROGRESS NOTES
Brain MRI has been scheduled on 9/7/2021.  No further action needed at this time.  Will close encounter.  Jody Dutton, RN, BSN, OCN, CBCN   
David Wu MD Nielsen, Karla, RN  She should get repeat MRI   I will order     Pt was notified with recommendations per Dr. Wu.  Will send a message to  staff to schedule appt for MRI.  Patient verbalized understanding and agreement with plan.  Pt was instructed to call the clinic with any questions, concerns, or worsening symptoms.  Jody Dutton RN, BSN, OCN, CBCN   
Pt called the clinic.   She has a history of metastatic breast cancer diagnosed in 2005. Pt developed brain metastasis in 2005.  Her last appt with Dr. Wu was on 2/12/2020.  She is supposed to be seeing Dr. Wu annually.    Pt reports that for the past 1-2 months, she has noticed an increase in headaches. They come and go, and are present every few days. Rates pain at 4-5 out of 10. Denies nausea/vomiting or visual changes. Has been taking ibuprofen for relief.    Also states that she has been having intermittent dizziness for the past month.  She has fallen 2 times over the past month. One of the times she was walking up the stairs and tripped over a box and fell and hit her head on the door. This happened 3-4 weeks ago.  Another time she was walking her dog, and the leash got tangled around her legs and she fell.    Will send a message to Dr. Wu for advice.  OK to leave a message if unable to reach pt.  Jody Dutton, RN, BSN, OCN, CBCN    
24

## 2022-10-22 NOTE — RESULT ENCOUNTER NOTE
Mayra, the urine analysis and wet prep test that I took at your recent visit are normal.  The preliminary reports on the urine culture is negative.  Lets go with the plan that we talked with using boric acid suppositories as we discussed.  Please let me know if you have any questions  Thank you  Bryson Enriquez M.D.

## 2022-11-09 PROBLEM — K29.70 GASTRITIS: Status: ACTIVE | Noted: 2018-04-20

## 2022-11-09 PROBLEM — K22.4 DIFFUSE SPASM OF ESOPHAGUS: Status: ACTIVE | Noted: 2020-08-25

## 2022-11-09 PROBLEM — K22.4 ESOPHAGEAL DYSMOTILITY: Status: ACTIVE | Noted: 2022-01-01

## 2022-11-09 PROBLEM — R13.19 INTERMITTENT DYSPHAGIA: Status: ACTIVE | Noted: 2018-04-23

## 2022-11-09 PROBLEM — T66.XXXA EFFECT, RADIATION: Status: ACTIVE | Noted: 2022-01-01

## 2022-11-09 PROBLEM — K22.9 DISORDER OF ESOPHAGUS: Status: ACTIVE | Noted: 2020-08-25

## 2022-11-09 PROBLEM — R13.19 ESOPHAGEAL DYSPHAGIA: Status: ACTIVE | Noted: 2018-04-27

## 2022-11-09 PROBLEM — R41.89 ALTERATION IN COGNITION: Status: ACTIVE | Noted: 2022-01-01

## 2022-11-09 PROBLEM — K31.9 DISORDER OF STOMACH: Status: ACTIVE | Noted: 2018-04-20

## 2022-12-20 NOTE — TELEPHONE ENCOUNTER
Spoke to patient reminding them to fill out the new patient questionnaire before their appointment.    Venita COOK MA

## 2023-01-01 ENCOUNTER — VIRTUAL VISIT (OUTPATIENT)
Dept: ENDOCRINOLOGY | Facility: CLINIC | Age: 58
End: 2023-01-01
Payer: MEDICARE

## 2023-01-01 ENCOUNTER — TELEPHONE (OUTPATIENT)
Dept: FAMILY MEDICINE | Facility: CLINIC | Age: 58
End: 2023-01-01
Payer: COMMERCIAL

## 2023-01-01 ENCOUNTER — NURSE TRIAGE (OUTPATIENT)
Dept: NURSING | Facility: CLINIC | Age: 58
End: 2023-01-01
Payer: COMMERCIAL

## 2023-01-01 ENCOUNTER — TELEPHONE (OUTPATIENT)
Dept: ENDOCRINOLOGY | Facility: CLINIC | Age: 58
End: 2023-01-01
Payer: COMMERCIAL

## 2023-01-01 ENCOUNTER — HEALTH MAINTENANCE LETTER (OUTPATIENT)
Age: 58
End: 2023-01-01

## 2023-01-01 ENCOUNTER — OFFICE VISIT (OUTPATIENT)
Dept: OBGYN | Facility: CLINIC | Age: 58
End: 2023-01-01
Payer: MEDICARE

## 2023-01-01 ENCOUNTER — TELEPHONE (OUTPATIENT)
Dept: ENDOCRINOLOGY | Facility: CLINIC | Age: 58
End: 2023-01-01

## 2023-01-01 ENCOUNTER — TELEPHONE (OUTPATIENT)
Dept: OBGYN | Facility: CLINIC | Age: 58
End: 2023-01-01
Payer: COMMERCIAL

## 2023-01-01 VITALS — WEIGHT: 193 LBS | HEIGHT: 68 IN | BODY MASS INDEX: 29.25 KG/M2

## 2023-01-01 VITALS — WEIGHT: 190 LBS | HEIGHT: 68 IN | BODY MASS INDEX: 28.79 KG/M2

## 2023-01-01 VITALS — DIASTOLIC BLOOD PRESSURE: 82 MMHG | SYSTOLIC BLOOD PRESSURE: 116 MMHG

## 2023-01-01 DIAGNOSIS — M81.8 OTHER OSTEOPOROSIS WITHOUT CURRENT PATHOLOGICAL FRACTURE: ICD-10-CM

## 2023-01-01 DIAGNOSIS — R39.15 URINARY URGENCY: ICD-10-CM

## 2023-01-01 DIAGNOSIS — E66.3 OVERWEIGHT (BMI 25.0-29.9): ICD-10-CM

## 2023-01-01 DIAGNOSIS — Z71.3 NUTRITIONAL COUNSELING: Primary | ICD-10-CM

## 2023-01-01 DIAGNOSIS — N90.89 VULVAR IRRITATION: Primary | ICD-10-CM

## 2023-01-01 DIAGNOSIS — N94.89 VAGINAL BURNING: ICD-10-CM

## 2023-01-01 DIAGNOSIS — R30.0 BURNING WITH URINATION: Primary | ICD-10-CM

## 2023-01-01 DIAGNOSIS — Z85.89 HISTORY OF CANCER METASTATIC TO BRAIN: Primary | ICD-10-CM

## 2023-01-01 DIAGNOSIS — K21.9 GASTROESOPHAGEAL REFLUX DISEASE WITHOUT ESOPHAGITIS: ICD-10-CM

## 2023-01-01 LAB
ALBUMIN UR-MCNC: NEGATIVE MG/DL
APPEARANCE UR: CLEAR
BACTERIA #/AREA URNS HPF: ABNORMAL /HPF
BACTERIA UR CULT: NORMAL
BILIRUB UR QL STRIP: NEGATIVE
CLUE CELLS: ABNORMAL
COLOR UR AUTO: YELLOW
GLUCOSE UR STRIP-MCNC: NEGATIVE MG/DL
HGB UR QL STRIP: NEGATIVE
KETONES UR STRIP-MCNC: NEGATIVE MG/DL
LEUKOCYTE ESTERASE UR QL STRIP: NEGATIVE
MUCOUS THREADS #/AREA URNS LPF: PRESENT /LPF
NITRATE UR QL: NEGATIVE
PH UR STRIP: 7 [PH] (ref 5–7)
RBC #/AREA URNS AUTO: ABNORMAL /HPF
SP GR UR STRIP: 1.01 (ref 1–1.03)
SQUAMOUS #/AREA URNS AUTO: ABNORMAL /LPF
TRICHOMONAS, WET PREP: ABNORMAL
UROBILINOGEN UR STRIP-ACNC: 0.2 E.U./DL
WBC #/AREA URNS AUTO: ABNORMAL /HPF
WBC'S/HIGH POWER FIELD, WET PREP: ABNORMAL
YEAST, WET PREP: ABNORMAL

## 2023-01-01 PROCEDURE — 99214 OFFICE O/P EST MOD 30 MIN: CPT | Mod: VID

## 2023-01-01 PROCEDURE — 87210 SMEAR WET MOUNT SALINE/INK: CPT | Performed by: OBSTETRICS & GYNECOLOGY

## 2023-01-01 PROCEDURE — 99213 OFFICE O/P EST LOW 20 MIN: CPT | Performed by: OBSTETRICS & GYNECOLOGY

## 2023-01-01 PROCEDURE — 81001 URINALYSIS AUTO W/SCOPE: CPT | Performed by: OBSTETRICS & GYNECOLOGY

## 2023-01-01 PROCEDURE — 97802 MEDICAL NUTRITION INDIV IN: CPT | Mod: TEL | Performed by: DIETITIAN, REGISTERED

## 2023-01-01 PROCEDURE — 87086 URINE CULTURE/COLONY COUNT: CPT | Performed by: OBSTETRICS & GYNECOLOGY

## 2023-01-01 PROCEDURE — 99205 OFFICE O/P NEW HI 60 MIN: CPT | Mod: GT

## 2023-01-01 RX ORDER — SEMAGLUTIDE 0.25 MG/.5ML
0.25 INJECTION, SOLUTION SUBCUTANEOUS WEEKLY
Qty: 2 ML | OUTPATIENT
Start: 2023-01-01

## 2023-01-01 RX ORDER — SEMAGLUTIDE 0.5 MG/.5ML
0.5 INJECTION, SOLUTION SUBCUTANEOUS WEEKLY
Qty: 2 ML | Refills: 1 | Status: SHIPPED | OUTPATIENT
Start: 2023-01-01

## 2023-01-01 RX ORDER — SEMAGLUTIDE 0.25 MG/.5ML
0.25 INJECTION, SOLUTION SUBCUTANEOUS WEEKLY
Qty: 2 ML | Refills: 1 | Status: SHIPPED | OUTPATIENT
Start: 2023-01-01

## 2023-01-01 RX ORDER — MIRABEGRON 25 MG/1
50 TABLET, FILM COATED, EXTENDED RELEASE ORAL DAILY
Qty: 180 TABLET | Refills: 2 | Status: SHIPPED | OUTPATIENT
Start: 2023-01-01

## 2023-01-01 RX ORDER — SEMAGLUTIDE 0.25 MG/.5ML
0.25 INJECTION, SOLUTION SUBCUTANEOUS WEEKLY
Qty: 2 ML | Refills: 0 | Status: SHIPPED | OUTPATIENT
Start: 2023-01-01 | End: 2023-01-01

## 2023-01-01 RX ORDER — CLOTRIMAZOLE 1 %
CREAM (GRAM) TOPICAL 2 TIMES DAILY
Qty: 85 G | Refills: 0 | Status: SHIPPED | OUTPATIENT
Start: 2023-01-01

## 2023-01-24 NOTE — PROGRESS NOTES
"Virtual Visit Check-In    During this virtual visit the patient is located in [place], patient verifies this as the location during the entirety of this visit.     Mayra is a 57 year old who is being evaluated via a billable video visit.      How would you like to obtain your AVS? MyChart  If the video visit is dropped, the invitation should be resent by: Text to cell phone: [phone]  Will anyone else be joining your video visit? No        Video-Visit Details    Type of service:  Video Visit   Originating Location (pt. Location): Home    Distant Location (provider location):  Off-site  Platform used for Video Visit: [employer]         Video Start Time: 10:00AM  Video End Time:11:08AM    Mariana Valverde NREMT      85 minutes spent on the date of the encounter doing chart review, history and exam, documentation and further activities per the note    New Medical Weight Management Consult    PATIENT:  Mayra Ramirez  MRN:         [id number]  :         1965  ZHANE:         2023    Dear Dr. Jojo Montoya,    I had the pleasure of seeing your patient, Mayra Ramirez. Full intake/assessment was done to determine barriers to weight loss success and develop a treatment plan. Mayra Ramirez is a 57 year old female interested in treatment of medical problems associated with excess weight. She has a height of 5' 8\", a weight of 190 lbs 0 oz, and the calculated Body mass index is 28.89 kg/m .        Assessment & Plan   Problem List Items Addressed This Visit        Other    Overweight (BMI 25.0-29.9)     Weight gain for the past 2 years with increase worsening of balance and short term memory. Has a history of breast cancer, treated with double mastectomy, oophorectomy, chemo and radiation from 0060-1936. Balance and memory loss are both side effects of radiation treatment. Previously has always been weight stable 150-160lbs, not struggling with weight gain most of her life. Has gained weight in her life due " to pregnancies and chronic steroid use, but has always been able to lose weight easily. Is now struggling to lose weight. Weight today is around 190-195lbs, she is not sure.     Has an [employer]/live-in assistant that helps with food and house chores. Mayra chooses from a food menu that was created by the [employer] and  for best help. She seems to struggle with hunger at times. But unsure due to memory loss.     During the visit, Mayra's  was present to help with any memory loss concerns and any clarifications needed.     Discussed AOMs today. Mayra was interested in starting one today:   - Phentermine - contraindicated due to currently struggles with agitation at time with memory loss.   - Topriamate - contraindicated due to memory loss and concern for brain fog side effect   - Naltrexone - contraindicated due to currently struggling with balance concerns and concern for increase dizziness side effect   - GLP-1 to be started today to help with weight loss. Discussed side effects, risks, and benefits. Will monitor for any changes in GERD symptoms. Confirmed mother had papillary thyroid cancer. Will start Wegovy if covered. Consider ozempic or saxenda as needed.          Relevant Medications    Semaglutide-Weight Management (WEGOVY) 0.25 MG/0.5ML SOAJ    Semaglutide-Weight Management (WEGOVY) 0.5 MG/0.5ML SOAJ (Start on 2/25/2023)      1. Start Wegovy 0.25mg once weekly for 4 weeks, increase to 0.5mg once weekly. Can consider Ozempic or Saxenda as needed.  2. Follow up with dietitian as needed   3. Follow up with Benita Mcmillan in 5 weeks           Mayra Ramirez is a 57 year old female who presents to clinic today. She has the following co-morbidities:       12/20/2022   I have the following health issues associated with obesity: Lymphedema   I have the following symptoms associated with obesity: Knee Pain, Back Pain     Bilateral breast cancer, with mets to cerebellum - leading to ovariectomy and bilateral  "mastectomy. Treated with chemo and radiation to brain.Treatment from [id number]. Since then has struggled with memory loss, and is now seeing new onset balance concerns.    GERD - will bend over will have acid reflux at times. Otherwise well controlled. Taking Protonix 40mg BID. EGD in past showed gastritis.     Hypothyroid -  Not on medications     Osteoporosis - recent diagnosis. Treatment with Prolia.     Lymphedema - in left shoulder due to mastectomy and clavical fracture.      Balance - due to brain radiation. Was going to PT/OT, but no longer covered by insurance. Was helpful previously. Followed by neurology     Patient Goals 12/20/2022   I am interested in having a healthier weight to diminish current health problems: Yes   I am interested in having a healthier weight in order to prevent future health problems: Yes   I am interested in having a healthier weight in order to have a future surgery: No       Referring Provider 12/20/2022   Please name the provider who referred you to Medical Weight Management.  If you do not know, please answer: \"I Don't Know\". Dr. Kilgore       Weight History 12/20/2022   How concerned are you about your weight? Very Concerned   Would you describe your weight gain as gradual? Yes   I became overweight: As an Adult   The following factors have contributed to my weight gain:  A Health Crisis, Lack of Exercise   My lowest weight since age 17 was: 140   My highest weight since age 17 was: 180   The most weight I have ever lost was: (lbs) 5   I have the following family history of obesity/being overweight:  One or more of my siblings are overweight   Has anyone in your family had weight loss surgery? No   How has your weight changed over the last year?  Gained   How many pounds? 20     Previously was stable at 160-170lbs for most of her adult life, never struggling with weight gain or obesity. Has gained weight in the past through 4 pregnancies and when she was on steroids, but was " always able to lose the weight easily. Over the past few years has struggled with weight gain, especially as her balance has worsened from a side effect of radiation back in 2007. Has tried to lose weight with diets and weight watchers, but has struggled. Is concerned about what would be the best option for diet and weightloss medication options.      Weight today - 190lbs    Eating 3 meals a day, snacks. Has a live-in  that helps Mayra with food and house work. She makes all of Mayra's meals. Was previously struggling with food options, so now has a menu she picks off every day for easy options. Sometimes she feels hungry. If heartburn is bad will eat ritz crackers for relief.. Breakfast - protein shake. No thoughts of food or cravings. Can get full, but at times struggles with staying full. Drinks water. Order food in 1x week - pizza. Snacks - fruit, crackers, cheese, apple + peanutbutter, chocolate.    Activity - 15min of walking with walker daily. A year ago could walk in neighborhood. Activity is very limited due to balance concerns.     Has not tried AOMs in the past.     Diet Recall Review with Patient 12/20/2022   Do you typically eat breakfast? Yes   If you do eat breakfast, what types of food do you eat? Yogurt and Muesli, Oatmeal and blueberries   Do you typically eat lunch? Yes   If you do eat lunch, what types of food do you typically eat?  Soup, salad   Do you typically eat supper? Yes   If you do eat supper, what types of food do you typically eat? Soup and [place]   Do you typically eat snacks? Yes   If you do snack, what types of food do you typically eat? fruit   Do you like vegetables?  Yes   Do you drink water? Yes   How many glasses of juice do you drink in a typical day? 0   How many of glasses of milk do you drink in a typical day? 2   If you do drink milk, what type? N/A   How many 8oz glasses of sugar containing drinks such as Garry-Aid/sweet tea do you drink in a day? 0   How many  cans/bottles of sugar pop/soda/tea/sports drinks do you drink in a day? 0   How many cans/bottles of diet pop/soda/tea or sports drink do you drink in a day? 0   How often do you have a drink of alcohol? Never       Eating Habits 12/20/2022   Generally, my meals include foods like these: bread, pasta, rice, potatoes, corn, crackers, sweet dessert, pop, or juice. A Few Times a Week   Generally, my meals include foods like these: fried meats, brats, burgers, french fries, pizza, cheese, chips, or ice cream. Never   Eat fast food (like [employer], [employer], [employer]). Never   Eat at a buffet or sit-down restaurant. Less Than Weekly   Eat most of my meals in front of the TV or computer. A Few Times a Week   Often skip meals, eat at random times, have no regular eating times. Never   Rarely sit down for a meal but snack or graze throughout.  Never   Eat extra snacks between meals. A Few Times a Week   Eat most of my food at the end of the day. Never   Eat in the middle of the night or wake up at night to eat. Never   Eat extra snacks to prevent or correct low blood sugar. Never   Eat to prevent acid reflux or stomach pain. A Few Times a Week   Worry about not having enough food to eat. Never   Have you been to the food shelf at least a few times this year? No   I eat when I am depressed. Less Than Weekly   I eat when I am stressed. Less Than Weekly   I eat when I am bored. Less Than Weekly   I eat when I am anxious. Never   I eat when I am happy or as a reward. Never   I feel hungry all the time even if I just have eaten. Less Than Weekly   Feeling full is important to me. Less Than Weekly   I finish all the food on my plate even if I am already full. Less Than Weekly   I can't resist eating delicious food or walk past the good food/smell. Less Than Weekly   I eat/snack without noticing that I am eating. Less Than Weekly   I eat when I am preparing the meal. Never   I eat more than usual when I see others eating. Less  Than Weekly   I have trouble not eating sweets, ice cream, cookies, or chips if they are around the house. Once a Week   I think about food all day. Less Than Weekly   What foods, if any, do you crave? Sweets/Candy/Chocolate       Amount of Food 12/20/2022   I make myself vomit what I have eaten or use laxatives to get rid of food. Never   I eat a large amount of food, like a loaf of bread, a box of cookies, a pint/quart of ice cream, all at once. Never   I eat a large amount of food even when I am not hungry. Never   I eat rapidly. Never   I eat alone because I feel embarrassed and do not want others to see how much I have eaten. Never   I eat until I am uncomfortably full. Never   I feel bad, disgusted, or guilty after I overeat. Never   I make myself vomit what I have eaten or use laxatives to get rid of food. Never       Activity/Exercise History 12/20/2022   How much of a typical 12 hour day do you spend sitting? Most of the Day   How much of a typical 12 hour day do you spend lying down? Less Than Half the Day   How much of a typical day do you spend walking/standing? Less Than Half the Day   How many hours (not including work) do you spend on the TV/Video Games/Computer/Tablet/Phone? 6 Hours or More   How many times a week are you active for the purpose of exercise? 4-5 TImes a Week   What keeps you from being more active? Too tired   How many total minutes do you spend doing some activity for the purpose of exercising when you exercise? 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       Work/Social History Reviewed With Patient 12/20/2022   My employment status is: Retired   My job is: retired   How much of your job is spent on the computer or phone? Less Than 50%   What is your marital status? /In a Relationship   If in a relationship, is your significant other overweight? No   Do you have children? Yes   If you have children, are they overweight? No   Who do you live with?     Are they supportive of your health goals? Yes   Who does the food shopping?  i shop with my care assistant   Has been on disability for the past 5 years due to memory loss from radiation for breast cancer treatment. Very supportive  and family. Has an [employer]/care assistant that helps with all daily needs and house chores. Due to her memory concerns,  is very active in her health and daily activies. Has 4 children who all live outside of the house.     Mental Health History Reviewed With Patient 12/20/2022   Have you ever been physically or sexually abused? No   If yes, do you feel that the abuse is affecting your weight? No   If yes, would you like to talk to a counselor about the abuse? N/A   How often in the past 2 weeks have you felt little interest or pleasure in doing things? More Than Half the Days   Over the past 2 weeks how often have you felt down, depressed, or hopeless? Not at all       Sleep History Reviewed With Patient 12/20/2022   How many hours do you sleep at night? 8   Do you think that you snore loudly or has anybody ever heard you snore loudly (louder than talking or so loud it can be heard behind a shut door)? No   Has anyone seen or heard you stop breathing during your sleep? No   Do you often feel tired, fatigued, or sleepy during the day? Yes   Do you have a TV/Computer in your bedroom? No       MEDICATIONS:   Current Outpatient Medications  "  Medication Sig Dispense Refill     CALCIUM + D OR None Entered       denosumab (PROLIA) 60 MG/ML SOSY injection Inject 60 mg Subcutaneous       donepezil (ARICEPT) 5 MG tablet Take 5 mg by mouth       metroNIDAZOLE (METROGEL) 0.75 % vaginal gel Place 1 applicator (5 g) vaginally daily 70 g 0     mirabegron (MYRBETRIQ) 25 MG 24 hr tablet Take 2 tablets (50 mg) by mouth daily 180 tablet 2     Multiple Minerals-Vitamins (BONE DENSITY BUILDER PO)        pantoprazole (PROTONIX) 40 MG EC tablet Take by mouth every 12 hours       romosozumab-aqqg (EVENITY) 105 MG/1.17ML SOSY injection        Semaglutide-Weight Management (WEGOVY) 0.25 MG/0.5ML SOAJ Inject 0.25 mg Subcutaneous once a week For 4 weeks 2 mL 0     [START ON 2/25/2023] Semaglutide-Weight Management (WEGOVY) 0.5 MG/0.5ML SOAJ Inject 0.5 mg Subcutaneous once a week After completing 4 weeks of the 0.25mg dose 2 mL 1     vitamin D2 (ERGOCALCIFEROL) 85471 units (1250 mcg) capsule TAKE 1 CAPSULE BY MOUTH ONCE A WEEK 8 capsule 0     ALOE VERA JUICE PO        lifitegrast (XIIDRA) 5 % opthalmic solution Apply to eye every 12 hours       NEW MED Take 3 capsules by mouth Brain restore       order for DME 2 Mastectomy Bras and 2 Prosthesis 2 Units 1     oxyquinoline-sodium lauryl sulfate (TRIMO-SAWANT) 0.025-0.01 % GEL vaginal gel Place 2 g vaginally continuous prn (for pessary change) 113.4 g 3     pantoprazole (PROTONIX) 20 MG EC tablet Take 40 mg by mouth daily         ALLERGIES:   Allergies   Allergen Reactions     Septra [Bactrim] Hives     Hives     Sulfa Drugs Hives     Sulfamethoxazole-Trimethoprim      Sulfasalazine Hives     Trimethoprim Rash         Objective    Ht 1.727 m (5' 8\")   Wt 86.2 kg (190 lb)   LMP  (LMP Unknown)   BMI 28.89 kg/m           Vitals:  No vitals were obtained today due to virtual visit.    Physical Exam   GENERAL: Healthy, alert and no distress  EYES: Eyes grossly normal to inspection.  No discharge or erythema, or obvious " scleral/conjunctival abnormalities.  RESP: No audible wheeze, cough, or visible cyanosis.  No visible retractions or increased work of breathing.    SKIN: Visible skin clear. No significant rash, abnormal pigmentation or lesions.  NEURO: Cranial nerves grossly intact.  Mentation and speech appropriate for age.  PSYCH: Mentation appears normal, affect normal/bright, judgement and insight intact, normal speech and appearance well-groomed.     Anti-obesity medication ROS:    HEENT  Hx of glaucoma: No    Cardiovascular  CAD:No  HTN:No    Gastrointestinal  GERD:Yes  Constipation:No  Liver Dz:No  H/O Pancreatitis:No    Psychiatric  Bipolar: No  Anxiety:No  Depression:No  History of alcohol/drug abuse: No  Hx of eating disorder:No    Endocrine  Personal or family hx of MTC or MEN2:Yes, mom had papillary thyroid cancer.   Diabetes/prediabetes: No    Neurologic:  Hx of seizures: No  Hx of migraines: No  Memory Impairment: Yes      History of kidney stones: No  Kidney disease: No  Current birth control: No      Sincerely,    MARIELA WILSON PA-C

## 2023-01-25 PROBLEM — E66.3 OVERWEIGHT (BMI 25.0-29.9): Status: ACTIVE | Noted: 2023-01-01

## 2023-01-25 NOTE — LETTER
"1/25/2023       RE: [address] Ramirez  [address] [facility] [address]     Dear Colleague,    Thank you for referring your patient, Mayra Ramirez, to the [facility] at [facility]. Please see a copy of my visit note below.    Mayra Ramirez is a 57 year old female who is being evaluated via a billable telephone visit.     The patient has been notified of following:     \"This telephone visit will be conducted via a call between you and your physician/provider. We have found that certain health care needs can be provided without the need for a physical exam.  This service lets us provide the care you need with a short phone conversation.  If a prescription is necessary we can send it directly to your pharmacy.  If lab work is needed we can place an order for that and you can then stop by our lab to have the test done at a later time.    Telephone visits are billed at different rates depending on your insurance coverage. During this emergency period, for some insurers they may be billed the same as an in-person visit.  Please reach out to your insurance provider with any questions.    If during the course of the call the physician/provider feels a telephone visit is not appropriate, you will not be charged for this service.\"    Patient has given verbal consent for Telephone visit?  Yes    How would you like to obtain your AVS? Mail per pt. Will also send email per pt request but will not include personal information, just goals/resources    Phone call duration: 42 minutes    Distant Location (provider location):  Offsite (providers home)     During this virtual visit the patient is located in [place], patient verifies this as the location during the entirety of this visit.     New Weight Management Nutrition Consultation    Mayra Ramirez is a 57 year old female presents today for new weight management nutrition " "consultation.  Patient referred by Benita Ackerman on January 25, 2023.    Patient with Co-morbidities of obesity including:  Type II DM no  Renal Failure no  Sleep apnea no  Hypertension no   Dyslipidemia no  Joint pain yes  Back pain yes  GERD yes per pt, EGD showed gastritis     PMH: lymphedema, knee pain, hypothyroid, osteoporosis     Balance issues due to brain radiation - went to PT/OT previously.     Hx of bilateral breast cancer that also impacted cerebellum. Had surgery and radiation to brain. Memory issues related to this.     Anthropometrics:  Weight 1/25/23: 180 lbs with BMI 27.37    Estimated body mass index is 27.37 kg/m  as calculated from the following:    Height as of an earlier encounter on 1/25/23: 1.727 m (5' 8\").    Weight as of an earlier encounter on 1/25/23: 81.6 kg (180 lb).    Medications for Weight Loss:  Possibly trying for wegovy per ROBIN Ackerman 1/25/23    NUTRITION HISTORY  NKFA  Not huge on beef or fish.  really likes salmon  Has trouble with reflux - avoids red sauces. Bending over can trigger reflux. Also triggered by chocolate    has some food allergies but she is not sure details     Pt has never seen a RD before per her report. However, per chart review did see a RD - last note from 9/22/22.   Of note - patient has some memory issues related to brain cancer and hx of radiation.     Per RD note 9/22/22   Breakfast: Nutrition Shake with protein- 16 oz (ice-water and protein (240 kcals) and banana - 300 kcal  Lunch: oatmeal (150 kcal for 1/2 cup uncooked) with blueberries (1/4 cup) - 165 kcals  PM: Cheese (60-80 kcal) and a few crackers -Ritz/Club (80) - 150 kcal.  Dinner: Stir guevara with 1 cup couscous and veggies OR avocado and hummus quesadillas   Snacks: apple (80 kcal)  Beverages: Water all day      Pt goals: lose weight, eat healthier     Eats 3 meals/day and occ snacks    Typical day per pt  B - oatmeal or museli with yogurt   L - soup or salad  D - varies, examples: " zucchini boat with meat/cheese, toño     Lately has been eating a lot of bagels - really likes cinnamon sugar. Recommend bagel thin or whole wheat bagel if having.     Has a Oper that lives with them and helps with meals.     Feels she does well staying hydrated.    makes protein shakes for them in morning.   Also likes to drink water - gets about 2 16.9 fluid oz per day  Does not drink pop or juice.     Calcium: almond milk (450 mg per serving - 1 cup) - has about 1 cup/day with bagel or in oatmeal, yogurt - has coconut milk yogurt with 360 mg, salads occ but she gets sick of them, muesli     Per PA note   Activity - 15min of walking with walker daily. A year ago could walk in neighborhood. Activity is very limited due to balance concerns.     Nutrition Prescription  Recommended energy/nutrient modification.    Nutrition Diagnosis  Previous (from RD note 9/22/22)  Excessive energy intake related to eating more over holidays and family gatherings as evidenced by patient stating seeing weight gain and observed weight gain in last clinic weight.    Nutrition Intervention  Reviewed current dietary habits and pts history   Discussed long-term goals pt hopes to accomplish in RD appointments  Answered pt questions  Coordination of care   Nutrition education - meal ideas, plate method, starchy vs non-starchy vegetables, carbohydrate sources, lean protein, hydration, calcium  AVS and handouts via Digital Perceptiont     Patient demonstrates understanding.    Expected Engagement: good    Nutrition Goals  1. Consider whole wheat bagel or bagel thin with a little avocado or hummus more often than cinnamon sugar bagel with cream cheese.   2. Recommend getting ~1200 mg/day of calcium in diet (your almond milk has 450 mg/1 cup, 360 mg in your coconut yogurt and your muesli has 15 mg for reference)   3. Aim for 4 water bottles per day (about 67.6 oz if doing 4 of the 16.9 oz water bottles). We want urine to be pale in color    4. Recommend looking for higher protein yogurts that are still low in sugar such as greek yogurt  5. Recommend trying to eat the rainbow (different colors) to hit different micronutrients   6. Call [phone] to set up future RD appointments if interested  7. Recommend Plate Method (see below) and healthy recipe resources        Plate method can be used for general guidance on balanced meals/portion sizes (see link below for picture/more information)                 Make 1/2 your plate non starchy vegetables (examples below)                3+ oz lean protein sources (salmon/skinless chicken/turkey breast/pork loin/lean cuts of beef/ or non-animal protein such as black, kidney or chadwick beans, tofu/edamame/tempeh)     (Note: 3 oz = deck of cards size)                 1/2 to 1 cup carbohydrate choices such as whole grain starches or starchy vegetables or fruit. For example: quinoa, brown rice, barley, potatoes, sweet potatoes, winter squash, peas, corn, or fruit.               Choose ~0-2 added fat servings at a meal (avocados, nut butters, nuts or seeds, olive oil, vegetable oils).    The Plate Method:  https://www.cdc.gov/diabetes/images/managing/Diabetes-Manage-Eat-Well-Plate-Graphic_600px.jpg    Non-starchy Vegetables Examples:   - Lettuce   - Spinach   - Onions    - Bell pepper   - Carrots   - Broccoli   - Cabbage   - Brussel sprouts   - Cauliflower    - Asparagus    - Cucumber   - Artichoke   - Zucchini   - Bean sprouts   - Mushrooms   - Sugar snap peas   - Eggplant   - Turnips   - Celery   - Radishes    - Green beans    Starchy vegetable examples:  ? Corn  ? Green peas  ? Winter squash (butternut, spaghetti squash, acorn)  ? Beans/lentils (not including green beans)  ? Potatoes  ? Sweet potatoes       Tips for reflux:  - Eat slowly (20-30 minutes per meal), chewing foods well (25 chews per bite/applesauce consistency)  - Wait 3 hours after eating before lying down.  - Eat in a calm, relaxed place. Sit down  "while you eat  - Allow time between eating and sleeping. Eating too close to bed can cause reflux  - Reduce stress. Stress can cause reflux.    Possible dietary reflux triggers:  - alcohol, particularly red wine.  - chocolate  - citrus (lemon, orange juice, etc.)  - black pepper  - garlic  - raw onions  - spicy foods  - coffee and caffeinated drinks, including tea and soda.  - peppermint.  - tomatoes  - carbonation     Healthy Recipe Resources:    Books:    \"The Volumetrics Eating Plan\" by Barbra Bryant, Ph.D.    \"Cooking that Counts\" by editors of Wanderable    \"Calorie Smart Meals\" cookbook by Better Homes and Gardens (200-500 calorie meals)    Websites:    www.Sabrix    www.UpWind Solutions.Raptor Pharmaceuticals    https://www.diabetesfoodhub.org/all-recipes.html    https://www.AcEmpire/    Https://www.Splotherplate.gov/myplatekitchen    https://snaped.Xtera Communicationss.usda.gov/recipes-menus     https://www.Quero Rock/Kasenna/0876784/dietitian-budget-high-protein-dinner-recipes/    https://www.Close/recipes/84/healthy-recipes/     Cultural Cuisines:    https://wwwClear2Pay/recipes/15830/cuisines-regions//    https://www.[employer].org/knowledge-center/recipes/    https://Emergency Service Partners/recipe-index/    Apps:    Meedor antwon (or website, mealime.com)     SpendLauren     Tasty      Other resources:   Building a Plate  Http://www.[email or web address].[email or web address]    Protein Sources   http://Modulus Financial Engineering/896621.pdf     Carbohydrates  http://[email or web address]     Mindful Eating  http://Modulus Financial Engineering/219342.pdf     Summary of Volumetrics Eating Plan  http://[email or web address]     Follow-Up:  1 month recommended/prn    Time spent with patient: 42 minutes.  RUPINDER Machuca, RD, LD    *ABN Medicare notification popped up while signing note. Appears to be based on secondary coverage. Per chart review - pt has been mailed the ABN previously by Diane Schwartz RD. This RD able to see one signed and on file " from 8/5/22 dated 6/30/22

## 2023-01-25 NOTE — PROGRESS NOTES
"Mayra Ramirez is a 57 year old female who is being evaluated via a billable telephone visit.     The patient has been notified of following:     \"This telephone visit will be conducted via a call between you and your physician/provider. We have found that certain health care needs can be provided without the need for a physical exam.  This service lets us provide the care you need with a short phone conversation.  If a prescription is necessary we can send it directly to your pharmacy.  If lab work is needed we can place an order for that and you can then stop by our lab to have the test done at a later time.    Telephone visits are billed at different rates depending on your insurance coverage. During this emergency period, for some insurers they may be billed the same as an in-person visit.  Please reach out to your insurance provider with any questions.    If during the course of the call the physician/provider feels a telephone visit is not appropriate, you will not be charged for this service.\"    Patient has given verbal consent for Telephone visit?  Yes    How would you like to obtain your AVS? Mail per pt. Will also send email per pt request but will not include personal information, just goals/resources    Phone call duration: 42 minutes    Distant Location (provider location):  Offsite (providers home)     During this virtual visit the patient is located in [place], patient verifies this as the location during the entirety of this visit.     New Weight Management Nutrition Consultation    Mayra Ramirez is a 57 year old female presents today for new weight management nutrition consultation.  Patient referred by Benita Ackerman on January 25, 2023.    Patient with Co-morbidities of obesity including:  Type II DM no  Renal Failure no  Sleep apnea no  Hypertension no   Dyslipidemia no  Joint pain yes  Back pain yes  GERD yes per pt, EGD showed gastritis     PMH: lymphedema, knee pain, hypothyroid, " "osteoporosis     Balance issues due to brain radiation - went to PT/OT previously.     Hx of bilateral breast cancer that also impacted cerebellum. Had surgery and radiation to brain. Memory issues related to this.     Anthropometrics:  Weight 1/25/23: 180 lbs with BMI 27.37    Estimated body mass index is 27.37 kg/m  as calculated from the following:    Height as of an earlier encounter on 1/25/23: 1.727 m (5' 8\").    Weight as of an earlier encounter on 1/25/23: 81.6 kg (180 lb).    Medications for Weight Loss:  Possibly trying for wegovy per ROBIN Ackerman 1/25/23    NUTRITION HISTORY  NKFA  Not huge on beef or fish.  really likes salmon  Has trouble with reflux - avoids red sauces. Bending over can trigger reflux. Also triggered by chocolate    has some food allergies but she is not sure details     Pt has never seen a RD before per her report. However, per chart review did see a RD - last note from 9/22/22.   Of note - patient has some memory issues related to brain cancer and hx of radiation.     Per RD note 9/22/22   Breakfast: Nutrition Shake with protein- 16 oz (ice-water and protein (240 kcals) and banana - 300 kcal  Lunch: oatmeal (150 kcal for 1/2 cup uncooked) with blueberries (1/4 cup) - 165 kcals  PM: Cheese (60-80 kcal) and a few crackers -Ritz/Club (80) - 150 kcal.  Dinner: Stir guevara with 1 cup couscous and veggies OR avocado and hummus quesadillas   Snacks: apple (80 kcal)  Beverages: Water all day      Pt goals: lose weight, eat healthier     Eats 3 meals/day and occ snacks    Typical day per pt  B - oatmeal or museli with yogurt   L - soup or salad  D - varies, examples: zucchini boat with meat/cheese, toño     Lately has been eating a lot of bagels - really likes cinnamon sugar. Recommend bagel thin or whole wheat bagel if having.     Has a Oper that lives with them and helps with meals.     Feels she does well staying hydrated.    makes protein shakes for them in morning. "   Also likes to drink water - gets about 2 16.9 fluid oz per day  Does not drink pop or juice.     Calcium: almond milk (450 mg per serving - 1 cup) - has about 1 cup/day with bagel or in oatmeal, yogurt - has coconut milk yogurt with 360 mg, salads occ but she gets sick of them, muesli     Per PA note   Activity - 15min of walking with walker daily. A year ago could walk in neighborhood. Activity is very limited due to balance concerns.     Nutrition Prescription  Recommended energy/nutrient modification.    Nutrition Diagnosis  Previous (from RD note 9/22/22)  Excessive energy intake related to eating more over holidays and family gatherings as evidenced by patient stating seeing weight gain and observed weight gain in last clinic weight.    Nutrition Intervention  Reviewed current dietary habits and pts history   Discussed long-term goals pt hopes to accomplish in RD appointments  Answered pt questions  Coordination of care   Nutrition education - meal ideas, plate method, starchy vs non-starchy vegetables, carbohydrate sources, lean protein, hydration, calcium  AVS and handouts via ModaMit     Patient demonstrates understanding.    Expected Engagement: good    Nutrition Goals  1. Consider whole wheat bagel or bagel thin with a little avocado or hummus more often than cinnamon sugar bagel with cream cheese.   2. Recommend getting ~1200 mg/day of calcium in diet (your almond milk has 450 mg/1 cup, 360 mg in your coconut yogurt and your muesli has 15 mg for reference)   3. Aim for 4 water bottles per day (about 67.6 oz if doing 4 of the 16.9 oz water bottles). We want urine to be pale in color   4. Recommend looking for higher protein yogurts that are still low in sugar such as greek yogurt  5. Recommend trying to eat the rainbow (different colors) to hit different micronutrients   6. Call [phone] to set up future RD appointments if interested  7. Recommend Plate Method (see below) and healthy recipe resources         Plate method can be used for general guidance on balanced meals/portion sizes (see link below for picture/more information)                 Make 1/2 your plate non starchy vegetables (examples below)                3+ oz lean protein sources (salmon/skinless chicken/turkey breast/pork loin/lean cuts of beef/ or non-animal protein such as black, kidney or chadwick beans, tofu/edamame/tempeh)     (Note: 3 oz = deck of cards size)                 1/2 to 1 cup carbohydrate choices such as whole grain starches or starchy vegetables or fruit. For example: quinoa, brown rice, barley, potatoes, sweet potatoes, winter squash, peas, corn, or fruit.               Choose ~0-2 added fat servings at a meal (avocados, nut butters, nuts or seeds, olive oil, vegetable oils).    The Plate Method:  https://www.cdc.gov/diabetes/images/managing/Diabetes-Manage-Eat-Well-Plate-Graphic_600px.jpg    Non-starchy Vegetables Examples:   - Lettuce   - Spinach   - Onions    - Bell pepper   - Carrots   - Broccoli   - Cabbage   - Brussel sprouts   - Cauliflower    - Asparagus    - Cucumber   - Artichoke   - Zucchini   - Bean sprouts   - Mushrooms   - Sugar snap peas   - Eggplant   - Turnips   - Celery   - Radishes    - Green beans    Starchy vegetable examples:  ? Corn  ? Green peas  ? Winter squash (butternut, spaghetti squash, acorn)  ? Beans/lentils (not including green beans)  ? Potatoes  ? Sweet potatoes       Tips for reflux:  - Eat slowly (20-30 minutes per meal), chewing foods well (25 chews per bite/applesauce consistency)  - Wait 3 hours after eating before lying down.  - Eat in a calm, relaxed place. Sit down while you eat  - Allow time between eating and sleeping. Eating too close to bed can cause reflux  - Reduce stress. Stress can cause reflux.    Possible dietary reflux triggers:  - alcohol, particularly red wine.  - chocolate  - citrus (lemon, orange juice, etc.)  - black pepper  - garlic  - raw onions  - spicy foods  - coffee  "and caffeinated drinks, including tea and soda.  - peppermint.  - tomatoes  - carbonation     Healthy Recipe Resources:    Books:    \"The Volumetrics Eating Plan\" by Barbra Bryant, Ph.D.    \"Cooking that Counts\" by editors of PlanetEye    \"Calorie Smart Meals\" cookbook by Better Homes and Gardens (200-500 calorie meals)    Websites:    www.Trivop    www.Xigen.org    https://www.diabetesfoodhub.org/all-recipes.html    https://www.Wisr.Icinetic/    Https://www.Feedzaimyplate.gov/myplatekitchen    https://snaped.Kivas.usda.gov/recipes-menus     https://www.Diagnostic Innovations/WebMD/3355833/dietitian-budget-high-protein-dinner-recipes/    https://wwwMessagemind/recipes/84/healthy-recipes/     Cultural Cuisines:    https://wwwDonorsPlay/recipes/12299/cuisines-regions//    https://www.MDconnectME/knowledge-center/recipes/    https://Ginger Software/recipe-index/    Apps:    Salus Security Devices antwon (or website, mealime.com)     SpendSmartEatSmart     Tasty      Other resources:   Building a Plate  Http://www.fvfiles.com/983556.pdf    Protein Sources   http://"VinAsset, Inc (Vertically Integrated Network)"/460654.pdf     Carbohydrates  http://fvfiles.com/084431.pdf     Mindful Eating  http://"VinAsset, Inc (Vertically Integrated Network)"/465305.pdf     Summary of Volumetrics Eating Plan  http://fvfiles.com/620066.pdf     Follow-Up:  1 month recommended/prn    Time spent with patient: 42 minutes.  RUPINDER Machuca, RD, LD    *ABN Medicare notification popped up while signing note. Appears to be based on secondary coverage. Per chart review - pt has been mailed the ABN previously by Diane Schwartz RD. This RD able to see one signed and on file from 8/5/22 dated 6/30/22         "

## 2023-01-25 NOTE — PATIENT INSTRUCTIONS
Nutrition Goals  Consider whole wheat bagel or bagel thin with a little avocado or hummus more often than cinnamon sugar bagel with cream cheese.   Recommend getting ~1200 mg/day of calcium in diet (your almond milk has 450 mg/1 cup, 360 mg in your coconut yogurt and your muesli has 15 mg for reference)   Aim for 4 water bottles per day (about 67.6 oz if doing 4 of the 16.9 oz water bottles). We want urine to be pale in color   Recommend looking for higher protein yogurts that are still low in sugar such as greek yogurt  Recommend trying to eat the rainbow (different colors) to hit different micronutrients   Call [phone] to set up future RD appointments if interested  Recommend Plate Method (see below) and healthy recipe resources        Plate method can be used for general guidance on balanced meals/portion sizes (see link below for picture/more information)                 Make 1/2 your plate non starchy vegetables (examples below)                3+ oz lean protein sources (salmon/skinless chicken/turkey breast/pork loin/lean cuts of beef/ or non-animal protein such as black, kidney or chadwick beans, tofu/edamame/tempeh)     (Note: 3 oz = deck of cards size)                 1/2 to 1 cup carbohydrate choices such as whole grain starches or starchy vegetables or fruit. For example: quinoa, brown rice, barley, potatoes, sweet potatoes, winter squash, peas, corn, or fruit.               Choose ~0-2 added fat servings at a meal (avocados, nut butters, nuts or seeds, olive oil, vegetable oils).    The Plate Method:  https://www.cdc.gov/diabetes/images/managing/Diabetes-Manage-Eat-Well-Plate-Graphic_600px.jpg    Non-starchy Vegetables Examples:   - Lettuce   - Spinach   - Onions    - Bell pepper   - Carrots   - Broccoli   - Cabbage   - Brussel sprouts   - Cauliflower    - Asparagus    - Cucumber   - Artichoke   - Zucchini   - Bean sprouts   - Mushrooms   - Sugar snap peas   - Eggplant   - Turnips   - Celery   -  "Radishes    - Green beans    Starchy vegetable examples:  Corn  Green peas  Winter squash (butternut, spaghetti squash, acorn)  Beans/lentils (not including green beans)  Potatoes  Sweet potatoes       Tips for reflux:  - Eat slowly (20-30 minutes per meal), chewing foods well (25 chews per bite/applesauce consistency)  - Wait 3 hours after eating before lying down.  - Eat in a calm, relaxed place. Sit down while you eat  - Allow time between eating and sleeping. Eating too close to bed can cause reflux  - Reduce stress. Stress can cause reflux.    Possible dietary reflux triggers:  - alcohol, particularly red wine.  - chocolate  - citrus (lemon, orange juice, etc.)  - black pepper  - garlic  - raw onions  - spicy foods  - coffee and caffeinated drinks, including tea and soda.  - peppermint.  - tomatoes  - carbonation     Healthy Recipe Resources:    Books:  \"The Volumetrics Eating Plan\" by Barbra Bryant, Ph.D.  \"Cooking that Counts\" by editors of Smarp.  \"Calorie Smart Meals\" cookbook by Better Homes and UserVoices (200-500 calorie meals)    Websites:  www.Gruppo MutuiOnline  www.MiaSolÃ©.org  https://www.diabetesfoodhub.org/all-recipes.html  https://www.Lagrange Systems.Stone Medical Corporation/  Https://www.Buddy Drinksmyplate.gov/myplatekitchen  https://snaped.fns.usda.gov/recipes-menus   https://www.Anomaly Innovations.[employer] Corporation/seb/6392015/dietitian-budget-high-protein-dinner-recipes/  https://www.Beijing Zhongbaixin Software Technology.[employer] Corporation/recipes/84/healthy-recipes/     Cultural Cuisines:  https://www.Anomaly Innovations.com/recipes/23569/cuisines-regions//  https://www.GeekatoonatDICOM Grid.org/knowledge-center/recipes/  https://Citizenside.[employer] Corporation/recipe-index/    Apps:  StoneCastle Partners antwon (or website, mealime.com)   SpendSmWant   Tasty      Other resources:   Building a Plate  Http://www.fvfiles.com/942778.pdf    Protein Sources   http://Senseg/382775.pdf     Carbohydrates  http://fvfiles.com/599965.pdf     Mindful Eating  http://Senseg/402988.pdf     Summary of Volumetrics " Eating Plan  http://Graftec Electronics/110213.pdf     Follow-Up:  1 month recommended/RUPINDER Mazariegos, RD, LD  Clinic #: [phone]

## 2023-01-25 NOTE — NURSING NOTE
"Chief Complaint   Patient presents with     Consult     New consultation for weight management.         Vitals:    01/25/23 0948   Weight: 180 lb   Height: 5' 8\"       Body mass index is 27.37 kg/m .      Mariana Valverde, EMT  [facility]                      "

## 2023-01-25 NOTE — LETTER
"2023       RE: May[facility] Ramirez  [address] [facility] [address]     Dear Colleague,    Thank you for referring your patient, Mayra Ramirez, to the [facility] [place] at [facility]. Please see a copy of my visit note below.    Virtual Visit Check-In    During this virtual visit the patient is located in [place], patient verifies this as the location during the entirety of this visit.     Mayra is a 57 year old who is being evaluated via a billable video visit.      How would you like to obtain your AVS? MyChart  If the video visit is dropped, the invitation should be resent by: Text to cell phone: [phone]  Will anyone else be joining your video visit? No        Video-Visit Details    Type of service:  Video Visit   Originating Location (pt. Location): Home    Distant Location (provider location):  Off-site  Platform used for Video Visit: Hookflash         Video Start Time: 10:00AM  Video End Time:11:08AM    Mariana Valverde NREMT      85 minutes spent on the date of the encounter doing chart review, history and exam, documentation and further activities per the note    New Medical Weight Management Consult    PATIENT:  Mayra Ramirez  MRN:         [id number]  :         1965  ZHANE:         2023    Dear Dr. Jojo Montoya,    I had the pleasure of seeing your patient, Mayra Ramirez. Full intake/assessment was done to determine barriers to weight loss success and develop a treatment plan. Mayra Ramirez is a 57 year old female interested in treatment of medical problems associated with excess weight. She has a height of 5' 8\", a weight of 190 lbs 0 oz, and the calculated Body mass index is 28.89 kg/m .        Assessment & Plan   Problem List Items Addressed This Visit        Other    Overweight (BMI 25.0-29.9)     Weight gain for the past 2 years with increase worsening of balance and short " term memory. Has a history of breast cancer, treated with double mastectomy, oophorectomy, chemo and radiation from 3789-4386. Balance and memory loss are both side effects of radiation treatment. Previously has always been weight stable 150-160lbs, not struggling with weight gain most of her life. Has gained weight in her life due to pregnancies and chronic steroid use, but has always been able to lose weight easily. Is now struggling to lose weight. Weight today is around 190-195lbs, she is not sure.     Has an [employer]/live-in assistant that helps with food and house chores. Mayra chooses from a food menu that was created by the [employer] and  for best help. She seems to struggle with hunger at times. But unsure due to memory loss.     During the visit, Mayra's  was present to help with any memory loss concerns and any clarifications needed.     Discussed AOMs today. Mayra was interested in starting one today:   - Phentermine - contraindicated due to currently struggles with agitation at time with memory loss.   - Topriamate - contraindicated due to memory loss and concern for brain fog side effect   - Naltrexone - contraindicated due to currently struggling with balance concerns and concern for increase dizziness side effect   - GLP-1 to be started today to help with weight loss. Discussed side effects, risks, and benefits. Will monitor for any changes in GERD symptoms. Confirmed mother had papillary thyroid cancer. Will start Wegovy if covered. Consider ozempic or saxenda as needed.          Relevant Medications    Semaglutide-Weight Management (WEGOVY) 0.25 MG/0.5ML SOAJ    Semaglutide-Weight Management (WEGOVY) 0.5 MG/0.5ML SOAJ (Start on 2/25/2023)      1. Start Wegovy 0.25mg once weekly for 4 weeks, increase to 0.5mg once weekly. Can consider Ozempic or Saxenda as needed.  2. Follow up with dietitian as needed   3. Follow up with Benita Mcmillan in 5 weeks           Mayra Ramirez is a 57 year old  "female who presents to clinic today. She has the following co-morbidities:       12/20/2022   I have the following health issues associated with obesity: Lymphedema   I have the following symptoms associated with obesity: Knee Pain, Back Pain     Bilateral breast cancer, with mets to cerebellum - leading to ovariectomy and bilateral mastectomy. Treated with chemo and radiation to brain.Treatment from 0318-2381. Since then has struggled with memory loss, and is now seeing new onset balance concerns.    GERD - will bend over will have acid reflux at times. Otherwise well controlled. Taking Protonix 40mg BID. EGD in past showed gastritis.     Hypothyroid -  Not on medications     Osteoporosis - recent diagnosis. Treatment with Prolia.     Lymphedema - in left shoulder due to mastectomy and clavical fracture.      Balance - due to brain radiation. Was going to PT/OT, but no longer covered by insurance. Was helpful previously. Followed by neurology     Patient Goals 12/20/2022   I am interested in having a healthier weight to diminish current health problems: Yes   I am interested in having a healthier weight in order to prevent future health problems: Yes   I am interested in having a healthier weight in order to have a future surgery: No       Referring Provider 12/20/2022   Please name the provider who referred you to Medical Weight Management.  If you do not know, please answer: \"I Don't Know\". Dr. Kilgore       Weight History 12/20/2022   How concerned are you about your weight? Very Concerned   Would you describe your weight gain as gradual? Yes   I became overweight: As an Adult   The following factors have contributed to my weight gain:  A Health Crisis, Lack of Exercise   My lowest weight since age 17 was: 140   My highest weight since age 17 was: 180   The most weight I have ever lost was: (lbs) 5   I have the following family history of obesity/being overweight:  One or more of my siblings are overweight "   Has anyone in your family had weight loss surgery? No   How has your weight changed over the last year?  Gained   How many pounds? 20     Previously was stable at 160-170lbs for most of her adult life, never struggling with weight gain or obesity. Has gained weight in the past through 4 pregnancies and when she was on steroids, but was always able to lose the weight easily. Over the past few years has struggled with weight gain, especially as her balance has worsened from a side effect of radiation back in 2007. Has tried to lose weight with diets and weight watchers, but has struggled. Is concerned about what would be the best option for diet and weightloss medication options.      Weight today - 190lbs    Eating 3 meals a day, snacks. Has a live-in  that helps Mayra with food and house work. She makes all of Mayra's meals. Was previously struggling with food options, so now has a menu she picks off every day for easy options. Sometimes she feels hungry. If heartburn is bad will eat ritz crackers for relief.. Breakfast - protein shake. No thoughts of food or cravings. Can get full, but at times struggles with staying full. Drinks water. Order food in 1x week - pizza. Snacks - fruit, crackers, cheese, apple + peanutbutter, chocolate.    Activity - 15min of walking with walker daily. A year ago could walk in neighborhood. Activity is very limited due to balance concerns.     Has not tried AOMs in the past.     Diet Recall Review with Patient 12/20/2022   Do you typically eat breakfast? Yes   If you do eat breakfast, what types of food do you eat? Yogurt and Muesli, Oatmeal and blueberries   Do you typically eat lunch? Yes   If you do eat lunch, what types of food do you typically eat?  Soup, salad   Do you typically eat supper? Yes   If you do eat supper, what types of food do you typically eat? Soup and [place]   Do you typically eat snacks? Yes   If you do snack, what types of food do you typically eat?  fruit   Do you like vegetables?  Yes   Do you drink water? Yes   How many glasses of juice do you drink in a typical day? 0   How many of glasses of milk do you drink in a typical day? 2   If you do drink milk, what type? N/A   How many 8oz glasses of sugar containing drinks such as Garry-Aid/sweet tea do you drink in a day? 0   How many cans/bottles of sugar pop/soda/tea/sports drinks do you drink in a day? 0   How many cans/bottles of diet pop/soda/tea or sports drink do you drink in a day? 0   How often do you have a drink of alcohol? Never       Eating Habits 12/20/2022   Generally, my meals include foods like these: bread, pasta, rice, potatoes, corn, crackers, sweet dessert, pop, or juice. A Few Times a Week   Generally, my meals include foods like these: fried meats, brats, burgers, french fries, pizza, cheese, chips, or ice cream. Never   Eat fast food (like [employer], [employer], [employer]). Never   Eat at a buffet or sit-down restaurant. Less Than Weekly   Eat most of my meals in front of the TV or computer. A Few Times a Week   Often skip meals, eat at random times, have no regular eating times. Never   Rarely sit down for a meal but snack or graze throughout.  Never   Eat extra snacks between meals. A Few Times a Week   Eat most of my food at the end of the day. Never   Eat in the middle of the night or wake up at night to eat. Never   Eat extra snacks to prevent or correct low blood sugar. Never   Eat to prevent acid reflux or stomach pain. A Few Times a Week   Worry about not having enough food to eat. Never   Have you been to the food shelf at least a few times this year? No   I eat when I am depressed. Less Than Weekly   I eat when I am stressed. Less Than Weekly   I eat when I am bored. Less Than Weekly   I eat when I am anxious. Never   I eat when I am happy or as a reward. Never   I feel hungry all the time even if I just have eaten. Less Than Weekly   Feeling full is important to me. Less Than  Weekly   I finish all the food on my plate even if I am already full. Less Than Weekly   I can't resist eating delicious food or walk past the good food/smell. Less Than Weekly   I eat/snack without noticing that I am eating. Less Than Weekly   I eat when I am preparing the meal. Never   I eat more than usual when I see others eating. Less Than Weekly   I have trouble not eating sweets, ice cream, cookies, or chips if they are around the house. Once a Week   I think about food all day. Less Than Weekly   What foods, if any, do you crave? Sweets/Candy/Chocolate       Amount of Food 12/20/2022   I make myself vomit what I have eaten or use laxatives to get rid of food. Never   I eat a large amount of food, like a loaf of bread, a box of cookies, a pint/quart of ice cream, all at once. Never   I eat a large amount of food even when I am not hungry. Never   I eat rapidly. Never   I eat alone because I feel embarrassed and do not want others to see how much I have eaten. Never   I eat until I am uncomfortably full. Never   I feel bad, disgusted, or guilty after I overeat. Never   I make myself vomit what I have eaten or use laxatives to get rid of food. Never       Activity/Exercise History 12/20/2022   How much of a typical 12 hour day do you spend sitting? Most of the Day   How much of a typical 12 hour day do you spend lying down? Less Than Half the Day   How much of a typical day do you spend walking/standing? Less Than Half the Day   How many hours (not including work) do you spend on the TV/Video Games/Computer/Tablet/Phone? 6 Hours or More   How many times a week are you active for the purpose of exercise? 4-5 TImes a Week   What keeps you from being more active? Too tired   How many total minutes do you spend doing some activity for the purpose of exercising when you exercise? 15-30 Minutes       PAST MEDICAL HISTORY:  Past Medical History:   Diagnosis Date     Abnormal Pap smear of cervix 05/15/1996    in Care  Everywhere - ASCUS pap (no HPV result seen)     Brain metastases (H) 2006    Recurrent metastatic breast cancer     Breast cancer (H) left    2004 - HER2 pos, ER+, MT-     Fracture of clavicle with nonunion 6/4/2018    Overview:  Added automatically from request for surgery [id number]     Lymphoedema 06/2012     Malignant neoplasm of breast (female), unspecified site 2004    Breast cancer     Metastasis to brain (H) 2005     Osteopenia     2/2 breast CA tx.       Work/Social History Reviewed With Patient 12/20/2022   My employment status is: Retired   My job is: retired   How much of your job is spent on the computer or phone? Less Than 50%   What is your marital status? /In a Relationship   If in a relationship, is your significant other overweight? No   Do you have children? Yes   If you have children, are they overweight? No   Who do you live with?     Are they supportive of your health goals? Yes   Who does the food shopping?  i shop with my care assistant   Has been on disability for the past 5 years due to memory loss from radiation for breast cancer treatment. Very supportive  and family. Has an [employer]/care assistant that helps with all daily needs and house chores. Due to her memory concerns,  is very active in her health and daily activies. Has 4 children who all live outside of the house.     Mental Health History Reviewed With Patient 12/20/2022   Have you ever been physically or sexually abused? No   If yes, do you feel that the abuse is affecting your weight? No   If yes, would you like to talk to a counselor about the abuse? N/A   How often in the past 2 weeks have you felt little interest or pleasure in doing things? More Than Half the Days   Over the past 2 weeks how often have you felt down, depressed, or hopeless? Not at all       Sleep History Reviewed With Patient 12/20/2022   How many hours do you sleep at night? 8   Do you think that you snore loudly or has anybody  ever heard you snore loudly (louder than talking or so loud it can be heard behind a shut door)? No   Has anyone seen or heard you stop breathing during your sleep? No   Do you often feel tired, fatigued, or sleepy during the day? Yes   Do you have a TV/Computer in your bedroom? No       MEDICATIONS:   Current Outpatient Medications   Medication Sig Dispense Refill     CALCIUM + D OR None Entered       denosumab (PROLIA) 60 MG/ML SOSY injection Inject 60 mg Subcutaneous       donepezil (ARICEPT) 5 MG tablet Take 5 mg by mouth       metroNIDAZOLE (METROGEL) 0.75 % vaginal gel Place 1 applicator (5 g) vaginally daily 70 g 0     mirabegron (MYRBETRIQ) 25 MG 24 hr tablet Take 2 tablets (50 mg) by mouth daily 180 tablet 2     Multiple Minerals-Vitamins (BONE DENSITY BUILDER PO)        pantoprazole (PROTONIX) 40 MG EC tablet Take by mouth every 12 hours       romosozumab-aqqg (EVENITY) 105 MG/1.17ML SOSY injection        Semaglutide-Weight Management (WEGOVY) 0.25 MG/0.5ML SOAJ Inject 0.25 mg Subcutaneous once a week For 4 weeks 2 mL 0     [START ON 2/25/2023] Semaglutide-Weight Management (WEGOVY) 0.5 MG/0.5ML SOAJ Inject 0.5 mg Subcutaneous once a week After completing 4 weeks of the 0.25mg dose 2 mL 1     vitamin D2 (ERGOCALCIFEROL) 01232 units (1250 mcg) capsule TAKE 1 CAPSULE BY MOUTH ONCE A WEEK 8 capsule 0     ALOE VERA JUICE PO        lifitegrast (XIIDRA) 5 % opthalmic solution Apply to eye every 12 hours       NEW MED Take 3 capsules by mouth Brain restore       order for DME 2 Mastectomy Bras and 2 Prosthesis 2 Units 1     oxyquinoline-sodium lauryl sulfate (TRIMO-SAWANT) 0.025-0.01 % GEL vaginal gel Place 2 g vaginally continuous prn (for pessary change) 113.4 g 3     pantoprazole (PROTONIX) 20 MG EC tablet Take 40 mg by mouth daily         ALLERGIES:   Allergies   Allergen Reactions     Septra [Bactrim] Hives     Hives     Sulfa Drugs Hives     Sulfamethoxazole-Trimethoprim      Sulfasalazine Hives      "Trimethoprim Rash         Objective     Ht 1.727 m (5' 8\")   Wt 86.2 kg (190 lb)   LMP  (LMP Unknown)   BMI 28.89 kg/m           Vitals:  No vitals were obtained today due to virtual visit.    Physical Exam   GENERAL: Healthy, alert and no distress  EYES: Eyes grossly normal to inspection.  No discharge or erythema, or obvious scleral/conjunctival abnormalities.  RESP: No audible wheeze, cough, or visible cyanosis.  No visible retractions or increased work of breathing.    SKIN: Visible skin clear. No significant rash, abnormal pigmentation or lesions.  NEURO: Cranial nerves grossly intact.  Mentation and speech appropriate for age.  PSYCH: Mentation appears normal, affect normal/bright, judgement and insight intact, normal speech and appearance well-groomed.     Anti-obesity medication ROS:    HEENT  Hx of glaucoma: No    Cardiovascular  CAD:No  HTN:No    Gastrointestinal  GERD:Yes  Constipation:No  Liver Dz:No  H/O Pancreatitis:No    Psychiatric  Bipolar: No  Anxiety:No  Depression:No  History of alcohol/drug abuse: No  Hx of eating disorder:No    Endocrine  Personal or family hx of MTC or MEN2:Yes, mom had papillary thyroid cancer.   Diabetes/prediabetes: No    Neurologic:  Hx of seizures: No  Hx of migraines: No  Memory Impairment: Yes      History of kidney stones: No  Kidney disease: No  Current birth control: No      Sincerely,    MARIELA WILSON PA-C     "

## 2023-01-25 NOTE — TELEPHONE ENCOUNTER
Per test claim Wegovy 0.25MG/0.5ML needs prior authorization.  Insurance as follows:  BIN: [id number]  PCN: [id number]  ID: [id number]  Group: [id number]  Please submit for PA approval. Once approved or denied, please include liaison on communication.

## 2023-01-25 NOTE — LETTER
"2023    To: [facility]    RE: Mayra Ramirez  [address] [place] [address]  : 1965  MRN: [id number]  Policy #:   Member ID: [id number]     Payor: [employer] Ph: [phone]     Benef[id number] Ph: [phone]     Group number: [id number]      To Whom It May Concern,    I am writing on behalf of my patient, Mayra Ramirez to document the medical necessity of Wegovy for the treatment of obesity. This letter provides information about the patient's medical history and diagnosis and a statement summarizing my treatment rationale.     Summary of Patient History and Diagnosis  I had the pleasure of seeing your patient, Mayra Ramirez. Full intake/assessment was done to determine barriers to weight loss success and develop a treatment plan. Mayra Ramirez is a 57 year old female interested in treatment of medical problems associated with excess weight. She has a height of 5' 8\", a weight of 190 lbs 0 oz, and the calculated Body mass index is 28.89 kg/m .     Weight gain for the past 2 years with increase worsening of balance and short term memory. Has a history of breast cancer, treated with double mastectomy, oophorectomy, chemo and radiation from [id number]. Balance and memory loss are both side effects of radiation treatment. Previously has always been weight stable 150-160lbs, not struggling with weight gain most of her life. Has gained weight in her life due to pregnancies and chronic steroid use, but has always been able to lose weight easily. Is now struggling to lose weight.    Discussed AOMs today. Mayra was interested in starting one today:   - Phentermine - contraindicated due to currently struggles with agitation at time with memory loss.   - Topriamate - contraindicated due to memory loss and concern for brain fog side effect   - Naltrexone - contraindicated due to currently struggling with balance concerns and concern for " increase dizziness side effect   - GLP-1 to be started today to help with weight loss. Discussed side effects, risks, and benefits. Will monitor for any changes in GERD symptoms. Confirmed mother had papillary thyroid cancer. Will start Wegovy.      Treatment Rationale  Denial Rational:              Patient has a body mass index is 28.89 kg/m .  She has the following weight related co-morbidities: Lymphedema, GERD and high cholesterol.     Duration  Up to 12 months.       Summary  In summary, Wegovy is medically necessary for this patient s medical condition. Please call my office at [phone] if I can provide you with any additional information to approve my request. I look forward to receiving your timely response and approval of this request.     Sincerely,    Benita Ackerman PA-C

## 2023-01-26 NOTE — PATIENT INSTRUCTIONS
"Thank you for allowing us the privilege of caring for you. We hope we provided you with the excellent service you deserve.   Please let us know if there is anything else we can do for you so that we can be sure you are completely satisfied with your care experience.    To ensure the quality of our services you may be receiving a patient satisfaction survey from an independent patient satisfaction monitoring company.    The greatest compliment you can give is a \"Likely to Recommend\"    Your visit was with MARIELA WILSON PA-C today.    Instructions per today's visit:     Jaydon Ramirez, it was great to visit with you today.  Here is a review of our visit.  If our clinic scheduler is not able to reach you please call [phone] to schedule your next appointments.    1. Start Wegovy 0.25mg once weekly for 4 weeks, increase to 0.5mg once weekly. Can consider Ozempic or Saxenda as needed.  2. Follow up with dietitian as needed   3. Follow up with Mariela Mcmillan in 5 weeks       Information about Video Visits with [facility]: video visit information  _________________________________________________________________________________________________________________________________________________________  If you are asked by your clinic team to have your blood pressure checked:  [facility] do offer several locations for blood pressure checks. Please follow the below link to schedule an appointment. Scheduling an appointment at the pharmacy for a blood pressure check is now preferred.    Appointment Plus (appointment-plus.[employer]EX)  _________________________________________________________________________________________________________________________________________________________  Important contact and scheduling information:  Please call our contact center at [phone] to schedule your next appointments.  To find a lab location near you, please call [phone].  For any nursing questions or concerns " call Yenifer Bolton LPN at [phone] or Gisselle Martell RN at [phone]  Please call during clinic hours Monday through Friday 8:00a - 4:00p if you have questions or you can contact us via Tapuloust at anytime and we will reply during clinic hours.    Lab results will be communicated through My Chart or letter (if My Chart not used). Please call the clinic if you have not received communication after 1 week or if you have any questions.?  Clinic Fax: [phone]    _________________________________________________________________________________________________________________________________________________________  Meal Replacement Products:    Here is the link to our new e-store where you can purchase our meal replacement products    [facility] E-Store  [employer]/store    The one week starter kit is a great way to sample a variety of products and see what works for you.    If you want more information about the product go to: Fresh Mineralist    If you are an employee or [facility] or [facility] please contact your care team for a 10% estore discount    Free Shipping for orders over $75     Benefits of meal replacements products:    Portion and calorie control  Improved nutrition  Structured eating  Simplified food choices  Avoid contact with trigger foods  _________________________________________________________________________________________________________________________________________________________  Interested in working with a health ?  Health coaches work with you to improve your overall health and wellbeing.  They look at the whole person, and may involve discussion of different areas of life, including, but not limited to the four pillars of health (sleep, exercise, nutrition, and stress management). Discuss with your care team if you would like to start working a health .  Health Coaching-3 Pack: Schedule by calling  [phone]    $99 for three health coaching visits    Visits may be done in person or via phone    Coaching is a partnership between the  and the client; Coaches do not prescribe or diagnose    Coaching helps inspire the client to reach his/her personal goals   _________________________________________________________________________________________________________________________________________________________  24 Week Healthy Lifestyle Plan:    Our mission in the 24-week Healthy Lifestyle Plan is to provide you with individualized care by giving you the tools, education and support you need to lose weight and maintain a healthy lifestyle. In your 24-week journey, you ll be supported by a dedicated weight loss team that includes registered dietitians, medical weight management providers, health coaches, and nurses -- all with special expertise in weight loss -- to help you every step of the way.     Monthly meetings with your registered dietician or medical weight management provider help to review your progress, update your care plan, and make any adjustments needed to ensure success. Between these visits, weekly and bi-weekly health  visits will help you focus on the four pillars of weight loss -- stress, sleep, nutrition, and exercise -- and how you can best adapt each to achieve sustainable weight loss results.    In addition, you will be given exclusive access to online wellbeing classes through [employer].  Your initial visit will be with a medical weight management provider who will help to understand your weight loss goals and ensure this program is the right fit for you. Please let our team know if you are interested in the 24 week plan by sending a message to your care team or calling [phone] to schedule.  _________________________________________________________________________________________________________________________________________________________    COMPREHENSIVE WEIGHT  "MANAGEMENT PROGRAM  VIRTUAL SUPPORT GROUPS    For Support Group Information:      We offer support groups for patients who are working on weight loss and considering, preparing for or have had weight loss surgery.   There is no cost for this opportunity.  You are invited to attend the?Virtual Support Groups?provided by any of the following locations:    [facility] via Microsoft Teams with Lesley Fu RN  2.   [place] via [employer] Teams with Kg Arora, PhD, LP  3.   [place] via [email or web address] with Alley Crockett RN  4.   [facility] via [employer] Teams with Alley Mcdonough [facility]    The following Support Group information can also be found on our website:  [email or web address]    [facility] Weight Loss Surgery Support Group    [facility] Weight Loss Surgery Support Group  The support group is a patient-lead forum that meets monthly to share experiences, encouragement and education. It is open to those who have had weight loss surgery, are scheduled for surgery, and those who are considering surgery.   WHEN: This group meets on the 3rd Wednesday of each month from 5:00PM - 6:00PM virtually using Microsoft Teams.   FACILITATOR: Led by Lesley Fu RD, LD, RN, the program's Clinical Coordinator.   TO REGISTER: Please contact the clinic via Essensium or call the nurse line directly at [phone] to inform our staff that you would like an invite sent to you and to let us know the email you would like the invite sent to. Prior to the meeting, a link with directions on how to join the meeting will be sent to you.    2022 Meetings  Josseline 15: \"Let's Talk\" a time for the group to share.  July 20: \"Let's Talk\" a time for the group to share.  August 17: \"Let's Talk\" a time for the group to share.  September 21: \"Let's Talk\" a time for the group to share.  October 19: Guest Speaker: Dr Jeyson Weaver MD Pulmonologist and Sleep Medicine " "Physician, \"Getting a Good Night's Sleep\".  November 16: \"Let's Talk\" a time for the group to share.  December 21: \"Let's Talk\" a time for the group to share.    [facility] and [facility] Support Groups    Connections: Bariatric Care Support Group?  This is open to all [facility] (and those external to this program) pre- and post- operative bariatric surgery patients as well as their support system.   WHEN: This group meets the 2nd Tuesday of each month from 6:30 PM - 8:00 PM virtually using Microsoft Teams.   FACILITATOR: Led by Kg Arora, Ph.D who is a Licensed Psychologist with the [facility] Comprehensive Weight Management Program.   TO REGISTER: Please send an email to Kg Arora, Ph.D., LP at?[email or web address]?if you would like an invitation to the group and to learn about using Microsoft Teams.    2022 Meetings  June 14: Flori Hayden, JONATHAN, LD at [facility], \"Nutritional Labeling\"  July 12 August 2 (Please Note Date Change)  September 13 October 11 November 8 December 13    Connections: Post-Operative Bariatric Surgery Support Group  This is a support group for [facility] bariatric patients (and those external to [facility]) who have had bariatric surgery and are at least 3 months post-surgery.  WHEN: This support group meets the 4th Wednesday of the month from 11:00 AM - 12:00 PM virtually using Microsoft Teams.   FACILITATOR: Led by Certified Bariatric Nurse, Alley Crockett RN.   TO REGISTER: Please send an email to Alley at [email or web address] if you would like an invitation to the group and to learn about using Microsoft Teams.    2022 Meetings June 22 July 27 August 24 September 28 October 26 November 23 December 28      [facility] Healthy Lifestyle Virtual Support Group    Healthy Lifestyle Virtual Support Group?  This is 60 minutes of small group guided " "discussion, support and resources. All are welcome who want a healthy lifestyle.  WHEN: This group meets monthly on a Friday from 12:30 PM - 1:30 PM virtually using Microsoft Teams.   FACILITATOR: Led by National Board Certified Health and , Alley Mcdonough [facility].   TO REGISTER: Please send an email to Alley [email or web address] ([email or web address]) to receive monthly invites to the group or if you have any questions about having a health .  Prior to the meeting, a link with directions on how to join the meeting will be sent to you.    2022 Meetings  June 24: Alley Mcdonough [facility], \"Setting Limits and Boundaries\".  Jul 29: Open Forum  August 26: Guest Speaker: Adalgisa Gardner Registered Dietitian  September 30: Open Forum  October 28th: Guest Speaker: Gracy Stephen [facility], Health , \"Gratitude Practices\".  November 18: Guest Speaker: Tita Colin RD Registered Dietitian, \"Navigating How to Eat around the Holidays\".  December 16: Guest Speaker: Dulce Brower [facility], \"Changing Your Relationship with Movement\".    ____________________________________________________________________________________________________________________________________________________________________________  [place] [facility] Get Moving Program  Our team of physical therapists is trained to help you understand and take control of your condition. They will perform a thorough evaluation to determine your ability for activity and develop a customized plan to fit your goals and physical ability.  Scheduling: Unsure if the Get Moving program is right for you? Discuss the program with your medical provider or diabetes educator. You can also call us at [phone] to ask questions or schedule an appointment.   [facility] Get Moving Program  ____________________________________________________________________________________________________________________________________________________________________________  [facility] " Diabetes Prevention Program (DPP)  If you have prediabetes and Medicare please contact us via FusionStorm to learn more about the Diabetes Prevention Program (DPP)  Program Details:  [facility] offers the year-long Diabetes Prevention Program (DPP). The program helps you to make lifestyle changes that prevent or delay type 2 diabetes by supporting healthy eating, increased physical activity, stress reduction and use of coping skills.   On average, previous [facility] DPP cohorts have lost and maintained at least 5% of their starting weight throughout the program and averaged more than 150 minutes of physical activity per week.  Participants meet weekly for one-hour group sessions over sixteen weeks, every other week for the next 8 weeks, and monthly for the last six months.   A year-long maintenance program is also available for participants who complete the first year.   Location & Cost:   During the COVID-19 Public Health Emergency, the program is offered virtually. When in-person classes can resume, they will be held at [facility].  For people with Medicare, the program is covered in full. A self-pay option will also be available for those with non-Medicare insurance plans.   _________________________________________________________________________________________________________________________________________________________  Bluetooth Scale:    We hope to provide you with high quality virtual healthcare visits while social distancing for COVID-19 is necessary, as well as in the future when virtual visits may be more convenient for you.     Our technology team made it possible for Bluetooth scales to send weight measurements to our electronic medical record. This allows weights from you weighing at home to securely flow into the medical record, which will improve telephone and virtual visits.   Additionally, studies have shown that adults actually lose more weight when  their weights are automatically sent to someone else, and also that this process is not stressful for those adults.    Below is a link for purchasing the scale, with a discount code for our patients. You may call your insurance company to see if they will reimburse you for the cost of the scale, as a piece of durable medical equipment. The scales only go up to a weight of 400 pounds. This is an issue and we are working with the developer on increasing this. We found no scales that go over 400lb that have blue-tooth for connecting to Kutenda.    Scale to purchase: the LP Amina  Body  Scale: https://www.[employer]/us/en/body/shop?[email or web address]=aw.ds    Discount Code: We have a discount code for our patients to bring the cost down to $50, Discount code is: [place]_Scale_20%off  _______________________________________________________________________________________________________________________________________________________________________________    To work with a Behavioral Health Psychologist:    Call to schedule:    Tan Thompson - [phone]  Rehan Bernardo - [phone]  Jane Bales - [phone]  Jessika Massey - [phone]   Iris Gutierres PhD (cannot accept Medicare) [phone]        Thank you,   [facility] Comprehensive Weight Management Team    WEGOVY (semaglutide)    What is Wegovy?    Wegovy (semaglutide) injection 2.4 mg is an injectable prescription medicine used for adults with obesity (BMI ?30) or overweight (excess weight) (BMI ?27) who also have weight-related medical problems to help them lose weight and keep the weight off.    1.  Start Wegovy (semaglutide) 0.25 mg once weekly for 4 weeks, then if tolerating increase to 0.5 mg weekly for 4 weeks, then if tolerating increase to 1 mg weekly for 4 weeks, then if tolerating increase to 1.7 mg weekly for 4 weeks, then if tolerating increase to 2.4 mg weekly thereafter.     -Each Wegovy pen is a once weekly single-dose prefilled pen with a pen injector already built within the pen. Discard the Wegovy pen after use in sharps container.     2. Storage: make sure that when you get the prescription that you store the prescription in the refrigerator until it is time to use the Wegovy pen.  Once it is time to use the Wegovy pen, you can keep the pen at room temperature and it is good for up to 28 days at room temperature.     3.  Potential common side effects: nausea, headache, diarrhea, stomach upset.  If these become unmanageable or concerning symptoms, please make sure to call or [employer] labshart.      Go to site: Wegovy video to learn more and watch instruction videos.      For any questions or concerns please send a UCWeb message to our team or call our weight management call center at [phone] during regular business hours. For questions during evenings or weekends your messages will be addressed during the next business day.  For emergencies please call 911 or seek immediate medical care.

## 2023-01-26 NOTE — ASSESSMENT & PLAN NOTE
Weight gain for the past 2 years with increase worsening of balance and short term memory. Has a history of breast cancer, treated with double mastectomy, oophorectomy, chemo and radiation from 3844-1581. Balance and memory loss are both side effects of radiation treatment. Previously has always been weight stable 150-160lbs, not struggling with weight gain most of her life. Has gained weight in her life due to pregnancies and chronic steroid use, but has always been able to lose weight easily. Is now struggling to lose weight. Weight today is around 190-195lbs, she is not sure.     Has an [employer]/live-in assistant that helps with food and house chores. Mayra chooses from a food menu that was created by the [employer] and  for best help. She seems to struggle with hunger at times. But unsure due to memory loss.     During the visit, Mayra's  was present to help with any memory loss concerns and any clarifications needed.     Discussed AOMs today. Mayra was interested in starting one today:   - Phentermine - contraindicated due to currently struggles with agitation at time with memory loss.   - Topriamate - contraindicated due to memory loss and concern for brain fog side effect   - Naltrexone - contraindicated due to currently struggling with balance concerns and concern for increase dizziness side effect   - GLP-1 to be started today to help with weight loss. Discussed side effects, risks, and benefits. Will monitor for any changes in GERD symptoms. Confirmed mother had papillary thyroid cancer. Will start Wegovy if covered. Consider ozempic or saxenda as needed.

## 2023-01-27 NOTE — TELEPHONE ENCOUNTER
Central Prior Authorization Team   Phone: [phone]      PA Initiation    Medication: Wegovy 0.25MG/0.5ML  Insurance Company: [facility] - Phone [phone] Fax [phone]  Pharmacy Filling the Rx: [id number] [place], [place] - [address]  Filling Pharmacy Phone: [phone]  Filling Pharmacy Fax:    Start Date: 1/27/2023

## 2023-01-31 NOTE — TELEPHONE ENCOUNTER
PRIOR AUTHORIZATION DENIED    Medication: Wegovy 0.25MG/0.5ML    Denial Date: 1/31/2023    Denial Rational:         Appeal Information:

## 2023-01-31 NOTE — TELEPHONE ENCOUNTER
As stated below, the PA has been denied. If an appeal is to be initiate please complete a appeal letter and I can fax it to the patient's insurance.    Thank You!    Geovani Cordova [facility] Pharmacy Liaison  [facility] [email or web address]  Phone: [phone]  Fax: [phone]

## 2023-02-01 NOTE — TELEPHONE ENCOUNTER
Left message with patient's , Remington, in regards to Wegovy. Informed him that Wegovy PA was denied and we have submitted an appeal. Will send update when we hear back from insurance. Gave call back number for further questions.

## 2023-02-01 NOTE — TELEPHONE ENCOUNTER
Medication Appeal Request    Please initiate an appeal for the requested medication: Wegovy 0.25MG/0.5ML    Has a letter of medical necessity been completed in EPIC?   yes    Any additional lab values/information to include?     Would you like to include any research articles?               If yes please include the hyperlink(s) below or fax to    [phone] for Specialty/Retail               [phone] for Infusion/Clinic Administered.                Include the patients name and MRN on the fax cover sheet.

## 2023-02-01 NOTE — TELEPHONE ENCOUNTER
Patient & her  calling regarding her Wegovy. Patients  would like to speak with someone from the care team.    [phone] okay to leave VM

## 2023-02-01 NOTE — TELEPHONE ENCOUNTER
MEDICATION APPEAL APPROVED    Medication: Wegovy 0.25MG/0.5ML  Authorization Effective Date: 01/01/2023   Authorization Expiration Date:  08/01/2023  Approved Dose/Quantity: 2 per 28 days  Reference #: [id number]   Insurance Company: [employer] - Phone [phone] Fax [phone]  Expected CoPay:       CoPay Card Available:      Foundation Assistance Needed:    Which Pharmacy is filling the prescription (Not needed for infusion/clinic administered): [id number] [address] AYAAN BAUER

## 2023-02-01 NOTE — TELEPHONE ENCOUNTER
Medication Appeal Initiation    We have initiated an appeal for the requested medication:  Medication: Wegovy 0.25MG/0.5ML  Appeal Start Date:  2/1/2023  Insurance Company: [employer] - Phone [phone] Fax [phone]  Comments:  Appeal Letter faxed to [phone]

## 2023-02-15 NOTE — TELEPHONE ENCOUNTER
Nurse Triage SBAR    Is this a 2nd Level Triage? YES, LICENSED PRACTITIONER REVIEW IS REQUIRED    Situation:   Patient is c/o vaginal irritation & soreness.    Background:   Patient has a hx of incontinence.    Assessment:   Patient reports itching on the outside of her vagina.  She denies that there is any redness or sore on her vagina.  The patient denies any burning or pain w/ urination.  The patient reports having odorous discharge, but reports that it is not new.      The patient reports changing out her diapers frequently & using Aquaphor for the irritation.    Protocol Recommended Disposition:   See in Office Today, See More Appropriate Protocol    Recommendation:   Per recommendation, it is advised that the patient be seen in office today; however, there are no available appts for PCP.      Please review & advise patient w/ further recommendations.  Please contact Jesse () [phone].    Routed to provider     Flor Garcia RN on 2/15/2023 at 10:03 AM    Does the patient meet one of the following criteria for ADS visit consideration? 16+ years old, with an [facility] PCP     TIP  Providers, please consider if this condition is appropriate for management at one of our Acute and Diagnostic Services sites.     If patient is a good candidate, please use dotphrase <dot>triageresponse and select Refer to ADS to document.      C/o vaginal soreness/ iritation cramp abd once in a while denies nausea or vomitting  Incontinence    aquaphor -     Hx cancer     Reason for Disposition    Itching or rash of external female genital area (vulva)    Patient wants to be seen    Additional Information    Negative: Sounds like a life-threatening emergency to the triager    Negative: Pain or burning with passing urine (urination) is main symptom    Negative: Pain or burning with passing urine (urination) is main symptom    Negative: Patient sounds very sick or weak to the triager    Negative: SEVERE pain (e.g.,  excruciating)    Negative: Genital area looks infected (e.g., draining sore, spreading redness)    Negative: Rash with painful tiny water blisters    Protocols used: VAGINAL SYMPTOMS-A-OH, VULVAR SYMPTOMS-A-OH

## 2023-02-16 NOTE — TELEPHONE ENCOUNTER
This is most likely a fungal infection in the setting of chronic diaper use.    We will send over a cream and if that does not improve it, patient should come in to be seen in person.

## 2023-02-16 NOTE — TELEPHONE ENCOUNTER
"Routing refill request to provider for review/approval because:  Labs not current:      Last Written Prescription Date:  5/8/22  Last Fill Quantity: 180,  # refills: 2   Last office visit provider:  7/27//22     Requested Prescriptions   Pending Prescriptions Disp Refills     mirabegron (MYRBETRIQ) 25 MG 24 hr tablet 180 tablet 2     Sig: Take 2 tablets (50 mg) by mouth daily       Beta 3 Adrenergic Agonists Failed - 2/14/2023 12:01 PM        Failed - Most recent eGFR greater than or equal to 30 within past 12 months     Recent Labs   Lab Test 01/25/22  1418 09/06/21  1228 12/05/19  1119   GFRESTIMATED 89   < > 84   GFRESTBLACK  --   --  >90    < > = values in this interval not displayed.             Passed - Most recent BP less than 140/90 on record     BP Readings from Last 3 Encounters:   10/20/22 110/74   10/13/22 110/74   09/26/22 120/72                 Passed - Recent or future visit with authorizing provider's specialty     Patient has had an office visit with the authorizing provider or a provider within the authorizing providers department within the previous 12 mos or has a future within next 30 days. See \"Patient Info\" tab in inbasket, or \"Choose Columns\" in Meds & Orders section of the refill encounter.              Passed - Medication is active on med list        Passed - Patient is of age 18 years or older        Passed - Patient is not pregnant        Passed - Patient has not had a positive pregnancy test within the past 12 months             Afua Metzger RN 02/16/23 9:07 AM  "

## 2023-02-24 NOTE — TELEPHONE ENCOUNTER
Semaglutide-Weight Management (WEGOVY) 0.25 MG/0.5ML SOAJ      Last Written Prescription Date:  1/25/23  Last Fill Quantity: 2ml,   # refills: 0  Last Office Visit : 1/25/23  Future Office visit:  3/14/23    Routing refill request to provider for review/approval because:  Semaglutide was ordered for 0.25 mg for 4 weeks, followed by 0.5 mg weekly. Should this order be discontinued?

## 2023-02-28 NOTE — TELEPHONE ENCOUNTER
Response received from provider:    Benita Ackerman PA-C  You 5 hours ago (8:46 AM)     MH  Yes, discontinue. She should  the 0.5mg dose next. Thank you         SEMAGLUTIDE - WEIGHT MANAGEMENT (WEGOVY) 0.25MG/0.5ML [place] [facility] requesting to discontinue

## 2023-03-07 NOTE — TELEPHONE ENCOUNTER
Pt's  calling, consent on file. Pt fell out of bed today and then again fell getting off toilet into the shower area later this morning. Pt has chronic stability issues and does tend to fall frequently. No head strike noted. Pt hit her back and her ribs are sore. No SOB or difficulty with deep breaths. Pt is ambulatory and has not noted any new SXs other than ribs being sore.    Advised HC and when to call back. Pt's  verbalized understanding.    Denice Cannon, RN, BSN  [facility] Nurse Advisor 11:48 AM 3/7/2023      Reason for Disposition    Back swelling, bruise or pain from direct blow to the back    Additional Information    Negative: Dangerous mechanism of injury (e.g., MVA, contact sports, trampoline, diving, fall > 10 feet or 3 meters)  (Exception: Back pain began > 1 hour after injury.)    Negative: Weakness (i.e., paralysis, loss of muscle strength) of the leg(s) or foot and sudden onset after back injury    Negative: Numbness (i.e., loss of sensation) of the leg(s) or foot and sudden onset after back injury    Negative: Major bleeding (actively dripping or spurting) that can't be stopped    Negative: Bullet, knife or other serious penetrating wound    Negative: Shock suspected (e.g., cold/pale/clammy skin, too weak to stand, low BP, rapid pulse)    Negative: Sounds like a life-threatening emergency to the triager    Negative: Back pain not from an injury    Negative: Back pain from overuse (work, exercise, gardening) OR from twisting, lifting, or bending injury    Negative: SEVERE pain in kidney area (flank) that follows a direct blow to that site    Negative: Blood in urine (red, pink, or tea-colored)    Negative: Unable to urinate (or only a few drops) > 4 hours and bladder feels very full (e.g., palpable bladder or strong urge to urinate)    Negative: Loss of bladder or bowel control (urine or bowel incontinence; wetting self, leaking stool) of new-onset    Negative: Numbness (loss  of sensation) in groin or rectal area    Negative: Skin is split open or gaping (length > 1/2 inch or 12 mm)    Negative: Puncture wound of back    Negative: Bleeding won't stop after 10 minutes of direct pressure (using correct technique)    Negative: Sounds like a serious injury to the triager    Negative: Weakness of a leg or foot (e.g., unable to bear weight, dragging foot)    Negative: Numbness of a leg or foot (i.e., loss of sensation)    Negative: SEVERE back pain (e.g., excruciating, unable to do any normal activities) and not improved after pain medicine and CARE ADVICE    Negative: Pain radiates into the thigh or further down the leg now    Negative: Landed hard on feet or buttocks and pain over spine    Negative: Large swelling or bruise and size > palm of person's hand    Negative: No prior tetanus shots (or is not fully vaccinated) and any wound (e.g., cut or scrape)    Negative: HIV positive or severe immunodeficiency (severely weak immune system) and DIRTY cut or scrape    Negative: Patient is confused or is an unreliable provider of information (e.g., dementia, severe intellectual disability, alcohol intoxication)    Negative: Patient wants to be seen    Negative: Last tetanus shot > 5 years ago and DIRTY cut or scrape    Negative: Last tetanus shot > 10 years ago and CLEAN cut or scrape    Negative: High-risk adult (e.g., age > 60 years, osteoporosis, chronic steroid use)    Negative: Suspicious history for the injury    Negative: Injury and pain has not improved after 3 days    Negative: Injury is still painful or swollen after 2 weeks    Protocols used: BACK INJURY-A-OH

## 2023-03-09 NOTE — TELEPHONE ENCOUNTER
Dr Enriquez,     Pt stopped in OX lobby this am, I did not talk to her.    Here is triage note:    Seen GYN (Dr. Enriquez) a couple weeks ago for yearly check      Had irritation and dryness in vaginal area  -told to use hydrocortizone cream  -just on outer area     Tonight it was very itchy and went to put some on, and there was something blocking vaginal opening     Still able to urinate  No pain   No fever     Triaged to a disposition of see HCP within 2 weeks. Patient sees Dr. Enriquez with [facility] for GYN. Transferred call to make an appointment with him.   
LM for pt to cb to see if she wants to come today at [address].    Abbie VILLANUEVA R.N.        
Pt aware.    Abbie VILLANUEVA R.N.        
Pt want to get in Friday to see Dr Enriquez. She spoke to triage and has a prolapse. Please call her back Friday morning.  
No

## 2023-03-14 NOTE — PROGRESS NOTES
Virtual Visit Check-In    During this virtual visit the patient is located in [place], patient verifies this as the location during the entirety of this visit.     Mayra is a 57 year old who is being evaluated via a billable video visit.      How would you like to obtain your AVS? MyChart  If the video visit is dropped, the invitation should be resent by: Text to cell phone: [phone]  Will anyone else be joining your video visit? No        Video-Visit Details    Originating Location (pt. Location): Home    Distant Location (provider location):  Off-site  Platform used for Video Visit: gBox         Video Start Time: 1:05PM  Video End Time:1:28PM    HIGINIO Barr            Return Medical Weight Management Note     Mayra Ramirez  MRN:  [id number]  :  1965  ZHANE:  3/14/2023    Dear Jojo Montoya DO,    I had the pleasure of seeing your patient Mayra Ramirez. She is a 57 year old female who I am continuing to see for treatment of obesity related to:       2022   I have the following health issues associated with obesity: Lymphedema   I have the following symptoms associated with obesity: Knee Pain, Back Pain       Assessment & Plan   Problem List Items Addressed This Visit        Other    Overweight (BMI 25.0-29.9)     Last seen 2023 for new MWM. Started on Wegovy. 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     Stated Wegovy with no concerns at first. At the 0.25mg dose had no side effects and found it helpful with hunger and portion sizes. With increase to 0.5mg dose,  and in home care noticed a worsening cognition and balance concerns. Has taken 2 doses total. Last week had an episode of falling out of bed, and then later that day fell in the shower. Reached out to PCP and has since recovered. Mayra does not believe her cognition or balance has worsened. Discussed how this could be due to some hypoglycemia. She has had a  decrease in food intake, but not significant per her . Will on average eat 2 small meals with a snack. Discussed trying to have 3 small meals. Will decrease back to 0.25mg to see if there is any improvement with cognition and balance.          Relevant Medications    Semaglutide-Weight Management (WEGOVY) 0.25 MG/0.5ML pen      1. Decrease Wegovy to 0.25mg once weekly. Continue to monitor for any changes in balance and cognition. If no change will stop medication and follow up with neurology.   2. Continue to eat 3 small meals a day   3. Follow up with Benita Mcmillan in 2 months       INTERVAL HISTORY:  Last seen for new MWM - 1/25/2023  Started Wegovy    Has lost 4lbs since first visit.     Anti-obesity medications:     Current:    Wegovy 0.5mg - has completed 2 doses. Had no side effects at the 0.25mg. With increase to 0.5mg dose,  and care team believe she has had decrease in memory and balance issues. Last week rolled off the bed, and then that same morning fell in the shower. She denies any side effects and does not believe that she is having increase in memory or balance. Decrease in hunger and portion sizes with both the 0.25mg and 0.5mg dose.     Recent diet changes: Decrease portion sizes and snacking. Has been eating small healthy meals. Has been eating 2 small meals a day. Still using menu method. Will have a few snacks throughout the day, with a meal at dinner. Possibly will also have lunch.         CURRENT WEIGHT:   193 lbs 0 oz    Initial Weight (lbs): 197 lbs  Last Visits Weight: 86.2 kg (190 lb)  Cumulative weight loss (lbs): 4  Weight Loss Percentage: 2.03%    Changes and Difficulties 3/14/2023   I have made the following changes to my diet since my last visit: Eating better than I have before.   With regards to my diet, I am still struggling with: Doing better.   I have made the following changes to my activity/exercise since my last visit: Not doing what I could do before.   With regards  "to my activity/exercise, I am still struggling with: Balance issues.         MEDICATIONS:   Current Outpatient Medications   Medication Sig Dispense Refill     Semaglutide-Weight Management (WEGOVY) 0.25 MG/0.5ML pen Inject 0.25 mg Subcutaneous once a week For 4 weeks 2 mL 1     ALOE VERA JUICE PO        CALCIUM + D OR None Entered       clotrimazole (LOTRIMIN) 1 % external cream Apply topically 2 times daily 85 g 0     denosumab (PROLIA) 60 MG/ML SOSY injection Inject 60 mg Subcutaneous       donepezil (ARICEPT) 5 MG tablet Take 5 mg by mouth       lifitegrast (XIIDRA) 5 % opthalmic solution Apply to eye every 12 hours       metroNIDAZOLE (METROGEL) 0.75 % vaginal gel Place 1 applicator (5 g) vaginally daily 70 g 0     mirabegron (MYRBETRIQ) 25 MG 24 hr tablet Take 2 tablets (50 mg) by mouth daily 180 tablet 2     Multiple Minerals-Vitamins (BONE DENSITY BUILDER PO)        NEW MED Take 3 capsules by mouth Brain restore       order for DME 2 Mastectomy Bras and 2 Prosthesis 2 Units 1     oxyquinoline-sodium lauryl sulfate (TRIMO-SAWANT) 0.025-0.01 % GEL vaginal gel Place 2 g vaginally continuous prn (for pessary change) 113.4 g 3     pantoprazole (PROTONIX) 20 MG EC tablet Take 40 mg by mouth daily       pantoprazole (PROTONIX) 40 MG EC tablet Take by mouth every 12 hours       romosozumab-aqqg (EVENITY) 105 MG/1.17ML SOSY injection        Semaglutide-Weight Management (WEGOVY) 0.5 MG/0.5ML SOAJ Inject 0.5 mg Subcutaneous once a week After completing 4 weeks of the 0.25mg dose 2 mL 1     vitamin D2 (ERGOCALCIFEROL) 61851 units (1250 mcg) capsule TAKE 1 CAPSULE BY MOUTH ONCE A WEEK 8 capsule 0       Weight Loss Medication History Reviewed With Patient 3/14/2023   Which weight loss medications are you currently taking on a regular basis?  Wegovy   Are you having any side effects from the weight loss medication that we have prescribed you? No             Objective    Ht 1.727 m (5' 8\")   Wt 87.5 kg (193 lb)   LMP  (LMP " Unknown)   BMI 29.35 kg/m             Vitals:  No vitals were obtained today due to virtual visit.    PHYSICAL EXAM:  GENERAL: Healthy, alert and no distress  EYES: Eyes grossly normal to inspection.  No discharge or erythema, or obvious scleral/conjunctival abnormalities.  RESP: No audible wheeze, cough, or visible cyanosis.  No visible retractions or increased work of breathing.    SKIN: Visible skin clear. No significant rash, abnormal pigmentation or lesions.  NEURO: Cranial nerves grossly intact.  Mentation and speech appropriate for age.  PSYCH: Mentation appears normal, affect normal/bright, judgement and insight intact, normal speech and appearance well-groomed.        Sincerely,    MARIELA WILSON PA-C      30 minutes spent on the date of the encounter doing chart review, history and exam, documentation and further activities per the note

## 2023-03-14 NOTE — LETTER
3/14/2023       [facility] [facility]  [address] [facility] [address]     Dear Colleague,    Thank you for referring your patient, Mayra Ramirez, to the [facility] [place] at [facility]. Please see a copy of my visit note below.    Virtual Visit Check-In    During this virtual visit the patient is located in [place], patient verifies this as the location during the entirety of this visit.     Mayra is a 57 year old who is being evaluated via a billable video visit.      How would you like to obtain your AVS? MyChart  If the video visit is dropped, the invitation should be resent by: Text to cell phone: [phone]  Will anyone else be joining your video visit? No        Video-Visit Details    Originating Location (pt. Location): Home    Distant Location (provider location):  Off-site  Platform used for Video Visit: [email or web address]         Video Start Time: 1:05PM  Video End Time:1:28PM    HIGINIO Barr            Return Medical Weight Management Note     Mayra Ramirez  MRN:  [id number]  :  1965  ZHANE:  3/14/2023    Dear Jojo Montoya DO,    I had the pleasure of seeing your patient Mayra Ramirez. She is a 57 year old female who I am continuing to see for treatment of obesity related to:       2022   I have the following health issues associated with obesity: Lymphedema   I have the following symptoms associated with obesity: Knee Pain, Back Pain       Assessment & Plan   Problem List Items Addressed This Visit        Other    Overweight (BMI 25.0-29.9)     Last seen 2023 for new MWM. Started on Wegovy. 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     Stated Wegovy with no concerns at first. At the 0.25mg dose had no side effects and found it helpful with hunger and portion sizes. With increase to 0.5mg dose,  and in home care noticed a  worsening cognition and balance concerns. Has taken 2 doses total. Last week had an episode of falling out of bed, and then later that day fell in the shower. Reached out to PCP and has since recovered. Mayra does not believe her cognition or balance has worsened. Discussed how this could be due to some hypoglycemia. She has had a decrease in food intake, but not significant per her . Will on average eat 2 small meals with a snack. Discussed trying to have 3 small meals. Will decrease back to 0.25mg to see if there is any improvement with cognition and balance.          Relevant Medications    Semaglutide-Weight Management (WEGOVY) 0.25 MG/0.5ML pen      1. Decrease Wegovy to 0.25mg once weekly. Continue to monitor for any changes in balance and cognition. If no change will stop medication and follow up with neurology.   2. Continue to eat 3 small meals a day   3. Follow up with Benita Mcmillan in 2 months       INTERVAL HISTORY:  Last seen for new [facility] - 1/25/2023  Started Wegovy    Has lost 4lbs since first visit.     Anti-obesity medications:     Current:    Wegovy 0.5mg - has completed 2 doses. Had no side effects at the 0.25mg. With increase to 0.5mg dose,  and care team believe she has had decrease in memory and balance issues. Last week rolled off the bed, and then that same morning fell in the shower. She denies any side effects and does not believe that she is having increase in memory or balance. Decrease in hunger and portion sizes with both the 0.25mg and 0.5mg dose.     Recent diet changes: Decrease portion sizes and snacking. Has been eating small healthy meals. Has been eating 2 small meals a day. Still using menu method. Will have a few snacks throughout the day, with a meal at dinner. Possibly will also have lunch.         CURRENT WEIGHT:   193 lbs 0 oz    Initial Weight (lbs): 197 lbs  Last Visits Weight: 86.2 kg (190 lb)  Cumulative weight loss (lbs): 4  Weight Loss Percentage:  2.03%    Changes and Difficulties 3/14/2023   I have made the following changes to my diet since my last visit: Eating better than I have before.   With regards to my diet, I am still struggling with: Doing better.   I have made the following changes to my activity/exercise since my last visit: Not doing what I could do before.   With regards to my activity/exercise, I am still struggling with: Balance issues.         MEDICATIONS:   Current Outpatient Medications   Medication Sig Dispense Refill     Semaglutide-Weight Management (WEGOVY) 0.25 MG/0.5ML pen Inject 0.25 mg Subcutaneous once a week For 4 weeks 2 mL 1     ALOE VERA JUICE PO        CALCIUM + D OR None Entered       clotrimazole (LOTRIMIN) 1 % external cream Apply topically 2 times daily 85 g 0     denosumab (PROLIA) 60 MG/ML SOSY injection Inject 60 mg Subcutaneous       donepezil (ARICEPT) 5 MG tablet Take 5 mg by mouth       lifitegrast (XIIDRA) 5 % opthalmic solution Apply to eye every 12 hours       metroNIDAZOLE (METROGEL) 0.75 % vaginal gel Place 1 applicator (5 g) vaginally daily 70 g 0     mirabegron (MYRBETRIQ) 25 MG 24 hr tablet Take 2 tablets (50 mg) by mouth daily 180 tablet 2     Multiple Minerals-Vitamins (BONE DENSITY BUILDER PO)        NEW MED Take 3 capsules by mouth Brain restore       order for DME 2 Mastectomy Bras and 2 Prosthesis 2 Units 1     oxyquinoline-sodium lauryl sulfate (TRIMO-SAWANT) 0.025-0.01 % GEL vaginal gel Place 2 g vaginally continuous prn (for pessary change) 113.4 g 3     pantoprazole (PROTONIX) 20 MG EC tablet Take 40 mg by mouth daily       pantoprazole (PROTONIX) 40 MG EC tablet Take by mouth every 12 hours       romosozumab-aqqg (EVENITY) 105 MG/1.17ML SOSY injection        Semaglutide-Weight Management (WEGOVY) 0.5 MG/0.5ML SOAJ Inject 0.5 mg Subcutaneous once a week After completing 4 weeks of the 0.25mg dose 2 mL 1     vitamin D2 (ERGOCALCIFEROL) 83884 units (1250 mcg) capsule TAKE 1 CAPSULE BY MOUTH ONCE A WEEK  "8 capsule 0       Weight Loss Medication History Reviewed With Patient 3/14/2023   Which weight loss medications are you currently taking on a regular basis?  Wegovy   Are you having any side effects from the weight loss medication that we have prescribed you? No             Objective     Ht 1.727 m (5' 8\")   Wt 87.5 kg (193 lb)   LMP  (LMP Unknown)   BMI 29.35 kg/m             Vitals:  No vitals were obtained today due to virtual visit.    PHYSICAL EXAM:  GENERAL: Healthy, alert and no distress  EYES: Eyes grossly normal to inspection.  No discharge or erythema, or obvious scleral/conjunctival abnormalities.  RESP: No audible wheeze, cough, or visible cyanosis.  No visible retractions or increased work of breathing.    SKIN: Visible skin clear. No significant rash, abnormal pigmentation or lesions.  NEURO: Cranial nerves grossly intact.  Mentation and speech appropriate for age.  PSYCH: Mentation appears normal, affect normal/bright, judgement and insight intact, normal speech and appearance well-groomed.        Sincerely,    MARIELA WILSON PA-C      30 minutes spent on the date of the encounter doing chart review, history and exam, documentation and further activities per the note    "

## 2023-03-15 NOTE — PATIENT INSTRUCTIONS
"Thank you for allowing us the privilege of caring for you. We hope we provided you with the excellent service you deserve.   Please let us know if there is anything else we can do for you so that we can be sure you are completely satisfied with your care experience.    To ensure the quality of our services you may be receiving a patient satisfaction survey from an independent patient satisfaction monitoring company.    The greatest compliment you can give is a \"Likely to Recommend\"    Your visit was with MARIELA WELLINGTON NADINE WILSON today.    Instructions per today's visit:     Jaydon Ramirez, it was great to visit with you today.  Here is a review of our visit.  If our clinic scheduler is not able to reach you please call [phone] to schedule your next appointments.    1. Decrease Wegovy to 0.25mg once weekly. If no improvement with balance and cognition please reach out.   2. Continue to eat 3 small meals a day   3. Follow up with Mariela Mcmillan in 2 months       Information about Video Visits with [facility]: video visit information  _________________________________________________________________________________________________________________________________________________________  If you are asked by your clinic team to have your blood pressure checked:  [facility] do offer several locations for blood pressure checks. Please follow the below link to schedule an appointment. Scheduling an appointment at the pharmacy for a blood pressure check is now preferred.    Appointment Plus (appointment-plus.[facility])  _________________________________________________________________________________________________________________________________________________________  Important contact and scheduling information:  Please call our contact center at [phone] to schedule your next appointments.  To find a lab location near you, please call [phone].  For any nursing questions or concerns call Yenifer Bolton " DIONE at [phone] or Gisselle Martell RN at [phone]  Please call during clinic hours Monday through Friday 8:00a - 4:00p if you have questions or you can contact us via [employer] at anytime and we will reply during clinic hours.    Lab results will be communicated through My Chart or letter (if My Chart not used). Please call the clinic if you have not received communication after 1 week or if you have any questions.?  Clinic Fax: [phone]    _________________________________________________________________________________________________________________________________________________________  Meal Replacement Products:    Here is the link to our new e-store where you can purchase our meal replacement products    [facility] E-Store  [email or web address]    The one week starter kit is a great way to sample a variety of products and see what works for you.    If you want more information about the product go to: [employer]ireno    If you are an employee or [facility] or [facility] please contact your care team for a 10% estore discount    Free Shipping for orders over $75     Benefits of meal replacements products:    Portion and calorie control  Improved nutrition  Structured eating  Simplified food choices  Avoid contact with trigger foods  _________________________________________________________________________________________________________________________________________________________  Interested in working with a health ?  Health coaches work with you to improve your overall health and wellbeing.  They look at the whole person, and may involve discussion of different areas of life, including, but not limited to the four pillars of health (sleep, exercise, nutrition, and stress management). Discuss with your care team if you would like to start working a health .  Health Coaching-3 Pack: Schedule by calling [phone]    $99 for  three health coaching visits    Visits may be done in person or via phone    Coaching is a partnership between the  and the client; Coaches do not prescribe or diagnose    Coaching helps inspire the client to reach his/her personal goals   _________________________________________________________________________________________________________________________________________________________  24 Week Healthy Lifestyle Plan:    Our mission in the 24-week Healthy Lifestyle Plan is to provide you with individualized care by giving you the tools, education and support you need to lose weight and maintain a healthy lifestyle. In your 24-week journey, you ll be supported by a dedicated weight loss team that includes registered dietitians, medical weight management providers, health coaches, and nurses -- all with special expertise in weight loss -- to help you every step of the way.     Monthly meetings with your registered dietician or medical weight management provider help to review your progress, update your care plan, and make any adjustments needed to ensure success. Between these visits, weekly and bi-weekly health  visits will help you focus on the four pillars of weight loss -- stress, sleep, nutrition, and exercise -- and how you can best adapt each to achieve sustainable weight loss results.    In addition, you will be given exclusive access to online wellbeing classes through [employer].  Your initial visit will be with a medical weight management provider who will help to understand your weight loss goals and ensure this program is the right fit for you. Please let our team know if you are interested in the 24 week plan by sending a message to your care team or calling [phone] to schedule.  _________________________________________________________________________________________________________________________________________________________    COMPREHENSIVE WEIGHT MANAGEMENT PROGRAM  VIRTUAL  "SUPPORT GROUPS    For Support Group Information:      We offer support groups for patients who are working on weight loss and considering, preparing for or have had weight loss surgery.   There is no cost for this opportunity.  You are invited to attend the?Virtual Support Groups?provided by any of the following locations:    [facility] via Microsoft Teams with Lesley Fu RN  2.   [place] via [employer] with Kg Arora, PhD, LP  3.   [place] via [employer] with Alley Crockett RN  4.   [facility] via [employer] Teams with Alley Mcdonough [facility]    The following Support Group information can also be found on our website:  [email or web address]    [facility] Weight Loss Surgery Support Group    [facility] Weight Loss Surgery Support Group  The support group is a patient-lead forum that meets monthly to share experiences, encouragement and education. It is open to those who have had weight loss surgery, are scheduled for surgery, and those who are considering surgery.   WHEN: This group meets on the 3rd Wednesday of each month from 5:00PM - 6:00PM virtually using Microsoft Teams.   FACILITATOR: Led by Lesley Fu, JONATHAN, VIK, RN, the program's Clinical Coordinator.   TO REGISTER: Please contact the clinic via [employer] or call the nurse line directly at [phone] to inform our staff that you would like an invite sent to you and to let us know the email you would like the invite sent to. Prior to the meeting, a link with directions on how to join the meeting will be sent to you.    2022 Meetings  Josseline 15: \"Let's Talk\" a time for the group to share.  July 20: \"Let's Talk\" a time for the group to share.  August 17: \"Let's Talk\" a time for the group to share.  September 21: \"Let's Talk\" a time for the group to share.  October 19: Guest Speaker: Dr Jeyson Weaver MD Pulmonologist and Sleep Medicine Physician, \"Getting a Good " "Night's Sleep\".  November 16: \"Let's Talk\" a time for the group to share.  December 21: \"Let's Talk\" a time for the group to share.    [facility] Support Groups    Connections: Bariatric Care Support Group?  This is open to all [facility] (and those external to this program) pre- and post- operative bariatric surgery patients as well as their support system.   WHEN: This group meets the 2nd Tuesday of each month from 6:30 PM - 8:00 PM virtually using Microsoft Teams.   FACILITATOR: Led by Kg Arora, Ph.D who is a Licensed Psychologist with the [facility] Comprehensive Weight Management Program.   TO REGISTER: Please send an email to Kg Arora, Ph.D., LP at?[email or web address]?if you would like an invitation to the group and to learn about using Microsoft Teams.    2022 Meetings  June 14: Flori Hayden RD, LD at [facility], \"Nutritional Labeling\"  July 12 August 2 (Please Note Date Change)  September 13 October 11 November 8 December 13    Connections: Post-Operative Bariatric Surgery Support Group  This is a support group for [facility] bariatric patients (and those external to [facility]) who have had bariatric surgery and are at least 3 months post-surgery.  WHEN: This support group meets the 4th Wednesday of the month from 11:00 AM - 12:00 PM virtually using Microsoft Teams.   FACILITATOR: Led by Certified Bariatric Nurse, Alley Crockett RN.   TO REGISTER: Please send an email to Alley at [email or web address] if you would like an invitation to the group and to learn about using Microsoft Teams.    2022 Meetings June 22 July 27 August 24 September 28 October 26 November 23 December 28      [facility] Healthy Lifestyle Virtual Support Group    Healthy Lifestyle Virtual Support Group?  This is 60 minutes of small group guided discussion, support and resources. " "All are welcome who want a healthy lifestyle.  WHEN: This group meets monthly on a Friday from 12:30 PM - 1:30 PM virtually using Microsoft Teams.   FACILITATOR: Led by National Board Certified Health and , Alley Mcdonough [facility].   TO REGISTER: Please send an email to Alley [email or web address] to receive monthly invites to the group or if you have any questions about having a health .  Prior to the meeting, a link with directions on how to join the meeting will be sent to you.    2022 Meetings  June 24: Alley Mcdonough [facility], \"Setting Limits and Boundaries\".  Jul 29: Open Forum  August 26: Guest Speaker: Adalgisa Gardner Registered Dietitian  September 30: Open Forum  October 28th: Guest Speaker: Gracy Stephen [facility], Health , \"Gratitude Practices\".  November 18: Guest Speaker: Tita Colin RD Registered Dietitian, \"Navigating How to Eat around the Holidays\".  December 16: Guest Speaker: Dulce Brower [facility], \"Changing Your Relationship with Movement\".    ____________________________________________________________________________________________________________________________________________________________________________  [facility] Get Moving Program  Our team of physical therapists is trained to help you understand and take control of your condition. They will perform a thorough evaluation to determine your ability for activity and develop a customized plan to fit your goals and physical ability.  Scheduling: Unsure if the Get Moving program is right for you? Discuss the program with your medical provider or diabetes educator. You can also call us at [phone] to ask questions or schedule an appointment.   [facility] Get Moving Program  ____________________________________________________________________________________________________________________________________________________________________________  [facility] Diabetes Prevention Program (DPP)  If " you have prediabetes and Medicare please contact us via [employer] to learn more about the Diabetes Prevention Program (DPP)  Program Details:  [facility] offers the year-long Diabetes Prevention Program (DPP). The program helps you to make lifestyle changes that prevent or delay type 2 diabetes by supporting healthy eating, increased physical activity, stress reduction and use of coping skills.   On average, previous [facility] DPP cohorts have lost and maintained at least 5% of their starting weight throughout the program and averaged more than 150 minutes of physical activity per week.  Participants meet weekly for one-hour group sessions over sixteen weeks, every other week for the next 8 weeks, and monthly for the last six months.   A year-long maintenance program is also available for participants who complete the first year.   Location & Cost:   During the COVID-19 Public Health Emergency, the program is offered virtually. When in-person classes can resume, they will be held at [facility].  For people with Medicare, the program is covered in full. A self-pay option will also be available for those with non-Medicare insurance plans.   _________________________________________________________________________________________________________________________________________________________  Bluetooth Scale:    We hope to provide you with high quality virtual healthcare visits while social distancing for COVID-19 is necessary, as well as in the future when virtual visits may be more convenient for you.     Our technology team made it possible for Bluetooth scales to send weight measurements to our electronic medical record. This allows weights from you weighing at home to securely flow into the medical record, which will improve telephone and virtual visits.   Additionally, studies have shown that adults actually lose more weight when their weights are automatically sent  to someone else, and also that this process is not stressful for those adults.    Below is a link for purchasing the scale, with a discount code for our patients. You may call your insurance company to see if they will reimburse you for the cost of the scale, as a piece of durable medical equipment. The scales only go up to a weight of 400 pounds. This is an issue and we are working with the developer on increasing this. We found no scales that go over 400lb that have blue-tooth for connecting to [employer].    Scale to purchase: the QSecure  Body  Scale: [email or web address]/shop?[email or web address]=aw.ds    Discount Code: We have a discount code for our patients to bring the cost down to $50, Discount code is: [place]_Scale_20%off  _______________________________________________________________________________________________________________________________________________________________________________    To work with a Behavioral Health Psychologist:    Call to schedule:    Tan Thompson - [phone]  Rehan Bernardo - [phone]  Jane Bales - [phone]  Jessika Massey - [phone]   Iris Gutierres PhD (cannot accept Medicare) [phone]        Thank you,   [facility] Comprehensive Weight Management Team

## 2023-03-15 NOTE — ASSESSMENT & PLAN NOTE
Last seen 1/25/2023 for new MWM. Started on Wegovy. 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     Stated Wegovy with no concerns at first. At the 0.25mg dose had no side effects and found it helpful with hunger and portion sizes. With increase to 0.5mg dose,  and in home care noticed a worsening cognition and balance concerns. Has taken 2 doses total. Last week had an episode of falling out of bed, and then later that day fell in the shower. Reached out to PCP and has since recovered. Mayra does not believe her cognition or balance has worsened. Discussed how this could be due to some hypoglycemia. She has had a decrease in food intake, but not significant per her . Will on average eat 2 small meals with a snack. Discussed trying to have 3 small meals. Will decrease back to 0.25mg to see if there is any improvement with cognition and balance.

## 2023-04-25 NOTE — NURSING NOTE
"Chief Complaint   Patient presents with     Urinary Problem     Vaginal irritation       Initial /82   LMP  (LMP Unknown)  Estimated body mass index is 29.35 kg/m  as calculated from the following:    Height as of 3/14/23: 1.727 m (5' 8\").    Weight as of 3/14/23: 87.5 kg (193 lb).  BP completed using cuff size: regular    Questioned patient about current smoking habits.  Pt. has never smoked.          The following HM Due: NONE      The following patient reported/Care Every where data was sent to:  P ABSTRACT QUALITY INITIATIVES [[id number]]        Vanessa Willingham CMA                 "
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
## 2023-04-25 NOTE — PATIENT INSTRUCTIONS
You can reach your [facility] Team any time of the day by calling [phone]. This number will put you in touch with the 24 hour nurse line if the clinic is closed.    To contact your OB/GYN Station Coordinator/Surgery Scheduler please call [phone]. This is a direct number for your care team between 8 a.m. and 4 p.m. Monday through Friday.    [facility] is open for your convenience:  Monday through Friday 8 a.m. to 6 p.m.  Closed weekends and all major holidays.

## 2023-04-29 NOTE — RESULT ENCOUNTER NOTE
Hello,    Please obtain OV notes from [address] [place] [address] so I can attach to the referral for authorization.     Thank you  Kervin Alicia Mayra, the results of your urinalysis and wet prep are negative.  The urine culture result was also negative.  Please let me know if your symptoms have not abated.    Bryson Enriquez M.D.

## 2023-04-29 NOTE — RESULT ENCOUNTER NOTE
Would you please call the patient on Monday to inquire how her symptoms of vaginal and vulvar irritation are doing  Thank you for your help with this  Bryson Enriquez M.D.

## 2023-05-17 NOTE — TELEPHONE ENCOUNTER
I left a voicemail message for the patient's  Remington inquiring as to the wellbeing of his wife Mayra Ramirez.  Earlier report states that there pursuing hospice care for Mayra because of deterioration in her condition.  I called to offer my condolences of Mayra is a long-term patient of mine.  I also discussed the potential of using vaginal estrogen if her vaginal burning is a major bother to her.  I have asked to have him call back with any questions that he may have

## 2023-06-01 NOTE — TELEPHONE ENCOUNTER
Order/Referral Request    Who is requesting: Spouse, Jesse    Orders being requested: Genetic Cancer Screening Test    Reason service is needed/diagnosis: Patient has cancer, and seeking the genetic testing to see if daughters will have the gene    When are orders needed by: at earliest convenience.     Has this been discussed with Provider: Unknown to Writer    Does patient have an appointment scheduled?: No    Where to send orders: Place orders within Epic    [facility] or would you prefer to receive a phone call?:   No preference   Okay to leave a detailed message?: Yes at Other phone number:  [phone] Remington's [employer] phone.

## 2023-06-08 ENCOUNTER — PATIENT OUTREACH (OUTPATIENT)
Dept: ONCOLOGY | Facility: CLINIC | Age: 58
End: 2023-06-08
Payer: COMMERCIAL

## 2023-06-09 NOTE — PROGRESS NOTES
New Patient Oncology Nurse Navigator Note     Referring provider: Jojo Montoya DO     Referring Clinic/Organization: [facility]     Referred to (specialty:) Genetic Counseling      Date Referral Received: June 8, 2023     Evaluation for:  Z85.89 (ICD-10-CM) - History of cancer metastatic to brain     Writer received referral, reviewed for appropriate plan, and referral transferred to New Patient Scheduling for completion.
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
